# Patient Record
Sex: MALE | Race: WHITE | NOT HISPANIC OR LATINO | Employment: OTHER | URBAN - METROPOLITAN AREA
[De-identification: names, ages, dates, MRNs, and addresses within clinical notes are randomized per-mention and may not be internally consistent; named-entity substitution may affect disease eponyms.]

---

## 2018-06-08 ENCOUNTER — TRANSCRIBE ORDERS (OUTPATIENT)
Dept: ADMINISTRATIVE | Facility: HOSPITAL | Age: 68
End: 2018-06-08

## 2018-06-08 DIAGNOSIS — R60.9 EDEMA, UNSPECIFIED TYPE: Primary | ICD-10-CM

## 2018-06-12 ENCOUNTER — HOSPITAL ENCOUNTER (OUTPATIENT)
Dept: ULTRASOUND IMAGING | Facility: HOSPITAL | Age: 68
Discharge: HOME | End: 2018-06-12
Admitting: PHYSICIAN ASSISTANT

## 2018-06-12 DIAGNOSIS — R60.9 EDEMA, UNSPECIFIED TYPE: ICD-10-CM

## 2018-06-12 PROCEDURE — 76700 US EXAM ABDOM COMPLETE: CPT

## 2018-09-27 ENCOUNTER — TRANSCRIBE ORDERS (OUTPATIENT)
Dept: ADMINISTRATIVE | Facility: HOSPITAL | Age: 68
End: 2018-09-27

## 2018-09-27 DIAGNOSIS — J44.9 CHRONIC OBSTRUCTIVE PULMONARY DISEASE, UNSPECIFIED COPD TYPE (HCC): ICD-10-CM

## 2018-09-27 DIAGNOSIS — R06.09 DOE (DYSPNEA ON EXERTION): Primary | ICD-10-CM

## 2018-11-19 ENCOUNTER — APPOINTMENT (OUTPATIENT)
Dept: PULMONOLOGY | Facility: HOSPITAL | Age: 68
End: 2018-11-19
Attending: INTERNAL MEDICINE

## 2018-11-26 ENCOUNTER — HOSPITAL ENCOUNTER (OUTPATIENT)
Dept: PULMONOLOGY | Facility: HOSPITAL | Age: 68
Discharge: HOME | End: 2018-11-26
Attending: INTERNAL MEDICINE | Admitting: INTERNAL MEDICINE

## 2018-11-26 DIAGNOSIS — R06.09 DOE (DYSPNEA ON EXERTION): ICD-10-CM

## 2018-11-26 DIAGNOSIS — J44.9 CHRONIC OBSTRUCTIVE PULMONARY DISEASE, UNSPECIFIED COPD TYPE (HCC): ICD-10-CM

## 2018-11-26 PROCEDURE — 94060 EVALUATION OF WHEEZING: CPT

## 2018-11-26 PROCEDURE — 94726 PLETHYSMOGRAPHY LUNG VOLUMES: CPT

## 2018-11-26 PROCEDURE — 94729 DIFFUSING CAPACITY: CPT

## 2018-11-26 RX ORDER — ALBUTEROL SULFATE 2.5 MG/3ML
2.5 SOLUTION RESPIRATORY (INHALATION) ONCE
Status: COMPLETED | OUTPATIENT
Start: 2018-11-26 | End: 2018-11-26

## 2018-11-26 RX ADMIN — ALBUTEROL SULFATE 2.5 MG: 2.5 SOLUTION RESPIRATORY (INHALATION) at 09:26

## 2018-12-31 ENCOUNTER — TRANSCRIBE ORDERS (OUTPATIENT)
Dept: ADMINISTRATIVE | Facility: HOSPITAL | Age: 68
End: 2018-12-31

## 2018-12-31 DIAGNOSIS — F17.218 NICOTINE DEPENDENCE, CIGARETTES, WITH OTHER NICOTINE-INDUCED DISORDERS: Primary | ICD-10-CM

## 2018-12-31 DIAGNOSIS — Z12.2 ENCOUNTER FOR SCREENING FOR LUNG CANCER: ICD-10-CM

## 2019-01-04 ENCOUNTER — HOSPITAL ENCOUNTER (OUTPATIENT)
Dept: CT IMAGING | Facility: HOSPITAL | Age: 69
Discharge: HOME | End: 2019-01-04
Attending: INTERNAL MEDICINE | Admitting: INTERNAL MEDICINE

## 2019-01-04 DIAGNOSIS — Z12.2 ENCOUNTER FOR SCREENING FOR LUNG CANCER: ICD-10-CM

## 2019-01-04 DIAGNOSIS — F17.218 NICOTINE DEPENDENCE, CIGARETTES, WITH OTHER NICOTINE-INDUCED DISORDERS: ICD-10-CM

## 2019-01-04 PROCEDURE — G0297 LDCT FOR LUNG CA SCREEN: HCPCS

## 2019-03-28 ENCOUNTER — HOSPITAL ENCOUNTER (OUTPATIENT)
Facility: HOSPITAL | Age: 69
Discharge: HOME | End: 2019-03-30
Attending: EMERGENCY MEDICINE | Admitting: HOSPITALIST

## 2019-03-28 ENCOUNTER — APPOINTMENT (OUTPATIENT)
Dept: GENERAL RADIOLOGY | Facility: HOSPITAL | Age: 69
End: 2019-03-28

## 2019-03-28 DIAGNOSIS — J44.1 COPD WITH ACUTE EXACERBATION (HCC): Primary | ICD-10-CM

## 2019-03-28 DIAGNOSIS — J40 BRONCHITIS: ICD-10-CM

## 2019-03-28 LAB
ALBUMIN SERPL-MCNC: 4.5 G/DL (ref 3.5–5.2)
ALBUMIN/GLOB SERPL: 1.7 G/DL
ALP SERPL-CCNC: 59 U/L (ref 39–117)
ALT SERPL W P-5'-P-CCNC: 22 U/L (ref 1–41)
ANION GAP SERPL CALCULATED.3IONS-SCNC: 12.4 MMOL/L
AST SERPL-CCNC: 15 U/L (ref 1–40)
BASOPHILS # BLD AUTO: 0.09 10*3/MM3 (ref 0–0.2)
BASOPHILS NFR BLD AUTO: 1 % (ref 0–1.5)
BILIRUB SERPL-MCNC: 0.2 MG/DL (ref 0.2–1.2)
BUN BLD-MCNC: 8 MG/DL (ref 8–23)
BUN/CREAT SERPL: 12.5 (ref 7–25)
CALCIUM SPEC-SCNC: 9.2 MG/DL (ref 8.6–10.5)
CHLORIDE SERPL-SCNC: 97 MMOL/L (ref 98–107)
CO2 SERPL-SCNC: 27.6 MMOL/L (ref 22–29)
CREAT BLD-MCNC: 0.64 MG/DL (ref 0.76–1.27)
DEPRECATED RDW RBC AUTO: 44.9 FL (ref 37–54)
EOSINOPHIL # BLD AUTO: 0.15 10*3/MM3 (ref 0–0.4)
EOSINOPHIL NFR BLD AUTO: 1.6 % (ref 0.3–6.2)
ERYTHROCYTE [DISTWIDTH] IN BLOOD BY AUTOMATED COUNT: 12.5 % (ref 12.3–15.4)
FLUAV AG NPH QL: NEGATIVE
FLUBV AG NPH QL IA: NEGATIVE
GFR SERPL CREATININE-BSD FRML MDRD: 125 ML/MIN/1.73
GLOBULIN UR ELPH-MCNC: 2.6 GM/DL
GLUCOSE BLD-MCNC: 138 MG/DL (ref 65–99)
GLUCOSE BLDC GLUCOMTR-MCNC: 204 MG/DL (ref 70–130)
HCT VFR BLD AUTO: 45 % (ref 37.5–51)
HGB BLD-MCNC: 14.8 G/DL (ref 13–17.7)
IMM GRANULOCYTES # BLD AUTO: 0.06 10*3/MM3 (ref 0–0.05)
IMM GRANULOCYTES NFR BLD AUTO: 0.6 % (ref 0–0.5)
LYMPHOCYTES # BLD AUTO: 1.67 10*3/MM3 (ref 0.7–3.1)
LYMPHOCYTES NFR BLD AUTO: 17.8 % (ref 19.6–45.3)
MCH RBC QN AUTO: 32 PG (ref 26.6–33)
MCHC RBC AUTO-ENTMCNC: 32.9 G/DL (ref 31.5–35.7)
MCV RBC AUTO: 97.4 FL (ref 79–97)
MONOCYTES # BLD AUTO: 0.84 10*3/MM3 (ref 0.1–0.9)
MONOCYTES NFR BLD AUTO: 9 % (ref 5–12)
NEUTROPHILS # BLD AUTO: 6.56 10*3/MM3 (ref 1.4–7)
NEUTROPHILS NFR BLD AUTO: 70 % (ref 42.7–76)
NRBC BLD AUTO-RTO: 0 /100 WBC (ref 0–0)
NT-PROBNP SERPL-MCNC: 55.8 PG/ML (ref 5–900)
PLATELET # BLD AUTO: 207 10*3/MM3 (ref 140–450)
PMV BLD AUTO: 11.6 FL (ref 6–12)
POTASSIUM BLD-SCNC: 4.2 MMOL/L (ref 3.5–5.2)
PROT SERPL-MCNC: 7.1 G/DL (ref 6–8.5)
RBC # BLD AUTO: 4.62 10*6/MM3 (ref 4.14–5.8)
SODIUM BLD-SCNC: 137 MMOL/L (ref 136–145)
TROPONIN T SERPL-MCNC: <0.01 NG/ML (ref 0–0.03)
WBC NRBC COR # BLD: 9.37 10*3/MM3 (ref 3.4–10.8)

## 2019-03-28 PROCEDURE — 99284 EMERGENCY DEPT VISIT MOD MDM: CPT

## 2019-03-28 PROCEDURE — 83036 HEMOGLOBIN GLYCOSYLATED A1C: CPT | Performed by: HOSPITALIST

## 2019-03-28 PROCEDURE — 94640 AIRWAY INHALATION TREATMENT: CPT

## 2019-03-28 PROCEDURE — G0378 HOSPITAL OBSERVATION PER HR: HCPCS

## 2019-03-28 PROCEDURE — 25010000002 METHYLPREDNISOLONE PER 125 MG: Performed by: EMERGENCY MEDICINE

## 2019-03-28 PROCEDURE — 94799 UNLISTED PULMONARY SVC/PX: CPT

## 2019-03-28 PROCEDURE — 71046 X-RAY EXAM CHEST 2 VIEWS: CPT

## 2019-03-28 PROCEDURE — 87633 RESP VIRUS 12-25 TARGETS: CPT | Performed by: HOSPITALIST

## 2019-03-28 PROCEDURE — 99219 PR INITIAL OBSERVATION CARE/DAY 50 MINUTES: CPT | Performed by: HOSPITALIST

## 2019-03-28 PROCEDURE — 99285 EMERGENCY DEPT VISIT HI MDM: CPT

## 2019-03-28 PROCEDURE — 85025 COMPLETE CBC W/AUTO DIFF WBC: CPT | Performed by: EMERGENCY MEDICINE

## 2019-03-28 PROCEDURE — 87581 M.PNEUMON DNA AMP PROBE: CPT | Performed by: HOSPITALIST

## 2019-03-28 PROCEDURE — 93005 ELECTROCARDIOGRAM TRACING: CPT | Performed by: EMERGENCY MEDICINE

## 2019-03-28 PROCEDURE — 96374 THER/PROPH/DIAG INJ IV PUSH: CPT

## 2019-03-28 PROCEDURE — 96375 TX/PRO/DX INJ NEW DRUG ADDON: CPT

## 2019-03-28 PROCEDURE — 80053 COMPREHEN METABOLIC PANEL: CPT | Performed by: EMERGENCY MEDICINE

## 2019-03-28 PROCEDURE — 87798 DETECT AGENT NOS DNA AMP: CPT | Performed by: HOSPITALIST

## 2019-03-28 PROCEDURE — 87804 INFLUENZA ASSAY W/OPTIC: CPT | Performed by: EMERGENCY MEDICINE

## 2019-03-28 PROCEDURE — 82962 GLUCOSE BLOOD TEST: CPT

## 2019-03-28 PROCEDURE — 87486 CHLMYD PNEUM DNA AMP PROBE: CPT | Performed by: HOSPITALIST

## 2019-03-28 PROCEDURE — 84484 ASSAY OF TROPONIN QUANT: CPT | Performed by: EMERGENCY MEDICINE

## 2019-03-28 PROCEDURE — 83880 ASSAY OF NATRIURETIC PEPTIDE: CPT | Performed by: EMERGENCY MEDICINE

## 2019-03-28 PROCEDURE — 99284 EMERGENCY DEPT VISIT MOD MDM: CPT | Performed by: EMERGENCY MEDICINE

## 2019-03-28 PROCEDURE — 93010 ELECTROCARDIOGRAM REPORT: CPT | Performed by: INTERNAL MEDICINE

## 2019-03-28 RX ORDER — ALBUTEROL SULFATE 2.5 MG/3ML
2.5 SOLUTION RESPIRATORY (INHALATION) ONCE
Status: COMPLETED | OUTPATIENT
Start: 2019-03-28 | End: 2019-03-28

## 2019-03-28 RX ORDER — METHYLPREDNISOLONE SODIUM SUCCINATE 125 MG/2ML
125 INJECTION, POWDER, LYOPHILIZED, FOR SOLUTION INTRAMUSCULAR; INTRAVENOUS ONCE
Status: COMPLETED | OUTPATIENT
Start: 2019-03-28 | End: 2019-03-28

## 2019-03-28 RX ORDER — NICOTINE 21 MG/24HR
PATCH, TRANSDERMAL 24 HOURS TRANSDERMAL
Status: COMPLETED
Start: 2019-03-28 | End: 2019-03-30

## 2019-03-28 RX ORDER — IPRATROPIUM BROMIDE AND ALBUTEROL SULFATE 2.5; .5 MG/3ML; MG/3ML
3 SOLUTION RESPIRATORY (INHALATION) ONCE
Status: COMPLETED | OUTPATIENT
Start: 2019-03-28 | End: 2019-03-28

## 2019-03-28 RX ORDER — SODIUM CHLORIDE 9 MG/ML
40 INJECTION, SOLUTION INTRAVENOUS AS NEEDED
Status: DISCONTINUED | OUTPATIENT
Start: 2019-03-28 | End: 2019-03-30 | Stop reason: HOSPADM

## 2019-03-28 RX ORDER — NICOTINE 21 MG/24HR
1 PATCH, TRANSDERMAL 24 HOURS TRANSDERMAL ONCE
Status: COMPLETED | OUTPATIENT
Start: 2019-03-29 | End: 2019-03-30

## 2019-03-28 RX ORDER — DEXTROSE MONOHYDRATE 25 G/50ML
25 INJECTION, SOLUTION INTRAVENOUS
Status: DISCONTINUED | OUTPATIENT
Start: 2019-03-28 | End: 2019-03-30 | Stop reason: HOSPADM

## 2019-03-28 RX ORDER — BUDESONIDE 0.5 MG/2ML
0.5 INHALANT ORAL
Status: DISCONTINUED | OUTPATIENT
Start: 2019-03-28 | End: 2019-03-30 | Stop reason: HOSPADM

## 2019-03-28 RX ORDER — SODIUM CHLORIDE 0.9 % (FLUSH) 0.9 %
3-10 SYRINGE (ML) INJECTION AS NEEDED
Status: DISCONTINUED | OUTPATIENT
Start: 2019-03-28 | End: 2019-03-30 | Stop reason: HOSPADM

## 2019-03-28 RX ORDER — SODIUM CHLORIDE 0.9 % (FLUSH) 0.9 %
3 SYRINGE (ML) INJECTION EVERY 12 HOURS SCHEDULED
Status: DISCONTINUED | OUTPATIENT
Start: 2019-03-28 | End: 2019-03-30 | Stop reason: HOSPADM

## 2019-03-28 RX ORDER — METHYLPREDNISOLONE SODIUM SUCCINATE 125 MG/2ML
60 INJECTION, POWDER, LYOPHILIZED, FOR SOLUTION INTRAMUSCULAR; INTRAVENOUS EVERY 8 HOURS
Status: DISCONTINUED | OUTPATIENT
Start: 2019-03-28 | End: 2019-03-29

## 2019-03-28 RX ORDER — NICOTINE POLACRILEX 4 MG
15 LOZENGE BUCCAL
Status: DISCONTINUED | OUTPATIENT
Start: 2019-03-28 | End: 2019-03-30 | Stop reason: HOSPADM

## 2019-03-28 RX ORDER — SODIUM CHLORIDE 9 MG/ML
50 INJECTION, SOLUTION INTRAVENOUS CONTINUOUS
Status: DISCONTINUED | OUTPATIENT
Start: 2019-03-28 | End: 2019-03-29

## 2019-03-28 RX ORDER — ACETAMINOPHEN 325 MG/1
650 TABLET ORAL EVERY 4 HOURS PRN
Status: DISCONTINUED | OUTPATIENT
Start: 2019-03-28 | End: 2019-03-30 | Stop reason: HOSPADM

## 2019-03-28 RX ORDER — NICOTINE 21 MG/24HR
1 PATCH, TRANSDERMAL 24 HOURS TRANSDERMAL EVERY 24 HOURS
Status: DISCONTINUED | OUTPATIENT
Start: 2019-03-28 | End: 2019-03-30 | Stop reason: HOSPADM

## 2019-03-28 RX ORDER — SODIUM CHLORIDE 9 MG/ML
INJECTION, SOLUTION INTRAVENOUS
Status: DISPENSED
Start: 2019-03-28 | End: 2019-03-29

## 2019-03-28 RX ORDER — ALBUTEROL SULFATE 2.5 MG/3ML
2.5 SOLUTION RESPIRATORY (INHALATION)
Status: DISCONTINUED | OUTPATIENT
Start: 2019-03-28 | End: 2019-03-30 | Stop reason: HOSPADM

## 2019-03-28 RX ORDER — AZITHROMYCIN 500 MG/1
INJECTION, POWDER, LYOPHILIZED, FOR SOLUTION INTRAVENOUS
Status: DISPENSED
Start: 2019-03-28 | End: 2019-03-29

## 2019-03-28 RX ADMIN — ALBUTEROL SULFATE 2.5 MG: 2.5 SOLUTION RESPIRATORY (INHALATION) at 23:36

## 2019-03-28 RX ADMIN — IPRATROPIUM BROMIDE AND ALBUTEROL SULFATE 3 ML: .5; 3 SOLUTION RESPIRATORY (INHALATION) at 19:38

## 2019-03-28 RX ADMIN — ALBUTEROL SULFATE 2.5 MG: 2.5 SOLUTION RESPIRATORY (INHALATION) at 19:41

## 2019-03-28 RX ADMIN — ALBUTEROL SULFATE 2.5 MG: 2.5 SOLUTION RESPIRATORY (INHALATION) at 20:14

## 2019-03-28 RX ADMIN — METHYLPREDNISOLONE SODIUM SUCCINATE 125 MG: 125 INJECTION, POWDER, FOR SOLUTION INTRAMUSCULAR; INTRAVENOUS at 19:46

## 2019-03-28 RX ADMIN — BUDESONIDE 0.5 MG: 0.5 SUSPENSION RESPIRATORY (INHALATION) at 23:36

## 2019-03-29 PROBLEM — J96.21 ACUTE ON CHRONIC RESPIRATORY FAILURE WITH HYPOXIA: Status: ACTIVE | Noted: 2019-03-29

## 2019-03-29 LAB
B PARAPERT DNA SPEC QL NAA+PROBE: NOT DETECTED
B PERT DNA SPEC QL NAA+PROBE: NOT DETECTED
C PNEUM DNA NPH QL NAA+NON-PROBE: NOT DETECTED
FLUAV H1 2009 PAND RNA NPH QL NAA+PROBE: NOT DETECTED
FLUAV H1 HA GENE NPH QL NAA+PROBE: NOT DETECTED
FLUAV H3 RNA NPH QL NAA+PROBE: NOT DETECTED
FLUAV SUBTYP SPEC NAA+PROBE: NOT DETECTED
FLUBV RNA ISLT QL NAA+PROBE: NOT DETECTED
GLUCOSE BLDC GLUCOMTR-MCNC: 206 MG/DL (ref 70–130)
GLUCOSE BLDC GLUCOMTR-MCNC: 267 MG/DL (ref 70–130)
GLUCOSE BLDC GLUCOMTR-MCNC: 299 MG/DL (ref 70–130)
GLUCOSE BLDC GLUCOMTR-MCNC: 302 MG/DL (ref 70–130)
HADV DNA SPEC NAA+PROBE: NOT DETECTED
HBA1C MFR BLD: 9.6 % (ref 4.8–5.6)
HCOV 229E RNA SPEC QL NAA+PROBE: NOT DETECTED
HCOV HKU1 RNA SPEC QL NAA+PROBE: NOT DETECTED
HCOV NL63 RNA SPEC QL NAA+PROBE: NOT DETECTED
HCOV OC43 RNA SPEC QL NAA+PROBE: NOT DETECTED
HMPV RNA NPH QL NAA+NON-PROBE: NOT DETECTED
HPIV1 RNA SPEC QL NAA+PROBE: NOT DETECTED
HPIV2 RNA SPEC QL NAA+PROBE: NOT DETECTED
HPIV3 RNA NPH QL NAA+PROBE: NOT DETECTED
HPIV4 P GENE NPH QL NAA+PROBE: NOT DETECTED
M PNEUMO IGG SER IA-ACNC: NOT DETECTED
PROCALCITONIN SERPL-MCNC: 0.12 NG/ML (ref 0.1–0.25)
RHINOVIRUS RNA SPEC NAA+PROBE: NOT DETECTED
RSV RNA NPH QL NAA+NON-PROBE: NOT DETECTED

## 2019-03-29 PROCEDURE — 63710000001 NICOTINE 21 MG/24HR PATCH 24 HOUR: Performed by: HOSPITALIST

## 2019-03-29 PROCEDURE — 94799 UNLISTED PULMONARY SVC/PX: CPT

## 2019-03-29 PROCEDURE — 94640 AIRWAY INHALATION TREATMENT: CPT

## 2019-03-29 PROCEDURE — A9270 NON-COVERED ITEM OR SERVICE: HCPCS | Performed by: HOSPITALIST

## 2019-03-29 PROCEDURE — 96376 TX/PRO/DX INJ SAME DRUG ADON: CPT

## 2019-03-29 PROCEDURE — 87070 CULTURE OTHR SPECIMN AEROBIC: CPT | Performed by: HOSPITALIST

## 2019-03-29 PROCEDURE — A9270 NON-COVERED ITEM OR SERVICE: HCPCS

## 2019-03-29 PROCEDURE — 84145 PROCALCITONIN (PCT): CPT | Performed by: INTERNAL MEDICINE

## 2019-03-29 PROCEDURE — 63710000001 ACETAMINOPHEN 325 MG TABLET: Performed by: HOSPITALIST

## 2019-03-29 PROCEDURE — 63710000001 METFORMIN 500 MG TABLET: Performed by: HOSPITALIST

## 2019-03-29 PROCEDURE — 25010000002 AZITHROMYCIN: Performed by: HOSPITALIST

## 2019-03-29 PROCEDURE — 63710000001 GLIPIZIDE 5 MG TABLET: Performed by: HOSPITALIST

## 2019-03-29 PROCEDURE — 25010000002 METHYLPREDNISOLONE PER 125 MG: Performed by: INTERNAL MEDICINE

## 2019-03-29 PROCEDURE — 25010000002 METHYLPREDNISOLONE PER 40 MG: Performed by: INTERNAL MEDICINE

## 2019-03-29 PROCEDURE — 96372 THER/PROPH/DIAG INJ SC/IM: CPT

## 2019-03-29 PROCEDURE — 25010000002 METHYLPREDNISOLONE PER 40 MG

## 2019-03-29 PROCEDURE — 96365 THER/PROPH/DIAG IV INF INIT: CPT

## 2019-03-29 PROCEDURE — 25010000002 ENOXAPARIN PER 10 MG: Performed by: HOSPITALIST

## 2019-03-29 PROCEDURE — 82962 GLUCOSE BLOOD TEST: CPT

## 2019-03-29 PROCEDURE — 99232 SBSQ HOSP IP/OBS MODERATE 35: CPT | Performed by: HOSPITALIST

## 2019-03-29 PROCEDURE — 63710000001 NICOTINE 14 MG/24HR PATCH 24 HOUR

## 2019-03-29 PROCEDURE — 87205 SMEAR GRAM STAIN: CPT | Performed by: HOSPITALIST

## 2019-03-29 PROCEDURE — 63710000001 INSULIN ASPART PER 5 UNITS: Performed by: HOSPITALIST

## 2019-03-29 RX ORDER — BUDESONIDE AND FORMOTEROL FUMARATE DIHYDRATE 160; 4.5 UG/1; UG/1
2 AEROSOL RESPIRATORY (INHALATION)
COMMUNITY
End: 2019-12-18 | Stop reason: HOSPADM

## 2019-03-29 RX ORDER — DOXAZOSIN MESYLATE 1 MG/1
1 TABLET ORAL DAILY
Status: ON HOLD | COMMUNITY
End: 2022-07-07

## 2019-03-29 RX ORDER — METHYLPREDNISOLONE SODIUM SUCCINATE 40 MG/ML
INJECTION, POWDER, LYOPHILIZED, FOR SOLUTION INTRAMUSCULAR; INTRAVENOUS
Status: COMPLETED
Start: 2019-03-29 | End: 2019-03-29

## 2019-03-29 RX ORDER — METHYLPREDNISOLONE SODIUM SUCCINATE 40 MG/ML
40 INJECTION, POWDER, LYOPHILIZED, FOR SOLUTION INTRAMUSCULAR; INTRAVENOUS EVERY 8 HOURS
Status: DISCONTINUED | OUTPATIENT
Start: 2019-03-29 | End: 2019-03-30 | Stop reason: HOSPADM

## 2019-03-29 RX ORDER — GLIPIZIDE 5 MG/1
5 TABLET ORAL
Status: DISCONTINUED | OUTPATIENT
Start: 2019-03-29 | End: 2019-03-30 | Stop reason: HOSPADM

## 2019-03-29 RX ADMIN — INSULIN ASPART 5 UNITS: 100 INJECTION, SOLUTION INTRAVENOUS; SUBCUTANEOUS at 21:21

## 2019-03-29 RX ADMIN — ALBUTEROL SULFATE 2.5 MG: 2.5 SOLUTION RESPIRATORY (INHALATION) at 14:43

## 2019-03-29 RX ADMIN — METHYLPREDNISOLONE SODIUM SUCCINATE 60 MG: 125 INJECTION, POWDER, LYOPHILIZED, FOR SOLUTION INTRAMUSCULAR; INTRAVENOUS at 03:13

## 2019-03-29 RX ADMIN — AZITHROMYCIN MONOHYDRATE 500 MG: 500 INJECTION, POWDER, LYOPHILIZED, FOR SOLUTION INTRAVENOUS at 23:04

## 2019-03-29 RX ADMIN — INSULIN ASPART 3 UNITS: 100 INJECTION, SOLUTION INTRAVENOUS; SUBCUTANEOUS at 00:17

## 2019-03-29 RX ADMIN — SODIUM CHLORIDE, PRESERVATIVE FREE 3 ML: 5 INJECTION INTRAVENOUS at 00:17

## 2019-03-29 RX ADMIN — SODIUM CHLORIDE, PRESERVATIVE FREE 3 ML: 5 INJECTION INTRAVENOUS at 08:06

## 2019-03-29 RX ADMIN — NICOTINE 1 PATCH: 21 PATCH TRANSDERMAL at 09:20

## 2019-03-29 RX ADMIN — SODIUM CHLORIDE 50 ML/HR: 9 INJECTION, SOLUTION INTRAVENOUS at 00:07

## 2019-03-29 RX ADMIN — METHYLPREDNISOLONE SODIUM SUCCINATE 40 MG: 125 INJECTION, POWDER, FOR SOLUTION INTRAMUSCULAR; INTRAVENOUS at 12:56

## 2019-03-29 RX ADMIN — BUDESONIDE 0.5 MG: 0.5 SUSPENSION RESPIRATORY (INHALATION) at 07:16

## 2019-03-29 RX ADMIN — NICOTINE 1 PATCH: 14 PATCH TRANSDERMAL at 00:17

## 2019-03-29 RX ADMIN — ALBUTEROL SULFATE 2.5 MG: 2.5 SOLUTION RESPIRATORY (INHALATION) at 22:54

## 2019-03-29 RX ADMIN — GLIPIZIDE 5 MG: 5 TABLET ORAL at 17:24

## 2019-03-29 RX ADMIN — ACETAMINOPHEN 650 MG: 325 TABLET, FILM COATED ORAL at 23:04

## 2019-03-29 RX ADMIN — AZITHROMYCIN MONOHYDRATE 500 MG: 500 INJECTION, POWDER, LYOPHILIZED, FOR SOLUTION INTRAVENOUS at 00:16

## 2019-03-29 RX ADMIN — METHYLPREDNISOLONE SODIUM SUCCINATE 40 MG: 125 INJECTION, POWDER, FOR SOLUTION INTRAMUSCULAR; INTRAVENOUS at 21:17

## 2019-03-29 RX ADMIN — ACETAMINOPHEN 650 MG: 325 TABLET, FILM COATED ORAL at 03:13

## 2019-03-29 RX ADMIN — ALBUTEROL SULFATE 2.5 MG: 2.5 SOLUTION RESPIRATORY (INHALATION) at 07:16

## 2019-03-29 RX ADMIN — INSULIN ASPART 4 UNITS: 100 INJECTION, SOLUTION INTRAVENOUS; SUBCUTANEOUS at 08:00

## 2019-03-29 RX ADMIN — Medication 1 PATCH: at 00:17

## 2019-03-29 RX ADMIN — ALBUTEROL SULFATE 2.5 MG: 2.5 SOLUTION RESPIRATORY (INHALATION) at 03:30

## 2019-03-29 RX ADMIN — BUDESONIDE 0.5 MG: 0.5 SUSPENSION RESPIRATORY (INHALATION) at 19:14

## 2019-03-29 RX ADMIN — ENOXAPARIN SODIUM 40 MG: 40 INJECTION SUBCUTANEOUS at 08:05

## 2019-03-29 RX ADMIN — SODIUM CHLORIDE, PRESERVATIVE FREE 3 ML: 5 INJECTION INTRAVENOUS at 21:18

## 2019-03-29 RX ADMIN — INSULIN ASPART 4 UNITS: 100 INJECTION, SOLUTION INTRAVENOUS; SUBCUTANEOUS at 17:15

## 2019-03-29 RX ADMIN — METFORMIN HYDROCHLORIDE 500 MG: 500 TABLET ORAL at 17:16

## 2019-03-29 RX ADMIN — INSULIN ASPART 3 UNITS: 100 INJECTION, SOLUTION INTRAVENOUS; SUBCUTANEOUS at 12:55

## 2019-03-29 RX ADMIN — ALBUTEROL SULFATE 2.5 MG: 2.5 SOLUTION RESPIRATORY (INHALATION) at 19:14

## 2019-03-29 RX ADMIN — ALBUTEROL SULFATE 2.5 MG: 2.5 SOLUTION RESPIRATORY (INHALATION) at 11:04

## 2019-03-29 RX ADMIN — METHYLPREDNISOLONE SODIUM SUCCINATE 60 MG: 40 INJECTION, POWDER, FOR SOLUTION INTRAMUSCULAR; INTRAVENOUS at 03:13

## 2019-03-30 VITALS
HEIGHT: 74 IN | OXYGEN SATURATION: 90 % | HEART RATE: 103 BPM | BODY MASS INDEX: 34.65 KG/M2 | RESPIRATION RATE: 20 BRPM | TEMPERATURE: 97.7 F | DIASTOLIC BLOOD PRESSURE: 83 MMHG | SYSTOLIC BLOOD PRESSURE: 132 MMHG | WEIGHT: 270 LBS

## 2019-03-30 LAB
GLUCOSE BLDC GLUCOMTR-MCNC: 250 MG/DL (ref 70–130)
GLUCOSE BLDC GLUCOMTR-MCNC: 270 MG/DL (ref 70–130)

## 2019-03-30 PROCEDURE — 63710000001 NICOTINE 21 MG/24HR PATCH 24 HOUR: Performed by: HOSPITALIST

## 2019-03-30 PROCEDURE — A9270 NON-COVERED ITEM OR SERVICE: HCPCS | Performed by: HOSPITALIST

## 2019-03-30 PROCEDURE — 25010000002 METHYLPREDNISOLONE PER 40 MG: Performed by: INTERNAL MEDICINE

## 2019-03-30 PROCEDURE — 96372 THER/PROPH/DIAG INJ SC/IM: CPT

## 2019-03-30 PROCEDURE — 94799 UNLISTED PULMONARY SVC/PX: CPT

## 2019-03-30 PROCEDURE — 63710000001 GLIPIZIDE 5 MG TABLET: Performed by: HOSPITALIST

## 2019-03-30 PROCEDURE — 82962 GLUCOSE BLOOD TEST: CPT

## 2019-03-30 PROCEDURE — 96376 TX/PRO/DX INJ SAME DRUG ADON: CPT

## 2019-03-30 PROCEDURE — 99217 PR OBSERVATION CARE DISCHARGE MANAGEMENT: CPT | Performed by: HOSPITALIST

## 2019-03-30 PROCEDURE — 63710000001 INSULIN ASPART PER 5 UNITS: Performed by: HOSPITALIST

## 2019-03-30 PROCEDURE — 25010000002 ENOXAPARIN PER 10 MG: Performed by: HOSPITALIST

## 2019-03-30 PROCEDURE — 63710000001 METFORMIN 500 MG TABLET: Performed by: HOSPITALIST

## 2019-03-30 RX ORDER — NICOTINE 21 MG/24HR
1 PATCH, TRANSDERMAL 24 HOURS TRANSDERMAL EVERY 24 HOURS
Qty: 14 EACH | Refills: 0 | Status: SHIPPED | OUTPATIENT
Start: 2019-03-31 | End: 2019-12-07

## 2019-03-30 RX ORDER — AZITHROMYCIN 250 MG/1
TABLET, FILM COATED ORAL
Qty: 6 TABLET | Refills: 0 | Status: SHIPPED | OUTPATIENT
Start: 2019-03-30 | End: 2019-11-09

## 2019-03-30 RX ORDER — METHYLPREDNISOLONE 4 MG/1
TABLET ORAL
Qty: 21 TABLET | Refills: 0 | Status: SHIPPED | OUTPATIENT
Start: 2019-03-30 | End: 2019-11-09

## 2019-03-30 RX ADMIN — ALBUTEROL SULFATE 2.5 MG: 2.5 SOLUTION RESPIRATORY (INHALATION) at 07:16

## 2019-03-30 RX ADMIN — BUDESONIDE 0.5 MG: 0.5 SUSPENSION RESPIRATORY (INHALATION) at 07:16

## 2019-03-30 RX ADMIN — INSULIN ASPART 4 UNITS: 100 INJECTION, SOLUTION INTRAVENOUS; SUBCUTANEOUS at 12:13

## 2019-03-30 RX ADMIN — METFORMIN HYDROCHLORIDE 500 MG: 500 TABLET ORAL at 08:26

## 2019-03-30 RX ADMIN — METHYLPREDNISOLONE SODIUM SUCCINATE 40 MG: 125 INJECTION, POWDER, FOR SOLUTION INTRAMUSCULAR; INTRAVENOUS at 12:14

## 2019-03-30 RX ADMIN — INSULIN ASPART 4 UNITS: 100 INJECTION, SOLUTION INTRAVENOUS; SUBCUTANEOUS at 08:21

## 2019-03-30 RX ADMIN — GLIPIZIDE 5 MG: 5 TABLET ORAL at 08:26

## 2019-03-30 RX ADMIN — ALBUTEROL SULFATE 2.5 MG: 2.5 SOLUTION RESPIRATORY (INHALATION) at 02:53

## 2019-03-30 RX ADMIN — NICOTINE 1 PATCH: 21 PATCH TRANSDERMAL at 08:26

## 2019-03-30 RX ADMIN — SODIUM CHLORIDE, PRESERVATIVE FREE 3 ML: 5 INJECTION INTRAVENOUS at 08:26

## 2019-03-30 RX ADMIN — ENOXAPARIN SODIUM 40 MG: 40 INJECTION SUBCUTANEOUS at 08:26

## 2019-03-30 RX ADMIN — METHYLPREDNISOLONE SODIUM SUCCINATE 40 MG: 125 INJECTION, POWDER, FOR SOLUTION INTRAMUSCULAR; INTRAVENOUS at 03:07

## 2019-03-30 RX ADMIN — ALBUTEROL SULFATE 2.5 MG: 2.5 SOLUTION RESPIRATORY (INHALATION) at 11:20

## 2019-03-31 ENCOUNTER — READMISSION MANAGEMENT (OUTPATIENT)
Dept: CALL CENTER | Facility: HOSPITAL | Age: 69
End: 2019-03-31

## 2019-03-31 LAB
BACTERIA SPEC RESP CULT: NORMAL
GRAM STN SPEC: NORMAL

## 2019-03-31 NOTE — OUTREACH NOTE
Prep Survey      Responses   Facility patient discharged from?  [facility]   Is LACE score < 7 ?  No   Is patient eligible?  Yes   Discharge diagnosis  AE COPD, acute asthmatic bronchitis/probably viral, Hypoxia, osteoarthritis with pain, IDDM II, obesity, tobacco abuse   Does the patient have one of the following disease processes/diagnoses(primary or secondary)?  COPD/Pneumonia   Does the patient have Home health ordered?  No   Is there a DME ordered?  No   What DME was ordered?  Pt. has home O2 per [facility], nebulizer and glucometer as well.    Comments regarding appointments  Pt. to schedule follow up appointments.   Prep survey completed?  Yes          Ariana Stafford RN

## 2019-04-02 ENCOUNTER — READMISSION MANAGEMENT (OUTPATIENT)
Dept: CALL CENTER | Facility: HOSPITAL | Age: 69
End: 2019-04-02

## 2019-04-02 NOTE — OUTREACH NOTE
COPD/PN Week 1 Survey      Responses   Facility patient discharged from?  [facility]   Does the patient have one of the following disease processes/diagnoses(primary or secondary)?  COPD/Pneumonia   Is there a successful TCM telephone encounter documented?  No   Was the primary reason for admission:  COPD exacerbation   Week 1 attempt successful?  Yes   Call start time  1244   Call end time  1248   Discharge diagnosis  AE COPD, acute asthmatic bronchitis/probably viral, Hypoxia, osteoarthritis with pain, IDDM II, obesity, tobacco abuse   Meds reviewed with patient/caregiver?  Yes   Is the patient having any side effects they believe may be caused by any medication additions or changes?  No   Does the patient have all medications ordered at discharge?  Yes   Is the patient taking all medications as directed (includes completed medication regime)?  Yes   Does the patient have a primary care provider?   Yes   Does the patient have an appointment with their PCP or pulmonologist within 7 days of discharge?  Yes   Has the patient kept scheduled appointments due by today?  N/A   Has home health visited the patient within 72 hours of discharge?  N/A   Psychosocial issues?  No   Did the patient receive a copy of their discharge instructions?  Yes   Nursing interventions  Reviewed instructions with patient   What is the patient's perception of their health status since discharge?  Improving   Nursing Interventions  Nurse provided patient education   Are the patient's immunizations up to date?   Yes   Nursing interventions  Educated on importance of maintaining up to date immunizations as advised by provider, Advised patient to discuss with provider at next visit   Is the patient/caregiver able to teach back the hierarchy of who to call/visit for symptoms/problems? PCP, Specialist, Home health nurse, Urgent Care, ED, 911  Yes   Is the patient able to teach back COPD zones?  Yes   Nursing interventions  Education provided on  various zones   Patient reports what zone on this call?  Green Zone   Green Zone  Reports doing well, Appetite is good, Usual activity and exercise level, Usual amount of phlegm/mucus without difficulty coughing up, Sleeping well   Green Zone interventions:  Take daily medications   Is the patient/caregiver able to teach back signs and symptoms of worsening condition:  Fever/chills, Shortness of breath, Chest pain   Is the patient/caregiver able to teach back importance of completing antibiotic course of treatment?  Yes   Week 1 call completed?  Yes          Reji Garner RN

## 2019-04-10 ENCOUNTER — READMISSION MANAGEMENT (OUTPATIENT)
Dept: CALL CENTER | Facility: HOSPITAL | Age: 69
End: 2019-04-10

## 2019-04-10 NOTE — OUTREACH NOTE
COPD/PN Week 2 Survey      Responses   Facility patient discharged from?  [facility]   Does the patient have one of the following disease processes/diagnoses(primary or secondary)?  COPD/Pneumonia   Was the primary reason for admission:  COPD exacerbation   Week 2 attempt successful?  No   Unsuccessful attempts  Attempt 1          Amena Powers RN

## 2019-04-12 ENCOUNTER — READMISSION MANAGEMENT (OUTPATIENT)
Dept: CALL CENTER | Facility: HOSPITAL | Age: 69
End: 2019-04-12

## 2019-04-12 NOTE — OUTREACH NOTE
COPD/PN Week 2 Survey      Responses   Facility patient discharged from?  [facility]   Does the patient have one of the following disease processes/diagnoses(primary or secondary)?  COPD/Pneumonia   Was the primary reason for admission:  COPD exacerbation   Week 2 attempt successful?  Yes   Call start time  1422   Call end time  1437   Discharge diagnosis  AE COPD, acute asthmatic bronchitis/probably viral, Hypoxia, osteoarthritis with pain, IDDM II, obesity, tobacco abuse   Meds reviewed with patient/caregiver?  Yes   Is the patient taking all medications as directed (includes completed medication regime)?  Yes   Does the patient have a primary care provider?   Yes   Has the patient kept scheduled appointments due by today?  N/A   Comments  Next Wednesday PCP 04/17 Dr Kathleen   Has home health visited the patient within 72 hours of discharge?  N/A   What DME was ordered?   Using nebulizer 4-5 times a day.  Wears O2 at night.   What is the patient's perception of their health status since discharge?  Improving   Nursing Interventions  Advised patient to call provider [Blood sugar has been elevated since getting home.  Was 400 this am, insulin was d/c at discharge and he doesn't understand why.  He took his previous insulin dose this am of 38 units.  Advised to call PCP re: BS readings and insulin dosing.]   Is the patient able to teach back COPD zones?  Yes   Patient reports what zone on this call?  Green Zone   Green Zone  Breathing without shortness of breath, Usual activity and exercise level   Green Zone interventions:  Take daily medications, Use oxygen as prescribed   Week 2 call completed?  Yes          Liza Rock RN

## 2019-11-09 ENCOUNTER — HOSPITAL ENCOUNTER (EMERGENCY)
Facility: HOSPITAL | Age: 69
Discharge: HOME | End: 2019-11-09
Attending: EMERGENCY MEDICINE | Admitting: EMERGENCY MEDICINE

## 2019-11-09 ENCOUNTER — APPOINTMENT (OUTPATIENT)
Dept: GENERAL RADIOLOGY | Facility: HOSPITAL | Age: 69
End: 2019-11-09

## 2019-11-09 VITALS
RESPIRATION RATE: 20 BRPM | BODY MASS INDEX: 34.65 KG/M2 | WEIGHT: 270 LBS | HEART RATE: 90 BPM | SYSTOLIC BLOOD PRESSURE: 142 MMHG | OXYGEN SATURATION: 90 % | HEIGHT: 74 IN | TEMPERATURE: 97.9 F | DIASTOLIC BLOOD PRESSURE: 86 MMHG

## 2019-11-09 DIAGNOSIS — J44.1 ACUTE EXACERBATION OF CHRONIC OBSTRUCTIVE PULMONARY DISEASE (COPD) (HCC): Primary | ICD-10-CM

## 2019-11-09 LAB
ALBUMIN SERPL-MCNC: 4.4 G/DL (ref 3.5–5.2)
ALBUMIN/GLOB SERPL: 2 G/DL
ALP SERPL-CCNC: 45 U/L (ref 39–117)
ALT SERPL W P-5'-P-CCNC: 19 U/L (ref 1–41)
ANION GAP SERPL CALCULATED.3IONS-SCNC: 12.7 MMOL/L (ref 5–15)
APTT PPP: 27.2 SECONDS (ref 24.3–38.1)
AST SERPL-CCNC: 20 U/L (ref 1–40)
BASOPHILS # BLD AUTO: 0.07 10*3/MM3 (ref 0–0.2)
BASOPHILS NFR BLD AUTO: 1 % (ref 0–1.5)
BILIRUB SERPL-MCNC: 0.3 MG/DL (ref 0.2–1.2)
BILIRUB UR QL STRIP: NEGATIVE
BUN BLD-MCNC: 9 MG/DL (ref 8–23)
BUN/CREAT SERPL: 10.8 (ref 7–25)
CALCIUM SPEC-SCNC: 9.5 MG/DL (ref 8.6–10.5)
CHLORIDE SERPL-SCNC: 102 MMOL/L (ref 98–107)
CLARITY UR: CLEAR
CO2 SERPL-SCNC: 28.3 MMOL/L (ref 22–29)
COLOR UR: YELLOW
CREAT BLD-MCNC: 0.83 MG/DL (ref 0.76–1.27)
D-LACTATE SERPL-SCNC: 1.8 MMOL/L (ref 0.5–2)
DEPRECATED RDW RBC AUTO: 46.7 FL (ref 37–54)
EOSINOPHIL # BLD AUTO: 0.19 10*3/MM3 (ref 0–0.4)
EOSINOPHIL NFR BLD AUTO: 2.6 % (ref 0.3–6.2)
ERYTHROCYTE [DISTWIDTH] IN BLOOD BY AUTOMATED COUNT: 13 % (ref 12.3–15.4)
FLUAV AG NPH QL: NEGATIVE
FLUBV AG NPH QL IA: NEGATIVE
GFR SERPL CREATININE-BSD FRML MDRD: 92 ML/MIN/1.73
GLOBULIN UR ELPH-MCNC: 2.2 GM/DL
GLUCOSE BLD-MCNC: 117 MG/DL (ref 65–99)
GLUCOSE UR STRIP-MCNC: NEGATIVE MG/DL
HCT VFR BLD AUTO: 44.5 % (ref 37.5–51)
HGB BLD-MCNC: 14.3 G/DL (ref 13–17.7)
HGB UR QL STRIP.AUTO: NEGATIVE
IMM GRANULOCYTES # BLD AUTO: 0.04 10*3/MM3 (ref 0–0.05)
IMM GRANULOCYTES NFR BLD AUTO: 0.6 % (ref 0–0.5)
INR PPP: 0.97 (ref 0.9–1.1)
KETONES UR QL STRIP: NEGATIVE
LEUKOCYTE ESTERASE UR QL STRIP.AUTO: NEGATIVE
LIPASE SERPL-CCNC: 11 U/L (ref 13–60)
LYMPHOCYTES # BLD AUTO: 1.78 10*3/MM3 (ref 0.7–3.1)
LYMPHOCYTES NFR BLD AUTO: 24.8 % (ref 19.6–45.3)
MCH RBC QN AUTO: 31.6 PG (ref 26.6–33)
MCHC RBC AUTO-ENTMCNC: 32.1 G/DL (ref 31.5–35.7)
MCV RBC AUTO: 98.2 FL (ref 79–97)
MONOCYTES # BLD AUTO: 0.63 10*3/MM3 (ref 0.1–0.9)
MONOCYTES NFR BLD AUTO: 8.8 % (ref 5–12)
NEUTROPHILS # BLD AUTO: 4.46 10*3/MM3 (ref 1.7–7)
NEUTROPHILS NFR BLD AUTO: 62.2 % (ref 42.7–76)
NITRITE UR QL STRIP: NEGATIVE
NRBC BLD AUTO-RTO: 0 /100 WBC (ref 0–0.2)
NT-PROBNP SERPL-MCNC: 20.5 PG/ML (ref 5–900)
PH UR STRIP.AUTO: 8.5 [PH] (ref 4.5–8)
PLATELET # BLD AUTO: 208 10*3/MM3 (ref 140–450)
PMV BLD AUTO: 11.5 FL (ref 6–12)
POTASSIUM BLD-SCNC: 4 MMOL/L (ref 3.5–5.2)
PROT SERPL-MCNC: 6.6 G/DL (ref 6–8.5)
PROT UR QL STRIP: NEGATIVE
PROTHROMBIN TIME: 12.6 SECONDS (ref 12.1–15)
RBC # BLD AUTO: 4.53 10*6/MM3 (ref 4.14–5.8)
SODIUM BLD-SCNC: 143 MMOL/L (ref 136–145)
SP GR UR STRIP: 1.01 (ref 1–1.03)
TROPONIN T SERPL-MCNC: 0.01 NG/ML (ref 0–0.03)
UROBILINOGEN UR QL STRIP: ABNORMAL
WBC NRBC COR # BLD: 7.17 10*3/MM3 (ref 3.4–10.8)

## 2019-11-09 PROCEDURE — 81003 URINALYSIS AUTO W/O SCOPE: CPT | Performed by: EMERGENCY MEDICINE

## 2019-11-09 PROCEDURE — 94799 UNLISTED PULMONARY SVC/PX: CPT

## 2019-11-09 PROCEDURE — 85730 THROMBOPLASTIN TIME PARTIAL: CPT | Performed by: EMERGENCY MEDICINE

## 2019-11-09 PROCEDURE — 94640 AIRWAY INHALATION TREATMENT: CPT

## 2019-11-09 PROCEDURE — 87804 INFLUENZA ASSAY W/OPTIC: CPT | Performed by: EMERGENCY MEDICINE

## 2019-11-09 PROCEDURE — 85025 COMPLETE CBC W/AUTO DIFF WBC: CPT | Performed by: EMERGENCY MEDICINE

## 2019-11-09 PROCEDURE — 93005 ELECTROCARDIOGRAM TRACING: CPT | Performed by: EMERGENCY MEDICINE

## 2019-11-09 PROCEDURE — 80053 COMPREHEN METABOLIC PANEL: CPT | Performed by: EMERGENCY MEDICINE

## 2019-11-09 PROCEDURE — 96374 THER/PROPH/DIAG INJ IV PUSH: CPT

## 2019-11-09 PROCEDURE — 99284 EMERGENCY DEPT VISIT MOD MDM: CPT | Performed by: EMERGENCY MEDICINE

## 2019-11-09 PROCEDURE — 83605 ASSAY OF LACTIC ACID: CPT | Performed by: EMERGENCY MEDICINE

## 2019-11-09 PROCEDURE — 25010000002 METHYLPREDNISOLONE PER 125 MG: Performed by: EMERGENCY MEDICINE

## 2019-11-09 PROCEDURE — 36415 COLL VENOUS BLD VENIPUNCTURE: CPT

## 2019-11-09 PROCEDURE — 93010 ELECTROCARDIOGRAM REPORT: CPT | Performed by: INTERNAL MEDICINE

## 2019-11-09 PROCEDURE — 83690 ASSAY OF LIPASE: CPT | Performed by: EMERGENCY MEDICINE

## 2019-11-09 PROCEDURE — 71046 X-RAY EXAM CHEST 2 VIEWS: CPT

## 2019-11-09 PROCEDURE — 85610 PROTHROMBIN TIME: CPT | Performed by: EMERGENCY MEDICINE

## 2019-11-09 PROCEDURE — 84484 ASSAY OF TROPONIN QUANT: CPT | Performed by: EMERGENCY MEDICINE

## 2019-11-09 PROCEDURE — 99284 EMERGENCY DEPT VISIT MOD MDM: CPT

## 2019-11-09 PROCEDURE — 87040 BLOOD CULTURE FOR BACTERIA: CPT | Performed by: EMERGENCY MEDICINE

## 2019-11-09 PROCEDURE — 83880 ASSAY OF NATRIURETIC PEPTIDE: CPT | Performed by: EMERGENCY MEDICINE

## 2019-11-09 RX ORDER — IPRATROPIUM BROMIDE AND ALBUTEROL SULFATE 2.5; .5 MG/3ML; MG/3ML
3 SOLUTION RESPIRATORY (INHALATION) ONCE
Status: COMPLETED | OUTPATIENT
Start: 2019-11-09 | End: 2019-11-09

## 2019-11-09 RX ORDER — METHYLPREDNISOLONE 4 MG/1
TABLET ORAL
Qty: 21 TABLET | Refills: 0 | Status: ON HOLD | OUTPATIENT
Start: 2019-11-09 | End: 2019-12-07

## 2019-11-09 RX ORDER — SODIUM CHLORIDE 0.9 % (FLUSH) 0.9 %
10 SYRINGE (ML) INJECTION AS NEEDED
Status: DISCONTINUED | OUTPATIENT
Start: 2019-11-09 | End: 2019-11-09 | Stop reason: HOSPADM

## 2019-11-09 RX ORDER — METHYLPREDNISOLONE SODIUM SUCCINATE 125 MG/2ML
125 INJECTION, POWDER, LYOPHILIZED, FOR SOLUTION INTRAMUSCULAR; INTRAVENOUS ONCE
Status: COMPLETED | OUTPATIENT
Start: 2019-11-09 | End: 2019-11-09

## 2019-11-09 RX ADMIN — IPRATROPIUM BROMIDE AND ALBUTEROL SULFATE 3 ML: .5; 3 SOLUTION RESPIRATORY (INHALATION) at 10:03

## 2019-11-09 RX ADMIN — IPRATROPIUM BROMIDE AND ALBUTEROL SULFATE 3 ML: .5; 3 SOLUTION RESPIRATORY (INHALATION) at 08:44

## 2019-11-09 RX ADMIN — METHYLPREDNISOLONE SODIUM SUCCINATE 125 MG: 125 INJECTION, POWDER, FOR SOLUTION INTRAMUSCULAR; INTRAVENOUS at 09:45

## 2019-11-09 NOTE — ED PROVIDER NOTES
EMERGENCY DEPARTMENT ENCOUNTER      Room Number: 4/04      HPI:    Chief complaint: Shortness of breath     Location: Not applicable    Quality/Severity: Moderately severe    Timing/Duration: Increasing symptoms all week and much worse since last night    Modifying Factors: Laying down and exertion increases symptoms     Associated Symptoms: Nonproductive cough and chest tightness along with new pedal edema    Narrative: Pt is a 69 y.o. male who presents complaining of shortness of breath as noted above.  Patient does have a known history of COPD and continues to smoke.  Patient relates that he has had increasing respiratory difficulty all week and much worse since last evening.  Patient has been using his home nebulizer and oxygen, but feels that he is continuing to get worse.  No fever or purulent sputum reported.      PMD: Ney Kathleen MD    REVIEW OF SYSTEMS  Review of Systems   Constitutional: Positive for activity change and fatigue. Negative for fever.   HENT: Positive for congestion (Chest).    Respiratory: Positive for cough, chest tightness, shortness of breath and wheezing.    Cardiovascular: Positive for leg swelling (New this week). Negative for chest pain and palpitations.   Gastrointestinal: Negative for abdominal pain, diarrhea, nausea and vomiting.   Genitourinary: Negative for dysuria, flank pain, hematuria and urgency.   Musculoskeletal: Negative for back pain.   Skin: Negative for rash and wound.   Neurological: Negative for dizziness, weakness and headaches.   Psychiatric/Behavioral: Negative for confusion.   All other systems reviewed and are negative.      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   • Chronic pain 07/11/2016   • Pain in right shoulder 07/11/2016   • COPD with acute exacerbation (CMS/[facility]) 03/28/2019   • Acute on  chronic respiratory failure with hypoxia (CMS/[facility]) 03/29/2019     Resolved Ambulatory Problems     Diagnosis Date Noted   • No Resolved Ambulatory Problems     Past Medical History:   Diagnosis Date   • Arthritis of back    • Arthritis of neck    • Asthma    • COPD (chronic obstructive pulmonary disease) (CMS/[facility])    • DM (diabetes mellitus) (CMS/[facility])    • Hip arthrosis    • Hyperlipidemia    • Knee swelling    • Periarthritis of shoulder    • Rotator cuff syndrome        PAST SURGICAL HISTORY  Past Surgical History:   Procedure Laterality Date   • APPENDECTOMY     • CHOLECYSTECTOMY     • ROTATOR CUFF REPAIR Right        FAMILY HISTORY  Family History   Problem Relation Age of Onset   • Diabetes Mother    • COPD Father        SOCIAL HISTORY  Social History     Socioeconomic History   • Marital status:      Spouse name: Not on file   • Number of children: Not on file   • Years of education: Not on file   • Highest education level: Not on file   Tobacco Use   • Smoking status: Current Every Day Smoker     Packs/day: 1.00     Years: 58.00     Pack years: 58.00     Types: Cigarettes   • Smokeless tobacco: Never Used   Substance and Sexual Activity   • Alcohol use: No   • Drug use: No   • Sexual activity: Defer       ALLERGIES  Patient has no known allergies.    PHYSICAL EXAM  ED Triage Vitals [11/09/19 0815]   Temp Heart Rate Resp BP SpO2   97.9 °F (36.6 °C) 105 24 (!) 174/116 90 %      Temp src Heart Rate Source Patient Position BP Location FiO2 (%)   -- -- -- -- --       Physical Exam   Constitutional: He is oriented to person, place, and time.   The patient is a large, obese, 69-year-old, white male in mild respiratory distress.   HENT:   Head: Normocephalic and atraumatic.   Eyes: Conjunctivae and EOM are normal.   Neck: Normal range of motion. Neck supple.   Cardiovascular: Regular rhythm and normal heart sounds.   No murmur heard.  Borderline tachycardia   Pulmonary/Chest:   Mild respiratory distress  demonstrated by the patient only being able to speak in short sentences and having tachypnea.  Auscultation of the lungs reveals poor exchange of air with some mild scattered wheezes.   Abdominal: Soft. Bowel sounds are normal. There is no tenderness.   Musculoskeletal: Normal range of motion. He exhibits edema (1+ pitting edema of both ankles and feet.).   Neurological: He is alert and oriented to person, place, and time.   Skin: Skin is warm and dry.   Psychiatric: Affect and judgment normal.   Nursing note and vitals reviewed.      LAB RESULTS  Results for orders placed or performed during the hospital encounter of 11/09/19   Influenza Antigen, Rapid - Swab, Nasopharynx   Result Value Ref Range    Influenza A Ag, EIA Negative Negative    Influenza B Ag, EIA Negative Negative   Comprehensive Metabolic Panel   Result Value Ref Range    Glucose 117 (H) 65 - 99 mg/dL    BUN 9 8 - 23 mg/dL    Creatinine 0.83 0.76 - 1.27 mg/dL    Sodium 143 136 - 145 mmol/L    Potassium 4.0 3.5 - 5.2 mmol/L    Chloride 102 98 - 107 mmol/L    CO2 28.3 22.0 - 29.0 mmol/L    Calcium 9.5 8.6 - 10.5 mg/dL    Total Protein 6.6 6.0 - 8.5 g/dL    Albumin 4.40 3.50 - 5.20 g/dL    ALT (SGPT) 19 1 - 41 U/L    AST (SGOT) 20 1 - 40 U/L    Alkaline Phosphatase 45 39 - 117 U/L    Total Bilirubin 0.3 0.2 - 1.2 mg/dL    eGFR Non African Amer 92 >60 mL/min/1.73    Globulin 2.2 gm/dL    A/G Ratio 2.0 g/dL    BUN/Creatinine Ratio 10.8 7.0 - 25.0    Anion Gap 12.7 5.0 - 15.0 mmol/L   Protime-INR   Result Value Ref Range    Protime 12.6 12.1 - 15.0 Seconds    INR 0.97 0.90 - 1.10   aPTT   Result Value Ref Range    PTT 27.2 24.3 - 38.1 seconds   Lipase   Result Value Ref Range    Lipase 11 (L) 13 - 60 U/L   BNP   Result Value Ref Range    proBNP 20.5 5.0 - 900.0 pg/mL   Troponin   Result Value Ref Range    Troponin T 0.012 0.000-<0.030 ng/mL   Lactic Acid, Plasma   Result Value Ref Range    Lactate 1.8 0.5 - 2.0 mmol/L   CBC Auto Differential   Result Value  Ref Range    WBC 7.17 3.40 - 10.80 10*3/mm3    RBC 4.53 4.14 - 5.80 10*6/mm3    Hemoglobin 14.3 13.0 - 17.7 g/dL    Hematocrit 44.5 37.5 - 51.0 %    MCV 98.2 (H) 79.0 - 97.0 fL    MCH 31.6 26.6 - 33.0 pg    MCHC 32.1 31.5 - 35.7 g/dL    RDW 13.0 12.3 - 15.4 %    RDW-SD 46.7 37.0 - 54.0 fl    MPV 11.5 6.0 - 12.0 fL    Platelets 208 140 - 450 10*3/mm3    Neutrophil % 62.2 42.7 - 76.0 %    Lymphocyte % 24.8 19.6 - 45.3 %    Monocyte % 8.8 5.0 - 12.0 %    Eosinophil % 2.6 0.3 - 6.2 %    Basophil % 1.0 0.0 - 1.5 %    Immature Grans % 0.6 (H) 0.0 - 0.5 %    Neutrophils, Absolute 4.46 1.70 - 7.00 10*3/mm3    Lymphocytes, Absolute 1.78 0.70 - 3.10 10*3/mm3    Monocytes, Absolute 0.63 0.10 - 0.90 10*3/mm3    Eosinophils, Absolute 0.19 0.00 - 0.40 10*3/mm3    Basophils, Absolute 0.07 0.00 - 0.20 10*3/mm3    Immature Grans, Absolute 0.04 0.00 - 0.05 10*3/mm3    nRBC 0.0 0.0 - 0.2 /100 WBC         I ordered the above labs and reviewed the results    RADIOLOGY  Xr Chest 2 View    Result Date: 11/9/2019  Narrative: CR Chest 2 Vws INDICATION:  Shortness of air for couple weeks and some chest pain COMPARISON:  None. FINDINGS: PA and lateral views of the chest.  Heart and mediastinal contours are normal. The lungs are clear. No pneumothorax or pleural effusion.      Impression: No acute cardiopulmonary findings. Signer Name: Sebastián Corona MD  Signed: 11/9/2019 9:21 AM  Workstation Name: [facility]  [facility]      I ordered the above radiologic testing and reviewed the results    PROCEDURES  Procedures      PROGRESS AND CONSULTS  ED Course as of Nov 09 1016   Sat Nov 09, 2019   0835 EKG tracing was contemporaneously reviewed at 082 8 hours and showed a normal sinus rhythm with a rate of 90 bpm.  P waves, QRS complexes, ST segments and T waves all unremarkable.  No ectopy.  Normal ECG.  [ML]   1013 It was explained to the patient that his work-up was quite unremarkable.  Diagnosis of acute exacerbation of COPD  discussed with patient along with treatment plan, expectations and warnings.  No indication for antibiotics at this time.  [ML]      ED Course User Index  [ML] Hussain Varma MD           MEDICAL DECISION MAKING  Results were reviewed/discussed with the patient and they were also made aware of online access. Pt also made aware that some labs, such as cultures, will not be resulted during ER visit and follow up with PMD is necessary.     MDM  Number of Diagnoses or Management Options  Acute exacerbation of chronic obstructive pulmonary disease (COPD) (CMS/[facility]): new and requires workup     Amount and/or Complexity of Data Reviewed  Clinical lab tests: ordered and reviewed  Tests in the radiology section of CPT®: ordered and reviewed  Independent visualization of images, tracings, or specimens: yes    Risk of Complications, Morbidity, and/or Mortality  Presenting problems: high  Diagnostic procedures: high  Management options: moderate    My differential diagnosis for dyspnea includes but is not limited to:  Asthma, COPD, pneumonia, pulmonary embolus, acute respiratory distress syndrome, pneumothorax, pleural effusion, pulmonary fibrosis, congestive heart failure, myocardial infarction, DKA, uremia, acidosis, sepsis, anemia, drug related, hyperventilation, CNS disease         DIAGNOSIS  Final diagnoses:   Acute exacerbation of chronic obstructive pulmonary disease (COPD) (CMS/HCC)       Latest Documented Vital Signs:  As of 10:16 AM  BP- 133/86 HR- 87 Temp- 97.9 °F (36.6 °C) O2 sat- 94%    DISPOSITION  Discharged in stable condition       Medication List      New Prescriptions    methylPREDNISolone 4 MG tablet  Commonly known as:  MEDROL (RENETTA)  Take as directed on package instructions          Follow-up Information     Ney Kathleen MD In 2 days.    Specialty:  Family Medicine  Contact information:  [address] [place] [id number] [phone]                      Hussain Varma MD  11/09/19  1016

## 2019-11-14 LAB
BACTERIA SPEC AEROBE CULT: NORMAL
BACTERIA SPEC AEROBE CULT: NORMAL

## 2019-12-07 ENCOUNTER — HOSPITAL ENCOUNTER (INPATIENT)
Facility: HOSPITAL | Age: 69
LOS: 11 days | Discharge: HOME HEALTH | End: 2019-12-18
Attending: EMERGENCY MEDICINE | Admitting: HOSPITALIST

## 2019-12-07 ENCOUNTER — APPOINTMENT (OUTPATIENT)
Dept: GENERAL RADIOLOGY | Facility: HOSPITAL | Age: 69
End: 2019-12-07

## 2019-12-07 DIAGNOSIS — R09.02 HYPOXIA: ICD-10-CM

## 2019-12-07 DIAGNOSIS — J96.02 ACUTE RESPIRATORY FAILURE WITH HYPOXIA AND HYPERCAPNIA (HCC): ICD-10-CM

## 2019-12-07 DIAGNOSIS — J96.01 ACUTE RESPIRATORY FAILURE WITH HYPOXIA AND HYPERCAPNIA (HCC): ICD-10-CM

## 2019-12-07 DIAGNOSIS — E11.65 TYPE 2 DIABETES MELLITUS WITH HYPERGLYCEMIA, WITH LONG-TERM CURRENT USE OF INSULIN (HCC): ICD-10-CM

## 2019-12-07 DIAGNOSIS — G47.34 NOCTURNAL HYPOXEMIA: ICD-10-CM

## 2019-12-07 DIAGNOSIS — Z79.4 TYPE 2 DIABETES MELLITUS WITH HYPERGLYCEMIA, WITH LONG-TERM CURRENT USE OF INSULIN (HCC): ICD-10-CM

## 2019-12-07 DIAGNOSIS — R73.9 HYPERGLYCEMIA: ICD-10-CM

## 2019-12-07 DIAGNOSIS — J44.1 COPD EXACERBATION (HCC): Primary | ICD-10-CM

## 2019-12-07 LAB
ACETONE BLD QL: ABNORMAL
ALBUMIN SERPL-MCNC: 4.5 G/DL (ref 3.5–5.2)
ALBUMIN/GLOB SERPL: 1.3 G/DL
ALP SERPL-CCNC: 54 U/L (ref 39–117)
ALT SERPL W P-5'-P-CCNC: 29 U/L (ref 1–41)
ANION GAP SERPL CALCULATED.3IONS-SCNC: 20.5 MMOL/L (ref 5–15)
ARTERIAL PATENCY WRIST A: POSITIVE
AST SERPL-CCNC: 28 U/L (ref 1–40)
ATMOSPHERIC PRESS: 747 MMHG
BACTERIA UR QL AUTO: ABNORMAL /HPF
BASE EXCESS BLDA CALC-SCNC: 2.3 MMOL/L (ref 0–2)
BASOPHILS # BLD AUTO: 0.08 10*3/MM3 (ref 0–0.2)
BASOPHILS NFR BLD AUTO: 0.8 % (ref 0–1.5)
BDY SITE: ABNORMAL
BILIRUB SERPL-MCNC: 0.5 MG/DL (ref 0.2–1.2)
BILIRUB UR QL STRIP: ABNORMAL
BODY TEMPERATURE: 37 C
BUN BLD-MCNC: 10 MG/DL (ref 8–23)
BUN/CREAT SERPL: 11.6 (ref 7–25)
CALCIUM SPEC-SCNC: 9.7 MG/DL (ref 8.6–10.5)
CHLORIDE SERPL-SCNC: 93 MMOL/L (ref 98–107)
CLARITY UR: CLEAR
CO2 SERPL-SCNC: 21.5 MMOL/L (ref 22–29)
COLOR UR: YELLOW
CREAT BLD-MCNC: 0.86 MG/DL (ref 0.76–1.27)
D DIMER PPP FEU-MCNC: 0.28 MCGFEU/ML (ref 0–0.46)
D-LACTATE SERPL-SCNC: 1.6 MMOL/L (ref 0.5–2)
DEPRECATED RDW RBC AUTO: 46.8 FL (ref 37–54)
EOSINOPHIL # BLD AUTO: 0 10*3/MM3 (ref 0–0.4)
EOSINOPHIL NFR BLD AUTO: 0 % (ref 0.3–6.2)
ERYTHROCYTE [DISTWIDTH] IN BLOOD BY AUTOMATED COUNT: 13.1 % (ref 12.3–15.4)
FLUAV AG NPH QL: NEGATIVE
FLUBV AG NPH QL IA: NEGATIVE
GAS FLOW AIRWAY: 3 LPM
GFR SERPL CREATININE-BSD FRML MDRD: 89 ML/MIN/1.73
GLOBULIN UR ELPH-MCNC: 3.4 GM/DL
GLUCOSE BLD-MCNC: 338 MG/DL (ref 65–99)
GLUCOSE BLDC GLUCOMTR-MCNC: 299 MG/DL (ref 70–130)
GLUCOSE BLDC GLUCOMTR-MCNC: 335 MG/DL (ref 70–130)
GLUCOSE UR STRIP-MCNC: ABNORMAL MG/DL
HBA1C MFR BLD: 9.1 % (ref 4.8–5.6)
HCO3 BLDA-SCNC: 27 MMOL/L (ref 20–26)
HCT VFR BLD AUTO: 45 % (ref 37.5–51)
HGB BLD-MCNC: 15 G/DL (ref 13–17.7)
HGB BLDA-MCNC: 14.9 G/DL (ref 14–18)
HGB UR QL STRIP.AUTO: ABNORMAL
HYALINE CASTS UR QL AUTO: ABNORMAL /LPF
IMM GRANULOCYTES # BLD AUTO: 0.08 10*3/MM3 (ref 0–0.05)
IMM GRANULOCYTES NFR BLD AUTO: 0.8 % (ref 0–0.5)
KETONES UR QL STRIP: ABNORMAL
LEUKOCYTE ESTERASE UR QL STRIP.AUTO: NEGATIVE
LYMPHOCYTES # BLD AUTO: 0.68 10*3/MM3 (ref 0.7–3.1)
LYMPHOCYTES NFR BLD AUTO: 6.4 % (ref 19.6–45.3)
MCH RBC QN AUTO: 32.1 PG (ref 26.6–33)
MCHC RBC AUTO-ENTMCNC: 33.3 G/DL (ref 31.5–35.7)
MCV RBC AUTO: 96.4 FL (ref 79–97)
MODALITY: ABNORMAL
MONOCYTES # BLD AUTO: 0.9 10*3/MM3 (ref 0.1–0.9)
MONOCYTES NFR BLD AUTO: 8.5 % (ref 5–12)
NEUTROPHILS # BLD AUTO: 8.85 10*3/MM3 (ref 1.7–7)
NEUTROPHILS NFR BLD AUTO: 83.5 % (ref 42.7–76)
NITRITE UR QL STRIP: NEGATIVE
NRBC BLD AUTO-RTO: 0 /100 WBC (ref 0–0.2)
NT-PROBNP SERPL-MCNC: 207.8 PG/ML (ref 5–900)
PCO2 BLDA: 41.4 MM HG (ref 35–45)
PCO2 TEMP ADJ BLD: 41.4 MM HG (ref 35–45)
PH BLDA: 7.42 PH UNITS (ref 7.35–7.45)
PH UR STRIP.AUTO: 6 [PH] (ref 4.5–8)
PH, TEMP CORRECTED: 7.42 PH UNITS (ref 7.35–7.45)
PLATELET # BLD AUTO: 162 10*3/MM3 (ref 140–450)
PMV BLD AUTO: 13.5 FL (ref 6–12)
PO2 BLDA: 57.4 MM HG (ref 83–108)
PO2 TEMP ADJ BLD: 57.4 MM HG (ref 83–108)
POTASSIUM BLD-SCNC: 4.2 MMOL/L (ref 3.5–5.2)
PROCALCITONIN SERPL-MCNC: 0.2 NG/ML (ref 0.1–0.25)
PROT SERPL-MCNC: 7.9 G/DL (ref 6–8.5)
PROT UR QL STRIP: ABNORMAL
RBC # BLD AUTO: 4.67 10*6/MM3 (ref 4.14–5.8)
RBC # UR: ABNORMAL /HPF
REF LAB TEST METHOD: ABNORMAL
SAO2 % BLDCOA: 90.2 % (ref 94–99)
SODIUM BLD-SCNC: 135 MMOL/L (ref 136–145)
SP GR UR STRIP: 1.01 (ref 1–1.03)
SQUAMOUS #/AREA URNS HPF: ABNORMAL /HPF
TROPONIN T SERPL-MCNC: <0.01 NG/ML (ref 0–0.03)
TSH SERPL DL<=0.05 MIU/L-ACNC: 0.58 UIU/ML (ref 0.27–4.2)
UROBILINOGEN UR QL STRIP: ABNORMAL
VENTILATOR MODE: ABNORMAL
WBC NRBC COR # BLD: 10.59 10*3/MM3 (ref 3.4–10.8)
WBC UR QL AUTO: ABNORMAL /HPF

## 2019-12-07 PROCEDURE — 83036 HEMOGLOBIN GLYCOSYLATED A1C: CPT | Performed by: HOSPITALIST

## 2019-12-07 PROCEDURE — 0100U HC BIOFIRE FILMARRAY RESP PANEL 2: CPT | Performed by: HOSPITALIST

## 2019-12-07 PROCEDURE — 82009 KETONE BODYS QUAL: CPT | Performed by: EMERGENCY MEDICINE

## 2019-12-07 PROCEDURE — 84145 PROCALCITONIN (PCT): CPT | Performed by: EMERGENCY MEDICINE

## 2019-12-07 PROCEDURE — 99284 EMERGENCY DEPT VISIT MOD MDM: CPT | Performed by: EMERGENCY MEDICINE

## 2019-12-07 PROCEDURE — 71045 X-RAY EXAM CHEST 1 VIEW: CPT

## 2019-12-07 PROCEDURE — 71046 X-RAY EXAM CHEST 2 VIEWS: CPT

## 2019-12-07 PROCEDURE — 84484 ASSAY OF TROPONIN QUANT: CPT | Performed by: EMERGENCY MEDICINE

## 2019-12-07 PROCEDURE — 93005 ELECTROCARDIOGRAM TRACING: CPT | Performed by: EMERGENCY MEDICINE

## 2019-12-07 PROCEDURE — 84443 ASSAY THYROID STIM HORMONE: CPT | Performed by: HOSPITALIST

## 2019-12-07 PROCEDURE — 93010 ELECTROCARDIOGRAM REPORT: CPT | Performed by: INTERNAL MEDICINE

## 2019-12-07 PROCEDURE — 80053 COMPREHEN METABOLIC PANEL: CPT | Performed by: EMERGENCY MEDICINE

## 2019-12-07 PROCEDURE — 82803 BLOOD GASES ANY COMBINATION: CPT

## 2019-12-07 PROCEDURE — 85379 FIBRIN DEGRADATION QUANT: CPT | Performed by: EMERGENCY MEDICINE

## 2019-12-07 PROCEDURE — 25010000002 METHYLPREDNISOLONE PER 40 MG: Performed by: HOSPITALIST

## 2019-12-07 PROCEDURE — 94640 AIRWAY INHALATION TREATMENT: CPT

## 2019-12-07 PROCEDURE — 81001 URINALYSIS AUTO W/SCOPE: CPT | Performed by: EMERGENCY MEDICINE

## 2019-12-07 PROCEDURE — 99284 EMERGENCY DEPT VISIT MOD MDM: CPT

## 2019-12-07 PROCEDURE — G0378 HOSPITAL OBSERVATION PER HR: HCPCS

## 2019-12-07 PROCEDURE — 83605 ASSAY OF LACTIC ACID: CPT | Performed by: EMERGENCY MEDICINE

## 2019-12-07 PROCEDURE — 85025 COMPLETE CBC W/AUTO DIFF WBC: CPT | Performed by: EMERGENCY MEDICINE

## 2019-12-07 PROCEDURE — 99222 1ST HOSP IP/OBS MODERATE 55: CPT | Performed by: INTERNAL MEDICINE

## 2019-12-07 PROCEDURE — 94799 UNLISTED PULMONARY SVC/PX: CPT

## 2019-12-07 PROCEDURE — 25010000002 ENOXAPARIN PER 10 MG: Performed by: HOSPITALIST

## 2019-12-07 PROCEDURE — 25010000002 METHYLPREDNISOLONE PER 125 MG: Performed by: EMERGENCY MEDICINE

## 2019-12-07 PROCEDURE — 87804 INFLUENZA ASSAY W/OPTIC: CPT | Performed by: EMERGENCY MEDICINE

## 2019-12-07 PROCEDURE — 36600 WITHDRAWAL OF ARTERIAL BLOOD: CPT

## 2019-12-07 PROCEDURE — 83880 ASSAY OF NATRIURETIC PEPTIDE: CPT | Performed by: HOSPITALIST

## 2019-12-07 PROCEDURE — 63710000001 INSULIN ASPART PER 5 UNITS: Performed by: HOSPITALIST

## 2019-12-07 PROCEDURE — 63710000001 INSULIN ASPART PER 5 UNITS: Performed by: NURSE PRACTITIONER

## 2019-12-07 PROCEDURE — 82962 GLUCOSE BLOOD TEST: CPT

## 2019-12-07 PROCEDURE — 25010000002 ENOXAPARIN PER 10 MG: Performed by: NURSE PRACTITIONER

## 2019-12-07 PROCEDURE — 87040 BLOOD CULTURE FOR BACTERIA: CPT | Performed by: EMERGENCY MEDICINE

## 2019-12-07 RX ORDER — BUDESONIDE AND FORMOTEROL FUMARATE DIHYDRATE 160; 4.5 UG/1; UG/1
2 AEROSOL RESPIRATORY (INHALATION)
Status: DISCONTINUED | OUTPATIENT
Start: 2019-12-07 | End: 2019-12-08

## 2019-12-07 RX ORDER — ATORVASTATIN CALCIUM 10 MG/1
10 TABLET, FILM COATED ORAL NIGHTLY
Status: DISCONTINUED | OUTPATIENT
Start: 2019-12-07 | End: 2019-12-18 | Stop reason: HOSPADM

## 2019-12-07 RX ORDER — HYDROCODONE BITARTRATE AND ACETAMINOPHEN 10; 325 MG/1; MG/1
1 TABLET ORAL 4 TIMES DAILY
Status: DISCONTINUED | OUTPATIENT
Start: 2019-12-07 | End: 2019-12-18 | Stop reason: HOSPADM

## 2019-12-07 RX ORDER — HYDROCODONE BITARTRATE AND ACETAMINOPHEN 5; 325 MG/1; MG/1
1 TABLET ORAL ONCE
Status: COMPLETED | OUTPATIENT
Start: 2019-12-07 | End: 2019-12-07

## 2019-12-07 RX ORDER — METHYLPREDNISOLONE SODIUM SUCCINATE 40 MG/ML
40 INJECTION, POWDER, LYOPHILIZED, FOR SOLUTION INTRAMUSCULAR; INTRAVENOUS EVERY 6 HOURS
Status: DISCONTINUED | OUTPATIENT
Start: 2019-12-07 | End: 2019-12-08

## 2019-12-07 RX ORDER — DICYCLOMINE HCL 20 MG
20 TABLET ORAL 4 TIMES DAILY
Status: DISCONTINUED | OUTPATIENT
Start: 2019-12-07 | End: 2019-12-12

## 2019-12-07 RX ORDER — ACETAMINOPHEN 325 MG/1
650 TABLET ORAL EVERY 4 HOURS PRN
Status: DISCONTINUED | OUTPATIENT
Start: 2019-12-07 | End: 2019-12-18 | Stop reason: HOSPADM

## 2019-12-07 RX ORDER — SODIUM CHLORIDE 0.9 % (FLUSH) 0.9 %
10 SYRINGE (ML) INJECTION AS NEEDED
Status: DISCONTINUED | OUTPATIENT
Start: 2019-12-07 | End: 2019-12-18 | Stop reason: HOSPADM

## 2019-12-07 RX ORDER — TERAZOSIN 1 MG/1
1 CAPSULE ORAL NIGHTLY
Status: DISCONTINUED | OUTPATIENT
Start: 2019-12-07 | End: 2019-12-18 | Stop reason: HOSPADM

## 2019-12-07 RX ORDER — METHYLPREDNISOLONE SODIUM SUCCINATE 125 MG/2ML
125 INJECTION, POWDER, LYOPHILIZED, FOR SOLUTION INTRAMUSCULAR; INTRAVENOUS ONCE
Status: COMPLETED | OUTPATIENT
Start: 2019-12-07 | End: 2019-12-07

## 2019-12-07 RX ORDER — SODIUM CHLORIDE 0.9 % (FLUSH) 0.9 %
10 SYRINGE (ML) INJECTION EVERY 12 HOURS SCHEDULED
Status: DISCONTINUED | OUTPATIENT
Start: 2019-12-07 | End: 2019-12-18 | Stop reason: HOSPADM

## 2019-12-07 RX ORDER — SODIUM CHLORIDE 9 MG/ML
40 INJECTION, SOLUTION INTRAVENOUS AS NEEDED
Status: DISCONTINUED | OUTPATIENT
Start: 2019-12-07 | End: 2019-12-18 | Stop reason: HOSPADM

## 2019-12-07 RX ORDER — ACETAMINOPHEN 650 MG/1
650 SUPPOSITORY RECTAL EVERY 4 HOURS PRN
Status: DISCONTINUED | OUTPATIENT
Start: 2019-12-07 | End: 2019-12-18 | Stop reason: HOSPADM

## 2019-12-07 RX ORDER — PIOGLITAZONEHYDROCHLORIDE 45 MG/1
45 TABLET ORAL DAILY
Status: DISCONTINUED | OUTPATIENT
Start: 2019-12-08 | End: 2019-12-18 | Stop reason: HOSPADM

## 2019-12-07 RX ORDER — NITROGLYCERIN 0.4 MG/1
0.4 TABLET SUBLINGUAL
Status: DISCONTINUED | OUTPATIENT
Start: 2019-12-07 | End: 2019-12-18 | Stop reason: HOSPADM

## 2019-12-07 RX ORDER — IPRATROPIUM BROMIDE AND ALBUTEROL SULFATE 2.5; .5 MG/3ML; MG/3ML
3 SOLUTION RESPIRATORY (INHALATION)
Status: DISCONTINUED | OUTPATIENT
Start: 2019-12-07 | End: 2019-12-14

## 2019-12-07 RX ORDER — ACETAMINOPHEN 160 MG/5ML
650 SOLUTION ORAL EVERY 4 HOURS PRN
Status: DISCONTINUED | OUTPATIENT
Start: 2019-12-07 | End: 2019-12-18 | Stop reason: HOSPADM

## 2019-12-07 RX ORDER — NICOTINE POLACRILEX 4 MG
15 LOZENGE BUCCAL
Status: DISCONTINUED | OUTPATIENT
Start: 2019-12-07 | End: 2019-12-18 | Stop reason: HOSPADM

## 2019-12-07 RX ORDER — NICOTINE 21 MG/24HR
1 PATCH, TRANSDERMAL 24 HOURS TRANSDERMAL
Status: DISCONTINUED | OUTPATIENT
Start: 2019-12-07 | End: 2019-12-18 | Stop reason: HOSPADM

## 2019-12-07 RX ORDER — DEXTROSE MONOHYDRATE 25 G/50ML
25 INJECTION, SOLUTION INTRAVENOUS
Status: DISCONTINUED | OUTPATIENT
Start: 2019-12-07 | End: 2019-12-18 | Stop reason: HOSPADM

## 2019-12-07 RX ORDER — IPRATROPIUM BROMIDE AND ALBUTEROL SULFATE 2.5; .5 MG/3ML; MG/3ML
3 SOLUTION RESPIRATORY (INHALATION) ONCE
Status: COMPLETED | OUTPATIENT
Start: 2019-12-07 | End: 2019-12-07

## 2019-12-07 RX ORDER — SODIUM CHLORIDE 9 MG/ML
125 INJECTION, SOLUTION INTRAVENOUS CONTINUOUS
Status: DISCONTINUED | OUTPATIENT
Start: 2019-12-07 | End: 2019-12-07

## 2019-12-07 RX ADMIN — TERAZOSIN HYDROCHLORIDE ANHYDROUS 1 MG: 1 CAPSULE ORAL at 20:09

## 2019-12-07 RX ADMIN — ENOXAPARIN SODIUM 40 MG: 40 INJECTION SUBCUTANEOUS at 16:59

## 2019-12-07 RX ADMIN — IPRATROPIUM BROMIDE AND ALBUTEROL SULFATE 3 ML: .5; 3 SOLUTION RESPIRATORY (INHALATION) at 23:28

## 2019-12-07 RX ADMIN — HYDROCODONE BITARTRATE AND ACETAMINOPHEN 1 TABLET: 10; 325 TABLET ORAL at 20:19

## 2019-12-07 RX ADMIN — DICYCLOMINE HYDROCHLORIDE 20 MG: 20 TABLET ORAL at 18:09

## 2019-12-07 RX ADMIN — IPRATROPIUM BROMIDE AND ALBUTEROL SULFATE 3 ML: .5; 3 SOLUTION RESPIRATORY (INHALATION) at 18:49

## 2019-12-07 RX ADMIN — HYDROCODONE BITARTRATE AND ACETAMINOPHEN 1 TABLET: 5; 325 TABLET ORAL at 11:38

## 2019-12-07 RX ADMIN — BUDESONIDE AND FORMOTEROL FUMARATE DIHYDRATE 2 PUFF: 160; 4.5 AEROSOL RESPIRATORY (INHALATION) at 18:49

## 2019-12-07 RX ADMIN — INSULIN ASPART 16 UNITS: 100 INJECTION, SOLUTION INTRAVENOUS; SUBCUTANEOUS at 17:13

## 2019-12-07 RX ADMIN — METHYLPREDNISOLONE SODIUM SUCCINATE 40 MG: 40 INJECTION, POWDER, FOR SOLUTION INTRAMUSCULAR; INTRAVENOUS at 17:01

## 2019-12-07 RX ADMIN — DICYCLOMINE HYDROCHLORIDE 20 MG: 20 TABLET ORAL at 20:09

## 2019-12-07 RX ADMIN — METHYLPREDNISOLONE SODIUM SUCCINATE 40 MG: 40 INJECTION, POWDER, FOR SOLUTION INTRAMUSCULAR; INTRAVENOUS at 21:30

## 2019-12-07 RX ADMIN — GLIPIZIDE: 5 TABLET ORAL at 17:00

## 2019-12-07 RX ADMIN — METHYLPREDNISOLONE SODIUM SUCCINATE 125 MG: 125 INJECTION, POWDER, FOR SOLUTION INTRAMUSCULAR; INTRAVENOUS at 11:37

## 2019-12-07 RX ADMIN — IPRATROPIUM BROMIDE AND ALBUTEROL SULFATE 3 ML: .5; 3 SOLUTION RESPIRATORY (INHALATION) at 15:24

## 2019-12-07 RX ADMIN — INSULIN ASPART 12 UNITS: 100 INJECTION, SOLUTION INTRAVENOUS; SUBCUTANEOUS at 22:07

## 2019-12-07 RX ADMIN — HYDROCODONE BITARTRATE AND ACETAMINOPHEN 1 TABLET: 5; 325 TABLET ORAL at 13:34

## 2019-12-07 RX ADMIN — ATORVASTATIN CALCIUM 10 MG: 10 TABLET, FILM COATED ORAL at 20:09

## 2019-12-07 RX ADMIN — IPRATROPIUM BROMIDE AND ALBUTEROL SULFATE 3 ML: .5; 3 SOLUTION RESPIRATORY (INHALATION) at 11:44

## 2019-12-07 RX ADMIN — METFORMIN HYDROCHLORIDE 500 MG: 500 TABLET ORAL at 18:09

## 2019-12-07 RX ADMIN — HYDROCODONE BITARTRATE AND ACETAMINOPHEN 1 TABLET: 10; 325 TABLET ORAL at 18:08

## 2019-12-07 RX ADMIN — SODIUM CHLORIDE, PRESERVATIVE FREE 10 ML: 5 INJECTION INTRAVENOUS at 20:10

## 2019-12-07 RX ADMIN — NICOTINE 1 PATCH: 21 PATCH TRANSDERMAL at 17:00

## 2019-12-07 NOTE — ED PROVIDER NOTES
Subjective     Shortness of Breath   Severity:  Moderate  Onset quality:  Gradual  Progression:  Worsening  Chronicity:  Chronic  Context comment:  Spont onset  Relieved by:  Oxygen  Worsened by:  Activity and coughing  Ineffective treatments:  Oxygen and inhaler  Associated symptoms: cough, fever and wheezing    Associated symptoms: no abdominal pain, no chest pain and no vomiting        Review of Systems   Constitutional: Positive for fever.   Respiratory: Positive for cough, shortness of breath and wheezing.    Cardiovascular: Negative for chest pain.   Gastrointestinal: Negative for abdominal pain and vomiting.   All other systems reviewed and are negative.      Past Medical History:   Diagnosis Date   • Arthritis of back    • Arthritis of neck    • Asthma    • COPD (chronic obstructive pulmonary disease) (CMS/[facility])    • DM (diabetes mellitus) (CMS/[facility])    • Hip arthrosis    • Hyperlipidemia    • Knee swelling    • Periarthritis of shoulder    • Rotator cuff syndrome        No Known Allergies    Past Surgical History:   Procedure Laterality Date   • APPENDECTOMY     • CHOLECYSTECTOMY     • ROTATOR CUFF REPAIR Right        Family History   Problem Relation Age of Onset   • Diabetes Mother    • COPD Father        Social History     Socioeconomic History   • Marital status:      Spouse name: Not on file   • Number of children: Not on file   • Years of education: Not on file   • Highest education level: Not on file   Tobacco Use   • Smoking status: Current Every Day Smoker     Packs/day: 0.50     Years: 58.00     Pack years: 29.00     Types: Cigarettes   • Smokeless tobacco: Never Used   Substance and Sexual Activity   • Alcohol use: No   • Drug use: No   • Sexual activity: Defer           Objective   Physical Exam   Constitutional: He is oriented to person, place, and time. He appears well-developed and well-nourished. No distress.   HENT:   Head: Normocephalic and atraumatic.   Mouth/Throat: Oropharynx is  clear and moist.   Eyes: Pupils are equal, round, and reactive to light. Conjunctivae and EOM are normal.   Neck: Normal range of motion. Neck supple. No JVD present.   Cardiovascular: Exam reveals no friction rub.   No murmur heard.  tachy   Pulmonary/Chest: Effort normal. No respiratory distress. He has wheezes. He has no rales.   Abdominal: Soft. Bowel sounds are normal. He exhibits no distension. There is no tenderness. There is no guarding.   Musculoskeletal: Normal range of motion. He exhibits no edema or tenderness.   Neurological: He is alert and oriented to person, place, and time. No cranial nerve deficit or sensory deficit. Coordination normal.   Skin: Skin is warm. Capillary refill takes less than 2 seconds. He is not diaphoretic. No erythema. No pallor.   Psychiatric: He has a normal mood and affect.   Nursing note and vitals reviewed.      Procedures           ED Course  ED Course as of Dec 07 1314   Sat Dec 07, 2019   1132 Ekg by me sinus 131, rad, artifact, no st elev    [BC]      ED Course User Index  [BC] Thor Krishna MD      Reeval, improved WOB  inpt md paged                  No data recorded                        MDM  Number of Diagnoses or Management Options  COPD exacerbation (CMS/HCC):   Hyperglycemia:   Hypoxia:      Amount and/or Complexity of Data Reviewed  Clinical lab tests: reviewed and ordered  Tests in the radiology section of CPT®: ordered and reviewed  Discuss the patient with other providers: yes        Final diagnoses:   COPD exacerbation (CMS/HCC)   Hyperglycemia   Hypoxia              Thor Krishna MD  12/07/19 1039

## 2019-12-07 NOTE — ED NOTES
PT admitted to [facility] per elinor ,IV site patent.     Marcos Lock, RN  12/07/19 1825

## 2019-12-08 ENCOUNTER — APPOINTMENT (OUTPATIENT)
Dept: CT IMAGING | Facility: HOSPITAL | Age: 69
End: 2019-12-08

## 2019-12-08 PROBLEM — E78.5 HLD (HYPERLIPIDEMIA): Status: ACTIVE | Noted: 2019-12-08

## 2019-12-08 PROBLEM — E66.9 OBESITY (BMI 30-39.9): Status: ACTIVE | Noted: 2019-07-10

## 2019-12-08 PROBLEM — Z72.0 TOBACCO ABUSE: Status: ACTIVE | Noted: 2019-07-08

## 2019-12-08 PROBLEM — J96.21 ACUTE ON CHRONIC RESPIRATORY FAILURE WITH HYPOXIA (HCC): Status: RESOLVED | Noted: 2019-03-29 | Resolved: 2019-12-08

## 2019-12-08 PROBLEM — E11.9 DM2 (DIABETES MELLITUS, TYPE 2): Status: ACTIVE | Noted: 2019-12-08

## 2019-12-08 LAB
ALBUMIN SERPL-MCNC: 4.3 G/DL (ref 3.5–5.2)
ALBUMIN/GLOB SERPL: 1.3 G/DL
ALP SERPL-CCNC: 51 U/L (ref 39–117)
ALT SERPL W P-5'-P-CCNC: 35 U/L (ref 1–41)
ANION GAP SERPL CALCULATED.3IONS-SCNC: 14.6 MMOL/L (ref 5–15)
ANION GAP SERPL CALCULATED.3IONS-SCNC: 17.4 MMOL/L (ref 5–15)
APTT PPP: 25.3 SECONDS (ref 24.3–38.1)
AST SERPL-CCNC: 41 U/L (ref 1–40)
B PARAPERT DNA SPEC QL NAA+PROBE: NOT DETECTED
B PERT DNA SPEC QL NAA+PROBE: NOT DETECTED
BACTERIA SPEC RESP CULT: NORMAL
BASOPHILS # BLD AUTO: 0.01 10*3/MM3 (ref 0–0.2)
BASOPHILS # BLD AUTO: 0.03 10*3/MM3 (ref 0–0.2)
BASOPHILS NFR BLD AUTO: 0.1 % (ref 0–1.5)
BASOPHILS NFR BLD AUTO: 0.2 % (ref 0–1.5)
BILIRUB SERPL-MCNC: 0.3 MG/DL (ref 0.2–1.2)
BUN BLD-MCNC: 18 MG/DL (ref 8–23)
BUN BLD-MCNC: 19 MG/DL (ref 8–23)
BUN/CREAT SERPL: 17.4 (ref 7–25)
BUN/CREAT SERPL: 19.6 (ref 7–25)
C PNEUM DNA NPH QL NAA+NON-PROBE: NOT DETECTED
CALCIUM SPEC-SCNC: 9.7 MG/DL (ref 8.6–10.5)
CALCIUM SPEC-SCNC: 9.9 MG/DL (ref 8.6–10.5)
CHLORIDE SERPL-SCNC: 93 MMOL/L (ref 98–107)
CHLORIDE SERPL-SCNC: 97 MMOL/L (ref 98–107)
CO2 SERPL-SCNC: 22.6 MMOL/L (ref 22–29)
CO2 SERPL-SCNC: 23.4 MMOL/L (ref 22–29)
CREAT BLD-MCNC: 0.92 MG/DL (ref 0.76–1.27)
CREAT BLD-MCNC: 1.09 MG/DL (ref 0.76–1.27)
CRP SERPL-MCNC: 7.27 MG/DL (ref 0–0.5)
D-LACTATE SERPL-SCNC: 2.4 MMOL/L (ref 0.5–2)
D-LACTATE SERPL-SCNC: 3 MMOL/L (ref 0.5–2)
D-LACTATE SERPL-SCNC: 5.5 MMOL/L (ref 0.5–2)
DEPRECATED RDW RBC AUTO: 47.8 FL (ref 37–54)
DEPRECATED RDW RBC AUTO: 49.6 FL (ref 37–54)
EOSINOPHIL # BLD AUTO: 0 10*3/MM3 (ref 0–0.4)
EOSINOPHIL # BLD AUTO: 0 10*3/MM3 (ref 0–0.4)
EOSINOPHIL NFR BLD AUTO: 0 % (ref 0.3–6.2)
EOSINOPHIL NFR BLD AUTO: 0 % (ref 0.3–6.2)
ERYTHROCYTE [DISTWIDTH] IN BLOOD BY AUTOMATED COUNT: 13.2 % (ref 12.3–15.4)
ERYTHROCYTE [DISTWIDTH] IN BLOOD BY AUTOMATED COUNT: 13.4 % (ref 12.3–15.4)
FLUAV H1 2009 PAND RNA NPH QL NAA+PROBE: NOT DETECTED
FLUAV H1 HA GENE NPH QL NAA+PROBE: NOT DETECTED
FLUAV H3 RNA NPH QL NAA+PROBE: NOT DETECTED
FLUAV SUBTYP SPEC NAA+PROBE: NOT DETECTED
FLUBV RNA ISLT QL NAA+PROBE: NOT DETECTED
GFR SERPL CREATININE-BSD FRML MDRD: 67 ML/MIN/1.73
GFR SERPL CREATININE-BSD FRML MDRD: 82 ML/MIN/1.73
GLOBULIN UR ELPH-MCNC: 3.3 GM/DL
GLUCOSE BLD-MCNC: 333 MG/DL (ref 65–99)
GLUCOSE BLD-MCNC: 353 MG/DL (ref 65–99)
GLUCOSE BLDC GLUCOMTR-MCNC: 142 MG/DL (ref 70–130)
GLUCOSE BLDC GLUCOMTR-MCNC: 330 MG/DL (ref 70–130)
GLUCOSE BLDC GLUCOMTR-MCNC: 382 MG/DL (ref 70–130)
GLUCOSE BLDC GLUCOMTR-MCNC: 85 MG/DL (ref 70–130)
GRAM STN SPEC: NORMAL
GRAM STN SPEC: NORMAL
HADV DNA SPEC NAA+PROBE: NOT DETECTED
HCOV 229E RNA SPEC QL NAA+PROBE: NOT DETECTED
HCOV HKU1 RNA SPEC QL NAA+PROBE: NOT DETECTED
HCOV NL63 RNA SPEC QL NAA+PROBE: NOT DETECTED
HCOV OC43 RNA SPEC QL NAA+PROBE: NOT DETECTED
HCT VFR BLD AUTO: 44.3 % (ref 37.5–51)
HCT VFR BLD AUTO: 47 % (ref 37.5–51)
HGB BLD-MCNC: 14.4 G/DL (ref 13–17.7)
HGB BLD-MCNC: 14.9 G/DL (ref 13–17.7)
HMPV RNA NPH QL NAA+NON-PROBE: NOT DETECTED
HOLD SPECIMEN: NORMAL
HPIV1 RNA SPEC QL NAA+PROBE: NOT DETECTED
HPIV2 RNA SPEC QL NAA+PROBE: NOT DETECTED
HPIV3 RNA NPH QL NAA+PROBE: NOT DETECTED
HPIV4 P GENE NPH QL NAA+PROBE: NOT DETECTED
IMM GRANULOCYTES # BLD AUTO: 0.07 10*3/MM3 (ref 0–0.05)
IMM GRANULOCYTES # BLD AUTO: 0.08 10*3/MM3 (ref 0–0.05)
IMM GRANULOCYTES NFR BLD AUTO: 0.6 % (ref 0–0.5)
IMM GRANULOCYTES NFR BLD AUTO: 0.6 % (ref 0–0.5)
INR PPP: 0.95 (ref 0.9–1.1)
LYMPHOCYTES # BLD AUTO: 0.38 10*3/MM3 (ref 0.7–3.1)
LYMPHOCYTES # BLD AUTO: 0.62 10*3/MM3 (ref 0.7–3.1)
LYMPHOCYTES NFR BLD AUTO: 3.4 % (ref 19.6–45.3)
LYMPHOCYTES NFR BLD AUTO: 4.5 % (ref 19.6–45.3)
M PNEUMO IGG SER IA-ACNC: NOT DETECTED
MAGNESIUM SERPL-MCNC: 1.9 MG/DL (ref 1.6–2.4)
MCH RBC QN AUTO: 31.8 PG (ref 26.6–33)
MCH RBC QN AUTO: 31.8 PG (ref 26.6–33)
MCHC RBC AUTO-ENTMCNC: 31.7 G/DL (ref 31.5–35.7)
MCHC RBC AUTO-ENTMCNC: 32.5 G/DL (ref 31.5–35.7)
MCV RBC AUTO: 100.2 FL (ref 79–97)
MCV RBC AUTO: 97.8 FL (ref 79–97)
MONOCYTES # BLD AUTO: 0.71 10*3/MM3 (ref 0.1–0.9)
MONOCYTES # BLD AUTO: 1.18 10*3/MM3 (ref 0.1–0.9)
MONOCYTES NFR BLD AUTO: 6.3 % (ref 5–12)
MONOCYTES NFR BLD AUTO: 8.6 % (ref 5–12)
NEUTROPHILS # BLD AUTO: 10.04 10*3/MM3 (ref 1.7–7)
NEUTROPHILS # BLD AUTO: 11.84 10*3/MM3 (ref 1.7–7)
NEUTROPHILS NFR BLD AUTO: 86.1 % (ref 42.7–76)
NEUTROPHILS NFR BLD AUTO: 89.6 % (ref 42.7–76)
NRBC BLD AUTO-RTO: 0 /100 WBC (ref 0–0.2)
NRBC BLD AUTO-RTO: 0 /100 WBC (ref 0–0.2)
NT-PROBNP SERPL-MCNC: 129.2 PG/ML (ref 5–900)
PHOSPHATE SERPL-MCNC: 3.4 MG/DL (ref 2.5–4.5)
PLATELET # BLD AUTO: 186 10*3/MM3 (ref 140–450)
PLATELET # BLD AUTO: 187 10*3/MM3 (ref 140–450)
PMV BLD AUTO: 12.3 FL (ref 6–12)
PMV BLD AUTO: 12.4 FL (ref 6–12)
POTASSIUM BLD-SCNC: 4.5 MMOL/L (ref 3.5–5.2)
POTASSIUM BLD-SCNC: 4.5 MMOL/L (ref 3.5–5.2)
PROCALCITONIN SERPL-MCNC: 0.13 NG/ML (ref 0.1–0.25)
PROT SERPL-MCNC: 7.6 G/DL (ref 6–8.5)
PROTHROMBIN TIME: 12.4 SECONDS (ref 12.1–15)
RBC # BLD AUTO: 4.53 10*6/MM3 (ref 4.14–5.8)
RBC # BLD AUTO: 4.69 10*6/MM3 (ref 4.14–5.8)
RHINOVIRUS RNA SPEC NAA+PROBE: NOT DETECTED
RSV RNA NPH QL NAA+NON-PROBE: NOT DETECTED
SODIUM BLD-SCNC: 133 MMOL/L (ref 136–145)
SODIUM BLD-SCNC: 135 MMOL/L (ref 136–145)
TROPONIN T SERPL-MCNC: <0.01 NG/ML (ref 0–0.03)
WBC NRBC COR # BLD: 11.21 10*3/MM3 (ref 3.4–10.8)
WBC NRBC COR # BLD: 13.75 10*3/MM3 (ref 3.4–10.8)

## 2019-12-08 PROCEDURE — 93010 ELECTROCARDIOGRAM REPORT: CPT | Performed by: INTERNAL MEDICINE

## 2019-12-08 PROCEDURE — 93005 ELECTROCARDIOGRAM TRACING: CPT | Performed by: INTERNAL MEDICINE

## 2019-12-08 PROCEDURE — 86140 C-REACTIVE PROTEIN: CPT | Performed by: INTERNAL MEDICINE

## 2019-12-08 PROCEDURE — 83880 ASSAY OF NATRIURETIC PEPTIDE: CPT | Performed by: INTERNAL MEDICINE

## 2019-12-08 PROCEDURE — 85025 COMPLETE CBC W/AUTO DIFF WBC: CPT | Performed by: HOSPITALIST

## 2019-12-08 PROCEDURE — 94799 UNLISTED PULMONARY SVC/PX: CPT

## 2019-12-08 PROCEDURE — 80053 COMPREHEN METABOLIC PANEL: CPT | Performed by: HOSPITALIST

## 2019-12-08 PROCEDURE — 84100 ASSAY OF PHOSPHORUS: CPT | Performed by: INTERNAL MEDICINE

## 2019-12-08 PROCEDURE — 63710000001 INSULIN DETEMIR PER 5 UNITS: Performed by: HOSPITALIST

## 2019-12-08 PROCEDURE — 82010 KETONE BODYS QUAN: CPT | Performed by: INTERNAL MEDICINE

## 2019-12-08 PROCEDURE — 87040 BLOOD CULTURE FOR BACTERIA: CPT | Performed by: INTERNAL MEDICINE

## 2019-12-08 PROCEDURE — 63710000001 INSULIN ASPART PER 5 UNITS: Performed by: NURSE PRACTITIONER

## 2019-12-08 PROCEDURE — 82962 GLUCOSE BLOOD TEST: CPT

## 2019-12-08 PROCEDURE — 25010000002 CEFTRIAXONE SODIUM-DEXTROSE 2-2.22 GM-%(50ML) RECONSTITUTED SOLUTION: Performed by: NURSE PRACTITIONER

## 2019-12-08 PROCEDURE — 63710000001 INSULIN ASPART PER 5 UNITS: Performed by: HOSPITALIST

## 2019-12-08 PROCEDURE — 83605 ASSAY OF LACTIC ACID: CPT | Performed by: INTERNAL MEDICINE

## 2019-12-08 PROCEDURE — 25010000002 METHYLPREDNISOLONE PER 40 MG: Performed by: INTERNAL MEDICINE

## 2019-12-08 PROCEDURE — 83735 ASSAY OF MAGNESIUM: CPT | Performed by: INTERNAL MEDICINE

## 2019-12-08 PROCEDURE — 85610 PROTHROMBIN TIME: CPT | Performed by: INTERNAL MEDICINE

## 2019-12-08 PROCEDURE — G0378 HOSPITAL OBSERVATION PER HR: HCPCS

## 2019-12-08 PROCEDURE — 25010000002 ENOXAPARIN PER 10 MG: Performed by: HOSPITALIST

## 2019-12-08 PROCEDURE — 87205 SMEAR GRAM STAIN: CPT | Performed by: INTERNAL MEDICINE

## 2019-12-08 PROCEDURE — 71275 CT ANGIOGRAPHY CHEST: CPT

## 2019-12-08 PROCEDURE — 0 IOPAMIDOL PER 1 ML: Performed by: HOSPITALIST

## 2019-12-08 PROCEDURE — 25010000002 METHYLPREDNISOLONE PER 40 MG: Performed by: HOSPITALIST

## 2019-12-08 PROCEDURE — 84484 ASSAY OF TROPONIN QUANT: CPT | Performed by: INTERNAL MEDICINE

## 2019-12-08 PROCEDURE — 84145 PROCALCITONIN (PCT): CPT | Performed by: INTERNAL MEDICINE

## 2019-12-08 PROCEDURE — 63710000001 INSULIN ASPART PER 5 UNITS: Performed by: INTERNAL MEDICINE

## 2019-12-08 PROCEDURE — 99233 SBSQ HOSP IP/OBS HIGH 50: CPT | Performed by: INTERNAL MEDICINE

## 2019-12-08 PROCEDURE — 85730 THROMBOPLASTIN TIME PARTIAL: CPT | Performed by: INTERNAL MEDICINE

## 2019-12-08 PROCEDURE — 25010000002 CEFTRIAXONE SODIUM-DEXTROSE 2-2.22 GM-%(50ML) RECONSTITUTED SOLUTION: Performed by: INTERNAL MEDICINE

## 2019-12-08 PROCEDURE — 25010000002 ENOXAPARIN PER 10 MG: Performed by: NURSE PRACTITIONER

## 2019-12-08 PROCEDURE — 85025 COMPLETE CBC W/AUTO DIFF WBC: CPT | Performed by: INTERNAL MEDICINE

## 2019-12-08 RX ORDER — CEFTRIAXONE 2 G/50ML
2 INJECTION, SOLUTION INTRAVENOUS EVERY 24 HOURS
Status: DISCONTINUED | OUTPATIENT
Start: 2019-12-08 | End: 2019-12-09

## 2019-12-08 RX ORDER — METHYLPREDNISOLONE SODIUM SUCCINATE 40 MG/ML
40 INJECTION, POWDER, LYOPHILIZED, FOR SOLUTION INTRAMUSCULAR; INTRAVENOUS EVERY 12 HOURS
Status: DISCONTINUED | OUTPATIENT
Start: 2019-12-08 | End: 2019-12-09

## 2019-12-08 RX ORDER — SODIUM CHLORIDE 9 MG/ML
50 INJECTION, SOLUTION INTRAVENOUS CONTINUOUS
Status: DISCONTINUED | OUTPATIENT
Start: 2019-12-08 | End: 2019-12-09

## 2019-12-08 RX ADMIN — DICYCLOMINE HYDROCHLORIDE 20 MG: 20 TABLET ORAL at 11:48

## 2019-12-08 RX ADMIN — IPRATROPIUM BROMIDE AND ALBUTEROL SULFATE 3 ML: .5; 3 SOLUTION RESPIRATORY (INHALATION) at 15:09

## 2019-12-08 RX ADMIN — IPRATROPIUM BROMIDE AND ALBUTEROL SULFATE 3 ML: .5; 3 SOLUTION RESPIRATORY (INHALATION) at 11:01

## 2019-12-08 RX ADMIN — HYDROCODONE BITARTRATE AND ACETAMINOPHEN 1 TABLET: 10; 325 TABLET ORAL at 17:18

## 2019-12-08 RX ADMIN — SODIUM CHLORIDE, PRESERVATIVE FREE 10 ML: 5 INJECTION INTRAVENOUS at 20:39

## 2019-12-08 RX ADMIN — IPRATROPIUM BROMIDE AND ALBUTEROL SULFATE 3 ML: .5; 3 SOLUTION RESPIRATORY (INHALATION) at 22:46

## 2019-12-08 RX ADMIN — INSULIN ASPART 20 UNITS: 100 INJECTION, SOLUTION INTRAVENOUS; SUBCUTANEOUS at 09:22

## 2019-12-08 RX ADMIN — DICYCLOMINE HYDROCHLORIDE 20 MG: 20 TABLET ORAL at 17:18

## 2019-12-08 RX ADMIN — SODIUM CHLORIDE, PRESERVATIVE FREE 10 ML: 5 INJECTION INTRAVENOUS at 09:16

## 2019-12-08 RX ADMIN — SODIUM CHLORIDE 125 ML/HR: 9 INJECTION, SOLUTION INTRAVENOUS at 23:36

## 2019-12-08 RX ADMIN — DOXYCYCLINE 100 MG: 100 INJECTION, POWDER, LYOPHILIZED, FOR SOLUTION INTRAVENOUS at 23:37

## 2019-12-08 RX ADMIN — DOXYCYCLINE 100 MG: 100 INJECTION, POWDER, LYOPHILIZED, FOR SOLUTION INTRAVENOUS at 14:18

## 2019-12-08 RX ADMIN — ENOXAPARIN SODIUM 40 MG: 40 INJECTION SUBCUTANEOUS at 15:54

## 2019-12-08 RX ADMIN — SODIUM CHLORIDE 125 ML/HR: 9 INJECTION, SOLUTION INTRAVENOUS at 12:35

## 2019-12-08 RX ADMIN — ATORVASTATIN CALCIUM 10 MG: 10 TABLET, FILM COATED ORAL at 20:39

## 2019-12-08 RX ADMIN — HYDROCODONE BITARTRATE AND ACETAMINOPHEN 1 TABLET: 10; 325 TABLET ORAL at 20:39

## 2019-12-08 RX ADMIN — HYDROCODONE BITARTRATE AND ACETAMINOPHEN 1 TABLET: 10; 325 TABLET ORAL at 11:48

## 2019-12-08 RX ADMIN — IPRATROPIUM BROMIDE AND ALBUTEROL SULFATE 3 ML: .5; 3 SOLUTION RESPIRATORY (INHALATION) at 18:53

## 2019-12-08 RX ADMIN — HYDROCODONE BITARTRATE AND ACETAMINOPHEN 1 TABLET: 10; 325 TABLET ORAL at 09:14

## 2019-12-08 RX ADMIN — CEFTRIAXONE 2 G: 2 INJECTION, SOLUTION INTRAVENOUS at 12:35

## 2019-12-08 RX ADMIN — INSULIN ASPART 15 UNITS: 100 INJECTION, SOLUTION INTRAVENOUS; SUBCUTANEOUS at 17:18

## 2019-12-08 RX ADMIN — NICOTINE 1 PATCH: 21 PATCH TRANSDERMAL at 09:15

## 2019-12-08 RX ADMIN — IOPAMIDOL 24 ML: 755 INJECTION, SOLUTION INTRAVENOUS at 09:04

## 2019-12-08 RX ADMIN — IPRATROPIUM BROMIDE AND ALBUTEROL SULFATE 3 ML: .5; 3 SOLUTION RESPIRATORY (INHALATION) at 02:39

## 2019-12-08 RX ADMIN — ACETAMINOPHEN 650 MG: 325 TABLET ORAL at 16:21

## 2019-12-08 RX ADMIN — IOPAMIDOL 100 ML: 755 INJECTION, SOLUTION INTRAVENOUS at 09:04

## 2019-12-08 RX ADMIN — METHYLPREDNISOLONE SODIUM SUCCINATE 40 MG: 40 INJECTION, POWDER, FOR SOLUTION INTRAMUSCULAR; INTRAVENOUS at 15:54

## 2019-12-08 RX ADMIN — BUDESONIDE AND FORMOTEROL FUMARATE DIHYDRATE 2 PUFF: 160; 4.5 AEROSOL RESPIRATORY (INHALATION) at 07:24

## 2019-12-08 RX ADMIN — INSULIN ASPART 15 UNITS: 100 INJECTION, SOLUTION INTRAVENOUS; SUBCUTANEOUS at 12:35

## 2019-12-08 RX ADMIN — IPRATROPIUM BROMIDE AND ALBUTEROL SULFATE 3 ML: .5; 3 SOLUTION RESPIRATORY (INHALATION) at 07:14

## 2019-12-08 RX ADMIN — PIOGLITAZONE 45 MG: 45 TABLET ORAL at 09:14

## 2019-12-08 RX ADMIN — DICYCLOMINE HYDROCHLORIDE 20 MG: 20 TABLET ORAL at 20:39

## 2019-12-08 RX ADMIN — INSULIN ASPART 16 UNITS: 100 INJECTION, SOLUTION INTRAVENOUS; SUBCUTANEOUS at 12:36

## 2019-12-08 RX ADMIN — METHYLPREDNISOLONE SODIUM SUCCINATE 40 MG: 40 INJECTION, POWDER, FOR SOLUTION INTRAMUSCULAR; INTRAVENOUS at 04:29

## 2019-12-08 RX ADMIN — INSULIN DETEMIR 40 UNITS: 100 INJECTION, SOLUTION SUBCUTANEOUS at 09:22

## 2019-12-08 RX ADMIN — DICYCLOMINE HYDROCHLORIDE 20 MG: 20 TABLET ORAL at 09:14

## 2019-12-08 RX ADMIN — TERAZOSIN HYDROCHLORIDE ANHYDROUS 1 MG: 1 CAPSULE ORAL at 20:42

## 2019-12-08 RX ADMIN — ACETAMINOPHEN 650 MG: 325 TABLET ORAL at 06:14

## 2019-12-08 RX ADMIN — GLIPIZIDE: 5 TABLET ORAL at 09:13

## 2019-12-08 NOTE — PLAN OF CARE
Problem: Patient Care Overview  Goal: Plan of Care Review  Outcome: Ongoing (interventions implemented as appropriate)  Flowsheets  Taken 12/8/2019 1632 by Mariah Mccarty RN  Progress: declining  Outcome Summary: Pt requiring increased oxygen titrated to 4 liters this am, lactic acid 5.5, strated on iv antibiotics, remains on droplet isolation awaiting pending sputum culture results, blood cultures obtaned and pt started on iv fluids. will continue to monitor.

## 2019-12-08 NOTE — SIGNIFICANT NOTE
12/08/19 1132   Provider Notification   Reason for Communication Critical lab value  (lactic 3.0)

## 2019-12-08 NOTE — H&P
HOSPITALIST SERVICES     @ [facility], [place]        [facility] Hospitalist Team    ADMISSION HISTORY AND PHYSICAL    PATIENT:      Patient Care Team:  Ney Kathleen MD as PCP - General (Family Medicine)    PATIENT IS A RESIDENT OF:  Lives at home      PATIENT ACCOMPANIED BY:  Alone. No family present      CHIEF COMPLAINT:     Shortness of breath, cough, wheezing and fever    HISTORY OF PRESENT ILLNESS:    Mr. Kt Armstrong is a 69 year old  male with hx of HLD, Type II DM, COPD, Tobacco use, IBS and DJD who presented to [facility] ED with hx of shortness of breath, cough, wheezing and fever for last couple of days. Patinet has been bringing up yellowish/ whitish phlegm. Temp had gone upto 100 deg. Patient is a chronic smoker.    Patient denies any sx of headache, chest pain, abdominal pain, nausea/ vomiting, dysuria, urgency/ frequency or any recent hx of blood in stool. No other medical history available.        Past Medical History:   Diagnosis Date   • Arthritis of back    • Arthritis of neck    • Asthma    • COPD (chronic obstructive pulmonary disease) (CMS/[facility])    • DM (diabetes mellitus) (CMS/[facility])    • Hip arthrosis    • Hyperlipidemia    • Knee swelling    • Periarthritis of shoulder    • Rotator cuff syndrome      Past Surgical History:   Procedure Laterality Date   • APPENDECTOMY     • CHOLECYSTECTOMY     • ROTATOR CUFF REPAIR Right      Family History   Problem Relation Age of Onset   • Diabetes Mother    • COPD Father        Family History as documented in the Problem List.    Social History     Tobacco Use   • Smoking status: Current Every Day Smoker     Packs/day: 0.50     Years: 58.00     Pack years: 29.00     Types: Cigarettes   • Smokeless tobacco: Never Used   • Tobacco comment: pt asking for nicotine patch   Substance Use Topics   • Alcohol use: No   • Drug use: No     Medications Prior to Admission   Medication Sig Dispense Refill Last Dose   •  JEWELL CONTOUR TEST test strip   1 12/7/2019 at Unknown time   • budesonide-formoterol (SYMBICORT) 160-4.5 MCG/ACT inhaler Inhale 2 puffs 2 (Two) Times a Day.   12/7/2019 at 0630   • dicyclomine (BENTYL) 20 MG tablet Take 20 mg by mouth 4 (Four) Times a Day.   12/7/2019 at 0630   • doxazosin (CARDURA) 1 MG tablet Take 1 mg by mouth Daily.   12/7/2019 at 0630   • glipiZIDE 5 MG tablet 5 mg, metFORMIN 500 MG tablet 500 mg Take 2 doses by mouth 2 (Two) Times a Day Before Meals.   12/7/2019 at 0630   • HYDROcodone-acetaminophen (NORCO)  MG per tablet 1 tablet 4 (Four) Times a Day.  0 12/7/2019 at 0630   • ipratropium-albuterol (DUO-NEB) 0.5-2.5 mg/mL nebulizer 3 mL 4 (Four) Times a Day.  0 12/7/2019 at 1100   • metFORMIN (GLUCOPHAGE) 500 MG tablet Take 1 tablet by mouth 2 (Two) Times a Day With Meals.   12/7/2019 at 0630   • NON FORMULARY Inject 40 Units under the skin into the appropriate area as directed Daily. Lantus 40 units sq every morning   12/7/2019 at 0630   • pioglitazone (ACTOS) 45 MG tablet Take 45 mg by mouth Daily.   12/7/2019 at 0630   • SIMVASTATIN PO Take 40 mg by mouth Every Evening.   12/6/2019 at 1830     Allergies:  Patient has no known allergies.    REVIEW OF SYSTEMS (ROS):  Please see the above history of present illness for pertinent positives and negatives.  The remainder of the patient's systems have been reviewed and are negative.     ROS negative except current and past medical and surgical history as noted in the Problem List.    14 SYSTEM REVIEW OF SYSTEM (Per CMS):    Constitutional Symptoms:  No hx of weight loss, fatigue, fever, or headache      Allergic/Immunologic:  Drug allergies: As per Notes. No hx  HIV/AIDS     Integumentary:  Skin color changes: NONE, No hx of itching, rash, hives, moles     Eyes:  No hx of blurriness, photophobia, dryness, pain, or cataracts/ glaucoma     ENT:  No hx of sore throat, Sinusitis, tinnitus, hearing loss or epistaxis      Cardiovascular:   "Positive hx of HLD. No hx of HTN, chest pain, angina, MI, murmurs, palpitations, PVD or DVT     Respiratory:  POsitive hx of dyspnea, cough, sputum production, wheezing but no hx of hemoptysis      Musculoskeletal:  Positive hx of weakness, joint pain, swelling or arthritis     Gastrointestinal:  Hx of IBS. No hx of nausea, vomiting, heartburn, diarrhea, constipation, hematemesis or blood in stool     Neurological:  No hx of syncope, seizures, vertigo, memory loss, paralysis, tremors or numbness     Genitourinary:  Hx of BPH. No hx of dysuria, hematuria, renal stones, STD     Endocrine:  Positive hx of diabetes. No hx of thyroid disease or goiter     Hematologic/Lymphatic:  No hx of bleeding disorders, anemia, tenderness of lymph nodes     Psychiatric:  No hx of depression, anxiety, hallucinations, suicidal ideation, substance abuse                   Vital Signs  Temp:  [98.6 °F (37 °C)-99.2 °F (37.3 °C)] 99.2 °F (37.3 °C)  Heart Rate:  [107-134] 118  Resp:  [20-36] 20  BP: (109-167)/() 109/52    Flowsheet Rows      First Filed Value   Admission Height  187.9 cm (73.98\") Documented at 12/07/2019 1126   Admission Weight  130 kg (286 lb) Documented at 12/07/2019 1126           Physical Exam:    PHYSICAL EXAMINATION:    VITAL SIGNS: As per Nurse's notes    GENERAL APPEARANCE: Well developed, well nourished, , in no acute distress without complaints of shortness of breath or acute pain. Lips and nail beds are pink.    BMI:  36.89 (Obesity Class II)      HEENT: Normocephalic, atraumatic. PERRL. Sclerae anicteric and conjunctivae pink and moist. Extraocular muscle movements intact. External inspection of the ears and nose WNL. No rhinitis. Lips, teeth, and gums with normal mucosa. The oral mucosa, palate, tongue and posterior pharynx normal.     NECK: Supple and symmetric. No masses. No thyromegaly. No tenderness. Trachea central. No carotid bruits. No evidence of JVD.    LYMPH NODES: No palpable " "lymphadenopathy.    SKIN: Warm, dry and intact. No rash or lesions or wounds or patechiae.    CHEST: Normal AP diameter and normal contour.     LUNGS: Accessory muscles of respiration are not active. On auscultation there are bilateral expiratory wheezes with no rales. Clear to auscultation bilaterally. Respiratory expansion equal bilaterally.    CARDIOVASCULAR: RRR. S1, S2 normal without murmur/gallop/rub. No S3, S4. The carotid pulses normal and 2+ bilaterally without bruits. Peripheral pulses 2+ and symmetric. Capillary refill less than 3 seconds.    ABDOMEN: Soft, non-tender, non-distended. No masses. No rigidity/ rebound/guarding. No hepatosplenomegaly. Normal bowel sounds. No auscultatable bruits. There were no inguinal or umbilical hernias noted.     GENITO-URINARY: Examination not indicated.    RECTAL: Not performed.     EXTREMITIES:  No evidence of cyanosis, clubbing, or edema.  + pedal pulses. No rash or ulcers or varicosities identified. Pulses are 2+ throughout. No evidence of Pallor, Pulselessness, Poikilothermia (\"coldness\"), Paralysis or Paresthesia. Moves all extremities well. Negative Homans sign bilaterally.     MUSCULOSKELETAL: Examination of cervical and lumbosacral spines with pain and discomfort. ROM of extremities WNL. No evidence of redness, swelling, warmth or pain of the joints of the extremities. Muscle strength and tone normal.    PSYCHIATRY: Responds appropriately to questions. No evidence of acute anxiety, depression, panic attacks or hallucinations.    MENTAL STATUS EXAMINATION: Neatly dressed, conscious and alert. Speech normal in tone. Pleasant and cooperative. Orientation x3. Thought process, content and insight are normal. Memory without deficits.    NEUROLOGIC: Examination is grossly intact globally with no focal deficits. Cranial nerves II through XII are grossly intact. No motor weakness. No decrease in sensation. No facial asymmetry. Finger Nose test normal. Normal deep tendon " reflexes. Gait was not tested.         Emotional Behavior:    Judgement and Insight: JUDGEMENT: Assessment of real life problem-solving skills is not possible but it appears patient has good judgement.     INSIGHT: Patient has understanding of medical illness and is compliant with treatments.     Mental Status:  Alertness  Patient is fully alert   Memory:  Seems intact for both recent and remote events   Mood and Affect:         Depression  None seen               Anxiety  None seen    Debilities:   Physical Weakness  Present   Handicaps  None except chronic physical/ medical conditions   Disabilities  No evidence except chronic physical/ medical conditions     Agitation  None seen         Results Review:    I reviewed the patient's new clinical results.  Lab Results (most recent)     Procedure Component Value Units Date/Time    Respiratory Panel, PCR - Swab, Nasopharynx [[id number]] Collected:  12/07/19 1709    Specimen:  Swab from Nasopharynx Updated:  12/07/19 1709    POC Glucose Once [[id number]]  (Abnormal) Collected:  12/07/19 1658    Specimen:  Blood Updated:  12/07/19 1705     Glucose 335 mg/dL     Hemoglobin A1c [[id number]]  (Abnormal) Collected:  12/07/19 1130    Specimen:  Blood Updated:  12/07/19 1604     Hemoglobin A1C 9.10 %     Narrative:       Hemoglobin A1C Ranges:    Increased Risk for Diabetes  5.7% to 6.4%  Diabetes                     >= 6.5%  Diabetic Goal                < 7.0%    TSH [[id number]]  (Normal) Collected:  12/07/19 1130    Specimen:  Blood Updated:  12/07/19 1601     TSH 0.584 uIU/mL     BNP [[id number]]  (Normal) Collected:  12/07/19 1130    Specimen:  Blood Updated:  12/07/19 1556     proBNP 207.8 pg/mL     Narrative:       Among patients with dyspnea, NT-proBNP is highly sensitive for the detection of acute congestive heart failure. In addition NT-proBNP of <300 pg/ml effectively rules out acute congestive heart failure with 99% negative predictive value.      Ketone Bodies, Serum  (Not performed at [place]) [[id number]] Collected:  12/07/19 1130    Specimen:  Blood Updated:  12/07/19 1358    Narrative:       The following orders were created for panel order Ketone Bodies, Serum (Not performed at [place]).  Procedure                               Abnormality         Status                     ---------                               -----------         ------                     Acetone[[id number]]                      Abnormal            Final result                 Please view results for these tests on the individual orders.    Acetone [[id number]]  (Abnormal) Collected:  12/07/19 1130    Specimen:  Blood Updated:  12/07/19 1358     Acetone Small    Blood Gas, Arterial [[id number]]  (Abnormal) Collected:  12/07/19 1250    Specimen:  Arterial Blood Updated:  12/07/19 1252     Site Right Radial     Rich's Test Positive     pH, Arterial 7.423 pH units      pCO2, Arterial 41.4 mm Hg      pO2, Arterial 57.4 mm Hg      Comment: 84 Value below reference range        HCO3, Arterial 27.0 mmol/L      Comment: 83 Value above reference range        Base Excess, Arterial 2.3 mmol/L      Comment: 83 Value above reference range        O2 Saturation, Arterial 90.2 %      Comment: 84 Value below reference range        Hemoglobin, Blood Gas 14.9 g/dL      Temperature 37.0 C      Barometric Pressure for Blood Gas 747 mmHg      Modality Nasal Cannula     Flow Rate 3.0 lpm      Ventilator Mode NA     Comment: Meter: [id number]     :  [id number]        pCO2, Temperature Corrected 41.4 mm Hg      pH, Temp Corrected 7.423 pH Units      pO2, Temperature Corrected 57.4 mm Hg     Urinalysis, Microscopic Only - Urine, Clean Catch [[id number]]  (Abnormal) Collected:  12/07/19 1214    Specimen:  Urine, Clean Catch Updated:  12/07/19 1250     RBC, UA 0-2 /HPF      WBC, UA 0-2 /HPF      Bacteria, UA None Seen /HPF      Squamous Epithelial Cells, UA None Seen /HPF      Hyaline Casts, UA None Seen /LPF       Methodology Manual Light Microscopy    Urinalysis With Culture If Indicated - Urine, Clean Catch [[id number]]  (Abnormal) Collected:  12/07/19 1214    Specimen:  Urine, Clean Catch Updated:  12/07/19 1242     Color, UA Yellow     Appearance, UA Clear     pH, UA 6.0     Specific Gravity, UA 1.010     Glucose,  mg/dL (2+)     Ketones, UA 80 mg/dL (3+)     Bilirubin, UA Small (1+)     Blood, UA Small (1+)     Protein, UA 30 mg/dL (1+)     Leuk Esterase, UA Negative     Nitrite, UA Negative     Urobilinogen, UA 0.2 E.U./dL    Procalcitonin [[id number]]  (Normal) Collected:  12/07/19 1130    Specimen:  Blood Updated:  12/07/19 1229     Procalcitonin 0.20 ng/mL     Blood Culture - Blood, Arm, Right [[id number]] Collected:  12/07/19 1159    Specimen:  Blood from Arm, Right Updated:  12/07/19 1206    Troponin [[id number]]  (Normal) Collected:  12/07/19 1130    Specimen:  Blood Updated:  12/07/19 1205     Troponin T <0.010 ng/mL     Narrative:       Troponin T Reference Range:  <= 0.03 ng/mL-   Negative for AMI  >0.03 ng/mL-     Abnormal for myocardial necrosis.  Clinicians would have to utilize clinical acumen, EKG, Troponin and serial changes to determine if it is an Acute Myocardial Infarction or myocardial injury due to an underlying chronic condition.     Comprehensive Metabolic Panel [[id number]]  (Abnormal) Collected:  12/07/19 1130    Specimen:  Blood Updated:  12/07/19 1205     Glucose 338 mg/dL      BUN 10 mg/dL      Creatinine 0.86 mg/dL      Sodium 135 mmol/L      Potassium 4.2 mmol/L      Chloride 93 mmol/L      CO2 21.5 mmol/L      Calcium 9.7 mg/dL      Total Protein 7.9 g/dL      Albumin 4.50 g/dL      ALT (SGPT) 29 U/L      AST (SGOT) 28 U/L      Alkaline Phosphatase 54 U/L      Total Bilirubin 0.5 mg/dL      eGFR Non African Amer 89 mL/min/1.73      Globulin 3.4 gm/dL      A/G Ratio 1.3 g/dL      BUN/Creatinine Ratio 11.6     Anion Gap 20.5 mmol/L     Narrative:       GFR Normal >60  Chronic Kidney Disease  <60  Kidney Failure <15      Blood Culture - Blood, Arm, Left [[id number]] Collected:  12/07/19 1130    Specimen:  Blood from Arm, Left Updated:  12/07/19 1205    Influenza Antigen, Rapid - Swab, Nasopharynx [[id number]]  (Normal) Collected:  12/07/19 1131    Specimen:  Swab from Nasopharynx Updated:  12/07/19 1204     Influenza A Ag, EIA Negative     Influenza B Ag, EIA Negative    D-dimer, Quantitative [[id number]]  (Normal) Collected:  12/07/19 1132    Specimen:  Blood Updated:  12/07/19 1200     D-Dimer, Quantitative 0.28 MCGFEU/mL     Narrative:       Can be elevated in, but is not diagnostic for deep vein thrombosis (DVT) or pulmonary embolis (PE).  It is also elevated in other medical conditions.  Clinical correlation is required.  The negative cut-off value for the D-Dimer is 0.50 mcg FEU/mL for DVT and PE.      Lactic Acid, Plasma [[id number]]  (Normal) Collected:  12/07/19 1130    Specimen:  Blood Updated:  12/07/19 1159     Lactate 1.6 mmol/L     CBC & Differential [[id number]] Collected:  12/07/19 1130    Specimen:  Blood Updated:  12/07/19 1153    Narrative:       The following orders were created for panel order CBC & Differential.  Procedure                               Abnormality         Status                     ---------                               -----------         ------                     CBC Auto Differential[[id number]]        Abnormal            Final result                 Please view results for these tests on the individual orders.    CBC Auto Differential [[id number]]  (Abnormal) Collected:  12/07/19 1130    Specimen:  Blood Updated:  12/07/19 1153     WBC 10.59 10*3/mm3      RBC 4.67 10*6/mm3      Hemoglobin 15.0 g/dL      Hematocrit 45.0 %      MCV 96.4 fL      MCH 32.1 pg      MCHC 33.3 g/dL      RDW 13.1 %      RDW-SD 46.8 fl      MPV 13.5 fL      Platelets 162 10*3/mm3      Neutrophil % 83.5 %      Lymphocyte % 6.4 %      Monocyte % 8.5 %      Eosinophil % 0.0 %      Basophil % 0.8  %      Immature Grans % 0.8 %      Neutrophils, Absolute 8.85 10*3/mm3      Lymphocytes, Absolute 0.68 10*3/mm3      Monocytes, Absolute 0.90 10*3/mm3      Eosinophils, Absolute 0.00 10*3/mm3      Basophils, Absolute 0.08 10*3/mm3      Immature Grans, Absolute 0.08 10*3/mm3      nRBC 0.0 /100 WBC           Imaging Results (Most Recent)     Procedure Component Value Units Date/Time    XR Chest PA & Lateral [[id number]] Collected:  12/07/19 1615     Updated:  12/07/19 1617    Narrative:       CR Chest 2 Vws    INDICATION:    Hypoxia. Question CHF. COPD with acute exacerbation.    COMPARISON:    12/7/2019 1130 hours    FINDINGS:   PA and lateral views of the chest.  Improved lung volumes. Heart size within normal limits. Mild pulmonary vascular congestion. No interstitial or alveolar edema. No focal consolidation. No effusions or pneumothorax.        Impression:       Improved lung volumes with continued mild prominence of the pulmonary vascularity but no convincing evidence of CHF.    Signer Name: KEATON Martin MD   Signed: 12/7/2019 4:15 PM   Workstation Name: [email or web address]    [facility]    XR Chest 1 View [[id number]] Collected:  12/07/19 1206     Updated:  12/07/19 1208    Narrative:       CR Chest 1 Vw    INDICATION:   69-year-old male with worsening shortness of air for 3 days. History of COPD.     COMPARISON:    Chest 11/9/2019    FINDINGS:  2 portable AP view(s) of the chest.  Heart size is upper limits. Mild central vascular congestion and bilateral perihilar interstitial opacities. No pleural effusions or pneumothorax. Mediastinum is within normal limits.      Impression:       Borderline heart size with mild central vascular congestion and bilateral perihilar interstitial opacities. Correlate for clinical symptoms of congestive failure.    Signer Name: Reginaldo Montemayor MD   Signed: 12/7/2019 12:06 PM   Workstation Name: [id number]    [facility]         reviewed    ECG/EMG Results (most recent)     Procedure Component Value Units Date/Time    ECG 12 Lead [[id number]] Collected:  12/07/19 1126     Updated:  12/07/19 1845    Narrative:       HEART RATE= 131  bpm  RR Interval= 456  ms  [place] Interval= 127  ms  P Horizontal Axis= -5  deg  P Front Axis= 49  deg  QRSD Interval= 95  ms  QT Interval= 303  ms  QRS Axis= 135  deg  T Wave Axis= 21  deg  - OTHERWISE NORMAL ECG -  Poor quality tracing repeat  Electronically Signed By: Lyle Middleton ([facility]) 07-Dec-2019 18:45:33  Date and Time of Study: 2019-12-07 11:26:15        reviewed    Assessment/Plan       DIFFERENTIAL DIAGNOSES CONSIDERED FOR CHIEF COMPLAINT:        The following clinical entities were considered in differential diagnosis. The list includes commonly encountered practical probabilities and rare esoteric diagnoses worked out through systemic diagnostic methods used to identify the presence of a disease entity where multiple diagnoses are possible. The decision is reached through either of the following methods: 1) direct confirmatory testing, or 2) a process of elimination, or 3) at least a process of obtaining information that shrinks the “probabilities” of candidate conditions to negligible levels, by using clinical evidence and thus implementing aspects of the hypothetico-deductive method.           DIFFERENTIAL DIAGNOSES OF DYSPNEA:    Differential diagnosis for abdominal pain includes but is not limited to:    A) Cardiac: CHF, CAD, MI, Cardiomyopathy, Valvular dysfunction, LVH, Asymmetric Septal Hypertrophy, Pericarditis, Arrythmias; B) Pulmonary: COPD, Asthma, Restrictive Lung diseases, Pneumonia, Hereditary Lung disorders, Pneumothorax; C) Mixed cardiac or pulmonary: COPD with pulmonary hypertension, and cor pulmonale, Deconditioning, Pulmonary emboli, Trauma; D) Noncardiac or Nonpulmonary: Metabolic conditions e.g. Acidosis, Pain, Neuromuscular disorders, Otorhinolaryngeal disorders, Functional  (Anxiety, Panic disorders, Hyperventilation)              ASSESSMENT AND PLAN:       SUMMARY:    ?   PROPHYLAXIS:   -Oxygen Saturation: As per Nurses' notes.   -DVT Prophylaxis: On Inj. Lovenox and SCDs  -Gastritis Prophylaxis: Omeprazole   -Constipation Prophylaxis: Colace    -Immunizations: N/A  -Intake and Output Orders: As per order sheet   -Borrero Catheter: Not indicated at this time  -Smoking/ Nicotine issues: Nicotine patch 21 mg daily.     ACTIVITIES:  -Bathroom Privileges: May use bathroom   -Activity: Encouraged to sit up in side chair for 2-4 hours BID   -Swallowing evaluation by Nurse on bedside/ Speech Therapist: N/A  -PT/OT: N/A      NUTRITION AND FLUIDS:  -Diet/ Nutrition: Regular, cardiac, consistent carbohydrate diet   -Fluid Status/Electrolytes: Saline Lock      SOCIAL ISSUES:   -MRSA SCREEN: As per institutional protocol   -Behavioral/ Agitation Issues: NONE   -Social Issues: Lives at home with family.   -Occupational Issues: Patient is employed at this time.   -Code Status: FULL CODE on admission   -Disposition: Should be able to go back home once ready for discharge       THERAPEUTIC:   ALLERGIES: NKDA  ANTIBIOTICS: N/A  PAIN MANAGEMENT: Norco 10 mg  ANTIPYRETIC: Tylenol 650 mg for headache or   temp >100 degrees   INSULIN THERAPY: Low dose insulin coverage as ordered   CHEST PAIN: N/A    NEBULIZER TREATMENT: DuoNeb/ via nebulizer q4-6 hrs PRN for shortness of breath and/or wheezing   ANXIETY: N/A     DEPRESSION: N/A  INSOMNIA: N/A                PLAN:    Labs and diagnostic tests reviewed: YES    Diagnostic tests reviewed: YES    Patient is clinically and hemodynamically stable    Consultation: Pulmonary    Any new recommendations: As per consultant    New Labs ordered: CBC, BMP, Serial Troponin    New Diagnostic tests ordered: N/A    Any changes in Medications:   Drugs added: Inj Solumedrol and DuoNeb   Drugs deleted: N/A   Drug dose changed: N/A    To continue current management and supportive  care    Discharge planning issues: NONE. Patient should be able to go back home once ready for discharge.    Will follow patient closely    Nothing new to add for right now        DIAGNOSES:      PRIMARY DIAGNOSES:    Acute Exacerbation of COPD with hypoxia: Patient has hx of Asthma/ COPD. Breathing has improved. On Inj Solumedrol and DuoNeb and Budesonide-Formoterol. Pulmonary consult requested. CXR does not show acute infiltrates.     Pulmonary Congestion: CXR does show evidence of pulmonary congestion but proBNP 207.8. Troponin <0.010. May need diuresis, repeat CXR and Echo if dyspnea persists    HLD: On simvastatin.     Type II DM: Last blood sugar 338 to 299. HbA1c 9.10. Although there is small acetone, CO@ acceptable. On home medicines and SSI. Will monitor    DJD: On Norco 10 mg. No acute issues    BPH: On Hytrin. No acute issues    IBS: On dicylomine. No acute issues    Anthropometric Analysis: BMI:    BMI:  36.89 (Obesity Class II)    CODE Status: FULL CODE    Tobacco use: Current. On Nicotine patch    Alcohol intake: drinks a week    Illegal Drug use: N/A    DVT Prophylaxis: Inj Enoxaparin and SCDs  ?     SECONDARY DIAGNOSES:  ?     As above        SURGICAL DIAGNOSES:      As per Problem List      I discussed the patients findings and my recommendations with patient and patient is agreeable to current treatment and management plan.     Lisandro Barrera MD  12/07/19  7:36 PM          Lisandro Barrera M.D.; FACP; MPH; [facility]  Internal Medicine/ Hospitalist        Time:  60 minutes

## 2019-12-08 NOTE — PLAN OF CARE
Problem: Chronic Obstructive Pulmonary Disease (Adult)  Intervention: Reduce/Relieve Breathlessness  Flowsheets (Taken 12/8/2019 4114)  Sensory Stimulation Regulation: quiet environment promoted  Environmental Support: calm environment promoted

## 2019-12-08 NOTE — CONSULTS
[facility]  Phone: [phone]  Jorge Woods MD      Subjective   LOS: 0 days     Thank you for this consultation.  69-year-old male who follows with pulmonary at [facility].  He remains a current smoker of at least a few cigarettes a day.  Previously half pack a day smoker.  He has underlying COPD and his last admission here was in April this year with COPD exacerbation.  He uses inhalers and nebulizers at home.  For the past 5 days he has had increased cough.  He feels congested but not expectorating.  He states he is wheezing.  He uses oxygen at home and has been compliant.  He has increased his nebulizer treatments.  Despite this he is continued to get worse.  He states his daughter had the flu recently.  His rapid influenza test here was negative.  He denies leg swelling or heart problems.  He is unaware if he has been treated for diastolic heart failure in the past.  He denies any chest pain.  He denies fever.    Kt Armstrong  reports that he does not drink alcohol.,  reports that he has been smoking cigarettes. He has a 29.00 pack-year smoking history. He has never used smokeless tobacco.     Past Hx:  has a past medical history of Arthritis of back, Arthritis of neck, Asthma, COPD (chronic obstructive pulmonary disease) (CMS/[facility]), DM (diabetes mellitus) (CMS/[facility]), Hip arthrosis, Hyperlipidemia, Knee swelling, Periarthritis of shoulder, and Rotator cuff syndrome.  Surg Hx:  has a past surgical history that includes Cholecystectomy; Appendectomy; and Rotator cuff repair (Right).  FH: family history includes COPD in his father; Diabetes in his mother.  SH:  reports that he has been smoking cigarettes. He has a 29.00 pack-year smoking history. He has never used smokeless tobacco. He reports that he does not drink alcohol or use drugs.    Medications Prior to Admission   Medication Sig Dispense Refill Last Dose   • JEWELL CONTOUR TEST test strip   1 12/7/2019 at Unknown time   •  budesonide-formoterol (SYMBICORT) 160-4.5 MCG/ACT inhaler Inhale 2 puffs 2 (Two) Times a Day.   12/7/2019 at 0630   • dicyclomine (BENTYL) 20 MG tablet Take 20 mg by mouth 4 (Four) Times a Day.   12/7/2019 at 0630   • doxazosin (CARDURA) 1 MG tablet Take 1 mg by mouth Daily.   12/7/2019 at 0630   • glipiZIDE 5 MG tablet 5 mg, metFORMIN 500 MG tablet 500 mg Take 2 doses by mouth 2 (Two) Times a Day Before Meals.   12/7/2019 at 0630   • HYDROcodone-acetaminophen (NORCO)  MG per tablet 1 tablet 4 (Four) Times a Day.  0 12/7/2019 at 0630   • ipratropium-albuterol (DUO-NEB) 0.5-2.5 mg/mL nebulizer 3 mL 4 (Four) Times a Day.  0 12/7/2019 at 1100   • metFORMIN (GLUCOPHAGE) 500 MG tablet Take 1 tablet by mouth 2 (Two) Times a Day With Meals.   12/7/2019 at 0630   • NON FORMULARY Inject 40 Units under the skin into the appropriate area as directed Daily. Lantus 40 units sq every morning   12/7/2019 at 0630   • pioglitazone (ACTOS) 45 MG tablet Take 45 mg by mouth Daily.   12/7/2019 at 0630   • SIMVASTATIN PO Take 40 mg by mouth Every Evening.   12/6/2019 at 1830     No Known Allergies    Review of Systems   Constitutional: Negative for chills and fever.   HENT: Positive for congestion. Negative for postnasal drip, sore throat and trouble swallowing.    Eyes: Negative for redness and visual disturbance.   Respiratory: Positive for cough, shortness of breath and wheezing.    Cardiovascular: Negative for chest pain, palpitations and leg swelling.   Gastrointestinal: Negative for abdominal pain, diarrhea, nausea and vomiting.   Endocrine: Negative for cold intolerance and heat intolerance.   Genitourinary: Negative for frequency and urgency.   Musculoskeletal: Negative for arthralgias and back pain.   Skin: Negative for pallor and rash.   Neurological: Negative for seizures and headaches.   Psychiatric/Behavioral: Negative for behavioral problems. The patient is not nervous/anxious.      Vital Signs past 24hrs  BP range:  BP: (109-167)/() 151/80  Pulse range: Heart Rate:  [] 114  Resp rate range: Resp:  [18-36] 24  Temp range: Temp (24hrs), Av.8 °F (37.1 °C), Min:98.4 °F (36.9 °C), Max:99.2 °F (37.3 °C)    Oxygen range: SpO2:  [85 %-95 %] 91 %; Flow (L/min):  [3-4] 4;   Device (Oxygen Therapy): nasal cannula  123 kg (272 lb); Body mass index is 36.89 kg/m².  No intake/output data recorded.    Adult male sitting on the side of the bed.  He is on nasal cannula.  He does not look like a distress.  Pupils equal and reactive to light.  Oropharynx class III Mallampati airway.  No posterior pharyngeal discharge.  Nasopharynx without discharge septum midline.  JVP not elevated trachea midline thyroid not enlarged.  Lungs reveal bilateral diminished breath sounds but surprisingly no wheezing no crackles and no rhonchi.  Percussion note resonant chest expansion equal no chest wall deformity or tenderness.  Heart examination S1-S2 present rhythm regular no murmurs.  No edema in lower extremities.  Abdomen is morbidly obese, soft, nontender.  Bowel sounds present no liver spleen enlargement.  No peripheral cyanosis clubbing.  Moves all 4 extremities sensorimotor intact.  No cervical, axillary, inguinal adenopathy.     Results Review:    I have reviewed the laboratory and imaging data from current admission. My annotations are as noted in assessment and plan.    Medication Review:  I have reviewed the current MAR. My annotations are as noted in assessment and plan.    Plan   PCCM Problems  AECOPD  AHRF  Acute bronchitis  Current smoker  Chronic hypoxia  Relevant Medical Diagnoses  DM 2  DJD    Plan of Treatment  Worsening hypoxia and surprisingly clear lungs.  Has underlying severe COPD.  Chest x-ray clear.  So far at least influenza panel negative.  I cannot really explain his worsening hypoxia.  I will check a PE protocol CT.  He should also have an echocardiogram performed.  I will order a troponin and a BNP to be repeated.   His EKG was poor quality and I will repeat.  I will DC metformin since I am doing a contrasted CT.    Given the absence of wheezing I will cut his steroids down to every 12 hours.  Continue with nebs every 4 for now.    Management of diabetes per primary team with hemoglobin A1c over 9.    Part of this note may be an electronic transcription/translation of spoken language to printed text using the Dragon Dictation System.

## 2019-12-08 NOTE — NURSING NOTE
Discharge Planning Assessment  MURIEL Chavez     Patient Name: Kt Armstrong  MRN: [id number]  Today's Date: 12/8/2019    Admit Date: 12/7/2019    Discharge Needs Assessment     Row Name 12/08/19 1223       Living Environment    Lives With  child(becki), adult;grandchild(becki)    Current Living Arrangements  home/apartment/condo    Primary Care Provided by  self    Provides Primary Care For  no one    Family Caregiver if Needed  child(becki), adult    Quality of Family Relationships  involved;supportive    Able to Return to Prior Arrangements  yes       Resource/Environmental Concerns    Resource/Environmental Concerns  none    Transportation Concerns  car, none       Transition Planning    Patient/Family Anticipates Transition to  home with family    Patient/Family Anticipated Services at Transition  none       Discharge Needs Assessment    Readmission Within the Last 30 Days  no previous admission in last 30 days    Concerns to be Addressed  denies needs/concerns at this time    Equipment Currently Used at Home  nebulizer;oxygen    Equipment Needed After Discharge  none        Discharge Plan     Row Name 12/08/19 1224       Plan    Plan  plan home, assess needs    Patient/Family in Agreement with Plan  yes    Plan Comments  Spoke with patient at bedside, permission to speak with visitor present. Face sheet verified. Patient lives in an apartment with his adult daughter and 12yo grandson. He is independent of ADLs including driving. He has a nebulizer and oxygen provided by [employer]. He states his oxygen is 2L@HS and prn. He denies additional DME. He has used HH after a knee surgery years ago, but does not recall the agency. He has not used inpatient rehab previously. [facility] and denies issues obtaining medications. He does not have a living will and declines information about creating one. He does not anticipate any needs at [place]. ANNA # placed on white board, will continue to follow.         Destination      Coordination has not been started for this encounter.      Durable Medical Equipment      Coordination has not been started for this encounter.      Dialysis/Infusion      Coordination has not been started for this encounter.      Home Medical Care      Coordination has not been started for this encounter.      Therapy      Coordination has not been started for this encounter.      Community Resources      Coordination has not been started for this encounter.          Demographic Summary     Row Name 12/08/19 1222       General Information    Admission Type  observation    Arrived From  home    Referral Source  admission list    Reason for Consult  discharge planning    Preferred Language  English     Used During This Interaction  no       Contact Information    Permission Granted to Share Info With          Functional Status    No documentation.       Psychosocial    No documentation.       Abuse/Neglect    No documentation.       Legal    No documentation.       Substance Abuse    No documentation.       Patient Forms    No documentation.           Constantino May RN

## 2019-12-08 NOTE — PROGRESS NOTES
"Hospitalist Team      Patient Care Team:  Ney Kathleen MD as PCP - General (Family Medicine)        Chief Complaint: Shortness of breath    Subjective    Interval History and ROS:     Patient is a 69-year-old gentleman with history of COPD on oxygen and came in with increasing shortness of breath with cough for 5 days.  Patient does have wheezing.  Patient was using increasing nebulizer treatment as well as his oxygen demand was also up.  Patient continues to smoke cigarettes.  Patient was admitted to our service on 12/7/2019 and started on neb treatments well as steroids and he had labs done including chest x-ray which shows mild pulmonary vascular congestion as well as rapid flu test was negative.    Today on 12/8/2019, patient feels little better but still has shortness of breath and cough and congestion.  Denies any new complaints.  Patient also was seen by pulmonary specialist and I discussed the case with him.      Objective    Vital Signs  Temp:  [98.4 °F (36.9 °C)-99.2 °F (37.3 °C)] 98.4 °F (36.9 °C)  Heart Rate:  [] 114  Resp:  [18-36] 24  BP: (109-167)/() 151/80  Oxygen Therapy  SpO2: 91 %  Pulse Oximetry Type: Intermittent  Device (Oxygen Therapy): nasal cannula  Flow (L/min): 4}  Flowsheet Rows      First Filed Value   Admission Height  187.9 cm (73.98\") Documented at 12/07/2019 1126   Admission Weight  130 kg (286 lb) Documented at 12/07/2019 1126            Physical Exam:    Physical Exam   Constitutional: He is oriented to person, place, and time. He appears well-developed and well-nourished.   HENT:   Head: Normocephalic and atraumatic.   Eyes: Pupils are equal, round, and reactive to light.   Neck: Normal range of motion. Neck supple.   Cardiovascular: Normal rate and regular rhythm.   Pulmonary/Chest: Effort normal and breath sounds normal.   I did not hear any wheezing on his lung exams   Abdominal: Soft. Bowel sounds are normal.   Musculoskeletal: Normal range of motion. "   Neurological: He is alert and oriented to person, place, and time.   Skin: Skin is warm and dry.   Psychiatric: He has a normal mood and affect. His behavior is normal.   Nursing note and vitals reviewed.      Results Review:     I reviewed the patient's new clinical results.    Lab Results (last 24 hours)     Procedure Component Value Units Date/Time    POC Glucose Once [[id number]]  (Abnormal) Collected:  12/07/19 2053    Specimen:  Blood Updated:  12/07/19 2059     Glucose 299 mg/dL     Respiratory Panel, PCR - Swab, Nasopharynx [[id number]] Collected:  12/07/19 1709    Specimen:  Swab from Nasopharynx Updated:  12/07/19 1709    POC Glucose Once [[id number]]  (Abnormal) Collected:  12/07/19 1658    Specimen:  Blood Updated:  12/07/19 1705     Glucose 335 mg/dL     Hemoglobin A1c [[id number]]  (Abnormal) Collected:  12/07/19 1130    Specimen:  Blood Updated:  12/07/19 1604     Hemoglobin A1C 9.10 %     Narrative:       Hemoglobin A1C Ranges:    Increased Risk for Diabetes  5.7% to 6.4%  Diabetes                     >= 6.5%  Diabetic Goal                < 7.0%    TSH [[id number]]  (Normal) Collected:  12/07/19 1130    Specimen:  Blood Updated:  12/07/19 1601     TSH 0.584 uIU/mL     BNP [[id number]]  (Normal) Collected:  12/07/19 1130    Specimen:  Blood Updated:  12/07/19 1556     proBNP 207.8 pg/mL     Narrative:       Among patients with dyspnea, NT-proBNP is highly sensitive for the detection of acute congestive heart failure. In addition NT-proBNP of <300 pg/ml effectively rules out acute congestive heart failure with 99% negative predictive value.      Ketone Bodies, Serum (Not performed at [facility]) [[id number]] Collected:  12/07/19 1130    Specimen:  Blood Updated:  12/07/19 1358    Narrative:       The following orders were created for panel order Ketone Bodies, Serum (Not performed at [facility]).  Procedure                               Abnormality         Status                     ---------                                -----------         ------                     Acetone[[id number]]                      Abnormal            Final result                 Please view results for these tests on the individual orders.    Acetone [[id number]]  (Abnormal) Collected:  12/07/19 1130    Specimen:  Blood Updated:  12/07/19 1358     Acetone Small    Blood Gas, Arterial [[id number]]  (Abnormal) Collected:  12/07/19 1250    Specimen:  Arterial Blood Updated:  12/07/19 1252     Site Right Radial     Rich's Test Positive     pH, Arterial 7.423 pH units      pCO2, Arterial 41.4 mm Hg      pO2, Arterial 57.4 mm Hg      Comment: 84 Value below reference range        HCO3, Arterial 27.0 mmol/L      Comment: 83 Value above reference range        Base Excess, Arterial 2.3 mmol/L      Comment: 83 Value above reference range        O2 Saturation, Arterial 90.2 %      Comment: 84 Value below reference range        Hemoglobin, Blood Gas 14.9 g/dL      Temperature 37.0 C      Barometric Pressure for Blood Gas 747 mmHg      Modality Nasal Cannula     Flow Rate 3.0 lpm      Ventilator Mode NA     Comment: Meter: [id number]     :  [id number]        pCO2, Temperature Corrected 41.4 mm Hg      pH, Temp Corrected 7.423 pH Units      pO2, Temperature Corrected 57.4 mm Hg     Urinalysis, Microscopic Only - Urine, Clean Catch [[id number]]  (Abnormal) Collected:  12/07/19 1214    Specimen:  Urine, Clean Catch Updated:  12/07/19 1250     RBC, UA 0-2 /HPF      WBC, UA 0-2 /HPF      Bacteria, UA None Seen /HPF      Squamous Epithelial Cells, UA None Seen /HPF      Hyaline Casts, UA None Seen /LPF      Methodology Manual Light Microscopy    Urinalysis With Culture If Indicated - Urine, Clean Catch [[id number]]  (Abnormal) Collected:  12/07/19 1214    Specimen:  Urine, Clean Catch Updated:  12/07/19 1242     Color, UA Yellow     Appearance, UA Clear     pH, UA 6.0     Specific Gravity, UA 1.010     Glucose,  mg/dL (2+)     Ketones, UA 80 mg/dL  (3+)     Bilirubin, UA Small (1+)     Blood, UA Small (1+)     Protein, UA 30 mg/dL (1+)     Leuk Esterase, UA Negative     Nitrite, UA Negative     Urobilinogen, UA 0.2 E.U./dL    Procalcitonin [[id number]]  (Normal) Collected:  12/07/19 1130    Specimen:  Blood Updated:  12/07/19 1229     Procalcitonin 0.20 ng/mL     Blood Culture - Blood, Arm, Right [[id number]] Collected:  12/07/19 1159    Specimen:  Blood from Arm, Right Updated:  12/07/19 1206    Troponin [[id number]]  (Normal) Collected:  12/07/19 1130    Specimen:  Blood Updated:  12/07/19 1205     Troponin T <0.010 ng/mL     Narrative:       Troponin T Reference Range:  <= 0.03 ng/mL-   Negative for AMI  >0.03 ng/mL-     Abnormal for myocardial necrosis.  Clinicians would have to utilize clinical acumen, EKG, Troponin and serial changes to determine if it is an Acute Myocardial Infarction or myocardial injury due to an underlying chronic condition.     Comprehensive Metabolic Panel [[id number]]  (Abnormal) Collected:  12/07/19 1130    Specimen:  Blood Updated:  12/07/19 1205     Glucose 338 mg/dL      BUN 10 mg/dL      Creatinine 0.86 mg/dL      Sodium 135 mmol/L      Potassium 4.2 mmol/L      Chloride 93 mmol/L      CO2 21.5 mmol/L      Calcium 9.7 mg/dL      Total Protein 7.9 g/dL      Albumin 4.50 g/dL      ALT (SGPT) 29 U/L      AST (SGOT) 28 U/L      Alkaline Phosphatase 54 U/L      Total Bilirubin 0.5 mg/dL      eGFR Non African Amer 89 mL/min/1.73      Globulin 3.4 gm/dL      A/G Ratio 1.3 g/dL      BUN/Creatinine Ratio 11.6     Anion Gap 20.5 mmol/L     Narrative:       GFR Normal >60  Chronic Kidney Disease <60  Kidney Failure <15      Blood Culture - Blood, Arm, Left [[id number]] Collected:  12/07/19 1130    Specimen:  Blood from Arm, Left Updated:  12/07/19 1205    Influenza Antigen, Rapid - Swab, Nasopharynx [[id number]]  (Normal) Collected:  12/07/19 1131    Specimen:  Swab from Nasopharynx Updated:  12/07/19 1204     Influenza A Ag, EIA Negative      Influenza B Ag, EIA Negative    D-dimer, Quantitative [[id number]]  (Normal) Collected:  12/07/19 1132    Specimen:  Blood Updated:  12/07/19 1200     D-Dimer, Quantitative 0.28 MCGFEU/mL     Narrative:       Can be elevated in, but is not diagnostic for deep vein thrombosis (DVT) or pulmonary embolis (PE).  It is also elevated in other medical conditions.  Clinical correlation is required.  The negative cut-off value for the D-Dimer is 0.50 mcg FEU/mL for DVT and PE.      Lactic Acid, Plasma [[id number]]  (Normal) Collected:  12/07/19 1130    Specimen:  Blood Updated:  12/07/19 1159     Lactate 1.6 mmol/L     CBC & Differential [[id number]] Collected:  12/07/19 1130    Specimen:  Blood Updated:  12/07/19 1153    Narrative:       The following orders were created for panel order CBC & Differential.  Procedure                               Abnormality         Status                     ---------                               -----------         ------                     CBC Auto Differential[[id number]]        Abnormal            Final result                 Please view results for these tests on the individual orders.    CBC Auto Differential [[id number]]  (Abnormal) Collected:  12/07/19 1130    Specimen:  Blood Updated:  12/07/19 1153     WBC 10.59 10*3/mm3      RBC 4.67 10*6/mm3      Hemoglobin 15.0 g/dL      Hematocrit 45.0 %      MCV 96.4 fL      MCH 32.1 pg      MCHC 33.3 g/dL      RDW 13.1 %      RDW-SD 46.8 fl      MPV 13.5 fL      Platelets 162 10*3/mm3      Neutrophil % 83.5 %      Lymphocyte % 6.4 %      Monocyte % 8.5 %      Eosinophil % 0.0 %      Basophil % 0.8 %      Immature Grans % 0.8 %      Neutrophils, Absolute 8.85 10*3/mm3      Lymphocytes, Absolute 0.68 10*3/mm3      Monocytes, Absolute 0.90 10*3/mm3      Eosinophils, Absolute 0.00 10*3/mm3      Basophils, Absolute 0.08 10*3/mm3      Immature Grans, Absolute 0.08 10*3/mm3      nRBC 0.0 /100 WBC           Imaging Results (Last 24 Hours)      Procedure Component Value Units Date/Time    XR Chest PA & Lateral [[id number]] Collected:  12/07/19 1615     Updated:  12/07/19 1617    Narrative:       CR Chest 2 Vws    INDICATION:    Hypoxia. Question CHF. COPD with acute exacerbation.    COMPARISON:    12/7/2019 1130 hours    FINDINGS:   PA and lateral views of the chest.  Improved lung volumes. Heart size within normal limits. Mild pulmonary vascular congestion. No interstitial or alveolar edema. No focal consolidation. No effusions or pneumothorax.        Impression:       Improved lung volumes with continued mild prominence of the pulmonary vascularity but no convincing evidence of CHF.    Signer Name: KEATON Martin MD   Signed: 12/7/2019 4:15 PM   Workstation Name: [id number]    [facility]    XR Chest 1 View [[id number]] Collected:  12/07/19 1206     Updated:  12/07/19 1208    Narrative:       CR Chest 1 Vw    INDICATION:   69-year-old male with worsening shortness of air for 3 days. History of COPD.     COMPARISON:    Chest 11/9/2019    FINDINGS:  2 portable AP view(s) of the chest.  Heart size is upper limits. Mild central vascular congestion and bilateral perihilar interstitial opacities. No pleural effusions or pneumothorax. Mediastinum is within normal limits.      Impression:       Borderline heart size with mild central vascular congestion and bilateral perihilar interstitial opacities. Correlate for clinical symptoms of congestive failure.    Signer Name: Reginaldo Montemayor MD   Signed: 12/7/2019 12:06 PM   Workstation Name: [email or web address]    [facility]          Xray reviewed personally by physician.      ECG reviewed personally by physician  ECG/EMG Results (most recent)     Procedure Component Value Units Date/Time    ECG 12 Lead [[id number]] Collected:  12/07/19 1126     Updated:  12/07/19 1845    Narrative:       HEART RATE= 131  bpm  RR Interval= 456  ms  LA Interval= 127  ms  P Horizontal Axis= -5   deg  P Front Axis= 49  deg  QRSD Interval= 95  ms  QT Interval= 303  ms  QRS Axis= 135  deg  T Wave Axis= 21  deg  - OTHERWISE NORMAL ECG -  Poor quality tracing repeat  Electronically Signed By: Lyle Middleton ([facility]) 07-Dec-2019 18:45:33  Date and Time of Study: 2019-12-07 11:26:15          Medication Review:   I have reviewed the patient's current medication list    Current Facility-Administered Medications:   •  acetaminophen (TYLENOL) tablet 650 mg, 650 mg, Oral, Q4H PRN, 650 mg at 12/08/19 0614 **OR** acetaminophen (TYLENOL) 160 MG/5ML solution 650 mg, 650 mg, Oral, Q4H PRN **OR** acetaminophen (TYLENOL) suppository 650 mg, 650 mg, Rectal, Q4H PRN, Shahana Adair MD  •  atorvastatin (LIPITOR) tablet 10 mg, 10 mg, Oral, Nightly, Shahana Adair MD, 10 mg at 12/07/19 2009  •  budesonide-formoterol (SYMBICORT) 160-4.5 MCG/ACT inhaler 2 puff, 2 puff, Inhalation, BID - RT, Shahana Adair MD, 2 puff at 12/08/19 0724  •  dextrose (D50W) 25 g/ 50mL Intravenous Solution 25 g, 25 g, Intravenous, Q15 Min PRN, Shahana Adair MD  •  dextrose (GLUTOSE) oral gel 15 g, 15 g, Oral, Q15 Min PRN, Shahana Adair MD  •  dicyclomine (BENTYL) tablet 20 mg, 20 mg, Oral, 4x Daily, Shahana Adair MD, 20 mg at 12/07/19 2009  •  enoxaparin (LOVENOX) syringe 40 mg, 40 mg, Subcutaneous, Q24H, Shahana Adair MD, 40 mg at 12/07/19 1659  •  glipizide (GLUCOTROL), metFORMIN (GLUCOPHAGE) for METAGLIP 5-500, , Oral, BID AC, Shahana Adair MD  •  glucagon (GLUCAGEN) injection 1 mg, 1 mg, Subcutaneous, Q15 Min PRN, Shahana Adair MD  •  HYDROcodone-acetaminophen (NORCO)  MG per tablet 1 tablet, 1 tablet, Oral, 4x Daily, Shahana Adair MD, 1 tablet at 12/07/19 2019  •  insulin aspart (novoLOG) injection 0-24 Units, 0-24 Units, Subcutaneous, 4x Daily AC & at Bedtime, Shahana Adair  MD Ximena, 12 Units at 12/07/19 2207  •  insulin detemir (LEVEMIR) injection 40 Units, 40 Units, Subcutaneous, QAM, Shahana Adair MD  •  ipratropium-albuterol (DUO-NEB) nebulizer solution 3 mL, 3 mL, Nebulization, Q4H - RT, Shahana Adair MD, 3 mL at 12/08/19 0714  •  metFORMIN (GLUCOPHAGE) tablet 500 mg, 500 mg, Oral, BID With Meals, Shahana Adair MD, 500 mg at 12/07/19 1809  •  methylPREDNISolone sodium succinate (SOLU-Medrol) injection 40 mg, 40 mg, Intravenous, Q6H, Shahana Adair MD, 40 mg at 12/08/19 0429  •  nicotine (NICODERM CQ) 21 MG/24HR patch 1 patch, 1 patch, Transdermal, Q24H, Shahana Adair MD, 1 patch at 12/07/19 1700  •  nitroglycerin (NITROSTAT) SL tablet 0.4 mg, 0.4 mg, Sublingual, Q5 Min PRN, Shahana Adair MD  •  pioglitazone (ACTOS) tablet 45 mg, 45 mg, Oral, Daily, Shahana Adair MD  •  sodium chloride 0.9 % flush 10 mL, 10 mL, Intravenous, PRN, Shahana Adair MD  •  sodium chloride 0.9 % flush 10 mL, 10 mL, Intravenous, Q12H, Shahana Adair MD, 10 mL at 12/07/19 2010  •  sodium chloride 0.9 % infusion 40 mL, 40 mL, Intravenous, PRN, Shahana Adair MD  •  terazosin (HYTRIN) capsule 1 mg, 1 mg, Oral, Nightly, Shahana Adair MD, 1 mg at 12/07/19 2009      Assessment/Plan     #1 shortness of breath with cough and congestion/wheezing in a patient with history of severe COPD and current smoker  -Patient chest x-ray noted shows mild vascular congestion  -Rapid flu test negative  -proBNP within reasonable range  -Patient most likely has acute COPD exacerbation but other etiologies are not excluded including congestive heart failure as well as PE.  Patient will be getting echocardiogram as well as patient will be getting CT PE study.  -Pulmonary has seen the patient and decreased his steroids and also patient will be continued on by  nebs and oxygen treatment.  -I will also check viral panel since patient may have RSV as well as rhinovirus possibility  -Patient currently on no antibiotics      #2 diabetes mellitus type 2 with hyperglycemia and uncontrolled as A1c is more than 9  -Ketones/anion gap and acetone noted  -It is noted that patient is also on steroids which will cause worsening of hyperglycemia  -Metformin was discontinued because patient will be getting contrast study  -Patient will be continued on Actos as well as patient will be continued on glipizide  -Patient is also on Levemir 40 units subcu every morning and also will continue sliding scale insulin  -I think we need to adjust the doses of Levemir and use pre-meal insulin as patient has uncontrolled diabetes mellitus.  I am increasing his Lantus as well as adding pre-meal NovoLog.  -Repeat labs tomorrow morning    #3 BPH patient is on Rogel and and will continue it    #4 smoking with nicotine addiction: Patient will be on nicotine patch    #5 DVT prophylaxis with Lovenox    #6 osteoarthritis: Patient is on Norco and will continue it    #7 IBS: Patient is on dicyclomine will continue it    #8 obesity with BMI of 36.8    #9 full code    #10 hyperlipidemia: Patient is on statin therapy.    Plan for disposition: Home once ready    Brody Barnard MD  12/08/19  7:55 AM

## 2019-12-09 ENCOUNTER — APPOINTMENT (OUTPATIENT)
Dept: GENERAL RADIOLOGY | Facility: HOSPITAL | Age: 69
End: 2019-12-09

## 2019-12-09 ENCOUNTER — APPOINTMENT (OUTPATIENT)
Dept: CARDIOLOGY | Facility: HOSPITAL | Age: 69
End: 2019-12-09

## 2019-12-09 PROBLEM — J44.1 COPD EXACERBATION: Status: ACTIVE | Noted: 2019-12-09

## 2019-12-09 LAB
ALBUMIN SERPL-MCNC: 3.8 G/DL (ref 3.5–5.2)
ALBUMIN/GLOB SERPL: 1.1 G/DL
ALP SERPL-CCNC: 42 U/L (ref 39–117)
ALT SERPL W P-5'-P-CCNC: 40 U/L (ref 1–41)
ANION GAP SERPL CALCULATED.3IONS-SCNC: 14.4 MMOL/L (ref 5–15)
ARTERIAL PATENCY WRIST A: POSITIVE
ARTERIAL PATENCY WRIST A: POSITIVE
AST SERPL-CCNC: 62 U/L (ref 1–40)
ATMOSPHERIC PRESS: 729 MMHG
ATMOSPHERIC PRESS: 734 MMHG
BASE EXCESS BLDA CALC-SCNC: 1.4 MMOL/L (ref 0–2)
BASE EXCESS BLDA CALC-SCNC: 5 MMOL/L (ref 0–2)
BASOPHILS # BLD AUTO: 0.01 10*3/MM3 (ref 0–0.2)
BASOPHILS NFR BLD AUTO: 0.1 % (ref 0–1.5)
BDY SITE: ABNORMAL
BDY SITE: ABNORMAL
BH CV ECHO MEAS - AO MAX PG (FULL): 4 MMHG
BH CV ECHO MEAS - AO MAX PG: 10.4 MMHG
BH CV ECHO MEAS - AO MEAN PG (FULL): 2 MMHG
BH CV ECHO MEAS - AO MEAN PG: 6 MMHG
BH CV ECHO MEAS - AO ROOT AREA (BSA CORRECTED): 1.3
BH CV ECHO MEAS - AO ROOT AREA: 7.5 CM^2
BH CV ECHO MEAS - AO ROOT DIAM: 3.1 CM
BH CV ECHO MEAS - AO V2 MAX: 161 CM/SEC
BH CV ECHO MEAS - AO V2 MEAN: 117 CM/SEC
BH CV ECHO MEAS - AO V2 VTI: 27.8 CM
BH CV ECHO MEAS - AVA(I,A): 3.6 CM^2
BH CV ECHO MEAS - AVA(I,D): 3.6 CM^2
BH CV ECHO MEAS - AVA(V,A): 3.3 CM^2
BH CV ECHO MEAS - AVA(V,D): 3.3 CM^2
BH CV ECHO MEAS - BSA(HAYCOCK): 2.5 M^2
BH CV ECHO MEAS - BSA: 2.4 M^2
BH CV ECHO MEAS - BZI_BMI: 36.9 KILOGRAMS/M^2
BH CV ECHO MEAS - BZI_METRIC_HEIGHT: 182.9 CM
BH CV ECHO MEAS - BZI_METRIC_WEIGHT: 123.4 KG
BH CV ECHO MEAS - EDV(CUBED): 88.1 ML
BH CV ECHO MEAS - EDV(MOD-SP2): 84.5 ML
BH CV ECHO MEAS - EDV(MOD-SP4): 115 ML
BH CV ECHO MEAS - EDV(TEICH): 90.1 ML
BH CV ECHO MEAS - EF(CUBED): 79.1 %
BH CV ECHO MEAS - EF(MOD-SP2): 54.2 %
BH CV ECHO MEAS - EF(MOD-SP4): 65.5 %
BH CV ECHO MEAS - EF(TEICH): 71.6 %
BH CV ECHO MEAS - ESV(CUBED): 18.4 ML
BH CV ECHO MEAS - ESV(MOD-SP2): 38.7 ML
BH CV ECHO MEAS - ESV(MOD-SP4): 39.7 ML
BH CV ECHO MEAS - ESV(TEICH): 25.6 ML
BH CV ECHO MEAS - FS: 40.7 %
BH CV ECHO MEAS - IVS/LVPW: 1.2
BH CV ECHO MEAS - IVSD: 1.2 CM
BH CV ECHO MEAS - LAT PEAK E' VEL: 10 CM/SEC
BH CV ECHO MEAS - LV DIASTOLIC VOL/BSA (35-75): 47.4 ML/M^2
BH CV ECHO MEAS - LV MASS(C)D: 183.2 GRAMS
BH CV ECHO MEAS - LV MASS(C)DI: 75.4 GRAMS/M^2
BH CV ECHO MEAS - LV MAX PG: 6.4 MMHG
BH CV ECHO MEAS - LV MEAN PG: 4 MMHG
BH CV ECHO MEAS - LV SYSTOLIC VOL/BSA (12-30): 16.4 ML/M^2
BH CV ECHO MEAS - LV V1 MAX: 126 CM/SEC
BH CV ECHO MEAS - LV V1 MEAN: 97.1 CM/SEC
BH CV ECHO MEAS - LV V1 VTI: 24.3 CM
BH CV ECHO MEAS - LVIDD: 4.5 CM
BH CV ECHO MEAS - LVIDS: 2.6 CM
BH CV ECHO MEAS - LVLD AP2: 8 CM
BH CV ECHO MEAS - LVLD AP4: 9 CM
BH CV ECHO MEAS - LVLS AP2: 6.7 CM
BH CV ECHO MEAS - LVLS AP4: 6.7 CM
BH CV ECHO MEAS - LVOT AREA (M): 4.2 CM^2
BH CV ECHO MEAS - LVOT AREA: 4.2 CM^2
BH CV ECHO MEAS - LVOT DIAM: 2.3 CM
BH CV ECHO MEAS - LVPWD: 1.1 CM
BH CV ECHO MEAS - MED PEAK E' VEL: 12 CM/SEC
BH CV ECHO MEAS - MV A MAX VEL: 124 CM/SEC
BH CV ECHO MEAS - MV DEC SLOPE: 437 CM/SEC^2
BH CV ECHO MEAS - MV DEC TIME: 169 SEC
BH CV ECHO MEAS - MV E MAX VEL: 95.1 CM/SEC
BH CV ECHO MEAS - MV E/A: 0.77
BH CV ECHO MEAS - MV MAX PG: 7.4 MMHG
BH CV ECHO MEAS - MV MEAN PG: 3 MMHG
BH CV ECHO MEAS - MV P1/2T MAX VEL: 105 CM/SEC
BH CV ECHO MEAS - MV P1/2T: 70.4 MSEC
BH CV ECHO MEAS - MV V2 MAX: 136 CM/SEC
BH CV ECHO MEAS - MV V2 MEAN: 87 CM/SEC
BH CV ECHO MEAS - MV V2 VTI: 31.4 CM
BH CV ECHO MEAS - MVA P1/2T LCG: 2.1 CM^2
BH CV ECHO MEAS - MVA(P1/2T): 3.1 CM^2
BH CV ECHO MEAS - MVA(VTI): 3.2 CM^2
BH CV ECHO MEAS - PA ACC TIME: 0.11 SEC
BH CV ECHO MEAS - PA MAX PG (FULL): 2 MMHG
BH CV ECHO MEAS - PA MAX PG: 3.9 MMHG
BH CV ECHO MEAS - PA PR(ACCEL): 30 MMHG
BH CV ECHO MEAS - PA V2 MAX: 99.1 CM/SEC
BH CV ECHO MEAS - RAP SYSTOLE: 3 MMHG
BH CV ECHO MEAS - RV MAX PG: 1.9 MMHG
BH CV ECHO MEAS - RV MEAN PG: 1 MMHG
BH CV ECHO MEAS - RV V1 MAX: 68.7 CM/SEC
BH CV ECHO MEAS - RV V1 MEAN: 41 CM/SEC
BH CV ECHO MEAS - RV V1 VTI: 11.7 CM
BH CV ECHO MEAS - SI(AO): 86.4 ML/M^2
BH CV ECHO MEAS - SI(CUBED): 28.7 ML/M^2
BH CV ECHO MEAS - SI(LVOT): 41.6 ML/M^2
BH CV ECHO MEAS - SI(MOD-SP2): 18.9 ML/M^2
BH CV ECHO MEAS - SI(MOD-SP4): 31 ML/M^2
BH CV ECHO MEAS - SI(TEICH): 26.6 ML/M^2
BH CV ECHO MEAS - SV(AO): 209.8 ML
BH CV ECHO MEAS - SV(CUBED): 69.7 ML
BH CV ECHO MEAS - SV(LVOT): 101 ML
BH CV ECHO MEAS - SV(MOD-SP2): 45.8 ML
BH CV ECHO MEAS - SV(MOD-SP4): 75.3 ML
BH CV ECHO MEAS - SV(TEICH): 64.5 ML
BH CV ECHO MEAS - TAPSE (>1.6): 2.3 CM
BH CV ECHO MEASUREMENTS AVERAGE E/E' RATIO: 8.65
BH CV XLRA - RV BASE: 3.7 CM
BH CV XLRA - TDI S': 14 CM/SEC
BILIRUB SERPL-MCNC: 0.4 MG/DL (ref 0.2–1.2)
BODY TEMPERATURE: 37 C
BODY TEMPERATURE: 37 C
BUN BLD-MCNC: 15 MG/DL (ref 8–23)
BUN/CREAT SERPL: 22.4 (ref 7–25)
CALCIUM SPEC-SCNC: 8.8 MG/DL (ref 8.6–10.5)
CHLORIDE SERPL-SCNC: 96 MMOL/L (ref 98–107)
CO2 SERPL-SCNC: 21.6 MMOL/L (ref 22–29)
CREAT BLD-MCNC: 0.67 MG/DL (ref 0.76–1.27)
CRP SERPL-MCNC: 4.66 MG/DL (ref 0–0.5)
D-LACTATE SERPL-SCNC: 1 MMOL/L (ref 0.5–2)
D-LACTATE SERPL-SCNC: 2 MMOL/L (ref 0.5–2)
DEPRECATED RDW RBC AUTO: 48.3 FL (ref 37–54)
EOSINOPHIL # BLD AUTO: 0 10*3/MM3 (ref 0–0.4)
EOSINOPHIL NFR BLD AUTO: 0 % (ref 0.3–6.2)
ERYTHROCYTE [DISTWIDTH] IN BLOOD BY AUTOMATED COUNT: 13.2 % (ref 12.3–15.4)
GAS FLOW AIRWAY: 5 LPM
GAS FLOW AIRWAY: 8 LPM
GFR SERPL CREATININE-BSD FRML MDRD: 118 ML/MIN/1.73
GLOBULIN UR ELPH-MCNC: 3.4 GM/DL
GLUCOSE BLD-MCNC: 256 MG/DL (ref 65–99)
GLUCOSE BLDC GLUCOMTR-MCNC: 119 MG/DL (ref 70–130)
GLUCOSE BLDC GLUCOMTR-MCNC: 188 MG/DL (ref 70–130)
GLUCOSE BLDC GLUCOMTR-MCNC: 215 MG/DL (ref 70–130)
GLUCOSE BLDC GLUCOMTR-MCNC: 261 MG/DL (ref 70–130)
HCO3 BLDA-SCNC: 27 MMOL/L (ref 20–26)
HCO3 BLDA-SCNC: 29.9 MMOL/L (ref 20–26)
HCT VFR BLD AUTO: 42.7 % (ref 37.5–51)
HGB BLD-MCNC: 13.9 G/DL (ref 13–17.7)
HGB BLDA-MCNC: 14.4 G/DL (ref 14–18)
HGB BLDA-MCNC: 14.5 G/DL (ref 14–18)
IMM GRANULOCYTES # BLD AUTO: 0.04 10*3/MM3 (ref 0–0.05)
IMM GRANULOCYTES NFR BLD AUTO: 0.5 % (ref 0–0.5)
LYMPHOCYTES # BLD AUTO: 0.33 10*3/MM3 (ref 0.7–3.1)
LYMPHOCYTES NFR BLD AUTO: 4 % (ref 19.6–45.3)
MAGNESIUM SERPL-MCNC: 1.6 MG/DL (ref 1.6–2.4)
MAXIMAL PREDICTED HEART RATE: 153 BPM
MCH RBC QN AUTO: 31.8 PG (ref 26.6–33)
MCHC RBC AUTO-ENTMCNC: 32.6 G/DL (ref 31.5–35.7)
MCV RBC AUTO: 97.7 FL (ref 79–97)
MODALITY: ABNORMAL
MODALITY: ABNORMAL
MONOCYTES # BLD AUTO: 0.37 10*3/MM3 (ref 0.1–0.9)
MONOCYTES NFR BLD AUTO: 4.5 % (ref 5–12)
NEUTROPHILS # BLD AUTO: 7.45 10*3/MM3 (ref 1.7–7)
NEUTROPHILS NFR BLD AUTO: 90.9 % (ref 42.7–76)
NRBC BLD AUTO-RTO: 0 /100 WBC (ref 0–0.2)
PCO2 BLDA: 44.4 MM HG (ref 35–45)
PCO2 BLDA: 45.3 MM HG (ref 35–45)
PCO2 TEMP ADJ BLD: 44.4 MM HG (ref 35–45)
PCO2 TEMP ADJ BLD: 45.3 MM HG (ref 35–45)
PH BLDA: 7.38 PH UNITS (ref 7.35–7.45)
PH BLDA: 7.44 PH UNITS (ref 7.35–7.45)
PH, TEMP CORRECTED: 7.38 PH UNITS (ref 7.35–7.45)
PH, TEMP CORRECTED: 7.44 PH UNITS (ref 7.35–7.45)
PLATELET # BLD AUTO: 168 10*3/MM3 (ref 140–450)
PMV BLD AUTO: 12 FL (ref 6–12)
PO2 BLDA: 56.4 MM HG (ref 83–108)
PO2 BLDA: 63.7 MM HG (ref 83–108)
PO2 TEMP ADJ BLD: 56.4 MM HG (ref 83–108)
PO2 TEMP ADJ BLD: 63.7 MM HG (ref 83–108)
POTASSIUM BLD-SCNC: 4.6 MMOL/L (ref 3.5–5.2)
PROCALCITONIN SERPL-MCNC: 0.1 NG/ML (ref 0.1–0.25)
PROT SERPL-MCNC: 7.2 G/DL (ref 6–8.5)
RBC # BLD AUTO: 4.37 10*6/MM3 (ref 4.14–5.8)
SAO2 % BLDCOA: 88.5 % (ref 94–99)
SAO2 % BLDCOA: 92.8 % (ref 94–99)
SODIUM BLD-SCNC: 132 MMOL/L (ref 136–145)
STRESS TARGET HR: 130 BPM
URATE SERPL-MCNC: 4 MG/DL (ref 3.4–7)
VENTILATOR MODE: ABNORMAL
VENTILATOR MODE: ABNORMAL
WBC NRBC COR # BLD: 8.2 10*3/MM3 (ref 3.4–10.8)

## 2019-12-09 PROCEDURE — 84550 ASSAY OF BLOOD/URIC ACID: CPT | Performed by: NURSE PRACTITIONER

## 2019-12-09 PROCEDURE — 63710000001 INSULIN ASPART PER 5 UNITS: Performed by: NURSE PRACTITIONER

## 2019-12-09 PROCEDURE — 71045 X-RAY EXAM CHEST 1 VIEW: CPT

## 2019-12-09 PROCEDURE — 85025 COMPLETE CBC W/AUTO DIFF WBC: CPT | Performed by: INTERNAL MEDICINE

## 2019-12-09 PROCEDURE — 84145 PROCALCITONIN (PCT): CPT | Performed by: INTERNAL MEDICINE

## 2019-12-09 PROCEDURE — 25010000002 ENOXAPARIN PER 10 MG: Performed by: NURSE PRACTITIONER

## 2019-12-09 PROCEDURE — 63710000001 INSULIN ASPART PER 5 UNITS: Performed by: HOSPITALIST

## 2019-12-09 PROCEDURE — 25010000002 METHYLPREDNISOLONE PER 125 MG: Performed by: NURSE PRACTITIONER

## 2019-12-09 PROCEDURE — 83605 ASSAY OF LACTIC ACID: CPT | Performed by: INTERNAL MEDICINE

## 2019-12-09 PROCEDURE — 63710000001 INSULIN DETEMIR PER 5 UNITS: Performed by: INTERNAL MEDICINE

## 2019-12-09 PROCEDURE — 63710000001 INSULIN ASPART PER 5 UNITS: Performed by: INTERNAL MEDICINE

## 2019-12-09 PROCEDURE — 94799 UNLISTED PULMONARY SVC/PX: CPT

## 2019-12-09 PROCEDURE — 99233 SBSQ HOSP IP/OBS HIGH 50: CPT | Performed by: NURSE PRACTITIONER

## 2019-12-09 PROCEDURE — 87205 SMEAR GRAM STAIN: CPT | Performed by: NURSE PRACTITIONER

## 2019-12-09 PROCEDURE — 25010000002 METHYLPREDNISOLONE PER 125 MG: Performed by: INTERNAL MEDICINE

## 2019-12-09 PROCEDURE — 25010000002 CEFTRIAXONE SODIUM-DEXTROSE 2-2.22 GM-%(50ML) RECONSTITUTED SOLUTION: Performed by: INTERNAL MEDICINE

## 2019-12-09 PROCEDURE — 80053 COMPREHEN METABOLIC PANEL: CPT | Performed by: INTERNAL MEDICINE

## 2019-12-09 PROCEDURE — 87070 CULTURE OTHR SPECIMN AEROBIC: CPT | Performed by: NURSE PRACTITIONER

## 2019-12-09 PROCEDURE — 36600 WITHDRAWAL OF ARTERIAL BLOOD: CPT

## 2019-12-09 PROCEDURE — 93306 TTE W/DOPPLER COMPLETE: CPT | Performed by: INTERNAL MEDICINE

## 2019-12-09 PROCEDURE — 93306 TTE W/DOPPLER COMPLETE: CPT

## 2019-12-09 PROCEDURE — 86140 C-REACTIVE PROTEIN: CPT | Performed by: INTERNAL MEDICINE

## 2019-12-09 PROCEDURE — 25010000002 CEFTRIAXONE SODIUM-DEXTROSE 2-2.22 GM-%(50ML) RECONSTITUTED SOLUTION: Performed by: NURSE PRACTITIONER

## 2019-12-09 PROCEDURE — 93005 ELECTROCARDIOGRAM TRACING: CPT | Performed by: NURSE PRACTITIONER

## 2019-12-09 PROCEDURE — 83735 ASSAY OF MAGNESIUM: CPT | Performed by: INTERNAL MEDICINE

## 2019-12-09 PROCEDURE — 93010 ELECTROCARDIOGRAM REPORT: CPT | Performed by: INTERNAL MEDICINE

## 2019-12-09 PROCEDURE — 82962 GLUCOSE BLOOD TEST: CPT

## 2019-12-09 PROCEDURE — 63710000001 INSULIN DETEMIR PER 5 UNITS: Performed by: NURSE PRACTITIONER

## 2019-12-09 PROCEDURE — 82803 BLOOD GASES ANY COMBINATION: CPT

## 2019-12-09 RX ORDER — MAGNESIUM SULFATE HEPTAHYDRATE 40 MG/ML
2 INJECTION, SOLUTION INTRAVENOUS AS NEEDED
Status: DISCONTINUED | OUTPATIENT
Start: 2019-12-09 | End: 2019-12-18 | Stop reason: HOSPADM

## 2019-12-09 RX ORDER — IPRATROPIUM BROMIDE AND ALBUTEROL SULFATE 2.5; .5 MG/3ML; MG/3ML
3 SOLUTION RESPIRATORY (INHALATION) EVERY 4 HOURS PRN
Status: DISCONTINUED | OUTPATIENT
Start: 2019-12-09 | End: 2019-12-18 | Stop reason: HOSPADM

## 2019-12-09 RX ORDER — BUDESONIDE 0.5 MG/2ML
0.5 INHALANT ORAL
Status: DISCONTINUED | OUTPATIENT
Start: 2019-12-09 | End: 2019-12-18 | Stop reason: HOSPADM

## 2019-12-09 RX ORDER — ARFORMOTEROL TARTRATE 15 UG/2ML
15 SOLUTION RESPIRATORY (INHALATION)
Status: DISCONTINUED | OUTPATIENT
Start: 2019-12-09 | End: 2019-12-18 | Stop reason: HOSPADM

## 2019-12-09 RX ORDER — METHYLPREDNISOLONE SODIUM SUCCINATE 125 MG/2ML
60 INJECTION, POWDER, LYOPHILIZED, FOR SOLUTION INTRAMUSCULAR; INTRAVENOUS EVERY 6 HOURS
Status: DISCONTINUED | OUTPATIENT
Start: 2019-12-09 | End: 2019-12-12

## 2019-12-09 RX ORDER — HYDROCODONE BITARTRATE AND ACETAMINOPHEN 5; 325 MG/1; MG/1
1 TABLET ORAL ONCE AS NEEDED
Status: COMPLETED | OUTPATIENT
Start: 2019-12-09 | End: 2019-12-09

## 2019-12-09 RX ORDER — POTASSIUM CHLORIDE 7.45 MG/ML
10 INJECTION INTRAVENOUS
Status: DISCONTINUED | OUTPATIENT
Start: 2019-12-09 | End: 2019-12-18 | Stop reason: HOSPADM

## 2019-12-09 RX ORDER — MAGNESIUM SULFATE 1 G/100ML
1 INJECTION INTRAVENOUS AS NEEDED
Status: DISCONTINUED | OUTPATIENT
Start: 2019-12-09 | End: 2019-12-18 | Stop reason: HOSPADM

## 2019-12-09 RX ORDER — POTASSIUM CHLORIDE 1.5 G/1.77G
40 POWDER, FOR SOLUTION ORAL AS NEEDED
Status: DISCONTINUED | OUTPATIENT
Start: 2019-12-09 | End: 2019-12-18 | Stop reason: HOSPADM

## 2019-12-09 RX ORDER — POTASSIUM CHLORIDE 20 MEQ/1
40 TABLET, EXTENDED RELEASE ORAL AS NEEDED
Status: DISCONTINUED | OUTPATIENT
Start: 2019-12-09 | End: 2019-12-18 | Stop reason: HOSPADM

## 2019-12-09 RX ORDER — MAGNESIUM SULFATE HEPTAHYDRATE 40 MG/ML
4 INJECTION, SOLUTION INTRAVENOUS AS NEEDED
Status: DISCONTINUED | OUTPATIENT
Start: 2019-12-09 | End: 2019-12-18 | Stop reason: HOSPADM

## 2019-12-09 RX ADMIN — DICYCLOMINE HYDROCHLORIDE 20 MG: 20 TABLET ORAL at 20:41

## 2019-12-09 RX ADMIN — DICYCLOMINE HYDROCHLORIDE 20 MG: 20 TABLET ORAL at 18:42

## 2019-12-09 RX ADMIN — INSULIN ASPART 12 UNITS: 100 INJECTION, SOLUTION INTRAVENOUS; SUBCUTANEOUS at 09:11

## 2019-12-09 RX ADMIN — PIOGLITAZONE 45 MG: 45 TABLET ORAL at 09:10

## 2019-12-09 RX ADMIN — IPRATROPIUM BROMIDE AND ALBUTEROL SULFATE 3 ML: .5; 3 SOLUTION RESPIRATORY (INHALATION) at 11:27

## 2019-12-09 RX ADMIN — DOXYCYCLINE 100 MG: 100 INJECTION, POWDER, LYOPHILIZED, FOR SOLUTION INTRAVENOUS at 13:20

## 2019-12-09 RX ADMIN — SODIUM CHLORIDE, PRESERVATIVE FREE 10 ML: 5 INJECTION INTRAVENOUS at 09:14

## 2019-12-09 RX ADMIN — HYDROCODONE BITARTRATE AND ACETAMINOPHEN 1 TABLET: 10; 325 TABLET ORAL at 09:10

## 2019-12-09 RX ADMIN — HYDROCODONE BITARTRATE AND ACETAMINOPHEN 1 TABLET: 10; 325 TABLET ORAL at 12:23

## 2019-12-09 RX ADMIN — IPRATROPIUM BROMIDE AND ALBUTEROL SULFATE 3 ML: .5; 3 SOLUTION RESPIRATORY (INHALATION) at 16:06

## 2019-12-09 RX ADMIN — ARFORMOTEROL TARTRATE 15 MCG: 15 SOLUTION RESPIRATORY (INHALATION) at 23:13

## 2019-12-09 RX ADMIN — INSULIN DETEMIR 50 UNITS: 100 INJECTION, SOLUTION SUBCUTANEOUS at 09:10

## 2019-12-09 RX ADMIN — BUDESONIDE 0.5 MG: 0.5 SUSPENSION RESPIRATORY (INHALATION) at 23:13

## 2019-12-09 RX ADMIN — IPRATROPIUM BROMIDE AND ALBUTEROL SULFATE 3 ML: .5; 3 SOLUTION RESPIRATORY (INHALATION) at 19:21

## 2019-12-09 RX ADMIN — ATORVASTATIN CALCIUM 10 MG: 10 TABLET, FILM COATED ORAL at 20:41

## 2019-12-09 RX ADMIN — ENOXAPARIN SODIUM 40 MG: 40 INJECTION SUBCUTANEOUS at 16:55

## 2019-12-09 RX ADMIN — INSULIN ASPART 15 UNITS: 100 INJECTION, SOLUTION INTRAVENOUS; SUBCUTANEOUS at 12:24

## 2019-12-09 RX ADMIN — SODIUM CHLORIDE, PRESERVATIVE FREE 10 ML: 5 INJECTION INTRAVENOUS at 20:42

## 2019-12-09 RX ADMIN — IPRATROPIUM BROMIDE AND ALBUTEROL SULFATE 3 ML: .5; 3 SOLUTION RESPIRATORY (INHALATION) at 02:20

## 2019-12-09 RX ADMIN — HYDROCODONE BITARTRATE AND ACETAMINOPHEN 1 TABLET: 5; 325 TABLET ORAL at 02:59

## 2019-12-09 RX ADMIN — NICOTINE 1 PATCH: 21 PATCH TRANSDERMAL at 10:47

## 2019-12-09 RX ADMIN — DICYCLOMINE HYDROCHLORIDE 20 MG: 20 TABLET ORAL at 12:28

## 2019-12-09 RX ADMIN — METHYLPREDNISOLONE SODIUM SUCCINATE 60 MG: 125 INJECTION, POWDER, FOR SOLUTION INTRAMUSCULAR; INTRAVENOUS at 16:55

## 2019-12-09 RX ADMIN — HYDROCODONE BITARTRATE AND ACETAMINOPHEN 1 TABLET: 10; 325 TABLET ORAL at 21:36

## 2019-12-09 RX ADMIN — DICYCLOMINE HYDROCHLORIDE 20 MG: 20 TABLET ORAL at 09:10

## 2019-12-09 RX ADMIN — IPRATROPIUM BROMIDE AND ALBUTEROL SULFATE 3 ML: .5; 3 SOLUTION RESPIRATORY (INHALATION) at 06:57

## 2019-12-09 RX ADMIN — INSULIN ASPART 15 UNITS: 100 INJECTION, SOLUTION INTRAVENOUS; SUBCUTANEOUS at 09:12

## 2019-12-09 RX ADMIN — METHYLPREDNISOLONE SODIUM SUCCINATE 60 MG: 125 INJECTION, POWDER, FOR SOLUTION INTRAMUSCULAR; INTRAVENOUS at 02:59

## 2019-12-09 RX ADMIN — HYDROCODONE BITARTRATE AND ACETAMINOPHEN 1 TABLET: 10; 325 TABLET ORAL at 17:41

## 2019-12-09 RX ADMIN — METHYLPREDNISOLONE SODIUM SUCCINATE 60 MG: 125 INJECTION, POWDER, FOR SOLUTION INTRAMUSCULAR; INTRAVENOUS at 21:36

## 2019-12-09 RX ADMIN — TERAZOSIN HYDROCHLORIDE ANHYDROUS 1 MG: 1 CAPSULE ORAL at 20:41

## 2019-12-09 RX ADMIN — METHYLPREDNISOLONE SODIUM SUCCINATE 60 MG: 125 INJECTION, POWDER, FOR SOLUTION INTRAMUSCULAR; INTRAVENOUS at 09:12

## 2019-12-09 RX ADMIN — ACETAMINOPHEN 650 MG: 325 TABLET ORAL at 02:20

## 2019-12-09 RX ADMIN — CEFTRIAXONE 2 G: 2 INJECTION, SOLUTION INTRAVENOUS at 12:28

## 2019-12-09 RX ADMIN — IPRATROPIUM BROMIDE AND ALBUTEROL SULFATE 3 ML: .5; 3 SOLUTION RESPIRATORY (INHALATION) at 23:13

## 2019-12-09 RX ADMIN — INSULIN ASPART 4 UNITS: 100 INJECTION, SOLUTION INTRAVENOUS; SUBCUTANEOUS at 20:41

## 2019-12-09 RX ADMIN — INSULIN ASPART 8 UNITS: 100 INJECTION, SOLUTION INTRAVENOUS; SUBCUTANEOUS at 12:23

## 2019-12-09 NOTE — PROGRESS NOTES
"SERVICE: [facility] HOSPITALIST    CONSULTANTS: Pulmonary    CHIEF COMPLAINT: Follow-up respiratory failure, AE COPD    SUBJECTIVE: Staff notes patient became acutely more short of air overnight requiring 6 to 8 L oxygen at rest to maintain oxygen saturations in the high 80s.  On exam this morning patient is very short of breath, has poor air movement, despite normal echo, troponin and BMP.   Transferring to ICU in case of need for BiPAP with high oxygen requirement and decreased air movement.  The patient otherwise notes constant coughing, occasionally productive, requiring sitting up to breathe, did not sleep due to shortness of air.  He is in tripod position at time of exam.  He denies f/c/chest pain/n/v/d/abdominal pain or other new concerns.    OBJECTIVE:    /59 (BP Location: Left arm, Patient Position: Sitting)   Pulse 113   Temp 98.6 °F (37 °C) (Oral)   Resp 28   Ht 182.9 cm (72\")   Wt 123 kg (272 lb)   SpO2 (!) 89%   BMI 36.89 kg/m²     MEDS/LABS REVIEWED AND ORDERED    atorvastatin 10 mg Oral Nightly   cefTRIAXone 2 g Intravenous Q24H   And      doxycycline 100 mg Intravenous Q12H   dicyclomine 20 mg Oral 4x Daily   enoxaparin 40 mg Subcutaneous Q24H   HYDROcodone-acetaminophen 1 tablet Oral 4x Daily   insulin aspart 0-24 Units Subcutaneous 4x Daily AC & at Bedtime   insulin aspart 20 Units Subcutaneous TID With Meals   [START ON 12/10/2019] insulin detemir 55 Units Subcutaneous QAM   ipratropium-albuterol 3 mL Nebulization Q4H - RT   methylPREDNISolone sodium succinate 60 mg Intravenous Q6H   nicotine 1 patch Transdermal Q24H   pioglitazone 45 mg Oral Daily   sodium chloride 10 mL Intravenous Q12H   terazosin 1 mg Oral Nightly     Physical Exam   Constitutional: He is oriented to person, place, and time.   Obese, ill appearance, appears older than stated age   HENT:   Head: Normocephalic and atraumatic.   Eyes: Pupils are equal, round, and reactive to light. EOM are normal. "   Cardiovascular:   Tachycardic, regular   Pulmonary/Chest:   Air movement extremely poor throughout, right greater than left crackles/Rales   Abdominal: Bowel sounds are normal. He exhibits no distension. There is no tenderness. There is no guarding.   Obese   Musculoskeletal: He exhibits no edema.   Neurological: He is alert and oriented to person, place, and time.   Skin: Skin is warm and dry. No erythema.   Psychiatric:   Calm   Vitals reviewed.    LAB/DIAGNOSTICS:    Lab Results (last 24 hours)     Procedure Component Value Units Date/Time    Blood Culture - Blood, Arm, Right [[id number]] Collected:  12/08/19 1225    Specimen:  Blood from Arm, Right Updated:  12/09/19 1248     Blood Culture No growth at 24 hours    Blood Culture - Blood, Arm, Right [[id number]] Collected:  12/08/19 1235    Specimen:  Blood from Arm, Right Updated:  12/09/19 1248     Blood Culture No growth at 24 hours    Blood Culture - Blood, Arm, Right [[id number]] Collected:  12/07/19 1159    Specimen:  Blood from Arm, Right Updated:  12/09/19 1216     Blood Culture No growth at 2 days    Blood Culture - Blood, Arm, Left [[id number]] Collected:  12/07/19 1130    Specimen:  Blood from Arm, Left Updated:  12/09/19 1216     Blood Culture No growth at 2 days    POC Glucose Once [[id number]]  (Abnormal) Collected:  12/09/19 1137    Specimen:  Blood Updated:  12/09/19 1214     Glucose 215 mg/dL     C-reactive Protein [[id number]]  (Abnormal) Collected:  12/09/19 0630    Specimen:  Blood Updated:  12/09/19 1050     C-Reactive Protein 4.66 mg/dL     POC Glucose Once [[id number]]  (Abnormal) Collected:  12/09/19 0734    Specimen:  Blood Updated:  12/09/19 0742     Glucose 261 mg/dL     Procalcitonin [[id number]]  (Normal) Collected:  12/09/19 0630    Specimen:  Blood Updated:  12/09/19 0715     Procalcitonin 0.10 ng/mL     Comprehensive Metabolic Panel [[id number]]  (Abnormal) Collected:  12/09/19 0630    Specimen:  Blood Updated:  12/09/19 0713     Glucose  256 mg/dL      BUN 15 mg/dL      Creatinine 0.67 mg/dL      Sodium 132 mmol/L      Potassium 4.6 mmol/L      Chloride 96 mmol/L      CO2 21.6 mmol/L      Calcium 8.8 mg/dL      Total Protein 7.2 g/dL      Albumin 3.80 g/dL      ALT (SGPT) 40 U/L      AST (SGOT) 62 U/L      Alkaline Phosphatase 42 U/L      Total Bilirubin 0.4 mg/dL      eGFR Non African Amer 118 mL/min/1.73      Globulin 3.4 gm/dL      A/G Ratio 1.1 g/dL      BUN/Creatinine Ratio 22.4     Anion Gap 14.4 mmol/L     Narrative:       GFR Normal >60  Chronic Kidney Disease <60  Kidney Failure <15      Magnesium [[id number]]  (Normal) Collected:  12/09/19 0630    Specimen:  Blood Updated:  12/09/19 0713     Magnesium 1.6 mg/dL     Lactic Acid, Plasma [[id number]]  (Normal) Collected:  12/09/19 0630    Specimen:  Blood Updated:  12/09/19 0705     Lactate 1.0 mmol/L     CBC & Differential [[id number]] Collected:  12/09/19 0630    Specimen:  Blood Updated:  12/09/19 0651    Narrative:       The following orders were created for panel order CBC & Differential.  Procedure                               Abnormality         Status                     ---------                               -----------         ------                     CBC Auto Differential[[id number]]        Abnormal            Final result                 Please view results for these tests on the individual orders.    CBC Auto Differential [[id number]]  (Abnormal) Collected:  12/09/19 0630    Specimen:  Blood Updated:  12/09/19 0651     WBC 8.20 10*3/mm3      RBC 4.37 10*6/mm3      Hemoglobin 13.9 g/dL      Hematocrit 42.7 %      MCV 97.7 fL      MCH 31.8 pg      MCHC 32.6 g/dL      RDW 13.2 %      RDW-SD 48.3 fl      MPV 12.0 fL      Platelets 168 10*3/mm3      Neutrophil % 90.9 %      Lymphocyte % 4.0 %      Monocyte % 4.5 %      Eosinophil % 0.0 %      Basophil % 0.1 %      Immature Grans % 0.5 %      Neutrophils, Absolute 7.45 10*3/mm3      Lymphocytes, Absolute 0.33 10*3/mm3      Monocytes,  Absolute 0.37 10*3/mm3      Eosinophils, Absolute 0.00 10*3/mm3      Basophils, Absolute 0.01 10*3/mm3      Immature Grans, Absolute 0.04 10*3/mm3      nRBC 0.0 /100 WBC     Blood Gas, Arterial [[id number]]  (Abnormal) Collected:  12/09/19 0510    Specimen:  Arterial Blood Updated:  12/09/19 0517     Site Right Radial     Rich's Test Positive     pH, Arterial 7.384 pH units      pCO2, Arterial 45.3 mm Hg      Comment: 83 Value above reference range        pO2, Arterial 56.4 mm Hg      Comment: 84 Value below reference range        HCO3, Arterial 27.0 mmol/L      Comment: 83 Value above reference range        Base Excess, Arterial 1.4 mmol/L      O2 Saturation, Arterial 88.5 %      Comment: 84 Value below reference range        Hemoglobin, Blood Gas 14.4 g/dL      Temperature 37.0 C      Barometric Pressure for Blood Gas 734 mmHg      Modality Nasal Cannula     Flow Rate 5.0 lpm      Ventilator Mode NA     Comment: Meter: [id number]     :  [id number]        pCO2, Temperature Corrected 45.3 mm Hg      pH, Temp Corrected 7.384 pH Units      pO2, Temperature Corrected 56.4 mm Hg     Lactic Acid, Plasma [[id number]]  (Normal) Collected:  12/09/19 0007    Specimen:  Blood Updated:  12/09/19 0028     Lactate 2.0 mmol/L     POC Glucose Once [[id number]]  (Normal) Collected:  12/08/19 2100    Specimen:  Blood Updated:  12/08/19 2107     Glucose 85 mg/dL     Respiratory Culture - Sputum, Cough [[id number]] Collected:  12/08/19 1426    Specimen:  Sputum from Cough Updated:  12/08/19 1853     Respiratory Culture Rejected     Gram Stain Rare (1+) WBCs seen      Few (2+) Epithelial cells seen    Lactic Acid, Reflex [[id number]]  (Abnormal) Collected:  12/08/19 1746    Specimen:  Blood Updated:  12/08/19 1809     Lactate 2.4 mmol/L     C-reactive Protein [[id number]]  (Abnormal) Collected:  12/08/19 1151    Specimen:  Blood Updated:  12/08/19 1750     C-Reactive Protein 7.27 mg/dL     POC Glucose Once [[id number]]   (Abnormal) Collected:  12/08/19 1642    Specimen:  Blood Updated:  12/08/19 1705     Glucose 142 mg/dL     Lactic Acid, Reflex Timer (This will reflex a repeat order 3-3:15 hours after ordered.) [[id number]] Collected:  12/08/19 1103    Specimen:  Blood Updated:  12/08/19 1445     Extra Tube Hold for add-ons.     Comment: Auto resulted.           ECG 12 Lead   Final Result   HEART RATE= 111  bpm   RR Interval= 540  ms   VT Interval= 165  ms   P Horizontal Axis= 4  deg   P Front Axis= 57  deg   QRSD Interval= 103  ms   QT Interval= 334  ms   QRS Axis= 59  deg   T Wave Axis= 35  deg   - OTHERWISE NORMAL ECG -   Poor quality tracing repeat   Electronically Signed By: Lyle Middleton ([facility]) 08-Dec-2019 17:47:26   Date and Time of Study: 2019-12-08 08:23:27      ECG 12 Lead   Final Result   HEART RATE= 131  bpm   RR Interval= 456  ms   VT Interval= 127  ms   P Horizontal Axis= -5  deg   P Front Axis= 49  deg   QRSD Interval= 95  ms   QT Interval= 303  ms   QRS Axis= 135  deg   T Wave Axis= 21  deg   - OTHERWISE NORMAL ECG -   Poor quality tracing repeat   Electronically Signed By: Lyle Middleton ([facility]) 07-Dec-2019 18:45:33   Date and Time of Study: 2019-12-07 11:26:15        Results for orders placed during the hospital encounter of 12/07/19   Adult Transthoracic Echo Complete W/ Cont if Necessary Per Protocol    Narrative · Left ventricular systolic function is normal. Estimated EF appears to be   in the range of 56 - 60%. Normal left ventricular cavity size, wall   thickness and mass noted. Normal diastolic function.  · No significant valvular stenosis or regurgitation noted.        Ct Angiogram Chest With & Without Contrast    Result Date: 12/8/2019  1. Negative for pulmonary embolus. 2. Negative for thoracic aortic aneurysm/dissection. 3. Mild bibasilar atelectasis/infiltrate. Correlation with clinical symptoms to exclude superimposed pneumonia. 4. Emphysema. Signer Name: Reginaldo Montemayor MD  Signed: 12/8/2019  9:27 AM  Workstation Name: [email or web address]  [facility]    Xr Chest 1 View    Result Date: 12/9/2019   1. Lung volumes are lower. New bibasilar atelectasis or infiltrates right greater the left.  This report was finalized on 12/9/2019 1:03 PM by Dr. Jaden Danielle MD.      Xr Chest Pa & Lateral    Result Date: 12/7/2019  Improved lung volumes with continued mild prominence of the pulmonary vascularity but no convincing evidence of CHF. Signer Name: KEATON Martin MD  Signed: 12/7/2019 4:15 PM  Workstation Name: [email or web address]  [facility] [place]    ASSESSMENT/PLAN:  AHHRF 2/2 AE COPD:   Lactic acidosis 2/2 hypoxia:   Steroid-induced leukocytosis:   Possible bronchospasm: Pulmonary following  ABG this a.m. slightly worsened, repeat now stat, stat chest x-ray, if worse start BiPAP/give lasix  N.p.o., consult SLP to evaluate for aspiration and while on BiPAP  CTA chest negative for acute PE/aneurysm or dissection  Procalcitonin/RVP negative, awaiting repeat sputum culture  Can likely DC antibiotics, await further pulmonary input  Continue duo nebs every 4 hours  Stat uric acid then likely will need Lasix  Moved to ICU  Monitor    Uncontrolled DM2 with hyperglycemia in obese:   Steroid-induced hyperglycemia: A1c 9.1%  Glucose not controlled on high-dose sliding scale insulin with home Actos 45 mg daily  Body mass index is 36.89 kg/m².  TSH normal  Increase levemir to 55 units every morning, monitor for need to add nighttime dose  Increase NovoLog  To 20 units 3 times daily with meals  Change Accu-Cheks to every 6 hours while n.p.o.    Suspected volume overload:  Despite echo, troponin, proBNP resulting as normal, clinically feel patient slightly volume overloaded  Management as noted above  Check EKG    Hyperlipidemia: No acute issues on Lipitor 10 mg nightly    Electrolyte imbalance: Add electrolyte replacement protocol    BPH: No acute issues, continue home terazosin 1 mg  "nightly    IBS: No acute issues on Bentyl 20 mg 4 times daily    Tobacco abuse: 10 you nicotine patch    OA/chronic pain with chronic narcotic use:  On home regimen of hydrocodone 4 times daily    PLAN FOR DISPOSITION: Home when able    NARENDRA Blanco  Hospitalist, [facility]  12/09/19  7:30 AM    \"Dictated utilizing Dragon dictation\"    Addendum: ABG shows improvement, will do heated high flow rather than BiPAP at this time, change BiPAP to as needed, monitor closely in ICU  "

## 2019-12-09 NOTE — PLAN OF CARE
Problem: Patient Care Overview  Goal: Plan of Care Review  12/9/2019 1813 by Brenna Bacon, RN  Flowsheets (Taken 12/9/2019 1802)  Outcome Summary: Pt transferred to ICU this afternoon for increased c/o SOA and increased O@ needs. Pt is currently on Heated high flow N/C at 45 % and 35 L. Pt denies SOA at this time and breathing appears regular and unlabored. Pt is NPO but may ice chips and sips of water. Speech eval ordered.  Note:   VSS  12/9/2019 1802 by Brenna Bacon, RN  Outcome: Ongoing (interventions implemented as appropriate)  Flowsheets (Taken 12/9/2019 1802)  Progress: improving  Plan of Care Reviewed With: patient  Outcome Summary: Pt transferred to ICU this afternoon for increased c/o SOA and increased O2@ needs. Pt is currently on Heated high flow N/C at 45 % and 35 L. Pt denies SOA at this time and breathing appears regular and unlabored. Pt is NPO but may ice chips and sips of water. Speech eval ordered. VSS

## 2019-12-09 NOTE — PLAN OF CARE
See flowsheet charting, pt bumped up to 4L nc at 8am 12/8/19, continued overnight until around 0230 12/9/19, see RT notes,  involved, see his orders. Am labs pending. Pt c/o unable to lie down in bed r/t will not be able to get back up easily r/t his abdomen, therefore he has been sitting upright, or sleeping on his side at 45-90angles and now c/o lower back and hip pain increased. Pt reports baseline chronic back pain and general pain at a 7, managed by scheduled Norco 10, see emar. Pt requesting additional pain med overnight, see emar.

## 2019-12-10 LAB
B-OH-BUTYR SERPL-MCNC: 2.1 MG/DL
GLUCOSE BLDC GLUCOMTR-MCNC: 189 MG/DL (ref 70–130)
GLUCOSE BLDC GLUCOMTR-MCNC: 212 MG/DL (ref 70–130)
GLUCOSE BLDC GLUCOMTR-MCNC: 213 MG/DL (ref 70–130)
GLUCOSE BLDC GLUCOMTR-MCNC: 241 MG/DL (ref 70–130)
GLUCOSE BLDC GLUCOMTR-MCNC: 270 MG/DL (ref 70–130)
MAGNESIUM SERPL-MCNC: 2.1 MG/DL (ref 1.6–2.4)

## 2019-12-10 PROCEDURE — 99232 SBSQ HOSP IP/OBS MODERATE 35: CPT | Performed by: NURSE PRACTITIONER

## 2019-12-10 PROCEDURE — 94667 MNPJ CHEST WALL 1ST: CPT

## 2019-12-10 PROCEDURE — 63710000001 INSULIN ASPART PER 5 UNITS: Performed by: NURSE PRACTITIONER

## 2019-12-10 PROCEDURE — 25010000002 METHYLPREDNISOLONE PER 125 MG: Performed by: NURSE PRACTITIONER

## 2019-12-10 PROCEDURE — 92610 EVALUATE SWALLOWING FUNCTION: CPT

## 2019-12-10 PROCEDURE — 63710000001 INSULIN DETEMIR PER 5 UNITS: Performed by: NURSE PRACTITIONER

## 2019-12-10 PROCEDURE — 82962 GLUCOSE BLOOD TEST: CPT

## 2019-12-10 PROCEDURE — 83735 ASSAY OF MAGNESIUM: CPT | Performed by: NURSE PRACTITIONER

## 2019-12-10 PROCEDURE — 25010000002 ENOXAPARIN PER 10 MG: Performed by: NURSE PRACTITIONER

## 2019-12-10 PROCEDURE — 94799 UNLISTED PULMONARY SVC/PX: CPT

## 2019-12-10 RX ORDER — SODIUM CHLORIDE FOR INHALATION 7 %
4 VIAL, NEBULIZER (ML) INHALATION 4 TIMES DAILY
Status: DISCONTINUED | OUTPATIENT
Start: 2019-12-10 | End: 2019-12-11

## 2019-12-10 RX ADMIN — ARFORMOTEROL TARTRATE 15 MCG: 15 SOLUTION RESPIRATORY (INHALATION) at 19:28

## 2019-12-10 RX ADMIN — IPRATROPIUM BROMIDE AND ALBUTEROL SULFATE 3 ML: .5; 3 SOLUTION RESPIRATORY (INHALATION) at 18:45

## 2019-12-10 RX ADMIN — ACETAMINOPHEN 650 MG: 325 TABLET ORAL at 17:52

## 2019-12-10 RX ADMIN — INSULIN ASPART 8 UNITS: 100 INJECTION, SOLUTION INTRAVENOUS; SUBCUTANEOUS at 07:47

## 2019-12-10 RX ADMIN — HYDROCODONE BITARTRATE AND ACETAMINOPHEN 1 TABLET: 10; 325 TABLET ORAL at 12:48

## 2019-12-10 RX ADMIN — SODIUM CHLORIDE SOLN NEBU 7% 4 ML: 7 NEBU SOLN at 22:56

## 2019-12-10 RX ADMIN — DOXYCYCLINE 100 MG: 100 INJECTION, POWDER, LYOPHILIZED, FOR SOLUTION INTRAVENOUS at 12:18

## 2019-12-10 RX ADMIN — METHYLPREDNISOLONE SODIUM SUCCINATE 60 MG: 125 INJECTION, POWDER, FOR SOLUTION INTRAMUSCULAR; INTRAVENOUS at 20:45

## 2019-12-10 RX ADMIN — ACETAMINOPHEN 650 MG: 325 TABLET ORAL at 02:34

## 2019-12-10 RX ADMIN — NICOTINE 1 PATCH: 21 PATCH TRANSDERMAL at 09:10

## 2019-12-10 RX ADMIN — DICYCLOMINE HYDROCHLORIDE 20 MG: 20 TABLET ORAL at 09:09

## 2019-12-10 RX ADMIN — PIOGLITAZONE 45 MG: 45 TABLET ORAL at 09:09

## 2019-12-10 RX ADMIN — IPRATROPIUM BROMIDE AND ALBUTEROL SULFATE 3 ML: .5; 3 SOLUTION RESPIRATORY (INHALATION) at 07:15

## 2019-12-10 RX ADMIN — DICYCLOMINE HYDROCHLORIDE 20 MG: 20 TABLET ORAL at 12:18

## 2019-12-10 RX ADMIN — BUDESONIDE 0.5 MG: 0.5 SUSPENSION RESPIRATORY (INHALATION) at 18:45

## 2019-12-10 RX ADMIN — INSULIN ASPART 8 UNITS: 100 INJECTION, SOLUTION INTRAVENOUS; SUBCUTANEOUS at 12:48

## 2019-12-10 RX ADMIN — BUDESONIDE 0.5 MG: 0.5 SUSPENSION RESPIRATORY (INHALATION) at 07:15

## 2019-12-10 RX ADMIN — HYDROCODONE BITARTRATE AND ACETAMINOPHEN 1 TABLET: 10; 325 TABLET ORAL at 22:51

## 2019-12-10 RX ADMIN — HYDROCODONE BITARTRATE AND ACETAMINOPHEN 1 TABLET: 10; 325 TABLET ORAL at 07:47

## 2019-12-10 RX ADMIN — ARFORMOTEROL TARTRATE 15 MCG: 15 SOLUTION RESPIRATORY (INHALATION) at 07:24

## 2019-12-10 RX ADMIN — INSULIN ASPART 20 UNITS: 100 INJECTION, SOLUTION INTRAVENOUS; SUBCUTANEOUS at 17:51

## 2019-12-10 RX ADMIN — INSULIN DETEMIR 55 UNITS: 100 INJECTION, SOLUTION SUBCUTANEOUS at 06:11

## 2019-12-10 RX ADMIN — INSULIN DETEMIR 15 UNITS: 100 INJECTION, SOLUTION SUBCUTANEOUS at 20:40

## 2019-12-10 RX ADMIN — IPRATROPIUM BROMIDE AND ALBUTEROL SULFATE 3 ML: .5; 3 SOLUTION RESPIRATORY (INHALATION) at 15:20

## 2019-12-10 RX ADMIN — HYDROCODONE BITARTRATE AND ACETAMINOPHEN 1 TABLET: 10; 325 TABLET ORAL at 17:52

## 2019-12-10 RX ADMIN — INSULIN ASPART 4 UNITS: 100 INJECTION, SOLUTION INTRAVENOUS; SUBCUTANEOUS at 17:53

## 2019-12-10 RX ADMIN — SODIUM CHLORIDE, PRESERVATIVE FREE 10 ML: 5 INJECTION INTRAVENOUS at 09:11

## 2019-12-10 RX ADMIN — DOXYCYCLINE 100 MG: 100 INJECTION, POWDER, LYOPHILIZED, FOR SOLUTION INTRAVENOUS at 00:16

## 2019-12-10 RX ADMIN — IPRATROPIUM BROMIDE AND ALBUTEROL SULFATE 3 ML: .5; 3 SOLUTION RESPIRATORY (INHALATION) at 03:15

## 2019-12-10 RX ADMIN — IPRATROPIUM BROMIDE AND ALBUTEROL SULFATE 3 ML: .5; 3 SOLUTION RESPIRATORY (INHALATION) at 11:10

## 2019-12-10 RX ADMIN — DICYCLOMINE HYDROCHLORIDE 20 MG: 20 TABLET ORAL at 17:52

## 2019-12-10 RX ADMIN — INSULIN ASPART 20 UNITS: 100 INJECTION, SOLUTION INTRAVENOUS; SUBCUTANEOUS at 12:49

## 2019-12-10 RX ADMIN — IPRATROPIUM BROMIDE AND ALBUTEROL SULFATE 3 ML: .5; 3 SOLUTION RESPIRATORY (INHALATION) at 22:49

## 2019-12-10 RX ADMIN — METHYLPREDNISOLONE SODIUM SUCCINATE 60 MG: 125 INJECTION, POWDER, FOR SOLUTION INTRAMUSCULAR; INTRAVENOUS at 04:10

## 2019-12-10 RX ADMIN — SODIUM CHLORIDE, PRESERVATIVE FREE 10 ML: 5 INJECTION INTRAVENOUS at 20:27

## 2019-12-10 RX ADMIN — DICYCLOMINE HYDROCHLORIDE 20 MG: 20 TABLET ORAL at 20:27

## 2019-12-10 RX ADMIN — ATORVASTATIN CALCIUM 10 MG: 10 TABLET, FILM COATED ORAL at 20:27

## 2019-12-10 RX ADMIN — TERAZOSIN HYDROCHLORIDE ANHYDROUS 1 MG: 1 CAPSULE ORAL at 20:27

## 2019-12-10 RX ADMIN — METHYLPREDNISOLONE SODIUM SUCCINATE 60 MG: 125 INJECTION, POWDER, FOR SOLUTION INTRAMUSCULAR; INTRAVENOUS at 16:38

## 2019-12-10 RX ADMIN — METHYLPREDNISOLONE SODIUM SUCCINATE 60 MG: 125 INJECTION, POWDER, FOR SOLUTION INTRAMUSCULAR; INTRAVENOUS at 09:10

## 2019-12-10 RX ADMIN — ENOXAPARIN SODIUM 40 MG: 40 INJECTION SUBCUTANEOUS at 16:38

## 2019-12-10 RX ADMIN — INSULIN ASPART 8 UNITS: 100 INJECTION, SOLUTION INTRAVENOUS; SUBCUTANEOUS at 20:40

## 2019-12-10 NOTE — PLAN OF CARE
Problem: Patient Care Overview  Goal: Plan of Care Review  Outcome: Ongoing (interventions implemented as appropriate)  Flowsheets (Taken 12/10/2019 6944)  Progress: improving  Outcome Summary: pt remains on heated high-flow @ 50% &35L. Pt rested better overnight. VSS. continue to monitor. Pt remains NPO until seen by speech this am.

## 2019-12-10 NOTE — PLAN OF CARE
Problem: Patient Care Overview  Goal: Individualization and Mutuality  Outcome: Ongoing (interventions implemented as appropriate)     Problem: Patient Care Overview  Goal: Plan of Care Review  Flowsheets (Taken 12/10/2019 0317)  Progress: improving  Plan of Care Reviewed With: patient     Problem: Chronic Obstructive Pulmonary Disease (Adult)  Intervention: Optimize Oxygenation/Ventilation/Perfusion  Flowsheets (Taken 12/10/2019 0317)  Head of Bed (HOB): HOB elevated  Airway/Ventilation Management: airway patency maintained; humidification applied; pulmonary hygiene promoted  Breathing Techniques/Airway Clearance: deep/controlled cough encouraged

## 2019-12-10 NOTE — PROGRESS NOTES
LOS: 0 days   Patient Care Team:  Ney Kathleen MD as PCP - General (Family Medicine)    Chief Complaint:  F/up respiratory failure, COPD exacerbation and medical problems listed below    Subjective   Interval History  I reviewed the admission note, progress notes, PMH, PSH, Family hx, social history, imagings and prior records on this admission, summarized the finding in my note and formulated a transition of care plan.     Moved to the intensive care unit today due to worsening hypoxemia.  Currently on OptiFlow 45% FiO2 on 35 L/min flow rate.  He=e reported no cough but has difficulty breathing.  He asked for water repetitively.  Apparently he was placed n.p.o. due to concerns about choking.    REVIEW OF SYSTEMS:   CARDIOVASCULAR: No chest pain, chest pressure or chest discomfort. No palpitations or edema.   RESPIRATORY: See above  GASTROINTESTINAL: No anorexia, nausea, vomiting or diarrhea. No abdominal pain or blood.   Neurology: Anxiety but no depression    Ventilator/Non-Invasive Ventilation Settings (From admission, onward)     Start     Ordered    12/09/19 1355  NIPPV (CPAP or BIPAP)  As Needed-RT     Question Answer Comment   Type: AutoBIPAP    NIPPV Mask Interface: Per Patient Preference    Max IPAP 12    Min EPAP 5        12/09/19 1355    12/09/19 1209  NIPPV (CPAP or BIPAP)  Until Discontinued,   Status:  Canceled     Question Answer Comment   Type: AutoBIPAP    NIPPV Mask Interface: Per Patient Preference    Max IPAP 12    Min EPAP 5        12/09/19 1209                      Physical Exam:     Vital Signs  Temp:  [98 °F (36.7 °C)-99.1 °F (37.3 °C)] 98.7 °F (37.1 °C)  Heart Rate:  [] 97  Resp:  [20-30] 30  BP: (120-156)/(59-91) 139/85    Intake/Output Summary (Last 24 hours) at 12/9/2019 2142  Last data filed at 12/9/2019 1856  Gross per 24 hour   Intake 2306.68 ml   Output 2625 ml   Net -318.32 ml     Flowsheet Rows      First Filed Value  "  Admission Height  187.9 cm (73.98\") Documented at 12/07/2019 1126   Admission Weight  130 kg (286 lb) Documented at 12/07/2019 1126          General Appearance:    Alert, cooperative, in no acute distress   ENMT:  Mallampati score 3, dry mucous membrane   Eyes: Pupils equals and reactive to light. EOMI   Neck:   Large. Trachea midline. No thyromegaly.   Lungs:    Diffuse bilateral expiratory wheezing.  Minimal crackles at the bases bilaterally.  Decreased breath sounds overall.    Heart:    Regular rhythm and normal rate, normal S1 and S2, no            murmur   Skin:    No rash.   Abdomen:     Obese. Soft. No tenderness. No HSM.   Neuro:   Conscious, alert, oriented x3. Strength 5/5 in upper and lower ext   Extremities:   Moves all extremities well, no edema, no cyanosis, no             Redness          Results Review:        Results from last 7 days   Lab Units 12/09/19  0630 12/08/19  1235 12/08/19  1055   SODIUM mmol/L 132* 133* 135*   POTASSIUM mmol/L 4.6 4.5 4.5   CHLORIDE mmol/L 96* 93* 97*   CO2 mmol/L 21.6* 22.6 23.4   BUN mg/dL 15 19 18   CREATININE mg/dL 0.67* 1.09 0.92   GLUCOSE mg/dL 256* 333* 353*   CALCIUM mg/dL 8.8 9.7 9.9     Results from last 7 days   Lab Units 12/08/19  1103 12/07/19  1130   TROPONIN T ng/mL <0.010 <0.010     Results from last 7 days   Lab Units 12/09/19  0630 12/08/19  1235 12/08/19  1055   WBC 10*3/mm3 8.20 13.75* 11.21*   HEMOGLOBIN g/dL 13.9 14.9 14.4   HEMATOCRIT % 42.7 47.0 44.3   PLATELETS 10*3/mm3 168 186 187     Results from last 7 days   Lab Units 12/08/19  1151   INR  0.95   APTT seconds 25.3     Results from last 7 days   Lab Units 12/08/19  1103   PROBNP pg/mL 129.2       I reviewed the patient's new clinical results.  I personally viewed and interpreted the patient's CXR        Medication Review:     atorvastatin 10 mg Oral Nightly   cefTRIAXone 2 g Intravenous Q24H   And      doxycycline 100 mg Intravenous Q12H   dicyclomine 20 mg Oral 4x Daily   enoxaparin 40 mg " Subcutaneous Q24H   HYDROcodone-acetaminophen 1 tablet Oral 4x Daily   insulin aspart 0-24 Units Subcutaneous 4x Daily AC & at Bedtime   insulin aspart 20 Units Subcutaneous TID With Meals   [START ON 12/10/2019] insulin detemir 55 Units Subcutaneous QAM   ipratropium-albuterol 3 mL Nebulization Q4H - RT   methylPREDNISolone sodium succinate 60 mg Intravenous Q6H   nicotine 1 patch Transdermal Q24H   pioglitazone 45 mg Oral Daily   sodium chloride 10 mL Intravenous Q12H   terazosin 1 mg Oral Nightly            Diagnostic imaging:  I personally and independently reviewed the following images:  CXR 12/9/2019: Low lung volumes.  Increased pulmonary vascular markings.  CT chest 12/8/2019: Upper lobe predominant emphysema.  Peribronchial cuffing in the lower lobes.     Assessment   1. Acute exacerbation of COPD  2. Acute on chronic hypoxic respiratory failure, on oxygen 3.5 L/min at baseline, worse  3. Acute bronchitis  4. Tobacco abuse    Pertinent medical problems:  · Diabetes mellitus type 2 with worsening hyperglycemia due to steroid hyponatremia  ·     All problems new to me    Plan   · Continue DuoNeb every 4.  Start Brovana and Pulmicort  · Oxygen by OptiFlow.  Titrate for SPO2 greater than 90%.  Discussed with RT  · Solu-Medrol IV every 6 hours.  · Stop Rocephin.  Continue doxycycline for COPD exacerbation  · Flutter valve to advance mucus clearance  · Insulin per primary for hyperglycemia          Salinas James MD  12/09/19  9:42 PM          This note was dictated utilizing [employer] dictation

## 2019-12-10 NOTE — PROGRESS NOTES
"SERVICE: [facility] HOSPITALIST    CONSULTANTS: Pulmonary    CHIEF COMPLAINT: Follow-up AE COPD, respiratory failure    SUBJECTIVE: Staff notes patient has remained on heated high flow with improved oxygenation overnight.  Patient now reports that he typically wears 3.5 L at night but that prior to admission he was wearing 3.5 L occasionally as he needed to, which was quite often in the week prior to admission.  He notes that he does cough when eating and cannot specify exactly what happens and notes that he does not have teeth and feels that it may be related to this.  He otherwise denies f/c/chest pain/n/v/d/abdominal pain or other new concerns.    OBJECTIVE:    /85   Pulse 96   Temp 98.1 °F (36.7 °C) (Oral)   Resp 24   Ht 182.9 cm (72\")   Wt 123 kg (272 lb)   SpO2 (!) 88%   BMI 36.89 kg/m²     MEDS/LABS REVIEWED AND ORDERED    arformoterol 15 mcg Nebulization BID - RT   atorvastatin 10 mg Oral Nightly   budesonide 0.5 mg Nebulization BID - RT   dicyclomine 20 mg Oral 4x Daily   doxycycline 100 mg Intravenous Q12H   enoxaparin 40 mg Subcutaneous Q24H   HYDROcodone-acetaminophen 1 tablet Oral 4x Daily   insulin aspart 0-24 Units Subcutaneous 4x Daily AC & at Bedtime   insulin aspart 20 Units Subcutaneous TID With Meals   insulin detemir 15 Units Subcutaneous Nightly   [START ON 12/11/2019] insulin detemir 60 Units Subcutaneous QAM   ipratropium-albuterol 3 mL Nebulization Q4H - RT   methylPREDNISolone sodium succinate 60 mg Intravenous Q6H   nicotine 1 patch Transdermal Q24H   pioglitazone 45 mg Oral Daily   sodium chloride 10 mL Intravenous Q12H   terazosin 1 mg Oral Nightly     Physical Exam   Constitutional: He is oriented to person, place, and time.   Obese, appears older than stated age   HENT:   Head: Normocephalic and atraumatic.   Edentulous   Eyes: Pupils are equal, round, and reactive to light. EOM are normal.   Cardiovascular: Normal rate and regular rhythm. "   Pulmonary/Chest:   Mild dyspnea at rest and with speech  Anterior upper lobes are clear and improved, anterior lower lobes, posterior lobes are clear but remain diminished throughout     Abdominal: Soft. Bowel sounds are normal. He exhibits no distension. There is no tenderness. There is no guarding.   Obese   Musculoskeletal: He exhibits no edema.   Neurological: He is alert and oriented to person, place, and time.   Skin: Skin is warm and dry. No erythema.   Psychiatric: He has a normal mood and affect. His behavior is normal.   Vitals reviewed.    LAB/DIAGNOSTICS:    Lab Results (last 24 hours)     Procedure Component Value Units Date/Time    Blood Culture - Blood, Arm, Right [[id number]] Collected:  12/08/19 1225    Specimen:  Blood from Arm, Right Updated:  12/10/19 1246     Blood Culture No growth at 2 days    Blood Culture - Blood, Arm, Right [[id number]] Collected:  12/08/19 1235    Specimen:  Blood from Arm, Right Updated:  12/10/19 1246     Blood Culture No growth at 2 days    Blood Culture - Blood, Arm, Right [[id number]] Collected:  12/07/19 1159    Specimen:  Blood from Arm, Right Updated:  12/10/19 1215     Blood Culture No growth at 3 days    Blood Culture - Blood, Arm, Left [[id number]] Collected:  12/07/19 1130    Specimen:  Blood from Arm, Left Updated:  12/10/19 1215     Blood Culture No growth at 3 days    POC Glucose Once [[id number]]  (Abnormal) Collected:  12/10/19 1142    Specimen:  Blood Updated:  12/10/19 1202     Glucose 213 mg/dL     POC Glucose Once [[id number]]  (Abnormal) Collected:  12/10/19 0731    Specimen:  Blood Updated:  12/10/19 0743     Glucose 241 mg/dL     POC Glucose Once [[id number]]  (Abnormal) Collected:  12/10/19 0611    Specimen:  Blood Updated:  12/10/19 0623     Glucose 270 mg/dL     Magnesium [[id number]]  (Normal) Collected:  12/10/19 0415    Specimen:  Blood from Arm, Right Updated:  12/10/19 0554     Magnesium 2.1 mg/dL     POC Glucose Once [[id number]]  (Abnormal)  Collected:  12/09/19 2029    Specimen:  Blood Updated:  12/09/19 2039     Glucose 188 mg/dL     Respiratory Culture - Sputum, Cough [[id number]] Collected:  12/09/19 1742    Specimen:  Sputum from Cough Updated:  12/09/19 1931     Gram Stain Few (2+) WBCs seen      Rare (1+) Epithelial cells seen      Rare (1+) Gram positive cocci in clusters      Rare (1+) Gram positive bacilli      Few (2+) Yeast    POC Glucose Once [[id number]]  (Normal) Collected:  12/09/19 1546    Specimen:  Blood Updated:  12/09/19 1552     Glucose 119 mg/dL     Uric Acid [[id number]]  (Normal) Collected:  12/09/19 0630    Specimen:  Blood Updated:  12/09/19 1422     Uric Acid 4.0 mg/dL     Blood Gas, Arterial [[id number]]  (Abnormal) Collected:  12/09/19 1343    Specimen:  Arterial Blood Updated:  12/09/19 1347     Site Left Radial     Rich's Test Positive     pH, Arterial 7.437 pH units      pCO2, Arterial 44.4 mm Hg      pO2, Arterial 63.7 mm Hg      Comment: 84 Value below reference range        HCO3, Arterial 29.9 mmol/L      Comment: 83 Value above reference range        Base Excess, Arterial 5.0 mmol/L      Comment: 83 Value above reference range        O2 Saturation, Arterial 92.8 %      Comment: 84 Value below reference range        Hemoglobin, Blood Gas 14.5 g/dL      Temperature 37.0 C      Barometric Pressure for Blood Gas 729 mmHg      Modality HFNC     Flow Rate 8.0 lpm      Ventilator Mode NA     Comment: Meter: [id number]     :  [id number]        pCO2, Temperature Corrected 44.4 mm Hg      pH, Temp Corrected 7.437 pH Units      pO2, Temperature Corrected 63.7 mm Hg         ECG 12 Lead   Final Result   HEART RATE= 97  bpm   RR Interval= 616  ms   HI Interval= 155  ms   P Horizontal Axis= 10  deg   P Front Axis= 55  deg   QRSD Interval= 96  ms   QT Interval= 332  ms   QRS Axis= 36  deg   T Wave Axis= 48  deg   - NORMAL ECG -   Sinus rhythm   NO SIGNIFICANT CHANGE FROM PREVIOUS ECG   Electronically Signed By: Demetri Zhang  ([facility]) 10-Dec-2019 12:18:19   Date and Time of Study: 2019-12-09 14:11:27      ECG 12 Lead   Final Result   HEART RATE= 111  bpm   RR Interval= 540  ms   VT Interval= 165  ms   P Horizontal Axis= 4  deg   P Front Axis= 57  deg   QRSD Interval= 103  ms   QT Interval= 334  ms   QRS Axis= 59  deg   T Wave Axis= 35  deg   - OTHERWISE NORMAL ECG -   Poor quality tracing repeat   Electronically Signed By: Lyle Middleton ([facility]) 08-Dec-2019 17:47:26   Date and Time of Study: 2019-12-08 08:23:27      ECG 12 Lead   Final Result   HEART RATE= 131  bpm   RR Interval= 456  ms   VT Interval= 127  ms   P Horizontal Axis= -5  deg   P Front Axis= 49  deg   QRSD Interval= 95  ms   QT Interval= 303  ms   QRS Axis= 135  deg   T Wave Axis= 21  deg   - OTHERWISE NORMAL ECG -   Poor quality tracing repeat   Electronically Signed By: Lyle Middleton ([facility]) 07-Dec-2019 18:45:33   Date and Time of Study: 2019-12-07 11:26:15        Results for orders placed during the hospital encounter of 12/07/19   Adult Transthoracic Echo Complete W/ Cont if Necessary Per Protocol    Narrative · Left ventricular systolic function is normal. Estimated EF appears to be   in the range of 56 - 60%. Normal left ventricular cavity size, wall   thickness and mass noted. Normal diastolic function.  · No significant valvular stenosis or regurgitation noted.        Xr Chest 1 View    Result Date: 12/9/2019   1. Lung volumes are lower. New bibasilar atelectasis or infiltrates right greater the left.  This report was finalized on 12/9/2019 1:03 PM by Dr. Jaden Danielle MD.      ASSESSMENT/PLAN:  AHHRF 2/2 AE COPD:   Lactic acidosis 2/2 hypoxia:   Steroid-induced leukocytosis:   Acute bronchitis: Pulmonary following  Continue ICU level care with heated high flow oxygen, wean as able  Sputum culture still pending final result  Continue doxycycline, Brovana, Pulmicort, duo nebs, Solu-Medrol  SLP showed no swallow dysfunction, add soft CCC  "diet  Monitor    Uncontrolled DM2 with hyperglycemia in obese:   Steroid-induced hyperglycemia: A1c 9.1%  Glucose continues elevated on home Actos 45 mg daily, high-dose sliding scale insulin.    Body mass index is 36.89 kg/m².  TSH normal  Add Levemir 15 units p.m., increase a.m. dose to 60 units   Continue NovoLog 20 units 3 times daily with meals  Change Accu-Cheks back to AC/at bedtime  Monitor     Hyperlipidemia: No acute issues on Lipitor 10 mg nightly     Electrolyte imbalance: Continue electrolyte replacement protocol     BPH: No acute issues, continue home terazosin 1 mg nightly     IBS: No acute issues on Bentyl 20 mg 4 times daily     Tobacco abuse:  Continue nicotine patch     OA/chronic pain with chronic narcotic use:  On home regimen of hydrocodone 4 times daily, no acute issues    PLAN FOR DISPOSITION:    NARENDRA Blanco  Hospitalist, [facility]  12/10/19  7:39 AM    \"Dictated utilizing Dragon dictation\"    "

## 2019-12-10 NOTE — PLAN OF CARE
Problem: Patient Care Overview  Goal: Plan of Care Review  Outcome: Ongoing (interventions implemented as appropriate)  Flowsheets  Progress: improving  Taken 12/10/2019 1600 by Flower Badillo, RN  Plan of Care Reviewed With: patient  Taken 12/10/2019 1655 by Flower Badillo, RN  Outcome Summary: Pt continues on high flow nc to keep o2 saturation 88-92; tolerating well. pt passed swallow evaul. VSS. afebrile. no n/v/discomfort. continues on home chronic pain medication. new IV placed in right AC; tolerated well. will continue to monitor in the ICU setting.

## 2019-12-10 NOTE — PLAN OF CARE
Problem: Patient Care Overview  Goal: Plan of Care Review  Outcome: Ongoing (interventions implemented as appropriate)  Flowsheets (Taken 12/10/2019 1140)  Outcome Summary: Pt with functional oropharyngeal swallow. Difficulty with mastication reported due to lack of dentition and pt reports no dentures. No clinical s/s of aspiration on thins, puree or soft solids. Rec. mechanical soft diet w/ground meats and thin liquids.

## 2019-12-10 NOTE — NURSING NOTE
Continued Stay Note  [facility]     Patient Name: Kt Armstrong  MRN: [id number]  Today's Date: 12/10/2019    Admit Date: 12/7/2019    Discharge Plan     Row Name 12/10/19 1626       Plan    Plan  d/c home     Plan Comments  f/u visit with pt.  He states he is feeling better. No new concerns voiced at this time.         Discharge Codes    No documentation.             Dereck Burns RN

## 2019-12-10 NOTE — THERAPY EVALUATION
Acute Care - Speech Language Pathology   Swallow Initial [place] [facility] [place]     Patient Name: Kt Armstrong  : 1950  MRN: [id number]  Today's Date: 12/10/2019               Admit Date: 2019    Visit Dx:     ICD-10-CM ICD-9-CM   1. COPD exacerbation (CMS/HCC) J44.1 491.21   2. Hyperglycemia R73.9 790.29   3. Hypoxia R09.02 799.02     Patient Active Problem List   Diagnosis   • Complete tear of right rotator cuff   • Biceps tendonitis   • Suprascapular nerve injury   • Chronic pain   • Pain in right shoulder   • COPD with acute exacerbation (CMS/HCC)   • DM2 (diabetes mellitus, type 2) (CMS/HCC)   • HLD (hyperlipidemia)   • Obesity (BMI 30-39.9)   • Tobacco abuse   • COPD exacerbation (CMS/HCC)     Past Medical History:   Diagnosis Date   • Arthritis of back    • Arthritis of neck    • Asthma    • COPD (chronic obstructive pulmonary disease) (CMS/HCC)    • DM (diabetes mellitus) (CMS/HCC)    • Hip arthrosis    • Hyperlipidemia    • Knee swelling    • Periarthritis of shoulder    • Rotator cuff syndrome      Past Surgical History:   Procedure Laterality Date   • APPENDECTOMY     • CHOLECYSTECTOMY     • ROTATOR CUFF REPAIR Right         SWALLOW EVALUATION (last 72 hours)      SLP Adult Swallow Evaluation     Row Name 12/10/19 1800       Rehab Evaluation    Document Type  evaluation  -AD    Total Evaluation Minutes, SLP  30  -AD    Subjective Information  no complaints  -AD    Patient Observations  alert;cooperative;agree to therapy  -AD    Patient/Family Observations  Pt seen sitting on the edge of the bed for the evaluation.   -AD    Patient Effort  good  -AD    Symptoms Noted During/After Treatment  none  -AD       General Information    Patient Profile Reviewed  yes  -AD    Pertinent History Of Current Problem  Pt admitted with COPD exacerbation. Pt had SOA and transferred to ICU and placed on heated high flow nasal cannula. Pt made NPO at that time. Report of failing RN dysphagia screen and reports  of coughing/choking while eating.   -AD    Current Method of Nutrition  NPO;other (see comments) except ice chips  -AD    Precautions/Limitations, Vision  WFL;for purposes of eval  -AD    Precautions/Limitations, Hearing  WFL;for purposes of eval  -AD    Prior Level of Function-Communication  WFL  -AD    Prior Level of Function-Swallowing  no diet consistency restrictions;safe, efficient swallowing in all situations;mechanical soft textures;thin liquids;other (see comments) pt reports he eats soft, easy to chew foods due to no teeth  -AD    Plans/Goals Discussed with  patient;agreed upon  -AD    Barriers to Rehab  none identified  -AD    Patient's Goals for Discharge  return to PO diet;eat/drink without coughing/choking  -AD       Pain Assessment    Additional Documentation  -- no pain reported  -AD       Oral Motor and Function    Oral Lesions or Structural Abnormalities and/or variants  none  -AD    Dentition Assessment  edentulous, does not have dentures  -AD    Secretion Management  WNL/WFL  -AD    Mucosal Quality  moist, healthy  -AD    Volitional Swallow  WFL  -AD    Volitional Cough  WFL  -AD       Oral Musculature and Cranial Nerve Assessment    Oral Motor General Assessment  WFL  -AD       General Eating/Swallowing Observations    Respiratory Support Currently in Use  other (see comments) heated high flow nasal cannula  -AD    Eating/Swallowing Skills  self-fed;appropriate self-feeding skills observed  -AD    Positioning During Eating  upright 90 degree;upright in bed sitting on edge of bed  -AD    Utensils Used  spoon;cup  -AD    Consistencies Trialed  regular textures;pureed;thin liquids  -AD    Pre SpO2 (%)  89  -AD    Post SpO2 (%)  89  -AD       Respiratory    Respiratory Status  WFL;during swallowing/eating  -AD       Clinical Swallow Eval    Oral Prep Phase  WFL  -AD    Oral Transit  WFL  -AD    Oral Residue  WFL  -AD    Pharyngeal Phase  no overt signs/symptoms of pharyngeal impairment  -AD     Esophageal Phase  unremarkable  -AD    Clinical Swallow Evaluation Summary  Pt with a functional oropharyngeal swallow with the exception of reported difficulty eating hard solid foods due to lack of dentition. Pt able to adequately masticate a cracker with no difficulty or residue. No clinical s/s of aspiration on thins from cup, puree and solids. Complaints of chewing meats and raw solids such as salad with pecans.   -AD       Clinical Impression    SLP Swallowing Diagnosis  functional oral phase;functional pharyngeal phase  -AD    Functional Impact  other risk of choking on hard solid foods due to no teeth  -AD    Rehab Potential/Prognosis, Swallowing  good, to achieve stated therapy goals  -AD    Swallow Criteria for Skilled Therapeutic Interventions Met  demonstrates skilled criteria  -AD       Recommendations    Therapy Frequency (Swallow)  1 day per week  -AD    Predicted Duration Therapy Intervention (Days)  1 day  -AD    SLP Diet Recommendation  soft textures;ground;thin liquids  -AD    Recommended Diagnostics  reassess via clinical swallow evaluation;other (see comments) meal assessment  -AD    Recommended Precautions and Strategies  upright posture during/after eating;small bites of food and sips of liquid  -AD    SLP Rec. for Method of Medication Administration  meds whole;with thin liquids;as tolerated  -AD    Monitor for Signs of Aspiration  no;notify SLP if any concerns  -AD    Anticipated Dischage Disposition  home  -AD       Swallow Goals (SLP)    Oral Nutrition/Hydration Goal Selection (SLP)  oral nutrition/hydration, SLP goal 1  -AD       Oral Nutrition/Hydration Goal 1 (SLP)    Oral Nutrition/Hydration Goal 1, SLP  Pt will tolerate a soft, ground diet with thins w/o clinical s/s of aspiration or complications such as aspiration pneumonia via meal assessment.  -AD    Time Frame (Oral Nutrition/Hydration Goal 1, SLP)  short term goal (STG);1 day  -AD    Barriers (Oral Nutrition/Hydration Goal 1,  SLP)  edentulous status  -AD    Progress/Outcomes (Oral Nutrition/Hydration Goal 1, SLP)  other (see comments) new  -AD      User Key  (r) = Recorded By, (t) = Taken By, (c) = Cosigned By    Initials Name Effective Dates    Carlotta Echavarria, MS CCC-SLP 02/28/19 -           EDUCATION  The patient has been educated in the following areas:   Dysphagia (Swallowing Impairment) Modified Diet Instruction. Pt verbalizes understanding of results of clinical swallow eval and recommendation for soft, ground diet with thins.     SLP Recommendation and Plan  SLP Swallowing Diagnosis: functional oral phase, functional pharyngeal phase  SLP Diet Recommendation: soft textures, ground, thin liquids  Recommended Precautions and Strategies: upright posture during/after eating, small bites of food and sips of liquid  SLP Rec. for Method of Medication Administration: meds whole, with thin liquids, as tolerated  Monitor for Signs of Aspiration: no, notify SLP if any concerns  Recommended Diagnostics: reassess via clinical swallow evaluation, other (see comments)(meal assessment)  Swallow Criteria for Skilled Therapeutic Interventions Met: demonstrates skilled criteria  Anticipated Dischage Disposition: home  Rehab Potential/Prognosis, Swallowing: good, to achieve stated therapy goals  Therapy Frequency (Swallow): 1 day per week  Predicted Duration Therapy Intervention (Days): 1 day       Outcome Summary: Pt with functional oropharyngeal swallow. Difficulty with mastication reported due to lack of dentition and pt reports no dentures. No clinical s/s of aspiration on thins, puree or soft solids. Rec. mechanical soft diet w/ground meats and thin liquids.    SLP GOALS     Row Name 12/10/19 1800       Oral Nutrition/Hydration Goal 1 (SLP)    Oral Nutrition/Hydration Goal 1, SLP  Pt will tolerate a soft, ground diet with thins w/o clinical s/s of aspiration or complications such as aspiration pneumonia via meal assessment.  -AD    Time  Frame (Oral Nutrition/Hydration Goal 1, SLP)  short term goal (STG);1 day  -AD    Barriers (Oral Nutrition/Hydration Goal 1, SLP)  edentulous status  -AD    Progress/Outcomes (Oral Nutrition/Hydration Goal 1, SLP)  other (see comments) new  -AD      User Key  (r) = Recorded By, (t) = Taken By, (c) = Cosigned By    Initials Name Provider Type    Carlotta Echavarria MS CCC-SLP Speech and Language Pathologist             Time Calculation:   Time Calculation- SLP     Row Name 12/10/19 1848             Time Calculation- SLP    SLP Start Time  1110  -AD      SLP Stop Time  1140  -AD      SLP Time Calculation (min)  30 min  -AD      Total Timed Code Minutes- SLP  0 minute(s)  -AD      SLP Non-Billable Time (min)  0 min  -AD      TCU Minutes- SLP  0 min  -AD      SLP Received On  12/10/19  -AD      SLP - Next Appointment  12/11/19  -AD        User Key  (r) = Recorded By, (t) = Taken By, (c) = Cosigned By    Initials Name Provider Type    AD aCrlotta Marie MS CCC-SLP Speech and Language Pathologist          Therapy Charges for Today     Code Description Service Date Service Provider Modifiers Qty    [id number] HC ST EVAL ORAL PHARYNG SWALLOW 2 12/10/2019 Carlotta Marie MS CCC-SLP GN 1        Carlotta Marie MS CCC-SLP  12/10/2019

## 2019-12-11 ENCOUNTER — APPOINTMENT (OUTPATIENT)
Dept: GENERAL RADIOLOGY | Facility: HOSPITAL | Age: 69
End: 2019-12-11

## 2019-12-11 LAB
ANION GAP SERPL CALCULATED.3IONS-SCNC: 13.1 MMOL/L (ref 5–15)
ARTERIAL PATENCY WRIST A: POSITIVE
ATMOSPHERIC PRESS: 749 MMHG
BACTERIA SPEC RESP CULT: NORMAL
BASE EXCESS BLDA CALC-SCNC: 4.2 MMOL/L (ref 0–2)
BASOPHILS # BLD AUTO: 0.01 10*3/MM3 (ref 0–0.2)
BASOPHILS NFR BLD AUTO: 0.1 % (ref 0–1.5)
BDY SITE: ABNORMAL
BODY TEMPERATURE: 37 C
BUN BLD-MCNC: 17 MG/DL (ref 8–23)
BUN/CREAT SERPL: 26.2 (ref 7–25)
CALCIUM SPEC-SCNC: 9 MG/DL (ref 8.6–10.5)
CHLORIDE SERPL-SCNC: 94 MMOL/L (ref 98–107)
CO2 SERPL-SCNC: 27.9 MMOL/L (ref 22–29)
CREAT BLD-MCNC: 0.65 MG/DL (ref 0.76–1.27)
DEPRECATED RDW RBC AUTO: 46.6 FL (ref 37–54)
EOSINOPHIL # BLD AUTO: 0 10*3/MM3 (ref 0–0.4)
EOSINOPHIL NFR BLD AUTO: 0 % (ref 0.3–6.2)
ERYTHROCYTE [DISTWIDTH] IN BLOOD BY AUTOMATED COUNT: 12.9 % (ref 12.3–15.4)
GAS FLOW AIRWAY: 40 LPM
GFR SERPL CREATININE-BSD FRML MDRD: 123 ML/MIN/1.73
GLUCOSE BLD-MCNC: 136 MG/DL (ref 65–99)
GLUCOSE BLDC GLUCOMTR-MCNC: 118 MG/DL (ref 70–130)
GLUCOSE BLDC GLUCOMTR-MCNC: 153 MG/DL (ref 70–130)
GLUCOSE BLDC GLUCOMTR-MCNC: 187 MG/DL (ref 70–130)
GLUCOSE BLDC GLUCOMTR-MCNC: 68 MG/DL (ref 70–130)
GLUCOSE BLDC GLUCOMTR-MCNC: 75 MG/DL (ref 70–130)
GRAM STN SPEC: NORMAL
HCO3 BLDA-SCNC: 29.8 MMOL/L (ref 20–26)
HCT VFR BLD AUTO: 43.6 % (ref 37.5–51)
HGB BLD-MCNC: 14.3 G/DL (ref 13–17.7)
HGB BLDA-MCNC: 15.1 G/DL (ref 14–18)
HOROWITZ INDEX BLD+IHG-RTO: 60 %
IMM GRANULOCYTES # BLD AUTO: 0.07 10*3/MM3 (ref 0–0.05)
IMM GRANULOCYTES NFR BLD AUTO: 0.8 % (ref 0–0.5)
LYMPHOCYTES # BLD AUTO: 0.39 10*3/MM3 (ref 0.7–3.1)
LYMPHOCYTES NFR BLD AUTO: 4.6 % (ref 19.6–45.3)
MCH RBC QN AUTO: 31.8 PG (ref 26.6–33)
MCHC RBC AUTO-ENTMCNC: 32.8 G/DL (ref 31.5–35.7)
MCV RBC AUTO: 97.1 FL (ref 79–97)
MODALITY: ABNORMAL
MONOCYTES # BLD AUTO: 0.56 10*3/MM3 (ref 0.1–0.9)
MONOCYTES NFR BLD AUTO: 6.5 % (ref 5–12)
NEUTROPHILS # BLD AUTO: 7.53 10*3/MM3 (ref 1.7–7)
NEUTROPHILS NFR BLD AUTO: 88 % (ref 42.7–76)
NRBC BLD AUTO-RTO: 0 /100 WBC (ref 0–0.2)
PCO2 BLDA: 46.9 MM HG (ref 35–45)
PCO2 TEMP ADJ BLD: 46.9 MM HG (ref 35–45)
PH BLDA: 7.41 PH UNITS (ref 7.35–7.45)
PH, TEMP CORRECTED: 7.41 PH UNITS (ref 7.35–7.45)
PLATELET # BLD AUTO: 185 10*3/MM3 (ref 140–450)
PMV BLD AUTO: 12.3 FL (ref 6–12)
PO2 BLDA: 59.1 MM HG (ref 83–108)
PO2 TEMP ADJ BLD: 59.1 MM HG (ref 83–108)
POTASSIUM BLD-SCNC: 4.3 MMOL/L (ref 3.5–5.2)
RBC # BLD AUTO: 4.49 10*6/MM3 (ref 4.14–5.8)
SAO2 % BLDCOA: 89.9 % (ref 94–99)
SODIUM BLD-SCNC: 135 MMOL/L (ref 136–145)
VENTILATOR MODE: ABNORMAL
WBC NRBC COR # BLD: 8.56 10*3/MM3 (ref 3.4–10.8)

## 2019-12-11 PROCEDURE — 74019 RADEX ABDOMEN 2 VIEWS: CPT

## 2019-12-11 PROCEDURE — 85025 COMPLETE CBC W/AUTO DIFF WBC: CPT | Performed by: NURSE PRACTITIONER

## 2019-12-11 PROCEDURE — 80048 BASIC METABOLIC PNL TOTAL CA: CPT | Performed by: NURSE PRACTITIONER

## 2019-12-11 PROCEDURE — 25010000002 ONDANSETRON PER 1 MG

## 2019-12-11 PROCEDURE — 63710000001 INSULIN DETEMIR PER 5 UNITS: Performed by: NURSE PRACTITIONER

## 2019-12-11 PROCEDURE — 92526 ORAL FUNCTION THERAPY: CPT

## 2019-12-11 PROCEDURE — 94799 UNLISTED PULMONARY SVC/PX: CPT

## 2019-12-11 PROCEDURE — 36600 WITHDRAWAL OF ARTERIAL BLOOD: CPT

## 2019-12-11 PROCEDURE — 82803 BLOOD GASES ANY COMBINATION: CPT

## 2019-12-11 PROCEDURE — 25010000002 ENOXAPARIN PER 10 MG: Performed by: NURSE PRACTITIONER

## 2019-12-11 PROCEDURE — 82962 GLUCOSE BLOOD TEST: CPT

## 2019-12-11 PROCEDURE — 99233 SBSQ HOSP IP/OBS HIGH 50: CPT | Performed by: NURSE PRACTITIONER

## 2019-12-11 PROCEDURE — 63710000001 INSULIN ASPART PER 5 UNITS: Performed by: NURSE PRACTITIONER

## 2019-12-11 PROCEDURE — 25010000002 METHYLPREDNISOLONE PER 125 MG: Performed by: NURSE PRACTITIONER

## 2019-12-11 PROCEDURE — 94668 MNPJ CHEST WALL SBSQ: CPT

## 2019-12-11 PROCEDURE — 25010000002 MORPHINE PER 10 MG

## 2019-12-11 RX ORDER — ONDANSETRON 2 MG/ML
4 INJECTION INTRAMUSCULAR; INTRAVENOUS EVERY 6 HOURS PRN
Status: DISCONTINUED | OUTPATIENT
Start: 2019-12-11 | End: 2019-12-18 | Stop reason: HOSPADM

## 2019-12-11 RX ORDER — ONDANSETRON 2 MG/ML
INJECTION INTRAMUSCULAR; INTRAVENOUS
Status: COMPLETED
Start: 2019-12-11 | End: 2019-12-11

## 2019-12-11 RX ORDER — MORPHINE SULFATE 2 MG/ML
2 INJECTION, SOLUTION INTRAMUSCULAR; INTRAVENOUS ONCE
Status: COMPLETED | OUTPATIENT
Start: 2019-12-11 | End: 2019-12-11

## 2019-12-11 RX ORDER — DOCUSATE SODIUM 100 MG/1
100 CAPSULE, LIQUID FILLED ORAL 2 TIMES DAILY
Status: DISCONTINUED | OUTPATIENT
Start: 2019-12-11 | End: 2019-12-18 | Stop reason: HOSPADM

## 2019-12-11 RX ORDER — MORPHINE SULFATE 2 MG/ML
INJECTION, SOLUTION INTRAMUSCULAR; INTRAVENOUS
Status: COMPLETED
Start: 2019-12-11 | End: 2019-12-11

## 2019-12-11 RX ORDER — SUCRALFATE 1 G/1
1 TABLET ORAL ONCE
Status: COMPLETED | OUTPATIENT
Start: 2019-12-11 | End: 2019-12-11

## 2019-12-11 RX ORDER — SODIUM CHLORIDE FOR INHALATION 7 %
4 VIAL, NEBULIZER (ML) INHALATION
Status: DISCONTINUED | OUTPATIENT
Start: 2019-12-11 | End: 2019-12-13

## 2019-12-11 RX ORDER — THEOPHYLLINE 400 MG/1
200 TABLET, EXTENDED RELEASE ORAL
Status: DISCONTINUED | OUTPATIENT
Start: 2019-12-11 | End: 2019-12-14

## 2019-12-11 RX ADMIN — PIOGLITAZONE 45 MG: 45 TABLET ORAL at 10:13

## 2019-12-11 RX ADMIN — ATORVASTATIN CALCIUM 10 MG: 10 TABLET, FILM COATED ORAL at 20:17

## 2019-12-11 RX ADMIN — METHYLPREDNISOLONE SODIUM SUCCINATE 60 MG: 125 INJECTION, POWDER, FOR SOLUTION INTRAMUSCULAR; INTRAVENOUS at 21:48

## 2019-12-11 RX ADMIN — IPRATROPIUM BROMIDE AND ALBUTEROL SULFATE 3 ML: .5; 3 SOLUTION RESPIRATORY (INHALATION) at 22:50

## 2019-12-11 RX ADMIN — SODIUM CHLORIDE SOLN NEBU 7% 4 ML: 7 NEBU SOLN at 07:03

## 2019-12-11 RX ADMIN — ACETAMINOPHEN 650 MG: 325 TABLET ORAL at 00:11

## 2019-12-11 RX ADMIN — DOXYCYCLINE 100 MG: 100 INJECTION, POWDER, LYOPHILIZED, FOR SOLUTION INTRAVENOUS at 11:53

## 2019-12-11 RX ADMIN — ARFORMOTEROL TARTRATE 15 MCG: 15 SOLUTION RESPIRATORY (INHALATION) at 19:05

## 2019-12-11 RX ADMIN — MORPHINE SULFATE 2 MG: 2 INJECTION, SOLUTION INTRAMUSCULAR; INTRAVENOUS at 03:14

## 2019-12-11 RX ADMIN — BUDESONIDE 0.5 MG: 0.5 SUSPENSION RESPIRATORY (INHALATION) at 07:19

## 2019-12-11 RX ADMIN — SODIUM CHLORIDE SOLN NEBU 7% 4 ML: 7 NEBU SOLN at 19:10

## 2019-12-11 RX ADMIN — BUDESONIDE 0.5 MG: 0.5 SUSPENSION RESPIRATORY (INHALATION) at 18:57

## 2019-12-11 RX ADMIN — SODIUM CHLORIDE SOLN NEBU 7% 4 ML: 7 NEBU SOLN at 15:24

## 2019-12-11 RX ADMIN — IPRATROPIUM BROMIDE AND ALBUTEROL SULFATE 3 ML: .5; 3 SOLUTION RESPIRATORY (INHALATION) at 02:46

## 2019-12-11 RX ADMIN — DICYCLOMINE HYDROCHLORIDE 20 MG: 20 TABLET ORAL at 20:17

## 2019-12-11 RX ADMIN — HYDROCODONE BITARTRATE AND ACETAMINOPHEN 1 TABLET: 10; 325 TABLET ORAL at 08:11

## 2019-12-11 RX ADMIN — DOCUSATE SODIUM 100 MG: 100 CAPSULE, LIQUID FILLED ORAL at 08:03

## 2019-12-11 RX ADMIN — HYDROCODONE BITARTRATE AND ACETAMINOPHEN 1 TABLET: 10; 325 TABLET ORAL at 20:17

## 2019-12-11 RX ADMIN — METHYLPREDNISOLONE SODIUM SUCCINATE 60 MG: 125 INJECTION, POWDER, FOR SOLUTION INTRAMUSCULAR; INTRAVENOUS at 14:53

## 2019-12-11 RX ADMIN — DOCUSATE SODIUM 100 MG: 100 CAPSULE, LIQUID FILLED ORAL at 20:17

## 2019-12-11 RX ADMIN — ONDANSETRON 4 MG: 2 INJECTION INTRAMUSCULAR; INTRAVENOUS at 06:12

## 2019-12-11 RX ADMIN — HYDROCODONE BITARTRATE AND ACETAMINOPHEN 1 TABLET: 10; 325 TABLET ORAL at 17:05

## 2019-12-11 RX ADMIN — INSULIN ASPART 4 UNITS: 100 INJECTION, SOLUTION INTRAVENOUS; SUBCUTANEOUS at 08:12

## 2019-12-11 RX ADMIN — SODIUM CHLORIDE, PRESERVATIVE FREE 10 ML: 5 INJECTION INTRAVENOUS at 10:14

## 2019-12-11 RX ADMIN — TERAZOSIN HYDROCHLORIDE ANHYDROUS 1 MG: 1 CAPSULE ORAL at 20:17

## 2019-12-11 RX ADMIN — ONDANSETRON 4 MG: 2 INJECTION, SOLUTION INTRAMUSCULAR; INTRAVENOUS at 06:12

## 2019-12-11 RX ADMIN — INSULIN DETEMIR 60 UNITS: 100 INJECTION, SOLUTION SUBCUTANEOUS at 06:12

## 2019-12-11 RX ADMIN — SODIUM CHLORIDE, PRESERVATIVE FREE 10 ML: 5 INJECTION INTRAVENOUS at 20:22

## 2019-12-11 RX ADMIN — METHYLPREDNISOLONE SODIUM SUCCINATE 60 MG: 125 INJECTION, POWDER, FOR SOLUTION INTRAMUSCULAR; INTRAVENOUS at 03:14

## 2019-12-11 RX ADMIN — IPRATROPIUM BROMIDE AND ALBUTEROL SULFATE 3 ML: .5; 3 SOLUTION RESPIRATORY (INHALATION) at 15:24

## 2019-12-11 RX ADMIN — IPRATROPIUM BROMIDE AND ALBUTEROL SULFATE 3 ML: .5; 3 SOLUTION RESPIRATORY (INHALATION) at 18:57

## 2019-12-11 RX ADMIN — METHYLPREDNISOLONE SODIUM SUCCINATE 60 MG: 125 INJECTION, POWDER, FOR SOLUTION INTRAMUSCULAR; INTRAVENOUS at 10:14

## 2019-12-11 RX ADMIN — NICOTINE 1 PATCH: 21 PATCH TRANSDERMAL at 08:14

## 2019-12-11 RX ADMIN — ACETAMINOPHEN 650 MG: 325 TABLET ORAL at 14:51

## 2019-12-11 RX ADMIN — DICYCLOMINE HYDROCHLORIDE 20 MG: 20 TABLET ORAL at 08:03

## 2019-12-11 RX ADMIN — DOXYCYCLINE 100 MG: 100 INJECTION, POWDER, LYOPHILIZED, FOR SOLUTION INTRAVENOUS at 00:06

## 2019-12-11 RX ADMIN — DICYCLOMINE HYDROCHLORIDE 20 MG: 20 TABLET ORAL at 17:05

## 2019-12-11 RX ADMIN — POLYETHYLENE GLYCOL 3350 17 G: 17 POWDER, FOR SOLUTION ORAL at 08:03

## 2019-12-11 RX ADMIN — INSULIN ASPART 20 UNITS: 100 INJECTION, SOLUTION INTRAVENOUS; SUBCUTANEOUS at 08:12

## 2019-12-11 RX ADMIN — SODIUM CHLORIDE SOLN NEBU 7% 4 ML: 7 NEBU SOLN at 11:16

## 2019-12-11 RX ADMIN — INSULIN ASPART 20 UNITS: 100 INJECTION, SOLUTION INTRAVENOUS; SUBCUTANEOUS at 17:05

## 2019-12-11 RX ADMIN — DICYCLOMINE HYDROCHLORIDE 20 MG: 20 TABLET ORAL at 11:51

## 2019-12-11 RX ADMIN — HYDROCODONE BITARTRATE AND ACETAMINOPHEN 1 TABLET: 10; 325 TABLET ORAL at 11:51

## 2019-12-11 RX ADMIN — THEOPHYLLINE 200 MG: 400 TABLET, EXTENDED RELEASE ORAL at 11:01

## 2019-12-11 RX ADMIN — Medication 300 ML: at 11:01

## 2019-12-11 RX ADMIN — IPRATROPIUM BROMIDE AND ALBUTEROL SULFATE 3 ML: .5; 3 SOLUTION RESPIRATORY (INHALATION) at 07:03

## 2019-12-11 RX ADMIN — ENOXAPARIN SODIUM 40 MG: 40 INJECTION SUBCUTANEOUS at 16:40

## 2019-12-11 RX ADMIN — ARFORMOTEROL TARTRATE 15 MCG: 15 SOLUTION RESPIRATORY (INHALATION) at 07:19

## 2019-12-11 RX ADMIN — SUCRALFATE 1 G: 1 TABLET ORAL at 18:39

## 2019-12-11 RX ADMIN — IPRATROPIUM BROMIDE AND ALBUTEROL SULFATE 3 ML: .5; 3 SOLUTION RESPIRATORY (INHALATION) at 11:16

## 2019-12-11 NOTE — PLAN OF CARE
Problem: Patient Care Overview  Goal: Plan of Care Review  Outcome: Ongoing (interventions implemented as appropriate)  Flowsheets (Taken 12/11/2019 0620)  Progress: declining  Plan of Care Reviewed With: patient  Outcome Summary: pt c/o increased pain overnight. requiring a 1x dose of IV morphine. Pt became increasingly SOA this am c/o nausea and belching. Pt now requiring Heated high-flow @ flow of 45 & 60%. MD notfied. Flat and Upright ordered, zofran initiated. Continue to monitor.

## 2019-12-11 NOTE — PLAN OF CARE
Problem: Patient Care Overview  Goal: Individualization and Mutuality  Outcome: Ongoing (interventions implemented as appropriate)     Problem: Patient Care Overview  Goal: Plan of Care Review  Flowsheets (Taken 12/11/2019 0356)  Progress: improving  Plan of Care Reviewed With: patient  Outcome Summary: still on HFNC flow 35, 50%. Started CPT tonight.     Problem: Chronic Obstructive Pulmonary Disease (Adult)  Intervention: Optimize Oxygenation/Ventilation/Perfusion  Flowsheets (Taken 12/11/2019 0356)  Head of Bed (HOB): HOB elevated  Airway/Ventilation Management: airway patency maintained; humidification applied; pulmonary hygiene promoted  Breathing Techniques/Airway Clearance: deep/controlled cough encouraged

## 2019-12-11 NOTE — PROGRESS NOTES
"                                              LOS: 1 day   Patient Care Team:  Ney Kathleen MD as PCP - General (Family Medicine)    Chief Complaint:  F/up respiratory failure, COPD exacerbation and medical problems listed below    Subjective   Interval History  Remains on high flow oxygen, currently on FiO2 50% which is higher than yesterday and flow rate of 35 L/min.  SPO2 is around 90-92% with that.  Cough is mostly nonproductive and he continues to have shortness of breath.    REVIEW OF SYSTEMS:   CARDIOVASCULAR: No chest pain, chest pressure or chest discomfort. No palpitations or edema.   RESPIRATORY: See above  GASTROINTESTINAL: No anorexia, nausea, vomiting or diarrhea. No abdominal pain or blood.   Neurology: Anxiety but no depression    Ventilator/Non-Invasive Ventilation Settings (From admission, onward)     Start     Ordered    12/09/19 1355  NIPPV (CPAP or BIPAP)  As Needed-RT     Question Answer Comment   Type: AutoBIPAP    NIPPV Mask Interface: Per Patient Preference    Max IPAP 12    Min EPAP 5        12/09/19 1355    12/09/19 1209  NIPPV (CPAP or BIPAP)  Until Discontinued,   Status:  Canceled     Question Answer Comment   Type: AutoBIPAP    NIPPV Mask Interface: Per Patient Preference    Max IPAP 12    Min EPAP 5        12/09/19 1209                      Physical Exam:     Vital Signs  Temp:  [98 °F (36.7 °C)-98.1 °F (36.7 °C)] 98 °F (36.7 °C)  Heart Rate:  [] 90  Resp:  [20-26] 24  BP: (119-157)/(68-94) 122/74    Intake/Output Summary (Last 24 hours) at 12/10/2019 2021  Last data filed at 12/10/2019 1758  Gross per 24 hour   Intake 1050 ml   Output 1425 ml   Net -375 ml     Flowsheet Rows      First Filed Value   Admission Height  187.9 cm (73.98\") Documented at 12/07/2019 1126   Admission Weight  130 kg (286 lb) Documented at 12/07/2019 1126          General Appearance:    Alert, cooperative, in no acute distress   ENMT:  Mallampati score 3, dry mucous membrane   Eyes: Pupils equals and " reactive to light. EOMI   Neck:   Large. Trachea midline. No thyromegaly.   Lungs:    Diffuse bilateral expiratory wheezing more prominent when he takes a deep breath.  No crackles but coarse breath sounds.  Nonlabored breathing.    Heart:    Regular rhythm and normal rate, normal S1 and S2, no            murmur   Skin:    No rash.   Abdomen:     Obese. Soft. No tenderness. No HSM.   Neuro:   Conscious, alert, oriented x3. Strength 5/5 in upper and lower ext   Extremities:   Moves all extremities well, no edema, no cyanosis, no             Redness          Results Review:        Results from last 7 days   Lab Units 12/09/19  0630 12/08/19  1235 12/08/19  1055   SODIUM mmol/L 132* 133* 135*   POTASSIUM mmol/L 4.6 4.5 4.5   CHLORIDE mmol/L 96* 93* 97*   CO2 mmol/L 21.6* 22.6 23.4   BUN mg/dL 15 19 18   CREATININE mg/dL 0.67* 1.09 0.92   GLUCOSE mg/dL 256* 333* 353*   CALCIUM mg/dL 8.8 9.7 9.9     Results from last 7 days   Lab Units 12/08/19  1103 12/07/19  1130   TROPONIN T ng/mL <0.010 <0.010     Results from last 7 days   Lab Units 12/09/19  0630 12/08/19  1235 12/08/19  1055   WBC 10*3/mm3 8.20 13.75* 11.21*   HEMOGLOBIN g/dL 13.9 14.9 14.4   HEMATOCRIT % 42.7 47.0 44.3   PLATELETS 10*3/mm3 168 186 187     Results from last 7 days   Lab Units 12/08/19  1151   INR  0.95   APTT seconds 25.3     Results from last 7 days   Lab Units 12/08/19  1103   PROBNP pg/mL 129.2       I reviewed the patient's new clinical results.  I personally viewed and interpreted the patient's CXR        Medication Review:     arformoterol 15 mcg Nebulization BID - RT   atorvastatin 10 mg Oral Nightly   budesonide 0.5 mg Nebulization BID - RT   dicyclomine 20 mg Oral 4x Daily   doxycycline 100 mg Intravenous Q12H   enoxaparin 40 mg Subcutaneous Q24H   HYDROcodone-acetaminophen 1 tablet Oral 4x Daily   insulin aspart 0-24 Units Subcutaneous 4x Daily AC & at Bedtime   insulin aspart 20 Units Subcutaneous TID With Meals   insulin detemir 15  Units Subcutaneous Nightly   [START ON 12/11/2019] insulin detemir 60 Units Subcutaneous QAM   ipratropium-albuterol 3 mL Nebulization Q4H - RT   methylPREDNISolone sodium succinate 60 mg Intravenous Q6H   nicotine 1 patch Transdermal Q24H   pioglitazone 45 mg Oral Daily   sodium chloride 10 mL Intravenous Q12H   terazosin 1 mg Oral Nightly            Diagnostic imaging:  I personally and independently reviewed the following images:  CXR 12/9/2019: Low lung volumes.  Increased pulmonary vascular markings.  CT chest 12/8/2019: Upper lobe predominant emphysema.  Peribronchial cuffing in the lower lobes.     Assessment   1. Acute exacerbation of COPD  2. Acute on chronic hypoxic respiratory failure, on oxygen 3.5 L/min at baseline  3. Acute bronchitis  4. Tobacco abuse    Pertinent medical problems:  · Diabetes mellitus type 2 with worsening hyperglycemia due to steroid hyponatremia        Plan   · Start Hypertonic saline Q 6 hours and CPT in attempt to improve mucous clearance and probable atelectasis   · Continue DuoNeb every 4, Brovana and Pulmicort  · Oxygen by OptiFlow.    · Solu-Medrol IV every 6 hours.  · Continue doxycycline for COPD exacerbation  · Flutter valve to advance mucus clearance  · Insulin per primary for hyperglycemia          Salinas James MD  12/10/19  8:21 PM          This note was dictated utilizing Dragon dictation

## 2019-12-11 NOTE — PROGRESS NOTES
"                                              LOS: 2 days   Patient Care Team:  Ney Kathleen MD as PCP - General (Family Medicine)    Chief Complaint:  F/up respiratory failure, COPD exacerbation and medical problems listed below    Subjective   Interval History  Seems to be worse today.  Oxygen requirement increased.  Currently on FiO2 60% and 50 L/min.  However, he clinically seems to be the same.  He continues to have cough which is predominantly dry.  He has shortness of breath on minimal activities.    REVIEW OF SYSTEMS:   CARDIOVASCULAR: No chest pain, chest pressure or chest discomfort. No palpitations or edema.   RESPIRATORY: See above  GASTROINTESTINAL: No anorexia, nausea, vomiting or diarrhea. No abdominal pain but reported abdominal distention and lack of bowel movement for >3 days  Neurology: Anxiety but no depression                 Physical Exam:     Vital Signs  Temp:  [98 °F (36.7 °C)-99.2 °F (37.3 °C)] 98.8 °F (37.1 °C)  Heart Rate:  [] 102  Resp:  [20-36] 34  BP: (122-160)/() 160/92    Intake/Output Summary (Last 24 hours) at 12/11/2019 0913  Last data filed at 12/11/2019 0515  Gross per 24 hour   Intake 1840 ml   Output 1675 ml   Net 165 ml     Flowsheet Rows      First Filed Value   Admission Height  187.9 cm (73.98\") Documented at 12/07/2019 1126   Admission Weight  130 kg (286 lb) Documented at 12/07/2019 1126          General Appearance:    Alert, cooperative, in no acute distress   ENMT:  Mallampati score 3, dry mucous membrane   Eyes: Pupils equals and reactive to light. EOMI   Neck:   Large. Trachea midline. No thyromegaly.   Lungs:    Diffuse bilateral expiratory wheezing, improved.  Improved air entry compared to yesterday's exam.  No crackles.    Heart:    Regular rhythm and normal rate, normal S1 and S2, no            murmur   Skin:    No rash.   Abdomen:     Obese. Soft. No tenderness. No HSM.   Neuro:   Conscious, alert, oriented x3. Strength 5/5 in upper and lower " ext   Extremities:   Moves all extremities well, no edema, no cyanosis, no             Redness          Results Review:        Results from last 7 days   Lab Units 12/11/19  0435 12/09/19  0630 12/08/19  1235   SODIUM mmol/L 135* 132* 133*   POTASSIUM mmol/L 4.3 4.6 4.5   CHLORIDE mmol/L 94* 96* 93*   CO2 mmol/L 27.9 21.6* 22.6   BUN mg/dL 17 15 19   CREATININE mg/dL 0.65* 0.67* 1.09   GLUCOSE mg/dL 136* 256* 333*   CALCIUM mg/dL 9.0 8.8 9.7     Results from last 7 days   Lab Units 12/08/19  1103 12/07/19  1130   TROPONIN T ng/mL <0.010 <0.010     Results from last 7 days   Lab Units 12/11/19  0435 12/09/19  0630 12/08/19  1235   WBC 10*3/mm3 8.56 8.20 13.75*   HEMOGLOBIN g/dL 14.3 13.9 14.9   HEMATOCRIT % 43.6 42.7 47.0   PLATELETS 10*3/mm3 185 168 186     Results from last 7 days   Lab Units 12/08/19  1151   INR  0.95   APTT seconds 25.3     Results from last 7 days   Lab Units 12/08/19  1103   PROBNP pg/mL 129.2       I reviewed the patient's new clinical results.  I personally viewed and interpreted the patient's CXR        Medication Review:     0.9% sodium chloride for irrigation-mineral oil-glycerin 300 mL Rectal Once   arformoterol 15 mcg Nebulization BID - RT   atorvastatin 10 mg Oral Nightly   budesonide 0.5 mg Nebulization BID - RT   dicyclomine 20 mg Oral 4x Daily   docusate sodium 100 mg Oral BID   doxycycline 100 mg Intravenous Q12H   enoxaparin 40 mg Subcutaneous Q24H   HYDROcodone-acetaminophen 1 tablet Oral 4x Daily   insulin aspart 0-24 Units Subcutaneous 4x Daily AC & at Bedtime   insulin aspart 20 Units Subcutaneous TID With Meals   insulin detemir 15 Units Subcutaneous Nightly   insulin detemir 60 Units Subcutaneous QAM   ipratropium-albuterol 3 mL Nebulization Q4H - RT   methylPREDNISolone sodium succinate 60 mg Intravenous Q6H   nicotine 1 patch Transdermal Q24H   pioglitazone 45 mg Oral Daily   polyethylene glycol 17 g Oral Daily   sodium chloride 10 mL Intravenous Q12H   sodium chloride 4 mL  Nebulization 4x Daily   terazosin 1 mg Oral Nightly            Diagnostic imaging:  I personally and independently reviewed the following images:  CXR 12/9/2019: Low lung volumes.  Increased pulmonary vascular markings.  CT chest 12/8/2019: Upper lobe predominant emphysema.  Peribronchial cuffing in the lower lobes.     KUB 12/11/2019:  Infiltrates/fluid/consolidation in the left lower lobe.  Gastric distention.    Assessment   1. Acute exacerbation of COPD  2. Acute on chronic hypoxic respiratory failure, on oxygen 3.5 L/min at baseline  3. Acute bronchitis  4. Tobacco abuse  5. Ileus, probable, NEW  6. Left lower lobe consolidation on KUB    Pertinent medical problems:  · Diabetes mellitus type 2 with worsening hyperglycemia due to steroid hyponatremia        Plan   · Oxygen by HF NC, at 50 L/min.  I increase the FiO2 to 65%.  · Continue hypertonic saline Q 6 hours and CPT in attempt to improve mucous clearance and probable atelectasis   · Continue DuoNeb every 4, Brovana and Pulmicort  · Solu-Medrol IV every 6 hours.  · Start theophylline due to persistent wheezing and respiratory failure  · Continue doxycycline for COPD exacerbation  · Flutter valve to advance mucus clearance  · Insulin per primary for hyperglycemia  · Enema/laxative for ileus.  There is concerned that the high flow oxygen is causing ileus.  It can certainly cause distention of the stomach by aerophagia but doubt it is the reason for ileus.  In addition, he is requiring a lot of function and cannot really be transitioned to simple cannula.  · Check CXR in a.m.    Discussed with patient and NARENDRA Blanco MD  12/11/19  9:13 AM          This note was dictated utilizing Dragon dictation

## 2019-12-11 NOTE — THERAPY TREATMENT NOTE
Acute Care - Speech Language Pathology   Swallow [place] [facility] [place]     Patient Name: Kt Armstrong  : 1950  MRN: [id number]  Today's Date: 2019               Admit Date: 2019    Visit Dx:      ICD-10-CM ICD-9-CM   1. COPD exacerbation (CMS/HCC) J44.1 491.21   2. Hyperglycemia R73.9 790.29   3. Hypoxia R09.02 799.02     Patient Active Problem List   Diagnosis   • Complete tear of right rotator cuff   • Biceps tendonitis   • Suprascapular nerve injury   • Chronic pain   • Pain in right shoulder   • COPD with acute exacerbation (CMS/HCC)   • DM2 (diabetes mellitus, type 2) (CMS/HCC)   • HLD (hyperlipidemia)   • Obesity (BMI 30-39.9)   • Tobacco abuse   • COPD exacerbation (CMS/HCC)       Therapy Treatment  Rehabilitation Treatment Summary     Row Name 19 1245             Treatment Time/Intention    Discipline  speech language pathologist  -AD      Document Type  therapy note (daily note)  -AD      Subjective Information  complains of;fatigue  -AD      Mode of Treatment  individual therapy;speech-language pathology  -AD      Patient/Family Observations  Pt seen upright on side of bed during lunch.  -AD      Care Plan Review  evaluation/treatment results reviewed;care plan/treatment goals reviewed;risks/benefits reviewed;current/potential barriers reviewed;patient/other agree to care plan  -AD      Total Evaluation Minutes, SLP  -- 10 tx min  -AD      Therapy Frequency (Swallow)  1 day per week  -AD      Patient Effort  good  -AD      Existing Precautions/Restrictions  -- aspiration precautions  -AD      Recorded by [AD] Carlotta Marie MS CCC-SLP 19 2391      Row Name 19 1248             Clinical Impression    SLP Swallowing Diagnosis  functional oral phase;functional pharyngeal phase  -AD      Functional Impact  other risk of choking on hard solid foods due to no teeth  -AD      Rehab Potential/Prognosis, Swallowing  good, to achieve stated therapy goals  -AD       Swallow Criteria for Skilled Therapeutic Interventions Met  demonstrates skilled criteria  -AD      Recorded by [AD] Carlotta Marie MS CCC-SLP 12/11/19 1723      Row Name 12/11/19 1245             Recommendations    Predicted Duration Therapy Intervention (Days)  2 days  -AD      SLP Diet Recommendation  soft textures;ground;thin liquids  -AD      Recommended Diagnostics  reassess via clinical swallow evaluation;other (see comments)  -AD      Recommended Precautions and Strategies  upright posture during/after eating;small bites of food and sips of liquid  -AD      SLP Rec. for Method of Medication Administration  meds whole;with thin liquids;as tolerated  -AD      Recorded by [AD] Carlotta Marie MS CCC-SLP 12/11/19 1723      Row Name 12/11/19 1245             Positioning and Restraints    Pre-Treatment Position  in bed  -AD      Post Treatment Position  bed  -AD      In Bed  sitting EOB  -AD      Recorded by [AD] Carlotta Marie MS CCC-SLP 12/11/19 1723      Row Name 12/11/19 1245             Pain Assessment    Additional Documentation  -- no pain reported  -AD      Recorded by [AD] Carlotta Marie MS CCC-SLP 12/11/19 1723      Row Name 12/11/19 1245             Outcome Summary/Treatment Plan (SLP)    Daily Summary of Progress (SLP)  progress toward functional goals is good  -AD      Barriers to Overall Progress (SLP)  none  -AD      Plan for Continued Treatment (SLP)  Will continue to follow up for one more day to see if diet can be adjusted to aid with chewing and decrease fatigue with eating.   -AD      Anticipated Dischage Disposition  home  -AD      Patient/Family Concerns, Anticipated Discharge Disposition (SLP)  No concerns reported per patient.  -AD      Recorded by [AD] Carlotta Marie MS CCC-SLP 12/11/19 172        User Key  (r) = Recorded By, (t) = Taken By, (c) = Cosigned By    Initials Name Effective Dates Discipline    AD Carlotta Marie MS CCC-SLP 02/28/19 -  SLP           Outcome Summary  Outcome Summary/Treatment Plan (SLP)  Daily Summary of Progress (SLP): progress toward functional goals is good (12/11/19 1245 : Carlotta Marie, MS CCC-SLP)  Barriers to Overall Progress (SLP): none (12/11/19 1245 : Carlotta Marie, MS CCC-SLP)  Plan for Continued Treatment (SLP): Will continue to follow up for one more day to see if diet can be adjusted to aid with chewing and decrease fatigue with eating.  (12/11/19 1245 : Carlotta Marie, MS CHAUDHRY-SLP)  Anticipated Dischage Disposition: home (12/11/19 1245 : Carlotta Marie, MS CCC-SLP)  Patient/Family Concerns, Anticipated Discharge Disposition (SLP): No concerns reported per patient. (12/11/19 1245 : Carlotta Marie, MS CCC-SLP)      SLP GOALS     Row Name 12/11/19 1245 12/10/19 1800          Oral Nutrition/Hydration Goal 1 (SLP)    Oral Nutrition/Hydration Goal 1, SLP  Pt will tolerate a soft, ground diet with thins w/o clinical s/s of aspiration or complications such as aspiration pneumonia via meal assessment.  -AD  Pt will tolerate a soft, ground diet with thins w/o clinical s/s of aspiration or complications such as aspiration pneumonia via meal assessment.  -AD     Time Frame (Oral Nutrition/Hydration Goal 1, SLP)  short term goal (STG);1 day  -AD  short term goal (STG);1 day  -AD     Barriers (Oral Nutrition/Hydration Goal 1, SLP)  edentulous status  -AD  edentulous status  -AD     Progress/Outcomes (Oral Nutrition/Hydration Goal 1, SLP)  continuing progress toward goal;goal ongoing contw/some difficulty w/chewing solids. Will work w/diet  -AD  other (see comments) new  -AD       User Key  (r) = Recorded By, (t) = Taken By, (c) = Cosigned By    Initials Name Provider Type    AD Carlotta Marie, MS CCC-SLP Speech and Language Pathologist          EDUCATION  The patient has been educated in the following areas:   Dysphagia (Swallowing Impairment) Modified Diet Instruction.    SLP Recommendation and Plan  Daily  Summary of Progress (SLP): progress toward functional goals is good  Barriers to Overall Progress (SLP): none  Plan for Continued Treatment (SLP): Will continue to follow up for one more day to see if diet can be adjusted to aid with chewing and decrease fatigue with eating.   Anticipated Dischage Disposition: home  Patient/Family Concerns, Anticipated Discharge Disposition (SLP): No concerns reported per patient.           Time Calculation:   Time Calculation- SLP     Row Name 12/11/19 1723             Time Calculation- SLP    SLP Start Time  1235  -AD      SLP Stop Time  1245  -AD      SLP Time Calculation (min)  10 min  -AD      Total Timed Code Minutes- SLP  0 minute(s)  -AD      SLP Non-Billable Time (min)  0 min  -AD      TCU Minutes- SLP  0 min  -AD      SLP Received On  12/11/19  -AD        User Key  (r) = Recorded By, (t) = Taken By, (c) = Cosigned By    Initials Name Provider Type    AD Carlotta Marie, MS CCC-SLP Speech and Language Pathologist          Therapy Charges for Today     Code Description Service Date Service Provider Modifiers Qty    [id number] HC ST TREATMENT SWALLOW 1 12/11/2019 Carlotta Marie MS CCC-SLP GN 1          Carlotta Marie MS CCC-SLP  12/11/2019

## 2019-12-11 NOTE — PROGRESS NOTES
"SERVICE: [facility] HOSPITALIST    CONSULTANTS: Pulmonary    CHIEF COMPLAINT: Follow-up AE COPD, acute on chronic respiratory failure    SUBJECTIVE: Staff notes that patient became more short of air overnight and required higher oxygen flow.  On exam this morning patient reports he is feeling poorly, very short of breath and notes that he has not had a bowel movement in multiple days and feels distended, mildly nauseated.  X-ray done overnight.  He denies much sputum production.  He is short winded at rest.  He otherwise denies f/c/chest pain vomiting/diarrhea/abdominal pain or other new concerns.    OBJECTIVE:    /87 (BP Location: Left arm, Patient Position: Lying)   Pulse 110   Temp 98.8 °F (37.1 °C) (Oral)   Resp 22   Ht 182.9 cm (72\")   Wt 123 kg (272 lb)   SpO2 92%   BMI 36.89 kg/m²     MEDS/LABS REVIEWED AND ORDERED    arformoterol 15 mcg Nebulization BID - RT   atorvastatin 10 mg Oral Nightly   budesonide 0.5 mg Nebulization BID - RT   dicyclomine 20 mg Oral 4x Daily   docusate sodium 100 mg Oral BID   doxycycline 100 mg Intravenous Q12H   enoxaparin 40 mg Subcutaneous Q24H   HYDROcodone-acetaminophen 1 tablet Oral 4x Daily   insulin aspart 0-24 Units Subcutaneous 4x Daily AC & at Bedtime   insulin aspart 20 Units Subcutaneous TID With Meals   insulin detemir 15 Units Subcutaneous Nightly   insulin detemir 60 Units Subcutaneous QAM   ipratropium-albuterol 3 mL Nebulization Q4H - RT   methylPREDNISolone sodium succinate 60 mg Intravenous Q6H   nicotine 1 patch Transdermal Q24H   pioglitazone 45 mg Oral Daily   polyethylene glycol 17 g Oral Daily   sodium chloride 10 mL Intravenous Q12H   sodium chloride 4 mL Nebulization 4x Daily   terazosin 1 mg Oral Nightly   theophylline 200 mg Oral Q24H     Physical Exam   Constitutional: He is oriented to person, place, and time. He appears well-nourished.   Obese, appears older than stated age   HENT:   Head: Normocephalic and atraumatic. "   Edentulous   Eyes: Pupils are equal, round, and reactive to light. EOM are normal.   Cardiovascular:   Tachycardic, regular   Pulmonary/Chest:   Dyspneic at rest, improved air movement all fields, diminished throughout, diminished bilateral bases   Abdominal:   Obese, bowel sounds present, nontender, no guarding, difficult to determine distention with obesity   Musculoskeletal: He exhibits no edema.   Neurological: He is alert and oriented to person, place, and time.   Skin: Skin is warm and dry. No erythema.   Psychiatric: He has a normal mood and affect. His behavior is normal.   Vitals reviewed.    LAB/DIAGNOSTICS:    Lab Results (last 24 hours)     Procedure Component Value Units Date/Time    Respiratory Culture - Sputum, Cough [[id number]] Collected:  12/09/19 1742    Specimen:  Sputum from Cough Updated:  12/11/19 0813     Respiratory Culture Moderate growth (3+) Normal Respiratory Veronica     Gram Stain Few (2+) WBCs seen      Rare (1+) Epithelial cells seen      Rare (1+) Gram positive cocci in clusters      Rare (1+) Gram positive bacilli      Few (2+) Yeast    Blood Gas, Arterial [[id number]]  (Abnormal) Collected:  12/11/19 0715    Specimen:  Arterial Blood Updated:  12/11/19 0710     Site Left Radial     Rich's Test Positive     pH, Arterial 7.411 pH units      pCO2, Arterial 46.9 mm Hg      Comment: 83 Value above reference range        pO2, Arterial 59.1 mm Hg      Comment: 84 Value below reference range        HCO3, Arterial 29.8 mmol/L      Comment: 83 Value above reference range        Base Excess, Arterial 4.2 mmol/L      Comment: 83 Value above reference range        O2 Saturation, Arterial 89.9 %      Comment: 84 Value below reference range        Hemoglobin, Blood Gas 15.1 g/dL      Temperature 37.0 C      Barometric Pressure for Blood Gas 749 mmHg      Modality Heated HFNC     FIO2 60 %      Flow Rate 40.0 lpm      Ventilator Mode NA     Comment: Meter: [id number]     :  [id number]         pCO2, Temperature Corrected 46.9 mm Hg      pH, Temp Corrected 7.411 pH Units      pO2, Temperature Corrected 59.1 mm Hg     Basic Metabolic Panel [[id number]]  (Abnormal) Collected:  12/11/19 0435    Specimen:  Blood Updated:  12/11/19 0706     Glucose 136 mg/dL      BUN 17 mg/dL      Creatinine 0.65 mg/dL      Sodium 135 mmol/L      Potassium 4.3 mmol/L      Chloride 94 mmol/L      CO2 27.9 mmol/L      Calcium 9.0 mg/dL      eGFR Non African Amer 123 mL/min/1.73      BUN/Creatinine Ratio 26.2     Anion Gap 13.1 mmol/L     Narrative:       GFR Normal >60  Chronic Kidney Disease <60  Kidney Failure <15      CBC & Differential [[id number]] Collected:  12/11/19 0435    Specimen:  Blood Updated:  12/11/19 0653    Narrative:       The following orders were created for panel order CBC & Differential.  Procedure                               Abnormality         Status                     ---------                               -----------         ------                     CBC Auto Differential[[id number]]        Abnormal            Final result                 Please view results for these tests on the individual orders.    CBC Auto Differential [[id number]]  (Abnormal) Collected:  12/11/19 0435    Specimen:  Blood Updated:  12/11/19 0653     WBC 8.56 10*3/mm3      RBC 4.49 10*6/mm3      Hemoglobin 14.3 g/dL      Hematocrit 43.6 %      MCV 97.1 fL      MCH 31.8 pg      MCHC 32.8 g/dL      RDW 12.9 %      RDW-SD 46.6 fl      MPV 12.3 fL      Platelets 185 10*3/mm3      Neutrophil % 88.0 %      Lymphocyte % 4.6 %      Monocyte % 6.5 %      Eosinophil % 0.0 %      Basophil % 0.1 %      Immature Grans % 0.8 %      Neutrophils, Absolute 7.53 10*3/mm3      Lymphocytes, Absolute 0.39 10*3/mm3      Monocytes, Absolute 0.56 10*3/mm3      Eosinophils, Absolute 0.00 10*3/mm3      Basophils, Absolute 0.01 10*3/mm3      Immature Grans, Absolute 0.07 10*3/mm3      nRBC 0.0 /100 WBC     POC Glucose Once [[id number]]  (Abnormal)  Collected:  12/11/19 0544    Specimen:  Blood Updated:  12/11/19 0550     Glucose 153 mg/dL     POC Glucose Once [[id number]]  (Abnormal) Collected:  12/10/19 2024    Specimen:  Blood Updated:  12/10/19 2048     Glucose 212 mg/dL     POC Glucose Once [[id number]]  (Abnormal) Collected:  12/10/19 1746    Specimen:  Blood Updated:  12/10/19 1754     Glucose 189 mg/dL     Beta-Hydroxybutyrate [[id number]] Collected:  12/08/19 1055    Specimen:  Blood Updated:  12/10/19 1610     Beta-Hydroxybutyrate Acid 2.1 mg/dL      Comment: Reference Range:  All Ages (fasting): 0.2 - 2.8       Narrative:       Performed at:  [facility]  [address] [place] [address]  [id number]  : Bonilla Gutierrez MD, Phone:  [phone]    Blood Culture - Blood, Arm, Right [[id number]] Collected:  12/08/19 1225    Specimen:  Blood from Arm, Right Updated:  12/10/19 1246     Blood Culture No growth at 2 days    Blood Culture - Blood, Arm, Right [[id number]] Collected:  12/08/19 1235    Specimen:  Blood from Arm, Right Updated:  12/10/19 1246     Blood Culture No growth at 2 days    Blood Culture - Blood, Arm, Right [[id number]] Collected:  12/07/19 1159    Specimen:  Blood from Arm, Right Updated:  12/10/19 1215     Blood Culture No growth at 3 days    Blood Culture - Blood, Arm, Left [[id number]] Collected:  12/07/19 1130    Specimen:  Blood from Arm, Left Updated:  12/10/19 1215     Blood Culture No growth at 3 days    POC Glucose Once [[id number]]  (Abnormal) Collected:  12/10/19 1142    Specimen:  Blood Updated:  12/10/19 1202     Glucose 213 mg/dL         ECG 12 Lead   Final Result   HEART RATE= 97  bpm   RR Interval= 616  ms   CT Interval= 155  ms   P Horizontal Axis= 10  deg   P Front Axis= 55  deg   QRSD Interval= 96  ms   QT Interval= 332  ms   QRS Axis= 36  deg   T Wave Axis= 48  deg   - NORMAL ECG -   Sinus rhythm   NO SIGNIFICANT CHANGE FROM PREVIOUS ECG   Electronically Signed By: Demetri Zhang ([facility]) 10-Dec-2019  12:18:19   Date and Time of Study: 2019-12-09 14:11:27      ECG 12 Lead   Final Result   HEART RATE= 111  bpm   RR Interval= 540  ms   NM Interval= 165  ms   P Horizontal Axis= 4  deg   P Front Axis= 57  deg   QRSD Interval= 103  ms   QT Interval= 334  ms   QRS Axis= 59  deg   T Wave Axis= 35  deg   - OTHERWISE NORMAL ECG -   Poor quality tracing repeat   Electronically Signed By: Lyle Middleton ([facility]) 08-Dec-2019 17:47:26   Date and Time of Study: 2019-12-08 08:23:27      ECG 12 Lead   Final Result   HEART RATE= 131  bpm   RR Interval= 456  ms   NM Interval= 127  ms   P Horizontal Axis= -5  deg   P Front Axis= 49  deg   QRSD Interval= 95  ms   QT Interval= 303  ms   QRS Axis= 135  deg   T Wave Axis= 21  deg   - OTHERWISE NORMAL ECG -   Poor quality tracing repeat   Electronically Signed By: Lyle Middleton ([facility]) 07-Dec-2019 18:45:33   Date and Time of Study: 2019-12-07 11:26:15        Results for orders placed during the hospital encounter of 12/07/19   Adult Transthoracic Echo Complete W/ Cont if Necessary Per Protocol    Narrative · Left ventricular systolic function is normal. Estimated EF appears to be   in the range of 56 - 60%. Normal left ventricular cavity size, wall   thickness and mass noted. Normal diastolic function.  · No significant valvular stenosis or regurgitation noted.        Xr Abdomen Flat & Upright    Result Date: 12/11/2019  Moderate gastric distention possibly the sequela of an ileus. Moderate bibasilar atelectasis. Signer Name: KEATON Martin MD  Signed: 12/11/2019 6:48 AM  Workstation Name: [facility]  [facility]    Xr Chest 1 View    Result Date: 12/9/2019   1. Lung volumes are lower. New bibasilar atelectasis or infiltrates right greater the left.  This report was finalized on 12/9/2019 1:03 PM by Dr. Jaden Danielle MD.      ASSESSMENT/PLAN:  AHHRF 2/2 AE COPD:   Lactic acidosis 2/2 hypoxia:   Steroid-induced leukocytosis:   Acute bronchitis:  "Pulmonary following  ABG reviewed, current heated high flow settings reviewed and discussed with pulmonary  Continue present care to keep sats 88%  Continues Brovana, Pulmicort, duo nebs, Solu-Medrol, doxycycline with theophylline added today per pulmonary  Echo reviewed, was essentially normal  SLP determined no swallow dysfunction, continue soft CCC diet  Sputum culture showed normal karely final  Feel he has narcotic bowel, likely contributing to increased shortness of air, see below     Uncontrolled DM2 with hyperglycemia in obese:   Steroid-induced hyperglycemia: A1c 9.1%  Body mass index is 36.89 kg/m²., TSH normal  A.m. glucose at goal and daytime glucose much improved on levemir 15 units nightly, 60 units am  Range 136-212 today  Continue NovoLog 20 units 3 times daily with meals  Continue Accu-Cheks  AC/at bedtime  Adjust as needed    Narcotic and immobility induced constipation/ileus 2/2 chronic narcotic dependence 2/2 chronic pain and OA:   Give smog enema now  Add Colace twice daily, MiraLAX daily  Continues home regimen hydrocodone 4 times daily  Monitor closely     Hyperlipidemia: No acute issues on Lipitor 10 mg nightly     Electrolyte imbalance: Continue electrolyte replacement protocol     BPH: No acute issues, continue home terazosin 1 mg nightly     IBS: No acute issues on Bentyl 20 mg 4 times daily     Tobacco abuse:  Continue nicotine patch    PLAN FOR DISPOSITION: NARENDRA Harris  Hospitalist, [facility]  12/11/19  7:30 AM    \"Dictated utilizing Dragon dictation\"    "

## 2019-12-12 ENCOUNTER — APPOINTMENT (OUTPATIENT)
Dept: GENERAL RADIOLOGY | Facility: HOSPITAL | Age: 69
End: 2019-12-12

## 2019-12-12 LAB
ANION GAP SERPL CALCULATED.3IONS-SCNC: 9.4 MMOL/L (ref 5–15)
BACTERIA SPEC AEROBE CULT: NORMAL
BACTERIA SPEC AEROBE CULT: NORMAL
BASOPHILS # BLD AUTO: 0.02 10*3/MM3 (ref 0–0.2)
BASOPHILS NFR BLD AUTO: 0.2 % (ref 0–1.5)
BUN BLD-MCNC: 14 MG/DL (ref 8–23)
BUN/CREAT SERPL: 21.9 (ref 7–25)
CALCIUM SPEC-SCNC: 8.6 MG/DL (ref 8.6–10.5)
CHLORIDE SERPL-SCNC: 92 MMOL/L (ref 98–107)
CO2 SERPL-SCNC: 29.6 MMOL/L (ref 22–29)
CREAT BLD-MCNC: 0.64 MG/DL (ref 0.76–1.27)
DEPRECATED RDW RBC AUTO: 46.1 FL (ref 37–54)
EOSINOPHIL # BLD AUTO: 0 10*3/MM3 (ref 0–0.4)
EOSINOPHIL NFR BLD AUTO: 0 % (ref 0.3–6.2)
ERYTHROCYTE [DISTWIDTH] IN BLOOD BY AUTOMATED COUNT: 12.8 % (ref 12.3–15.4)
GFR SERPL CREATININE-BSD FRML MDRD: 125 ML/MIN/1.73
GLUCOSE BLD-MCNC: 197 MG/DL (ref 65–99)
GLUCOSE BLDC GLUCOMTR-MCNC: 115 MG/DL (ref 70–130)
GLUCOSE BLDC GLUCOMTR-MCNC: 149 MG/DL (ref 70–130)
GLUCOSE BLDC GLUCOMTR-MCNC: 202 MG/DL (ref 70–130)
GLUCOSE BLDC GLUCOMTR-MCNC: 253 MG/DL (ref 70–130)
HCT VFR BLD AUTO: 43.3 % (ref 37.5–51)
HGB BLD-MCNC: 14.1 G/DL (ref 13–17.7)
IMM GRANULOCYTES # BLD AUTO: 0.07 10*3/MM3 (ref 0–0.05)
IMM GRANULOCYTES NFR BLD AUTO: 0.9 % (ref 0–0.5)
LYMPHOCYTES # BLD AUTO: 0.38 10*3/MM3 (ref 0.7–3.1)
LYMPHOCYTES NFR BLD AUTO: 4.7 % (ref 19.6–45.3)
MCH RBC QN AUTO: 31.5 PG (ref 26.6–33)
MCHC RBC AUTO-ENTMCNC: 32.6 G/DL (ref 31.5–35.7)
MCV RBC AUTO: 96.9 FL (ref 79–97)
MONOCYTES # BLD AUTO: 0.41 10*3/MM3 (ref 0.1–0.9)
MONOCYTES NFR BLD AUTO: 5.1 % (ref 5–12)
NEUTROPHILS # BLD AUTO: 7.19 10*3/MM3 (ref 1.7–7)
NEUTROPHILS NFR BLD AUTO: 89.1 % (ref 42.7–76)
NRBC BLD AUTO-RTO: 0 /100 WBC (ref 0–0.2)
NT-PROBNP SERPL-MCNC: 50.3 PG/ML (ref 5–900)
PLATELET # BLD AUTO: 183 10*3/MM3 (ref 140–450)
PMV BLD AUTO: 11.8 FL (ref 6–12)
POTASSIUM BLD-SCNC: 4.7 MMOL/L (ref 3.5–5.2)
RBC # BLD AUTO: 4.47 10*6/MM3 (ref 4.14–5.8)
SODIUM BLD-SCNC: 131 MMOL/L (ref 136–145)
WBC NRBC COR # BLD: 8.07 10*3/MM3 (ref 3.4–10.8)

## 2019-12-12 PROCEDURE — 25010000002 METHYLPREDNISOLONE PER 125 MG: Performed by: NURSE PRACTITIONER

## 2019-12-12 PROCEDURE — 99232 SBSQ HOSP IP/OBS MODERATE 35: CPT | Performed by: NURSE PRACTITIONER

## 2019-12-12 PROCEDURE — 63710000001 INSULIN ASPART PER 5 UNITS: Performed by: NURSE PRACTITIONER

## 2019-12-12 PROCEDURE — 63710000001 INSULIN DETEMIR PER 5 UNITS: Performed by: NURSE PRACTITIONER

## 2019-12-12 PROCEDURE — 94799 UNLISTED PULMONARY SVC/PX: CPT

## 2019-12-12 PROCEDURE — 80048 BASIC METABOLIC PNL TOTAL CA: CPT | Performed by: NURSE PRACTITIONER

## 2019-12-12 PROCEDURE — 94668 MNPJ CHEST WALL SBSQ: CPT

## 2019-12-12 PROCEDURE — 25010000002 ENOXAPARIN PER 10 MG: Performed by: NURSE PRACTITIONER

## 2019-12-12 PROCEDURE — 25010000002 METHYLPREDNISOLONE PER 40 MG: Performed by: INTERNAL MEDICINE

## 2019-12-12 PROCEDURE — 83880 ASSAY OF NATRIURETIC PEPTIDE: CPT | Performed by: NURSE PRACTITIONER

## 2019-12-12 PROCEDURE — 25010000002 FUROSEMIDE PER 20 MG: Performed by: INTERNAL MEDICINE

## 2019-12-12 PROCEDURE — 82962 GLUCOSE BLOOD TEST: CPT

## 2019-12-12 PROCEDURE — 74022 RADEX COMPL AQT ABD SERIES: CPT

## 2019-12-12 PROCEDURE — 85025 COMPLETE CBC W/AUTO DIFF WBC: CPT | Performed by: NURSE PRACTITIONER

## 2019-12-12 RX ORDER — METHYLPREDNISOLONE SODIUM SUCCINATE 40 MG/ML
40 INJECTION, POWDER, LYOPHILIZED, FOR SOLUTION INTRAMUSCULAR; INTRAVENOUS EVERY 8 HOURS SCHEDULED
Status: DISCONTINUED | OUTPATIENT
Start: 2019-12-12 | End: 2019-12-15

## 2019-12-12 RX ORDER — BISACODYL 10 MG
10 SUPPOSITORY, RECTAL RECTAL DAILY
Status: DISCONTINUED | OUTPATIENT
Start: 2019-12-12 | End: 2019-12-18 | Stop reason: HOSPADM

## 2019-12-12 RX ORDER — FUROSEMIDE 10 MG/ML
20 INJECTION INTRAMUSCULAR; INTRAVENOUS DAILY
Status: DISCONTINUED | OUTPATIENT
Start: 2019-12-12 | End: 2019-12-17

## 2019-12-12 RX ORDER — ACETYLCYSTEINE 200 MG/ML
3 SOLUTION ORAL; RESPIRATORY (INHALATION)
Status: DISCONTINUED | OUTPATIENT
Start: 2019-12-12 | End: 2019-12-13

## 2019-12-12 RX ORDER — SUCRALFATE 1 G/1
1 TABLET ORAL 4 TIMES DAILY PRN
Status: DISCONTINUED | OUTPATIENT
Start: 2019-12-12 | End: 2019-12-18 | Stop reason: HOSPADM

## 2019-12-12 RX ORDER — BISACODYL 5 MG/1
10 TABLET, DELAYED RELEASE ORAL DAILY PRN
Status: DISCONTINUED | OUTPATIENT
Start: 2019-12-12 | End: 2019-12-18 | Stop reason: HOSPADM

## 2019-12-12 RX ORDER — DICYCLOMINE HCL 20 MG
10 TABLET ORAL 4 TIMES DAILY
Status: DISCONTINUED | OUTPATIENT
Start: 2019-12-12 | End: 2019-12-18 | Stop reason: HOSPADM

## 2019-12-12 RX ADMIN — SODIUM CHLORIDE SOLN NEBU 7% 4 ML: 7 NEBU SOLN at 19:26

## 2019-12-12 RX ADMIN — HYDROCODONE BITARTRATE AND ACETAMINOPHEN 1 TABLET: 10; 325 TABLET ORAL at 18:08

## 2019-12-12 RX ADMIN — ACETYLCYSTEINE 3 ML: 200 SOLUTION ORAL; RESPIRATORY (INHALATION) at 22:45

## 2019-12-12 RX ADMIN — BUDESONIDE 0.5 MG: 0.5 SUSPENSION RESPIRATORY (INHALATION) at 19:10

## 2019-12-12 RX ADMIN — BISACODYL 10 MG: 10 SUPPOSITORY RECTAL at 14:04

## 2019-12-12 RX ADMIN — NICOTINE 1 PATCH: 21 PATCH TRANSDERMAL at 08:36

## 2019-12-12 RX ADMIN — SODIUM CHLORIDE SOLN NEBU 7% 4 ML: 7 NEBU SOLN at 10:59

## 2019-12-12 RX ADMIN — INSULIN DETEMIR 60 UNITS: 100 INJECTION, SOLUTION SUBCUTANEOUS at 08:38

## 2019-12-12 RX ADMIN — DOCUSATE SODIUM 100 MG: 100 CAPSULE, LIQUID FILLED ORAL at 20:37

## 2019-12-12 RX ADMIN — Medication 300 ML: at 11:09

## 2019-12-12 RX ADMIN — IPRATROPIUM BROMIDE AND ALBUTEROL SULFATE 3 ML: .5; 3 SOLUTION RESPIRATORY (INHALATION) at 19:10

## 2019-12-12 RX ADMIN — IPRATROPIUM BROMIDE AND ALBUTEROL SULFATE 3 ML: .5; 3 SOLUTION RESPIRATORY (INHALATION) at 07:10

## 2019-12-12 RX ADMIN — SODIUM CHLORIDE SOLN NEBU 7% 4 ML: 7 NEBU SOLN at 07:10

## 2019-12-12 RX ADMIN — METHYLPREDNISOLONE SODIUM SUCCINATE 60 MG: 125 INJECTION, POWDER, FOR SOLUTION INTRAMUSCULAR; INTRAVENOUS at 03:50

## 2019-12-12 RX ADMIN — ACETAMINOPHEN 650 MG: 325 TABLET ORAL at 02:09

## 2019-12-12 RX ADMIN — INSULIN ASPART 20 UNITS: 100 INJECTION, SOLUTION INTRAVENOUS; SUBCUTANEOUS at 12:24

## 2019-12-12 RX ADMIN — METHYLPREDNISOLONE SODIUM SUCCINATE 40 MG: 40 INJECTION, POWDER, FOR SOLUTION INTRAMUSCULAR; INTRAVENOUS at 21:28

## 2019-12-12 RX ADMIN — INSULIN ASPART 12 UNITS: 100 INJECTION, SOLUTION INTRAVENOUS; SUBCUTANEOUS at 08:35

## 2019-12-12 RX ADMIN — SODIUM CHLORIDE, PRESERVATIVE FREE 10 ML: 5 INJECTION INTRAVENOUS at 08:37

## 2019-12-12 RX ADMIN — INSULIN ASPART 20 UNITS: 100 INJECTION, SOLUTION INTRAVENOUS; SUBCUTANEOUS at 18:08

## 2019-12-12 RX ADMIN — HYDROCODONE BITARTRATE AND ACETAMINOPHEN 1 TABLET: 10; 325 TABLET ORAL at 08:23

## 2019-12-12 RX ADMIN — ENOXAPARIN SODIUM 40 MG: 40 INJECTION SUBCUTANEOUS at 18:08

## 2019-12-12 RX ADMIN — ACETAMINOPHEN 650 MG: 325 TABLET ORAL at 18:08

## 2019-12-12 RX ADMIN — HYDROCODONE BITARTRATE AND ACETAMINOPHEN 1 TABLET: 10; 325 TABLET ORAL at 20:37

## 2019-12-12 RX ADMIN — DOCUSATE SODIUM 100 MG: 100 CAPSULE, LIQUID FILLED ORAL at 08:35

## 2019-12-12 RX ADMIN — HYDROCODONE BITARTRATE AND ACETAMINOPHEN 1 TABLET: 10; 325 TABLET ORAL at 12:23

## 2019-12-12 RX ADMIN — IPRATROPIUM BROMIDE AND ALBUTEROL SULFATE 3 ML: .5; 3 SOLUTION RESPIRATORY (INHALATION) at 10:59

## 2019-12-12 RX ADMIN — THEOPHYLLINE 200 MG: 400 TABLET, EXTENDED RELEASE ORAL at 10:16

## 2019-12-12 RX ADMIN — ACETAMINOPHEN 650 MG: 325 TABLET ORAL at 08:36

## 2019-12-12 RX ADMIN — METHYLPREDNISOLONE SODIUM SUCCINATE 60 MG: 125 INJECTION, POWDER, FOR SOLUTION INTRAMUSCULAR; INTRAVENOUS at 10:16

## 2019-12-12 RX ADMIN — ARFORMOTEROL TARTRATE 15 MCG: 15 SOLUTION RESPIRATORY (INHALATION) at 09:20

## 2019-12-12 RX ADMIN — TERAZOSIN HYDROCHLORIDE ANHYDROUS 1 MG: 1 CAPSULE ORAL at 20:37

## 2019-12-12 RX ADMIN — IPRATROPIUM BROMIDE AND ALBUTEROL SULFATE 3 ML: .5; 3 SOLUTION RESPIRATORY (INHALATION) at 15:26

## 2019-12-12 RX ADMIN — ARFORMOTEROL TARTRATE 15 MCG: 15 SOLUTION RESPIRATORY (INHALATION) at 19:22

## 2019-12-12 RX ADMIN — DICYCLOMINE HYDROCHLORIDE 20 MG: 20 TABLET ORAL at 08:35

## 2019-12-12 RX ADMIN — FUROSEMIDE 20 MG: 10 INJECTION, SOLUTION INTRAMUSCULAR; INTRAVENOUS at 18:21

## 2019-12-12 RX ADMIN — BUDESONIDE 0.5 MG: 0.5 SUSPENSION RESPIRATORY (INHALATION) at 09:20

## 2019-12-12 RX ADMIN — IPRATROPIUM BROMIDE AND ALBUTEROL SULFATE 3 ML: .5; 3 SOLUTION RESPIRATORY (INHALATION) at 03:09

## 2019-12-12 RX ADMIN — DICYCLOMINE HYDROCHLORIDE 10 MG: 20 TABLET ORAL at 12:24

## 2019-12-12 RX ADMIN — SUCRALFATE 1 G: 1 TABLET ORAL at 18:21

## 2019-12-12 RX ADMIN — DOXYCYCLINE 100 MG: 100 INJECTION, POWDER, LYOPHILIZED, FOR SOLUTION INTRAVENOUS at 00:47

## 2019-12-12 RX ADMIN — ATORVASTATIN CALCIUM 10 MG: 10 TABLET, FILM COATED ORAL at 20:37

## 2019-12-12 RX ADMIN — SODIUM CHLORIDE, PRESERVATIVE FREE 10 ML: 5 INJECTION INTRAVENOUS at 20:39

## 2019-12-12 RX ADMIN — IPRATROPIUM BROMIDE AND ALBUTEROL SULFATE 3 ML: .5; 3 SOLUTION RESPIRATORY (INHALATION) at 22:45

## 2019-12-12 RX ADMIN — INSULIN DETEMIR 15 UNITS: 100 INJECTION, SOLUTION SUBCUTANEOUS at 21:24

## 2019-12-12 RX ADMIN — INSULIN ASPART 20 UNITS: 100 INJECTION, SOLUTION INTRAVENOUS; SUBCUTANEOUS at 08:36

## 2019-12-12 RX ADMIN — DICYCLOMINE HYDROCHLORIDE 10 MG: 20 TABLET ORAL at 20:37

## 2019-12-12 RX ADMIN — INSULIN ASPART 8 UNITS: 100 INJECTION, SOLUTION INTRAVENOUS; SUBCUTANEOUS at 18:09

## 2019-12-12 RX ADMIN — DICYCLOMINE HYDROCHLORIDE 10 MG: 20 TABLET ORAL at 18:09

## 2019-12-12 RX ADMIN — DOXYCYCLINE 100 MG: 100 INJECTION, POWDER, LYOPHILIZED, FOR SOLUTION INTRAVENOUS at 12:30

## 2019-12-12 RX ADMIN — PIOGLITAZONE 45 MG: 45 TABLET ORAL at 10:16

## 2019-12-12 RX ADMIN — SODIUM CHLORIDE SOLN NEBU 7% 4 ML: 7 NEBU SOLN at 15:26

## 2019-12-12 NOTE — PLAN OF CARE
Problem: Patient Care Overview  Goal: Plan of Care Review  Outcome: Ongoing (interventions implemented as appropriate)  Flowsheets  Taken 12/12/2019 1840  Progress: no change  Outcome Summary: remains on HHFNC (50L 75% FiO2). denies n/v/. VSS. Afebrile. pain and discomfort treated; see MAR. large BM this shift. will continue to monitor.  Taken 12/12/2019 1600  Plan of Care Reviewed With: patient

## 2019-12-12 NOTE — PROGRESS NOTES
"                                              LOS: 3 days   Patient Care Team:  Ney Kathleen MD as PCP - General (Family Medicine)    Chief Complaint:  F/up respiratory failure, COPD exacerbation and medical problems listed below    Subjective   Interval History  He feels better today.  Cough is more productive but his oxygen requirement has increased.  He is currently on 50 L/min and 80% FiO2.  He still receiving nebulizer therapy and intravenous steroids.    REVIEW OF SYSTEMS:   CARDIOVASCULAR: No chest pain, chest pressure or chest discomfort. No palpitations or edema.   RESPIRATORY: See above  GASTROINTESTINAL: Had a smear bowel movement after the enema yesterday.  No abdominal pain but reported bloating.  No nausea or vomiting.  Neurology: No anxiety or depression.                 Physical Exam:     Vital Signs  Temp:  [97.7 °F (36.5 °C)-99.1 °F (37.3 °C)] 97.7 °F (36.5 °C)  Heart Rate:  [] 95  Resp:  [20-30] 28  BP: (111-150)/() 144/119    Intake/Output Summary (Last 24 hours) at 12/12/2019 1701  Last data filed at 12/12/2019 1230  Gross per 24 hour   Intake 790 ml   Output 2075 ml   Net -1285 ml     Flowsheet Rows      First Filed Value   Admission Height  187.9 cm (73.98\") Documented at 12/07/2019 1126   Admission Weight  130 kg (286 lb) Documented at 12/07/2019 1126          General Appearance:    Alert, cooperative, in no acute distress   ENMT:  Mallampati score 3, dry mucous membrane   Eyes: Pupils equals and reactive to light. EOMI   Neck:   Large. Trachea midline. No thyromegaly.   Lungs:    Diminished breath sounds overall.  No wheezing on today's examination but diffuse crackles on the left up to mid lung zone and right basal crackles.  Nonlabored breathing at rest    Heart:    Regular rhythm and normal rate, normal S1 and S2, no            murmur   Skin:    No rash.   Abdomen:     Obese. Soft. No tenderness. No HSM.   Neuro:   Conscious, alert, oriented x3. Strength 5/5 in upper and " lower ext   Extremities:   Moves all extremities well, +2 edema, no cyanosis, no             Redness          Results Review:        Results from last 7 days   Lab Units 12/12/19  0401 12/11/19  0435 12/09/19  0630   SODIUM mmol/L 131* 135* 132*   POTASSIUM mmol/L 4.7 4.3 4.6   CHLORIDE mmol/L 92* 94* 96*   CO2 mmol/L 29.6* 27.9 21.6*   BUN mg/dL 14 17 15   CREATININE mg/dL 0.64* 0.65* 0.67*   GLUCOSE mg/dL 197* 136* 256*   CALCIUM mg/dL 8.6 9.0 8.8     Results from last 7 days   Lab Units 12/08/19  1103 12/07/19  1130   TROPONIN T ng/mL <0.010 <0.010     Results from last 7 days   Lab Units 12/12/19  0401 12/11/19  0435 12/09/19  0630   WBC 10*3/mm3 8.07 8.56 8.20   HEMOGLOBIN g/dL 14.1 14.3 13.9   HEMATOCRIT % 43.3 43.6 42.7   PLATELETS 10*3/mm3 183 185 168     Results from last 7 days   Lab Units 12/08/19  1151   INR  0.95   APTT seconds 25.3     Results from last 7 days   Lab Units 12/12/19  0401   PROBNP pg/mL 50.3       I reviewed the patient's new clinical results.  I personally viewed and interpreted the patient's CXR        Medication Review:     arformoterol 15 mcg Nebulization BID - RT   atorvastatin 10 mg Oral Nightly   bisacodyl 10 mg Rectal Daily   budesonide 0.5 mg Nebulization BID - RT   dicyclomine 10 mg Oral 4x Daily   docusate sodium 100 mg Oral BID   doxycycline 100 mg Intravenous Q12H   enoxaparin 40 mg Subcutaneous Q24H   HYDROcodone-acetaminophen 1 tablet Oral 4x Daily   insulin aspart 0-24 Units Subcutaneous 4x Daily AC & at Bedtime   insulin aspart 20 Units Subcutaneous TID With Meals   insulin detemir 15 Units Subcutaneous Nightly   insulin detemir 60 Units Subcutaneous QAM   ipratropium-albuterol 3 mL Nebulization Q4H - RT   methylPREDNISolone sodium succinate 60 mg Intravenous Q6H   nicotine 1 patch Transdermal Q24H   pioglitazone 45 mg Oral Daily   polyethylene glycol 17 g Oral Daily   sodium chloride 10 mL Intravenous Q12H   sodium chloride 4 mL Nebulization 4x Daily - RT   terazosin 1  mg Oral Nightly   theophylline 200 mg Oral Q24H            Diagnostic imaging:  I personally and independently reviewed the following images:  CXR 12/9/2019: Low lung volumes.  Increased pulmonary vascular markings.  CT chest 12/8/2019: Upper lobe predominant emphysema.  Peribronchial cuffing in the lower lobes.     KUB 12/11/2019:  Infiltrates/fluid/consolidation in the left lower lobe.  Gastric distention.    CXR 12/12/2019: Increased pulmonary vascular markings.  Worsening atelectasis in the right lower lobe and possible left pleural effusion.    Assessment   1. Acute exacerbation of COPD  2. Acute on chronic hypoxic respiratory failure, on oxygen 3.5 L/min at baseline  3. Acute bronchitis  4. Tobacco abuse  5. Ileus, probable  6. Bilateral lower lobe atelectasis  7. Possible pleural effusion    Pertinent medical problems:  · Diabetes mellitus type 2 with worsening hyperglycemia due to steroid hyponatremia        Plan   · Oxygen by HF NC, at 50 L/min and FiO2 80%.  · Continue hypertonic saline Q 6 hours and CPT in attempt to improve mucous clearance and probable atelectasis.  Add Mucomyst due to lack of improvement  · Continue DuoNeb every 4, Brovana and Pulmicort  · Start low-dose diuretics to see if this would improve his breathing status..  Although his echocardiogram showed normal EF and diastolic dysfunction, he may still have some volume overload  · Reduce Solu-Medrol to 40 mg every 8.  Wheezing has improved.  · Continue theophylline day 2 and will check after 3 days  · Finished antibiotic with doxycycline.  Doubt pneumonia.  Never had fever and no leukocytosis and had several normal procalcitonin.  · Flutter valve to advance mucus clearance  · Insulin per primary for hyperglycemia      Discussed with patient and NARENDRA Blanco MD  12/12/19  5:01 PM          This note was dictated utilizing Dragon dictation

## 2019-12-12 NOTE — PLAN OF CARE
Chart reviewed due to LOS and  list. Pt limited po intake reported due to resp issue & GI issues. May consider oral supplement for time when pt not able to take much po. Will cont to follow.

## 2019-12-12 NOTE — PLAN OF CARE
Problem: Patient Care Overview  Goal: Plan of Care Review  Outcome: Ongoing (interventions implemented as appropriate)  Flowsheets (Taken 12/12/2019 0404)  Progress: improving  Plan of Care Reviewed With: patient  Outcome Summary: Still on HHFNC, 80%Fio2 and flow of 50.     Problem: Patient Care Overview  Goal: Individualization and Mutuality  Outcome: Ongoing (interventions implemented as appropriate)     Problem: Chronic Obstructive Pulmonary Disease (Adult)  Intervention: Optimize Oxygenation/Ventilation/Perfusion  Flowsheets (Taken 12/12/2019 0404)  Head of Bed (HOB): HOB elevated  Airway/Ventilation Management: airway patency maintained;humidification applied;pulmonary hygiene promoted  Breathing Techniques/Airway Clearance: deep/controlled cough encouraged

## 2019-12-12 NOTE — PROGRESS NOTES
Adult Nutrition  Assessment/PES    Patient Name:  Kt Armstrong  YOB: 1950  MRN: [id number]  Admit Date:  12/7/2019    Assessment Date:  12/12/2019    Recommend: Consider adding CIB light or Boost Glucose Control with meals for times when pt unable to eat much, could be easier to drink.      Reason for Assessment     Row Name 12/12/19 1340          Reason for Assessment    Reason For Assessment  per organizational policy LOS, noted  list     Diagnosis  pulmonary disease;diabetes diagnosis/complications;gastrointestinal disease Resp Failure, Acute COPD, Narcotic constipation vs ileus r/t high flow o2, DM          Nutrition/Diet History     Row Name 12/12/19 1341          Nutrition/Diet History    Typical Food/Fluid Intake  Spoke w pt, RT in room, pt with face mask & breathing treatment, permission given. NKFA. Denies issue chew/swallowing. Confirms some results with enema, eating definitely affected by breathing & belly.          Anthropometrics     Row Name 12/12/19 1344          Anthropometrics    Weight  -- 123 kg         Body Mass Index (BMI)    BMI Assessment  BMI 35-39.9: obesity grade II         Labs/Tests/Procedures/Meds     Row Name 12/12/19 1344          Labs/Procedures/Meds    Lab Results Reviewed  reviewed     Lab Results Comments  glu         Diagnostic Tests/Procedures    Diagnostic Test/Procedure Reviewed  reviewed        Medications    Pertinent Medications Reviewed  reviewed     Pertinent Medications Comments  actos, solumedrol, levemir, novolog         Physical Findings     Row Name 12/12/19 1344          Physical Findings    Overall Physical Appearance  obese high flow o2 noted         Estimated/Assessed Needs     Row Name 12/12/19 1345          Estimated/Assessed Needs    Additional Documentation  Calorie Requirements (Group);Protein Requirements (Group);Fluid Requirements (Group)        Calorie Requirements    Estimated Calorie Need Method  Manny العراقي      Estimated Calorie Requirement Comment  2049 kcal ( mifflin no factor obese)  231 gm CHO, 45% kcal         Protein Requirements    Est Protein Requirement Amount (gms/kg)  0.8 gm protein 98 gm pro         Fluid Requirements    Estimated Fluid Requirement Method  RDA Method 2049 ml          Nutrition Prescription Ordered     Row Name 12/12/19 1346          Nutrition Prescription PO    Current PO Diet  Soft Texture     Texture  Ground     Common Modifiers  Consistent Carbohydrate         Evaluation of Received Nutrient/Fluid Intake     Row Name 12/12/19 1346          Fluid Intake Evaluation    Oral Fluid (mL)  [address] ave x 5, 41%         PO Evaluation    Number of Meals  4     % PO Intake  75               Problem/Interventions:  Problem 1     Row Name 12/12/19 1346          Nutrition Diagnoses Problem 1    Problem 1  Overweight/Obesity     Etiology (related to)  Factors Affecting Nutrition     Signs/Symptoms (evidenced by)  BMI     BMI  35 - 39.9         Problem 2     Row Name 12/12/19 1346          Nutrition Diagnoses Problem 2    Problem 2  Inadequate Intake/Infusion     Inadequate Intake Type  Oral     Etiology (related to)  Factors Affecting Nutrition;Medical Diagnosis     Signs/Symptoms (evidenced by)  Report/Observation constipation/poss ileus/breathing              Intervention Goal     Row Name 12/12/19 1348          Intervention Goal    PO  Increase intake;PO intake (%)     PO Intake %  75 % or greater most meals     Weight  Appropriate weight loss         Nutrition Intervention     Row Name 12/12/19 1350          Nutrition Intervention    RD/Tech Action  Interview for preference;Encourage intake;Recommend/ordered;Follow Tx progress     Recommended/Ordered  Supplement         Nutrition Prescription     Row Name 12/12/19 1350          Other Orders    Other  may consider oral supplement         Education/Evaluation     Row Name 12/12/19 1351          Education    Education  Will Instruct as appropriate         Monitor/Evaluation    Monitor  Per protocol;I&O;PO intake;Pertinent labs;Supplement intake;Weight;Symptoms           Electronically signed by:  Nicole España RD  12/12/19 1:52 PM

## 2019-12-12 NOTE — PROGRESS NOTES
"SERVICE: [facility] HOSPITALIST    CONSULTANTS: Pulmonary    CHIEF COMPLAINT: Follow-up AE COPD, respiratory failure    SUBJECTIVE: The patient believes that he is feeling somewhat better today, did have bowel movement yesterday but notes he is not able to eat much due to shortness of air.  Left notes increasing FiO2 overnight.  He reports dyspepsia and abdominal fullness yesterday that improved after Carafate dosing COPD has assisted with mucus production.  Patient otherwise denies f/c/chest pain/n/v/d/abdominal pain or other new concerns.    OBJECTIVE:    /99 (BP Location: Right arm, Patient Position: Lying)   Pulse 84   Temp 98.4 °F (36.9 °C) (Oral)   Resp 28   Ht 182.9 cm (72\")   Wt 123 kg (272 lb)   SpO2 92%   BMI 36.89 kg/m²     MEDS/LABS REVIEWED AND ORDERED    0.9% sodium chloride for irrigation-mineral oil-glycerin 300 mL Rectal Once   arformoterol 15 mcg Nebulization BID - RT   atorvastatin 10 mg Oral Nightly   bisacodyl 10 mg Rectal Daily   budesonide 0.5 mg Nebulization BID - RT   dicyclomine 10 mg Oral 4x Daily   docusate sodium 100 mg Oral BID   doxycycline 100 mg Intravenous Q12H   enoxaparin 40 mg Subcutaneous Q24H   HYDROcodone-acetaminophen 1 tablet Oral 4x Daily   insulin aspart 0-24 Units Subcutaneous 4x Daily AC & at Bedtime   insulin aspart 20 Units Subcutaneous TID With Meals   insulin detemir 15 Units Subcutaneous Nightly   insulin detemir 60 Units Subcutaneous QAM   ipratropium-albuterol 3 mL Nebulization Q4H - RT   methylPREDNISolone sodium succinate 60 mg Intravenous Q6H   nicotine 1 patch Transdermal Q24H   pioglitazone 45 mg Oral Daily   polyethylene glycol 17 g Oral Daily   sodium chloride 10 mL Intravenous Q12H   sodium chloride 4 mL Nebulization 4x Daily - RT   terazosin 1 mg Oral Nightly   theophylline 200 mg Oral Q24H     Physical Exam   Constitutional: He is oriented to person, place, and time.   Obese   HENT:   Head: Normocephalic and atraumatic. "   Edentulous   Eyes: Pupils are equal, round, and reactive to light. EOM are normal.   Cardiovascular: Normal rate and regular rhythm.   Pulmonary/Chest:   Less labored, diminished all fields, bilateral crackles with faint expiratory wheezes, overall air movement is improved    Abdominal: Soft. Bowel sounds are normal. There is no tenderness. There is no guarding.   Obese   Musculoskeletal: He exhibits no edema.   Neurological: He is alert and oriented to person, place, and time.   Skin: Skin is warm and dry. No erythema.   Psychiatric: He has a normal mood and affect. His behavior is normal.   Vitals reviewed.    LAB/DIAGNOSTICS:    Lab Results (last 24 hours)     Procedure Component Value Units Date/Time    BNP [[id number]]  (Normal) Collected:  12/12/19 0401    Specimen:  Blood Updated:  12/12/19 0853     proBNP 50.3 pg/mL     Narrative:       Among patients with dyspnea, NT-proBNP is highly sensitive for the detection of acute congestive heart failure. In addition NT-proBNP of <300 pg/ml effectively rules out acute congestive heart failure with 99% negative predictive value.      POC Glucose Once [[id number]]  (Abnormal) Collected:  12/12/19 0827    Specimen:  Blood Updated:  12/12/19 0844     Glucose 253 mg/dL     Basic Metabolic Panel [[id number]]  (Abnormal) Collected:  12/12/19 0401    Specimen:  Blood Updated:  12/12/19 0444     Glucose 197 mg/dL      BUN 14 mg/dL      Creatinine 0.64 mg/dL      Sodium 131 mmol/L      Potassium 4.7 mmol/L      Chloride 92 mmol/L      CO2 29.6 mmol/L      Calcium 8.6 mg/dL      eGFR Non African Amer 125 mL/min/1.73      BUN/Creatinine Ratio 21.9     Anion Gap 9.4 mmol/L     Narrative:       GFR Normal >60  Chronic Kidney Disease <60  Kidney Failure <15      CBC & Differential [[id number]] Collected:  12/12/19 0401    Specimen:  Blood Updated:  12/12/19 0428    Narrative:       The following orders were created for panel order CBC & Differential.  Procedure                                Abnormality         Status                     ---------                               -----------         ------                     CBC Auto Differential[[id number]]        Abnormal            Final result                 Please view results for these tests on the individual orders.    CBC Auto Differential [[id number]]  (Abnormal) Collected:  12/12/19 0401    Specimen:  Blood Updated:  12/12/19 0428     WBC 8.07 10*3/mm3      RBC 4.47 10*6/mm3      Hemoglobin 14.1 g/dL      Hematocrit 43.3 %      MCV 96.9 fL      MCH 31.5 pg      MCHC 32.6 g/dL      RDW 12.8 %      RDW-SD 46.1 fl      MPV 11.8 fL      Platelets 183 10*3/mm3      Neutrophil % 89.1 %      Lymphocyte % 4.7 %      Monocyte % 5.1 %      Eosinophil % 0.0 %      Basophil % 0.2 %      Immature Grans % 0.9 %      Neutrophils, Absolute 7.19 10*3/mm3      Lymphocytes, Absolute 0.38 10*3/mm3      Monocytes, Absolute 0.41 10*3/mm3      Eosinophils, Absolute 0.00 10*3/mm3      Basophils, Absolute 0.02 10*3/mm3      Immature Grans, Absolute 0.07 10*3/mm3      nRBC 0.0 /100 WBC     POC Glucose Once [[id number]]  (Normal) Collected:  12/11/19 2109    Specimen:  Blood Updated:  12/11/19 2116     Glucose 75 mg/dL     POC Glucose Once [[id number]]  (Abnormal) Collected:  12/11/19 1639    Specimen:  Blood Updated:  12/11/19 1649     Glucose 187 mg/dL     POC Glucose Once [[id number]]  (Normal) Collected:  12/11/19 1456    Specimen:  Blood Updated:  12/11/19 1502     Glucose 118 mg/dL     Blood Culture - Blood, Arm, Right [[id number]] Collected:  12/08/19 1225    Specimen:  Blood from Arm, Right Updated:  12/11/19 1245     Blood Culture No growth at 3 days    Blood Culture - Blood, Arm, Right [[id number]] Collected:  12/08/19 1235    Specimen:  Blood from Arm, Right Updated:  12/11/19 1245     Blood Culture No growth at 3 days    Blood Culture - Blood, Arm, Right [[id number]] Collected:  12/07/19 1159    Specimen:  Blood from Arm, Right Updated:  12/11/19 121      Blood Culture No growth at 4 days    Blood Culture - Blood, Arm, Left [[id number]] Collected:  12/07/19 1130    Specimen:  Blood from Arm, Left Updated:  12/11/19 1215     Blood Culture No growth at 4 days    POC Glucose Once [[id number]]  (Abnormal) Collected:  12/11/19 1146    Specimen:  Blood Updated:  12/11/19 1206     Glucose 68 mg/dL         ECG 12 Lead   Final Result   HEART RATE= 97  bpm   RR Interval= 616  ms   NC Interval= 155  ms   P Horizontal Axis= 10  deg   P Front Axis= 55  deg   QRSD Interval= 96  ms   QT Interval= 332  ms   QRS Axis= 36  deg   T Wave Axis= 48  deg   - NORMAL ECG -   Sinus rhythm   NO SIGNIFICANT CHANGE FROM PREVIOUS ECG   Electronically Signed By: Demetri Zhang ([facility]) 10-Dec-2019 12:18:19   Date and Time of Study: 2019-12-09 14:11:27      ECG 12 Lead   Final Result   HEART RATE= 111  bpm   RR Interval= 540  ms   NC Interval= 165  ms   P Horizontal Axis= 4  deg   P Front Axis= 57  deg   QRSD Interval= 103  ms   QT Interval= 334  ms   QRS Axis= 59  deg   T Wave Axis= 35  deg   - OTHERWISE NORMAL ECG -   Poor quality tracing repeat   Electronically Signed By: Lyle Middleton ([facility]) 08-Dec-2019 17:47:26   Date and Time of Study: 2019-12-08 08:23:27      ECG 12 Lead   Final Result   HEART RATE= 131  bpm   RR Interval= 456  ms   NC Interval= 127  ms   P Horizontal Axis= -5  deg   P Front Axis= 49  deg   QRSD Interval= 95  ms   QT Interval= 303  ms   QRS Axis= 135  deg   T Wave Axis= 21  deg   - OTHERWISE NORMAL ECG -   Poor quality tracing repeat   Electronically Signed By: Lyle Middleton ([facility]) 07-Dec-2019 18:45:33   Date and Time of Study: 2019-12-07 11:26:15        Results for orders placed during the hospital encounter of 12/07/19   Adult Transthoracic Echo Complete W/ Cont if Necessary Per Protocol    Narrative · Left ventricular systolic function is normal. Estimated EF appears to be   in the range of 56 - 60%. Normal left ventricular cavity size, wall   thickness and mass noted.  Normal diastolic function.  · No significant valvular stenosis or regurgitation noted.        Xr Abdomen Flat & Upright    Result Date: 12/11/2019  Moderate gastric distention possibly the sequela of an ileus. Moderate bibasilar atelectasis. Signer Name: KEATON Martin MD  Signed: 12/11/2019 6:48 AM  Workstation Name: [id number]  [facility]    ASSESSMENT/PLAN:  AHHRF 2/2 AE COPD:   Lactic acidosis 2/2 hypoxia:   Steroid-induced leukocytosis:   Acute bronchitis: Pulmonary following  Continues on heated high flow with FiO2 at 80% now, goal saturations 88 to 90%  Continues saline nebs, duo nebs, Brovana, Pulmicort, Solu-Medrol, theophylline, doxycycline  BNP normal and echo overall normal function, CTA chest negative for PE/dissection/aneurysm  Continue CCC-A soft diet, no dysphagia per SLP  Sputum culture normal karely, procalcitonin low, RVP negative  Ileus/narcotic bowel contributing to respiratory difficulty  Check abdomen, chest x-ray now  Monitor     Uncontrolled DM2 with hyperglycemia in obese:   Steroid-induced hyperglycemia: A1c 9.1%  Body mass index is 36.89 kg/m²., TSH normal  AM glucose elevated, pm glucose nearer to goal  Increase levemir to 20 units nightly, continue 60 units am  Continue novolog 20 units 3 times daily with meals  Continue Accu-Cheks  AC/at bedtime  Continue to monitor closely and likely will need de-escalation once IV steroids are changed to oral     Narcotic and immobility induced constipation/ileus 2/2 chronic narcotic dependence 2/2 chronic pain and OA:   Feel this is complicating his respiratory situation, repeat smog enema now, improved after smog yesterday  Continue Colace, MiraLAX  Add as needed suppositories  Continues home regimen hydrocodone 4 times daily  Monitor closely    IBS: Change Bentyl dosing to 10 mg 4 times daily and consider need to hold with constipation     Stable diagnoses:     Hyperlipidemia: No acute issues on Lipitor 10 mg  "nightly     Electrolyte imbalance: Continue electrolyte replacement protocol     BPH: No acute issues, continue home terazosin 1 mg nightly     Tobacco abuse:  Continue nicotine patch    PLAN FOR DISPOSITION: home when able    NARENDRA Blanco  Hospitalist, [facility]  12/12/19  7:11 AM    \"Dictated utilizing Dragon dictation\"    "

## 2019-12-12 NOTE — PLAN OF CARE
Problem: Patient Care Overview  Goal: Plan of Care Review  Outcome: Ongoing (interventions implemented as appropriate)  Flowsheets (Taken 12/12/2019 0425)  Progress: improving  Plan of Care Reviewed With: patient  Outcome Summary: Remains on HHFNC (50L and 80% FiO2) with oxygen saturation at mid 90's. On scheduled pain med for generalized chronic pain. Denies nausea and vomiting. Voids per urinal. Tolerated current diet. SR on the monitor. VSS. Afebrile.

## 2019-12-13 LAB
ANION GAP SERPL CALCULATED.3IONS-SCNC: 9.7 MMOL/L (ref 5–15)
ARTERIAL PATENCY WRIST A: ABNORMAL
ATMOSPHERIC PRESS: 740 MMHG
BACTERIA SPEC AEROBE CULT: NORMAL
BACTERIA SPEC AEROBE CULT: NORMAL
BASE EXCESS BLDA CALC-SCNC: 9 MMOL/L (ref 0–2)
BASOPHILS # BLD AUTO: 0.03 10*3/MM3 (ref 0–0.2)
BASOPHILS NFR BLD AUTO: 0.4 % (ref 0–1.5)
BDY SITE: ABNORMAL
BODY TEMPERATURE: 37 C
BUN BLD-MCNC: 17 MG/DL (ref 8–23)
BUN/CREAT SERPL: 24.6 (ref 7–25)
CALCIUM SPEC-SCNC: 9 MG/DL (ref 8.6–10.5)
CHLORIDE SERPL-SCNC: 93 MMOL/L (ref 98–107)
CO2 SERPL-SCNC: 32.3 MMOL/L (ref 22–29)
CREAT BLD-MCNC: 0.69 MG/DL (ref 0.76–1.27)
DEPRECATED RDW RBC AUTO: 45.1 FL (ref 37–54)
EOSINOPHIL # BLD AUTO: 0 10*3/MM3 (ref 0–0.4)
EOSINOPHIL NFR BLD AUTO: 0 % (ref 0.3–6.2)
ERYTHROCYTE [DISTWIDTH] IN BLOOD BY AUTOMATED COUNT: 12.7 % (ref 12.3–15.4)
GAS FLOW AIRWAY: 60 LPM
GFR SERPL CREATININE-BSD FRML MDRD: 114 ML/MIN/1.73
GLUCOSE BLD-MCNC: 97 MG/DL (ref 65–99)
GLUCOSE BLDC GLUCOMTR-MCNC: 134 MG/DL (ref 70–130)
GLUCOSE BLDC GLUCOMTR-MCNC: 169 MG/DL (ref 70–130)
GLUCOSE BLDC GLUCOMTR-MCNC: 219 MG/DL (ref 70–130)
GLUCOSE BLDC GLUCOMTR-MCNC: 245 MG/DL (ref 70–130)
HCO3 BLDA-SCNC: 34.3 MMOL/L (ref 20–26)
HCT VFR BLD AUTO: 46.7 % (ref 37.5–51)
HGB BLD-MCNC: 15.3 G/DL (ref 13–17.7)
HGB BLDA-MCNC: 15.7 G/DL (ref 14–18)
HOROWITZ INDEX BLD+IHG-RTO: 90 %
IMM GRANULOCYTES # BLD AUTO: 0.18 10*3/MM3 (ref 0–0.05)
IMM GRANULOCYTES NFR BLD AUTO: 2.5 % (ref 0–0.5)
LYMPHOCYTES # BLD AUTO: 0.66 10*3/MM3 (ref 0.7–3.1)
LYMPHOCYTES NFR BLD AUTO: 9 % (ref 19.6–45.3)
MCH RBC QN AUTO: 31.5 PG (ref 26.6–33)
MCHC RBC AUTO-ENTMCNC: 32.8 G/DL (ref 31.5–35.7)
MCV RBC AUTO: 96.1 FL (ref 79–97)
MODALITY: ABNORMAL
MONOCYTES # BLD AUTO: 0.66 10*3/MM3 (ref 0.1–0.9)
MONOCYTES NFR BLD AUTO: 9 % (ref 5–12)
NEUTROPHILS # BLD AUTO: 5.77 10*3/MM3 (ref 1.7–7)
NEUTROPHILS NFR BLD AUTO: 79.1 % (ref 42.7–76)
NRBC BLD AUTO-RTO: 0 /100 WBC (ref 0–0.2)
PCO2 BLDA: 47.9 MM HG (ref 35–45)
PCO2 TEMP ADJ BLD: 47.9 MM HG (ref 35–45)
PH BLDA: 7.46 PH UNITS (ref 7.35–7.45)
PH, TEMP CORRECTED: 7.46 PH UNITS (ref 7.35–7.45)
PLATELET # BLD AUTO: 196 10*3/MM3 (ref 140–450)
PMV BLD AUTO: 12 FL (ref 6–12)
PO2 BLDA: 80.5 MM HG (ref 83–108)
PO2 TEMP ADJ BLD: 80.5 MM HG (ref 83–108)
POTASSIUM BLD-SCNC: 4.8 MMOL/L (ref 3.5–5.2)
RBC # BLD AUTO: 4.86 10*6/MM3 (ref 4.14–5.8)
SAO2 % BLDCOA: 96.1 % (ref 94–99)
SODIUM BLD-SCNC: 135 MMOL/L (ref 136–145)
THEOPHYLLINE SERPL-MCNC: 1.1 MCG/ML (ref 10–20)
VENTILATOR MODE: ABNORMAL
WBC NRBC COR # BLD: 7.3 10*3/MM3 (ref 3.4–10.8)

## 2019-12-13 PROCEDURE — 82962 GLUCOSE BLOOD TEST: CPT

## 2019-12-13 PROCEDURE — 63710000001 INSULIN DETEMIR PER 5 UNITS: Performed by: NURSE PRACTITIONER

## 2019-12-13 PROCEDURE — 80048 BASIC METABOLIC PNL TOTAL CA: CPT | Performed by: NURSE PRACTITIONER

## 2019-12-13 PROCEDURE — 25010000002 METHYLPREDNISOLONE PER 40 MG: Performed by: INTERNAL MEDICINE

## 2019-12-13 PROCEDURE — 94799 UNLISTED PULMONARY SVC/PX: CPT

## 2019-12-13 PROCEDURE — 25010000002 FUROSEMIDE PER 20 MG: Performed by: INTERNAL MEDICINE

## 2019-12-13 PROCEDURE — 63710000001 INSULIN ASPART PER 5 UNITS: Performed by: NURSE PRACTITIONER

## 2019-12-13 PROCEDURE — 85025 COMPLETE CBC W/AUTO DIFF WBC: CPT | Performed by: NURSE PRACTITIONER

## 2019-12-13 PROCEDURE — 25010000002 FUROSEMIDE PER 20 MG: Performed by: NURSE PRACTITIONER

## 2019-12-13 PROCEDURE — 25010000002 ENOXAPARIN PER 10 MG: Performed by: NURSE PRACTITIONER

## 2019-12-13 PROCEDURE — 99233 SBSQ HOSP IP/OBS HIGH 50: CPT | Performed by: NURSE PRACTITIONER

## 2019-12-13 PROCEDURE — 80198 ASSAY OF THEOPHYLLINE: CPT | Performed by: NURSE PRACTITIONER

## 2019-12-13 PROCEDURE — 94668 MNPJ CHEST WALL SBSQ: CPT

## 2019-12-13 PROCEDURE — 82803 BLOOD GASES ANY COMBINATION: CPT

## 2019-12-13 RX ORDER — FUROSEMIDE 10 MG/ML
40 INJECTION INTRAMUSCULAR; INTRAVENOUS ONCE
Status: COMPLETED | OUTPATIENT
Start: 2019-12-13 | End: 2019-12-13

## 2019-12-13 RX ORDER — SODIUM CHLORIDE FOR INHALATION 7 %
4 VIAL, NEBULIZER (ML) INHALATION
Status: DISCONTINUED | OUTPATIENT
Start: 2019-12-13 | End: 2019-12-17

## 2019-12-13 RX ORDER — FLUTICASONE PROPIONATE 50 MCG
2 SPRAY, SUSPENSION (ML) NASAL 2 TIMES DAILY
Status: DISCONTINUED | OUTPATIENT
Start: 2019-12-13 | End: 2019-12-18 | Stop reason: HOSPADM

## 2019-12-13 RX ADMIN — INSULIN DETEMIR 60 UNITS: 100 INJECTION, SOLUTION SUBCUTANEOUS at 08:41

## 2019-12-13 RX ADMIN — BUDESONIDE 0.5 MG: 0.5 SUSPENSION RESPIRATORY (INHALATION) at 19:35

## 2019-12-13 RX ADMIN — DICYCLOMINE HYDROCHLORIDE 10 MG: 20 TABLET ORAL at 17:45

## 2019-12-13 RX ADMIN — ATORVASTATIN CALCIUM 10 MG: 10 TABLET, FILM COATED ORAL at 21:36

## 2019-12-13 RX ADMIN — HYDROCODONE BITARTRATE AND ACETAMINOPHEN 1 TABLET: 10; 325 TABLET ORAL at 17:45

## 2019-12-13 RX ADMIN — FUROSEMIDE 20 MG: 10 INJECTION, SOLUTION INTRAMUSCULAR; INTRAVENOUS at 08:44

## 2019-12-13 RX ADMIN — IPRATROPIUM BROMIDE AND ALBUTEROL SULFATE 3 ML: .5; 3 SOLUTION RESPIRATORY (INHALATION) at 11:38

## 2019-12-13 RX ADMIN — ACETYLCYSTEINE 3 ML: 200 SOLUTION ORAL; RESPIRATORY (INHALATION) at 11:38

## 2019-12-13 RX ADMIN — HYDROCODONE BITARTRATE AND ACETAMINOPHEN 1 TABLET: 10; 325 TABLET ORAL at 12:11

## 2019-12-13 RX ADMIN — INSULIN DETEMIR 15 UNITS: 100 INJECTION, SOLUTION SUBCUTANEOUS at 21:26

## 2019-12-13 RX ADMIN — FLUTICASONE PROPIONATE 2 SPRAY: 50 SPRAY, METERED NASAL at 21:29

## 2019-12-13 RX ADMIN — THEOPHYLLINE 200 MG: 400 TABLET, EXTENDED RELEASE ORAL at 10:46

## 2019-12-13 RX ADMIN — METHYLPREDNISOLONE SODIUM SUCCINATE 40 MG: 40 INJECTION, POWDER, FOR SOLUTION INTRAMUSCULAR; INTRAVENOUS at 14:52

## 2019-12-13 RX ADMIN — ENOXAPARIN SODIUM 40 MG: 40 INJECTION SUBCUTANEOUS at 16:12

## 2019-12-13 RX ADMIN — IPRATROPIUM BROMIDE AND ALBUTEROL SULFATE 3 ML: .5; 3 SOLUTION RESPIRATORY (INHALATION) at 03:15

## 2019-12-13 RX ADMIN — INSULIN ASPART 8 UNITS: 100 INJECTION, SOLUTION INTRAVENOUS; SUBCUTANEOUS at 12:10

## 2019-12-13 RX ADMIN — FUROSEMIDE 40 MG: 10 INJECTION, SOLUTION INTRAMUSCULAR; INTRAVENOUS at 10:47

## 2019-12-13 RX ADMIN — SODIUM CHLORIDE 4 ML: 7 NEBU SOLN,3 % NEBU at 19:35

## 2019-12-13 RX ADMIN — METHYLPREDNISOLONE SODIUM SUCCINATE 40 MG: 40 INJECTION, POWDER, FOR SOLUTION INTRAMUSCULAR; INTRAVENOUS at 05:39

## 2019-12-13 RX ADMIN — ARFORMOTEROL TARTRATE 15 MCG: 15 SOLUTION RESPIRATORY (INHALATION) at 09:02

## 2019-12-13 RX ADMIN — INSULIN ASPART 8 UNITS: 100 INJECTION, SOLUTION INTRAVENOUS; SUBCUTANEOUS at 17:45

## 2019-12-13 RX ADMIN — DICYCLOMINE HYDROCHLORIDE 10 MG: 20 TABLET ORAL at 08:44

## 2019-12-13 RX ADMIN — DOXYCYCLINE 100 MG: 100 INJECTION, POWDER, LYOPHILIZED, FOR SOLUTION INTRAVENOUS at 00:33

## 2019-12-13 RX ADMIN — INSULIN ASPART 20 UNITS: 100 INJECTION, SOLUTION INTRAVENOUS; SUBCUTANEOUS at 12:11

## 2019-12-13 RX ADMIN — PIOGLITAZONE 45 MG: 45 TABLET ORAL at 10:46

## 2019-12-13 RX ADMIN — SODIUM CHLORIDE, PRESERVATIVE FREE 10 ML: 5 INJECTION INTRAVENOUS at 08:44

## 2019-12-13 RX ADMIN — DOCUSATE SODIUM 100 MG: 100 CAPSULE, LIQUID FILLED ORAL at 08:44

## 2019-12-13 RX ADMIN — FLUTICASONE PROPIONATE 2 SPRAY: 50 SPRAY, METERED NASAL at 12:22

## 2019-12-13 RX ADMIN — DICYCLOMINE HYDROCHLORIDE 10 MG: 20 TABLET ORAL at 12:11

## 2019-12-13 RX ADMIN — HYDROCODONE BITARTRATE AND ACETAMINOPHEN 1 TABLET: 10; 325 TABLET ORAL at 21:36

## 2019-12-13 RX ADMIN — IPRATROPIUM BROMIDE AND ALBUTEROL SULFATE 3 ML: .5; 3 SOLUTION RESPIRATORY (INHALATION) at 19:34

## 2019-12-13 RX ADMIN — ARFORMOTEROL TARTRATE 15 MCG: 15 SOLUTION RESPIRATORY (INHALATION) at 19:34

## 2019-12-13 RX ADMIN — ACETAMINOPHEN 650 MG: 325 TABLET ORAL at 05:52

## 2019-12-13 RX ADMIN — POLYETHYLENE GLYCOL 3350 17 G: 17 POWDER, FOR SOLUTION ORAL at 08:44

## 2019-12-13 RX ADMIN — DICYCLOMINE HYDROCHLORIDE 10 MG: 20 TABLET ORAL at 21:36

## 2019-12-13 RX ADMIN — IPRATROPIUM BROMIDE AND ALBUTEROL SULFATE 3 ML: .5; 3 SOLUTION RESPIRATORY (INHALATION) at 15:11

## 2019-12-13 RX ADMIN — DOCUSATE SODIUM 100 MG: 100 CAPSULE, LIQUID FILLED ORAL at 21:36

## 2019-12-13 RX ADMIN — METHYLPREDNISOLONE SODIUM SUCCINATE 40 MG: 40 INJECTION, POWDER, FOR SOLUTION INTRAMUSCULAR; INTRAVENOUS at 21:36

## 2019-12-13 RX ADMIN — HYDROCODONE BITARTRATE AND ACETAMINOPHEN 1 TABLET: 10; 325 TABLET ORAL at 08:43

## 2019-12-13 RX ADMIN — SODIUM CHLORIDE SOLN NEBU 7% 4 ML: 7 NEBU SOLN at 07:02

## 2019-12-13 RX ADMIN — TERAZOSIN HYDROCHLORIDE ANHYDROUS 1 MG: 1 CAPSULE ORAL at 21:36

## 2019-12-13 RX ADMIN — INSULIN ASPART 20 UNITS: 100 INJECTION, SOLUTION INTRAVENOUS; SUBCUTANEOUS at 08:43

## 2019-12-13 RX ADMIN — INSULIN ASPART 20 UNITS: 100 INJECTION, SOLUTION INTRAVENOUS; SUBCUTANEOUS at 17:46

## 2019-12-13 RX ADMIN — IPRATROPIUM BROMIDE AND ALBUTEROL SULFATE 3 ML: .5; 3 SOLUTION RESPIRATORY (INHALATION) at 06:49

## 2019-12-13 RX ADMIN — DOXYCYCLINE 100 MG: 100 INJECTION, POWDER, LYOPHILIZED, FOR SOLUTION INTRAVENOUS at 12:10

## 2019-12-13 RX ADMIN — NICOTINE 1 PATCH: 21 PATCH TRANSDERMAL at 08:53

## 2019-12-13 RX ADMIN — BUDESONIDE 0.5 MG: 0.5 SUSPENSION RESPIRATORY (INHALATION) at 06:50

## 2019-12-13 NOTE — PROGRESS NOTES
"SERVICE: [facility] HOSPITALIST    CONSULTANTS: Pulmonary    CHIEF COMPLAINT:f/u AECOPD, respiratory failure    SUBJECTIVE: The patient reports he continues to feel poorly, staff notes oxygen requirement went up to 90% overnight.  He reports very small bowel movement yesterday, not able to eat much due to shortness of air.  He complains of nasal congestion with nasal cannula.  He otherwise denies pain concerns.  He reports having to sit up to breathe. No c/o chest pain, n/v/d    OBJECTIVE:    /89   Pulse 109   Temp 97.9 °F (36.6 °C) (Oral)   Resp 28   Ht 182.9 cm (72\")   Wt 123 kg (272 lb)   SpO2 95%   BMI 36.89 kg/m²     MEDS/LABS REVIEWED AND ORDERED    0.9% sodium chloride for irrigation-mineral oil-glycerin 300 mL Rectal Once   acetylcysteine 3 mL Nebulization TID - RT   arformoterol 15 mcg Nebulization BID - RT   atorvastatin 10 mg Oral Nightly   bisacodyl 10 mg Rectal Daily   budesonide 0.5 mg Nebulization BID - RT   dicyclomine 10 mg Oral 4x Daily   docusate sodium 100 mg Oral BID   doxycycline 100 mg Intravenous Q12H   enoxaparin 40 mg Subcutaneous Q24H   fluticasone 2 spray Each Nare BID   furosemide 20 mg Intravenous Daily   HYDROcodone-acetaminophen 1 tablet Oral 4x Daily   insulin aspart 0-24 Units Subcutaneous 4x Daily AC & at Bedtime   insulin aspart 20 Units Subcutaneous TID With Meals   insulin detemir 15 Units Subcutaneous Nightly   [START ON 12/14/2019] insulin detemir 65 Units Subcutaneous QAM   ipratropium-albuterol 3 mL Nebulization Q4H - RT   methylPREDNISolone sodium succinate 40 mg Intravenous Q8H   nicotine 1 patch Transdermal Q24H   pioglitazone 45 mg Oral Daily   polyethylene glycol 17 g Oral Daily   sodium chloride 10 mL Intravenous Q12H   sodium chloride 4 mL Nebulization 4x Daily - RT   terazosin 1 mg Oral Nightly   theophylline 200 mg Oral Q24H     Physical Exam   Constitutional: He is oriented to person, place, and time.   Obese       HENT:   Head: " Normocephalic and atraumatic.   Eyes: Pupils are equal, round, and reactive to light. EOM are normal.   Cardiovascular: Normal rate and regular rhythm.   Pulmonary/Chest:   Mildly dyspneic at rest  Poor air movement all fields  Crackles/rales bilateral bases   Abdominal: Soft. Bowel sounds are normal. He exhibits no distension. There is no tenderness. There is no guarding.   obese   Musculoskeletal:   Trace bilateral LE edema   Neurological: He is alert and oriented to person, place, and time.   Skin: Skin is warm and dry. No erythema.   Psychiatric: He has a normal mood and affect. His behavior is normal.   Vitals reviewed.    LAB/DIAGNOSTICS:    Lab Results (last 24 hours)     Procedure Component Value Units Date/Time    Blood Culture - Blood, Arm, Right [[id number]] Collected:  12/08/19 1225    Specimen:  Blood from Arm, Right Updated:  12/13/19 1245     Blood Culture No growth at 5 days    Blood Culture - Blood, Arm, Right [[id number]] Collected:  12/08/19 1235    Specimen:  Blood from Arm, Right Updated:  12/13/19 1245     Blood Culture No growth at 5 days    POC Glucose Once [[id number]]  (Abnormal) Collected:  12/13/19 1154    Specimen:  Blood Updated:  12/13/19 1213     Glucose 219 mg/dL     Blood Gas, Arterial [[id number]]  (Abnormal) Collected:  12/13/19 0950    Specimen:  Arterial Blood Updated:  12/13/19 0954     Site Arterial Line     Rich's Test N/A     pH, Arterial 7.464 pH units      Comment: 83 Value above reference range        pCO2, Arterial 47.9 mm Hg      Comment: 83 Value above reference range        pO2, Arterial 80.5 mm Hg      Comment: 84 Value below reference range        HCO3, Arterial 34.3 mmol/L      Comment: 83 Value above reference range        Base Excess, Arterial 9.0 mmol/L      Comment: 83 Value above reference range        O2 Saturation, Arterial 96.1 %      Hemoglobin, Blood Gas 15.7 g/dL      Temperature 37.0 C      Barometric Pressure for Blood Gas 740 mmHg      Modality Heated  HFNC     FIO2 90 %      Flow Rate 60.0 lpm      Ventilator Mode NA     Comment: Meter: [id number]     :  [id number]        pCO2, Temperature Corrected 47.9 mm Hg      pH, Temp Corrected 7.464 pH Units      pO2, Temperature Corrected 80.5 mm Hg     POC Glucose Once [[id number]]  (Abnormal) Collected:  12/13/19 0727    Specimen:  Blood Updated:  12/13/19 0736     Glucose 134 mg/dL     Basic Metabolic Panel [[id number]]  (Abnormal) Collected:  12/13/19 0400    Specimen:  Blood Updated:  12/13/19 0442     Glucose 97 mg/dL      BUN 17 mg/dL      Creatinine 0.69 mg/dL      Sodium 135 mmol/L      Potassium 4.8 mmol/L      Chloride 93 mmol/L      CO2 32.3 mmol/L      Calcium 9.0 mg/dL      eGFR Non African Amer 114 mL/min/1.73      BUN/Creatinine Ratio 24.6     Anion Gap 9.7 mmol/L     Narrative:       GFR Normal >60  Chronic Kidney Disease <60  Kidney Failure <15      Theophylline Level [[id number]]  (Abnormal) Collected:  12/13/19 0400    Specimen:  Blood Updated:  12/13/19 0439     Theophylline Level 1.1 mcg/mL     CBC & Differential [[id number]] Collected:  12/13/19 0400    Specimen:  Blood Updated:  12/13/19 0420    Narrative:       The following orders were created for panel order CBC & Differential.  Procedure                               Abnormality         Status                     ---------                               -----------         ------                     CBC Auto Differential[[id number]]        Abnormal            Final result                 Please view results for these tests on the individual orders.    CBC Auto Differential [[id number]]  (Abnormal) Collected:  12/13/19 0400    Specimen:  Blood Updated:  12/13/19 0420     WBC 7.30 10*3/mm3      RBC 4.86 10*6/mm3      Hemoglobin 15.3 g/dL      Hematocrit 46.7 %      MCV 96.1 fL      MCH 31.5 pg      MCHC 32.8 g/dL      RDW 12.7 %      RDW-SD 45.1 fl      MPV 12.0 fL      Platelets 196 10*3/mm3      Neutrophil % 79.1 %      Lymphocyte % 9.0 %       Monocyte % 9.0 %      Eosinophil % 0.0 %      Basophil % 0.4 %      Immature Grans % 2.5 %      Neutrophils, Absolute 5.77 10*3/mm3      Lymphocytes, Absolute 0.66 10*3/mm3      Monocytes, Absolute 0.66 10*3/mm3      Eosinophils, Absolute 0.00 10*3/mm3      Basophils, Absolute 0.03 10*3/mm3      Immature Grans, Absolute 0.18 10*3/mm3      nRBC 0.0 /100 WBC     POC Glucose Once [[id number]]  (Abnormal) Collected:  12/12/19 2115    Specimen:  Blood Updated:  12/12/19 2122     Glucose 149 mg/dL     POC Glucose Once [[id number]]  (Abnormal) Collected:  12/12/19 1755    Specimen:  Blood Updated:  12/12/19 1805     Glucose 202 mg/dL         ECG 12 Lead   Final Result   HEART RATE= 97  bpm   RR Interval= 616  ms   MO Interval= 155  ms   P Horizontal Axis= 10  deg   P Front Axis= 55  deg   QRSD Interval= 96  ms   QT Interval= 332  ms   QRS Axis= 36  deg   T Wave Axis= 48  deg   - NORMAL ECG -   Sinus rhythm   NO SIGNIFICANT CHANGE FROM PREVIOUS ECG   Electronically Signed By: Demetri Zhang ([facility]) 10-Dec-2019 12:18:19   Date and Time of Study: 2019-12-09 14:11:27      ECG 12 Lead   Final Result   HEART RATE= 111  bpm   RR Interval= 540  ms   MO Interval= 165  ms   P Horizontal Axis= 4  deg   P Front Axis= 57  deg   QRSD Interval= 103  ms   QT Interval= 334  ms   QRS Axis= 59  deg   T Wave Axis= 35  deg   - OTHERWISE NORMAL ECG -   Poor quality tracing repeat   Electronically Signed By: Lyle Middleton ([facility]) 08-Dec-2019 17:47:26   Date and Time of Study: 2019-12-08 08:23:27      ECG 12 Lead   Final Result   HEART RATE= 131  bpm   RR Interval= 456  ms   MO Interval= 127  ms   P Horizontal Axis= -5  deg   P Front Axis= 49  deg   QRSD Interval= 95  ms   QT Interval= 303  ms   QRS Axis= 135  deg   T Wave Axis= 21  deg   - OTHERWISE NORMAL ECG -   Poor quality tracing repeat   Electronically Signed By: Lyle Middleton ([facility]) 07-Dec-2019 18:45:33   Date and Time of Study: 2019-12-07 11:26:15        Results for orders placed  during the hospital encounter of 12/07/19   Adult Transthoracic Echo Complete W/ Cont if Necessary Per Protocol    Narrative · Left ventricular systolic function is normal. Estimated EF appears to be   in the range of 56 - 60%. Normal left ventricular cavity size, wall   thickness and mass noted. Normal diastolic function.  · No significant valvular stenosis or regurgitation noted.        Xr Abdomen 2 View With Chest 1 View    Result Date: 12/12/2019   1. Cardiomegaly with persistent bibasilar atelectasis or infiltrates. Interval worsening of opacities in the midlung zone on the right suspicious for pneumonia. Persistent trace right effusion. 2. No free air. 3. Nonspecific but nonobstructive bowel gas pattern. Ileus could present similarly. Gaseous distention of the stomach, nonspecific.  This report was finalized on 12/12/2019 10:06 AM by Dr. Jaden Danielle MD.      ASSESSMENT/PLAN:  AHRF 2/2 AECOPD:  Bilateral pleural effusions:   Lactic acidosis 2/2 hypoxemia:  Acute bronchitis:  Steroid induced leukoctyosis:  Give dose of IV lasix now, seems volume overloaded  IV steroids weaned down yesterday, FiO2 with heated high flow increased to 90% overnight, now decreased, continue to wean as tolerated  Continues saline nebs, duo nebs, Brovana, Pulmicort, Solu-Medrol, theophylline, doxycycline  BNP normal and echo overall normal function, CTA chest negative for PE/dissection/aneurysm  Continue CCC soft diet, no dysphagia per SLP  Sputum culture normal karely, procalcitonin low, RVP negative  Ileus/narcotic bowel contributing to respiratory difficulty  Continue daily weight/strict I&O    Uncontrolled DM2 with hyperglycemia in obese:   Steroid-induced hyperglycemia: A1c 9.1%  Body mass index is 36.89 kg/m²., TSH normal  A.m. glucose at goal, daytime glucose remains above goal, increase a.m. dose to 65 units, continue p.m. dose 15 units  Continue novolog 20 units 3 times daily with meals  Continue Accu-Cheks  AC/at  "bedtime  Monitor as IV steroid is de-escalated     Narcotic and immobility induced constipation/ileus 2/2 chronic narcotic dependence 2/2 chronic pain and OA:   Repeat smog as needed, likely constipation complicating respiratory recovery  Continue Colace, MiraLAX  Continue needed suppositories  Continues home regimen hydrocodone 4 times daily  Monitor closely     Stable diagnoses:     IBS: continue bentyl dosing at 10 mg 4 times daily      Hyperlipidemia: No acute issues on Lipitor 10 mg nightly     Electrolyte imbalance: Continue electrolyte replacement protocol     BPH: No acute issues, continue home terazosin 1 mg nightly     Tobacco abuse:  Continue nicotine patch    PLAN FOR DISPOSITION: TBD, ??pulmonary rehab??    NARENDRA Blanco  Hospitalist, [facility]  12/13/19  9:25 AM    \"Dictated utilizing Dragon dictation\"    "

## 2019-12-13 NOTE — NURSING NOTE
Continued Stay Note  MURIEL Chavez     Patient Name: Kt Armstrong  MRN: [id number]  Today's Date: 12/13/2019    Admit Date: 12/7/2019    Discharge Plan     Row Name 12/13/19 1245       Plan    Plan  Uncertain    Plan Comments  Patient on higher oxygen and heated high flow.  Short of breath with any exertion.  Plan is uncertain.  Will continue to follow        Discharge Codes    No documentation.             Airana Taylor RN

## 2019-12-13 NOTE — PLAN OF CARE
Problem: Patient Care Overview  Goal: Plan of Care Review  Outcome: Ongoing (interventions implemented as appropriate)  Flowsheets  Taken 12/12/2019 1840  Progress: no change  Taken 12/13/2019 1616  Plan of Care Reviewed With: patient  Taken 12/13/2019 1722  Outcome Summary: pt had increased o2 demands at begging on shift. HHFNC at this time was 90% and 60L. o2 stablized and now is on HHFNC requiring 60L and 60% FiO2. pt still desats with minimal exertion. generalized chronic pain is well controlled with scheduled pain medication; (see MAR). VSS. afebrile. will continue to monitor.

## 2019-12-13 NOTE — PLAN OF CARE
Problem: Patient Care Overview  Goal: Plan of Care Review  12/13/2019 0546 by Osvaldo Francis, RN  Outcome: Ongoing (interventions implemented as appropriate)  Flowsheets  Taken 12/12/2019 1840 by Flower Badillo, RN  Progress: no change  Taken 12/13/2019 0546 by Osvaldo Francis, RN  Plan of Care Reviewed With: patient  Outcome Summary: Remains on HHFNC requiring 60L and 80% FiO2 for desaturation  even with minimal exertion since shift change last night. Generalized chronic pain is well controlled with scheduled pain medication. (see MAR). VSS. Afebrile.

## 2019-12-14 LAB
ANION GAP SERPL CALCULATED.3IONS-SCNC: 9.4 MMOL/L (ref 5–15)
BASOPHILS # BLD AUTO: 0.02 10*3/MM3 (ref 0–0.2)
BASOPHILS NFR BLD AUTO: 0.2 % (ref 0–1.5)
BUN BLD-MCNC: 21 MG/DL (ref 8–23)
BUN/CREAT SERPL: 28.4 (ref 7–25)
CALCIUM SPEC-SCNC: 8.9 MG/DL (ref 8.6–10.5)
CHLORIDE SERPL-SCNC: 92 MMOL/L (ref 98–107)
CO2 SERPL-SCNC: 34.6 MMOL/L (ref 22–29)
CREAT BLD-MCNC: 0.74 MG/DL (ref 0.76–1.27)
DEPRECATED RDW RBC AUTO: 45.1 FL (ref 37–54)
EOSINOPHIL # BLD AUTO: 0 10*3/MM3 (ref 0–0.4)
EOSINOPHIL NFR BLD AUTO: 0 % (ref 0.3–6.2)
ERYTHROCYTE [DISTWIDTH] IN BLOOD BY AUTOMATED COUNT: 12.6 % (ref 12.3–15.4)
GFR SERPL CREATININE-BSD FRML MDRD: 105 ML/MIN/1.73
GLUCOSE BLD-MCNC: 201 MG/DL (ref 65–99)
GLUCOSE BLDC GLUCOMTR-MCNC: 115 MG/DL (ref 70–130)
GLUCOSE BLDC GLUCOMTR-MCNC: 241 MG/DL (ref 70–130)
GLUCOSE BLDC GLUCOMTR-MCNC: 312 MG/DL (ref 70–130)
HCT VFR BLD AUTO: 44.8 % (ref 37.5–51)
HGB BLD-MCNC: 14.8 G/DL (ref 13–17.7)
IMM GRANULOCYTES # BLD AUTO: 0.17 10*3/MM3 (ref 0–0.05)
IMM GRANULOCYTES NFR BLD AUTO: 2.1 % (ref 0–0.5)
LYMPHOCYTES # BLD AUTO: 0.98 10*3/MM3 (ref 0.7–3.1)
LYMPHOCYTES NFR BLD AUTO: 11.9 % (ref 19.6–45.3)
MCH RBC QN AUTO: 31.8 PG (ref 26.6–33)
MCHC RBC AUTO-ENTMCNC: 33 G/DL (ref 31.5–35.7)
MCV RBC AUTO: 96.3 FL (ref 79–97)
MONOCYTES # BLD AUTO: 0.73 10*3/MM3 (ref 0.1–0.9)
MONOCYTES NFR BLD AUTO: 8.9 % (ref 5–12)
NEUTROPHILS # BLD AUTO: 6.33 10*3/MM3 (ref 1.7–7)
NEUTROPHILS NFR BLD AUTO: 76.9 % (ref 42.7–76)
NRBC BLD AUTO-RTO: 0 /100 WBC (ref 0–0.2)
PLATELET # BLD AUTO: 246 10*3/MM3 (ref 140–450)
PMV BLD AUTO: 11.1 FL (ref 6–12)
POTASSIUM BLD-SCNC: 4.2 MMOL/L (ref 3.5–5.2)
RBC # BLD AUTO: 4.65 10*6/MM3 (ref 4.14–5.8)
SODIUM BLD-SCNC: 136 MMOL/L (ref 136–145)
THEOPHYLLINE SERPL-MCNC: 1.4 MCG/ML (ref 10–20)
WBC NRBC COR # BLD: 8.23 10*3/MM3 (ref 3.4–10.8)

## 2019-12-14 PROCEDURE — 99231 SBSQ HOSP IP/OBS SF/LOW 25: CPT | Performed by: HOSPITALIST

## 2019-12-14 PROCEDURE — 85025 COMPLETE CBC W/AUTO DIFF WBC: CPT | Performed by: NURSE PRACTITIONER

## 2019-12-14 PROCEDURE — 94799 UNLISTED PULMONARY SVC/PX: CPT

## 2019-12-14 PROCEDURE — 63710000001 INSULIN DETEMIR PER 5 UNITS: Performed by: NURSE PRACTITIONER

## 2019-12-14 PROCEDURE — 25010000002 FUROSEMIDE PER 20 MG: Performed by: INTERNAL MEDICINE

## 2019-12-14 PROCEDURE — 63710000001 INSULIN ASPART PER 5 UNITS: Performed by: NURSE PRACTITIONER

## 2019-12-14 PROCEDURE — 82962 GLUCOSE BLOOD TEST: CPT

## 2019-12-14 PROCEDURE — 25010000002 ENOXAPARIN PER 10 MG: Performed by: NURSE PRACTITIONER

## 2019-12-14 PROCEDURE — 80198 ASSAY OF THEOPHYLLINE: CPT | Performed by: INTERNAL MEDICINE

## 2019-12-14 PROCEDURE — 80048 BASIC METABOLIC PNL TOTAL CA: CPT | Performed by: NURSE PRACTITIONER

## 2019-12-14 PROCEDURE — 25010000002 METHYLPREDNISOLONE PER 40 MG: Performed by: INTERNAL MEDICINE

## 2019-12-14 RX ORDER — THEOPHYLLINE 400 MG/1
400 TABLET, EXTENDED RELEASE ORAL
Status: DISCONTINUED | OUTPATIENT
Start: 2019-12-15 | End: 2019-12-18 | Stop reason: HOSPADM

## 2019-12-14 RX ORDER — IPRATROPIUM BROMIDE AND ALBUTEROL SULFATE 2.5; .5 MG/3ML; MG/3ML
3 SOLUTION RESPIRATORY (INHALATION)
Status: DISCONTINUED | OUTPATIENT
Start: 2019-12-14 | End: 2019-12-17

## 2019-12-14 RX ADMIN — DOXYCYCLINE 100 MG: 100 INJECTION, POWDER, LYOPHILIZED, FOR SOLUTION INTRAVENOUS at 23:51

## 2019-12-14 RX ADMIN — ARFORMOTEROL TARTRATE 15 MCG: 15 SOLUTION RESPIRATORY (INHALATION) at 10:22

## 2019-12-14 RX ADMIN — DICYCLOMINE HYDROCHLORIDE 10 MG: 20 TABLET ORAL at 11:52

## 2019-12-14 RX ADMIN — FUROSEMIDE 20 MG: 10 INJECTION, SOLUTION INTRAMUSCULAR; INTRAVENOUS at 09:09

## 2019-12-14 RX ADMIN — DOCUSATE SODIUM 100 MG: 100 CAPSULE, LIQUID FILLED ORAL at 09:06

## 2019-12-14 RX ADMIN — POLYETHYLENE GLYCOL 3350 17 G: 17 POWDER, FOR SOLUTION ORAL at 09:09

## 2019-12-14 RX ADMIN — THEOPHYLLINE 200 MG: 400 TABLET, EXTENDED RELEASE ORAL at 09:07

## 2019-12-14 RX ADMIN — TERAZOSIN HYDROCHLORIDE ANHYDROUS 1 MG: 1 CAPSULE ORAL at 21:57

## 2019-12-14 RX ADMIN — INSULIN DETEMIR 65 UNITS: 100 INJECTION, SOLUTION SUBCUTANEOUS at 07:48

## 2019-12-14 RX ADMIN — INSULIN ASPART 20 UNITS: 100 INJECTION, SOLUTION INTRAVENOUS; SUBCUTANEOUS at 11:51

## 2019-12-14 RX ADMIN — DICYCLOMINE HYDROCHLORIDE 10 MG: 20 TABLET ORAL at 08:03

## 2019-12-14 RX ADMIN — DICYCLOMINE HYDROCHLORIDE 10 MG: 20 TABLET ORAL at 18:37

## 2019-12-14 RX ADMIN — IPRATROPIUM BROMIDE AND ALBUTEROL SULFATE 3 ML: .5; 3 SOLUTION RESPIRATORY (INHALATION) at 03:10

## 2019-12-14 RX ADMIN — HYDROCODONE BITARTRATE AND ACETAMINOPHEN 1 TABLET: 10; 325 TABLET ORAL at 08:00

## 2019-12-14 RX ADMIN — SODIUM CHLORIDE 4 ML: 7 NEBU SOLN,3 % NEBU at 19:06

## 2019-12-14 RX ADMIN — BUDESONIDE 0.5 MG: 0.5 SUSPENSION RESPIRATORY (INHALATION) at 10:22

## 2019-12-14 RX ADMIN — HYDROCODONE BITARTRATE AND ACETAMINOPHEN 1 TABLET: 10; 325 TABLET ORAL at 18:37

## 2019-12-14 RX ADMIN — DICYCLOMINE HYDROCHLORIDE 10 MG: 20 TABLET ORAL at 21:52

## 2019-12-14 RX ADMIN — FLUTICASONE PROPIONATE 2 SPRAY: 50 SPRAY, METERED NASAL at 09:09

## 2019-12-14 RX ADMIN — SODIUM CHLORIDE, PRESERVATIVE FREE 10 ML: 5 INJECTION INTRAVENOUS at 21:58

## 2019-12-14 RX ADMIN — BUDESONIDE 0.5 MG: 0.5 SUSPENSION RESPIRATORY (INHALATION) at 18:55

## 2019-12-14 RX ADMIN — DOXYCYCLINE 100 MG: 100 INJECTION, POWDER, LYOPHILIZED, FOR SOLUTION INTRAVENOUS at 00:57

## 2019-12-14 RX ADMIN — INSULIN ASPART 20 UNITS: 100 INJECTION, SOLUTION INTRAVENOUS; SUBCUTANEOUS at 07:49

## 2019-12-14 RX ADMIN — ACETAMINOPHEN 650 MG: 325 TABLET ORAL at 04:58

## 2019-12-14 RX ADMIN — INSULIN ASPART 16 UNITS: 100 INJECTION, SOLUTION INTRAVENOUS; SUBCUTANEOUS at 21:56

## 2019-12-14 RX ADMIN — HYDROCODONE BITARTRATE AND ACETAMINOPHEN 1 TABLET: 10; 325 TABLET ORAL at 11:50

## 2019-12-14 RX ADMIN — IPRATROPIUM BROMIDE AND ALBUTEROL SULFATE 3 ML: .5; 3 SOLUTION RESPIRATORY (INHALATION) at 00:00

## 2019-12-14 RX ADMIN — INSULIN ASPART 8 UNITS: 100 INJECTION, SOLUTION INTRAVENOUS; SUBCUTANEOUS at 11:50

## 2019-12-14 RX ADMIN — INSULIN ASPART 8 UNITS: 100 INJECTION, SOLUTION INTRAVENOUS; SUBCUTANEOUS at 07:47

## 2019-12-14 RX ADMIN — ARFORMOTEROL TARTRATE 15 MCG: 15 SOLUTION RESPIRATORY (INHALATION) at 22:26

## 2019-12-14 RX ADMIN — NICOTINE 1 PATCH: 21 PATCH TRANSDERMAL at 08:05

## 2019-12-14 RX ADMIN — METHYLPREDNISOLONE SODIUM SUCCINATE 40 MG: 40 INJECTION, POWDER, FOR SOLUTION INTRAMUSCULAR; INTRAVENOUS at 15:55

## 2019-12-14 RX ADMIN — ENOXAPARIN SODIUM 40 MG: 40 INJECTION SUBCUTANEOUS at 15:55

## 2019-12-14 RX ADMIN — IPRATROPIUM BROMIDE AND ALBUTEROL SULFATE 3 ML: .5; 3 SOLUTION RESPIRATORY (INHALATION) at 08:20

## 2019-12-14 RX ADMIN — HYDROCODONE BITARTRATE AND ACETAMINOPHEN 1 TABLET: 10; 325 TABLET ORAL at 21:51

## 2019-12-14 RX ADMIN — INSULIN DETEMIR 15 UNITS: 100 INJECTION, SOLUTION SUBCUTANEOUS at 21:56

## 2019-12-14 RX ADMIN — IPRATROPIUM BROMIDE AND ALBUTEROL SULFATE 3 ML: .5; 3 SOLUTION RESPIRATORY (INHALATION) at 11:56

## 2019-12-14 RX ADMIN — IPRATROPIUM BROMIDE AND ALBUTEROL SULFATE 3 ML: .5; 3 SOLUTION RESPIRATORY (INHALATION) at 15:28

## 2019-12-14 RX ADMIN — IPRATROPIUM BROMIDE AND ALBUTEROL SULFATE 3 ML: .5; 3 SOLUTION RESPIRATORY (INHALATION) at 18:55

## 2019-12-14 RX ADMIN — THEOPHYLLINE 200 MG: 400 TABLET, EXTENDED RELEASE ORAL at 11:54

## 2019-12-14 RX ADMIN — DOXYCYCLINE 100 MG: 100 INJECTION, POWDER, LYOPHILIZED, FOR SOLUTION INTRAVENOUS at 12:04

## 2019-12-14 RX ADMIN — SODIUM CHLORIDE 4 ML: 7 NEBU SOLN,3 % NEBU at 08:20

## 2019-12-14 RX ADMIN — METHYLPREDNISOLONE SODIUM SUCCINATE 40 MG: 40 INJECTION, POWDER, FOR SOLUTION INTRAMUSCULAR; INTRAVENOUS at 21:59

## 2019-12-14 RX ADMIN — ATORVASTATIN CALCIUM 10 MG: 10 TABLET, FILM COATED ORAL at 21:56

## 2019-12-14 RX ADMIN — PIOGLITAZONE 45 MG: 45 TABLET ORAL at 09:09

## 2019-12-14 RX ADMIN — DOCUSATE SODIUM 100 MG: 100 CAPSULE, LIQUID FILLED ORAL at 21:56

## 2019-12-14 RX ADMIN — SODIUM CHLORIDE, PRESERVATIVE FREE 10 ML: 5 INJECTION INTRAVENOUS at 08:00

## 2019-12-14 NOTE — PLAN OF CARE
Problem: Patient Care Overview  Goal: Plan of Care Review  Outcome: Ongoing (interventions implemented as appropriate)  Flowsheets (Taken 12/14/2019 3019)  Plan of Care Reviewed With: patient  Note:   No increased need for 02 this shift. No issues. Rested off and on. VSS.

## 2019-12-14 NOTE — PROGRESS NOTES
"                                              LOS: 4 days   Patient Care Team:  Ney Kathleen MD as PCP - General (Family Medicine)    Chief Complaint:  F/up respiratory failure, COPD exacerbation and medical problems listed below    Subjective   Interval History  On high flow Oxygen. Cough is minimal, mostly clear. No shortness of breat at rest but on minimal exertion.     REVIEW OF SYSTEMS:   CARDIOVASCULAR: No chest pain, chest pressure or chest discomfort. No palpitations or edema.   GASTROINTESTINAL: Normal BM. No constipation or abdominal pain  Neurology: No anxiety or depression.                 Physical Exam:     Vital Signs  Temp:  [97.6 °F (36.4 °C)-98.2 °F (36.8 °C)] 97.6 °F (36.4 °C)  Heart Rate:  [] 86  Resp:  [20-32] 20  BP: (110-156)/(73-93) 129/85    Intake/Output Summary (Last 24 hours) at 12/13/2019 2111  Last data filed at 12/13/2019 1700  Gross per 24 hour   Intake 1140 ml   Output 3650 ml   Net -2510 ml     Flowsheet Rows      First Filed Value   Admission Height  187.9 cm (73.98\") Documented at 12/07/2019 1126   Admission Weight  130 kg (286 lb) Documented at 12/07/2019 1126          General Appearance:    Alert, cooperative, in no acute distress   ENMT:  Mallampati score 3, dry mucous membrane   Eyes: Pupils equals and reactive to light. EOMI   Neck:   Large. Trachea midline. No thyromegaly.   Lungs:    Diminished breath sounds overall.  Minimal wheezing. Mild rales at the bases, R>L.  Nonlabored breathing at rest    Heart:    Regular rhythm and normal rate, normal S1 and S2, no            murmur   Skin:    No rash.   Abdomen:     Obese. Soft. No tenderness. No HSM.   Neuro:   Conscious, alert, oriented x3. Strength 5/5 in upper and lower ext   Extremities:   Moves all extremities well, +2 edema, no cyanosis, no             Redness          Results Review:        Results from last 7 days   Lab Units 12/13/19  0400 12/12/19  0401 12/11/19  0435   SODIUM mmol/L 135* 131* 135* "   POTASSIUM mmol/L 4.8 4.7 4.3   CHLORIDE mmol/L 93* 92* 94*   CO2 mmol/L 32.3* 29.6* 27.9   BUN mg/dL 17 14 17   CREATININE mg/dL 0.69* 0.64* 0.65*   GLUCOSE mg/dL 97 197* 136*   CALCIUM mg/dL 9.0 8.6 9.0     Results from last 7 days   Lab Units 12/08/19  1103 12/07/19  1130   TROPONIN T ng/mL <0.010 <0.010     Results from last 7 days   Lab Units 12/13/19  0400 12/12/19  0401 12/11/19  0435   WBC 10*3/mm3 7.30 8.07 8.56   HEMOGLOBIN g/dL 15.3 14.1 14.3   HEMATOCRIT % 46.7 43.3 43.6   PLATELETS 10*3/mm3 196 183 185     Results from last 7 days   Lab Units 12/08/19  1151   INR  0.95   APTT seconds 25.3     Results from last 7 days   Lab Units 12/12/19  0401   PROBNP pg/mL 50.3       I reviewed the patient's new clinical results.  I personally viewed and interpreted the patient's CXR        Medication Review:     0.9% sodium chloride for irrigation-mineral oil-glycerin 300 mL Rectal Once   arformoterol 15 mcg Nebulization BID - RT   atorvastatin 10 mg Oral Nightly   bisacodyl 10 mg Rectal Daily   budesonide 0.5 mg Nebulization BID - RT   dicyclomine 10 mg Oral 4x Daily   docusate sodium 100 mg Oral BID   doxycycline 100 mg Intravenous Q12H   enoxaparin 40 mg Subcutaneous Q24H   fluticasone 2 spray Each Nare BID   furosemide 20 mg Intravenous Daily   HYDROcodone-acetaminophen 1 tablet Oral 4x Daily   insulin aspart 0-24 Units Subcutaneous 4x Daily AC & at Bedtime   insulin aspart 20 Units Subcutaneous TID With Meals   insulin detemir 15 Units Subcutaneous Nightly   [START ON 12/14/2019] insulin detemir 65 Units Subcutaneous QAM   ipratropium-albuterol 3 mL Nebulization Q4H - RT   methylPREDNISolone sodium succinate 40 mg Intravenous Q8H   nicotine 1 patch Transdermal Q24H   pioglitazone 45 mg Oral Daily   polyethylene glycol 17 g Oral Daily   sodium chloride 10 mL Intravenous Q12H   sodium chloride 4 mL Nebulization BID - RT   terazosin 1 mg Oral Nightly   theophylline 200 mg Oral Q24H            Diagnostic  imaging:  I personally and independently reviewed the following images:  CXR 12/9/2019: Low lung volumes.  Increased pulmonary vascular markings.  CT chest 12/8/2019: Upper lobe predominant emphysema.  Peribronchial cuffing in the lower lobes.     KUB 12/11/2019:  Infiltrates/fluid/consolidation in the left lower lobe.  Gastric distention.    CXR 12/12/2019: Increased pulmonary vascular markings.  Worsening atelectasis in the right lower lobe and possible left pleural effusion.    Assessment   1. Acute exacerbation of COPD  2. Acute on chronic hypoxic respiratory failure, on oxygen 3.5 L/min at baseline  3. Acute bronchitis  4. Tobacco abuse  5. Ileus, probable  6. Bilateral lower lobe atelectasis  7. Possible pleural effusion    Pertinent medical problems:  · Diabetes mellitus type 2 with worsening hyperglycemia due to steroid        Plan   · Oxygen by [facility], at 60 L/min and FiO2 70%. Titrate as tolerated  · Reduce hypertonic saline to BID. Stop Mucomyst. Concerns that the frequent mucolytics making him have more bronchospasm  · Change DuoNeb to every 6 and continue Brovana and Pulmicort  · Continue Lasix 40 mg daily..  Although his echocardiogram showed normal EF and diastolic dysfunction, he may still have some volume overload  · Solu-Medrol to 40 mg every 8.  Wheezing has improved.  · Continue theophylline day 3. Check trough tomorrow AM.   · Finished antibiotic with doxycycline.  Doubt pneumonia.  Never had fever and no leukocytosis and had several normal procalcitonin.  · Flutter valve to advance mucus clearance  · Insulin per primary for hyperglycemia      Discussed with patient and RT          Salinas James MD  12/13/19  9:11 PM          This note was dictated utilizing Dragon dictation

## 2019-12-14 NOTE — PROGRESS NOTES
"Hospitalist Team      Patient Care Team:  Ney Kathleen MD as PCP - General (Family Medicine)        Chief Complaint:  Follow up end stage AECOPD/ acute on chronic respiratory failure    Subjective    Interval History and ROS:     Today patient does not feel much different.  He tells me, \"Maybe even worse.\"  Wheezing a lot which means he is moving air today.    Objective    Vital Signs  Temp:  [97.6 °F (36.4 °C)-97.8 °F (36.6 °C)] 97.6 °F (36.4 °C)  Heart Rate:  [] 85  Resp:  [20-28] 20  BP: (115-138)/(69-93) 136/78  Oxygen Therapy  SpO2: 94 %  Pulse Oximetry Type: Continuous  Device (Oxygen Therapy): heated, high-flow nasal cannula, humidified  $ High Flow Nasal Cannula Set-Up: yes  Flow (L/min): 60  Oxygen Concentration (%): 50  ETCO2 (mmHg): 32 mmHg  Oximetry Probe Site Changed: Yes(change to head probe)}  Flowsheet Rows      First Filed Value   Admission Height  187.9 cm (73.98\") Documented at 12/07/2019 1126   Admission Weight  130 kg (286 lb) Documented at 12/07/2019 1126        Body mass index is 35.67 kg/m².      Physical Exam:    Physical Exam   Constitutional: He is oriented to person, place, and time.   Cardiovascular: Normal rate and regular rhythm.   Pulmonary/Chest: Effort normal and breath sounds normal.   Abdominal: Soft. Bowel sounds are normal.   Musculoskeletal:   Moves all 4 extremities in bed   Neurological: He is alert and oriented to person, place, and time.   Skin: Skin is warm and dry.   Nursing note and vitals reviewed.      Results Review:     I reviewed the patient's new clinical results.    Lab Results (last 24 hours)     Procedure Component Value Units Date/Time    Theophylline Level [[id number]]  (Abnormal) Collected:  12/14/19 0558    Specimen:  Blood Updated:  12/14/19 0628     Theophylline Level 1.4 mcg/mL     Basic Metabolic Panel [[id number]]  (Abnormal) Collected:  12/14/19 0558    Specimen:  Blood Updated:  12/14/19 0628     Glucose 201 mg/dL      BUN 21 mg/dL      " Creatinine 0.74 mg/dL      Sodium 136 mmol/L      Potassium 4.2 mmol/L      Chloride 92 mmol/L      CO2 34.6 mmol/L      Calcium 8.9 mg/dL      eGFR Non African Amer 105 mL/min/1.73      BUN/Creatinine Ratio 28.4     Anion Gap 9.4 mmol/L     Narrative:       GFR Normal >60  Chronic Kidney Disease <60  Kidney Failure <15      CBC & Differential [[id number]] Collected:  12/14/19 0558    Specimen:  Blood Updated:  12/14/19 0602    Narrative:       The following orders were created for panel order CBC & Differential.  Procedure                               Abnormality         Status                     ---------                               -----------         ------                     CBC Auto Differential[[id number]]        Abnormal            Final result                 Please view results for these tests on the individual orders.    CBC Auto Differential [[id number]]  (Abnormal) Collected:  12/14/19 0558    Specimen:  Blood Updated:  12/14/19 0602     WBC 8.23 10*3/mm3      RBC 4.65 10*6/mm3      Hemoglobin 14.8 g/dL      Hematocrit 44.8 %      MCV 96.3 fL      MCH 31.8 pg      MCHC 33.0 g/dL      RDW 12.6 %      RDW-SD 45.1 fl      MPV 11.1 fL      Platelets 246 10*3/mm3      Neutrophil % 76.9 %      Lymphocyte % 11.9 %      Monocyte % 8.9 %      Eosinophil % 0.0 %      Basophil % 0.2 %      Immature Grans % 2.1 %      Neutrophils, Absolute 6.33 10*3/mm3      Lymphocytes, Absolute 0.98 10*3/mm3      Monocytes, Absolute 0.73 10*3/mm3      Eosinophils, Absolute 0.00 10*3/mm3      Basophils, Absolute 0.02 10*3/mm3      Immature Grans, Absolute 0.17 10*3/mm3      nRBC 0.0 /100 WBC     POC Glucose Once [[id number]]  (Abnormal) Collected:  12/13/19 2121    Specimen:  Blood Updated:  12/13/19 2129     Glucose 169 mg/dL     POC Glucose Once [[id number]]  (Abnormal) Collected:  12/13/19 1704    Specimen:  Blood Updated:  12/13/19 1712     Glucose 245 mg/dL     Blood Culture - Blood, Arm, Right [[id number]] Collected:   12/08/19 1225    Specimen:  Blood from Arm, Right Updated:  12/13/19 1245     Blood Culture No growth at 5 days    Blood Culture - Blood, Arm, Right [[id number]] Collected:  12/08/19 1235    Specimen:  Blood from Arm, Right Updated:  12/13/19 1245     Blood Culture No growth at 5 days    POC Glucose Once [[id number]]  (Abnormal) Collected:  12/13/19 1154    Specimen:  Blood Updated:  12/13/19 1213     Glucose 219 mg/dL           Imaging Results (Last 24 Hours)     ** No results found for the last 24 hours. **          Xray not reviewed personally by physician.      ECG not reviewed personally by physician  ECG/EMG Results (most recent)     Procedure Component Value Units Date/Time    ECG 12 Lead [[id number]] Collected:  12/07/19 1126     Updated:  12/07/19 1845    Narrative:       HEART RATE= 131  bpm  RR Interval= 456  ms  [place] Interval= 127  ms  P Horizontal Axis= -5  deg  P Front Axis= 49  deg  QRSD Interval= 95  ms  QT Interval= 303  ms  QRS Axis= 135  deg  T Wave Axis= 21  deg  - OTHERWISE NORMAL ECG -  Poor quality tracing repeat  Electronically Signed By: Lyle Middleton ([facility]) 07-Dec-2019 18:45:33  Date and Time of Study: 2019-12-07 11:26:15    ECG 12 Lead [[id number]] Collected:  12/08/19 0823     Updated:  12/08/19 1747    Narrative:       HEART RATE= 111  bpm  RR Interval= 540  ms  NY Interval= 165  ms  P Horizontal Axis= 4  deg  P Front Axis= 57  deg  QRSD Interval= 103  ms  QT Interval= 334  ms  QRS Axis= 59  deg  T Wave Axis= 35  deg  - OTHERWISE NORMAL ECG -  Poor quality tracing repeat  Electronically Signed By: Lyle Middleton ([facility]) 08-Dec-2019 17:47:26  Date and Time of Study: 2019-12-08 08:23:27    Adult Transthoracic Echo Complete W/ Cont if Necessary Per Protocol [[id number]] Collected:  12/09/19 0728     Updated:  12/09/19 0851     BSA 2.4 m^2      IVSd 1.2 cm      LVIDd 4.5 cm      LVIDs 2.6 cm      LVPWd 1.1 cm      IVS/LVPW 1.2     FS 40.7 %      EDV(Teich) 90.1 ml      ESV(Teich) 25.6 ml       EF(Teich) 71.6 %      EDV(cubed) 88.1 ml      ESV(cubed) 18.4 ml      EF(cubed) 79.1 %      LV mass(C)d 183.2 grams      LV mass(C)dI 75.4 grams/m^2      SV(Teich) 64.5 ml      SI(Teich) 26.6 ml/m^2      SV(cubed) 69.7 ml      SI(cubed) 28.7 ml/m^2      Ao root diam 3.1 cm      Ao root area 7.5 cm^2      LVOT diam 2.3 cm      LVOT area 4.2 cm^2      LVOT area(traced) 4.2 cm^2      LVLd ap4 9.0 cm      EDV(MOD-sp4) 115.0 ml      LVLs ap4 6.7 cm      ESV(MOD-sp4) 39.7 ml      EF(MOD-sp4) 65.5 %      LVLd ap2 8.0 cm      EDV(MOD-sp2) 84.5 ml      LVLs ap2 6.7 cm      ESV(MOD-sp2) 38.7 ml      EF(MOD-sp2) 54.2 %      SV(MOD-sp4) 75.3 ml      SI(MOD-sp4) 31.0 ml/m^2      SV(MOD-sp2) 45.8 ml      SI(MOD-sp2) 18.9 ml/m^2      Ao root area (BSA corrected) 1.3     LV Cifuentes Vol (BSA corrected) 47.4 ml/m^2      LV Sys Vol (BSA corrected) 16.4 ml/m^2      TAPSE (>1.6) 2.3 cm      MV E max jerome 95.1 cm/sec      MV A max jerome 124.0 cm/sec      MV E/A 0.77     MV V2 max 136.0 cm/sec      MV max PG 7.4 mmHg      MV V2 mean 87.0 cm/sec      MV mean PG 3.0 mmHg      MV V2 VTI 31.4 cm      MVA(VTI) 3.2 cm^2      MV P1/2t max jerome 105.0 cm/sec      MV P1/2t 70.4 msec      MVA(P1/2t) 3.1 cm^2      MV dec slope 437.0 cm/sec^2      MV dec time 169 sec      Ao pk jerome 161.0 cm/sec      Ao max PG 10.4 mmHg      Ao max PG (full) 4.0 mmHg      Ao V2 mean 117.0 cm/sec      Ao mean PG 6.0 mmHg      Ao mean PG (full) 2.0 mmHg      Ao V2 VTI 27.8 cm      CHRISSIE(I,A) 3.6 cm^2      CHRISSIE(I,D) 3.6 cm^2      CHRISSIE(V,A) 3.3 cm^2      CHRISSIE(V,D) 3.3 cm^2      LV V1 max PG 6.4 mmHg      LV V1 mean PG 4.0 mmHg      LV V1 max 126.0 cm/sec      LV V1 mean 97.1 cm/sec      LV V1 VTI 24.3 cm      SV(Ao) 209.8 ml      SI(Ao) 86.4 ml/m^2      SV(LVOT) 101.0 ml      SI(LVOT) 41.6 ml/m^2      PA V2 max 99.1 cm/sec      PA max PG 3.9 mmHg      PA max PG (full) 2.0 mmHg      PA acc time 0.11 sec      RV V1 max PG 1.9 mmHg      RV V1 mean PG 1.0 mmHg      RV V1 max 68.7  cm/sec      RV V1 mean 41.0 cm/sec      RV V1 VTI 11.7 cm      PA pr(Accel) 30.0 mmHg      MVA P1/2T LCG 2.1 cm^2       CV ECHO BRETT - BZI_BMI 36.9 kilograms/m^2       CV ECHO BRETT - BSA(HAYCOCK) 2.5 m^2       CV ECHO BRETT - BZI_METRIC_WEIGHT 123.4 kg       CV ECHO BRETT - BZI_METRIC_HEIGHT 182.9 cm      Target HR (85%) 130 bpm      Max. Pred. HR (100%) 153 bpm      TDI S' 14.00 cm/sec      RV Base 3.70 cm      Avg E/e' ratio 8.65     Lat Peak E' Carlos 10.0 cm/sec      Med Peak E' Carlos 12.00 cm/sec      RAP systole 3 mmHg     Narrative:       · Left ventricular systolic function is normal. Estimated EF appears to be   in the range of 56 - 60%. Normal left ventricular cavity size, wall   thickness and mass noted. Normal diastolic function.  · No significant valvular stenosis or regurgitation noted.       ECG 12 Lead [[id number]] Collected:  12/09/19 1411     Updated:  12/10/19 1218    Narrative:       HEART RATE= 97  bpm  RR Interval= 616  ms  ME Interval= 155  ms  P Horizontal Axis= 10  deg  P Front Axis= 55  deg  QRSD Interval= 96  ms  QT Interval= 332  ms  QRS Axis= 36  deg  T Wave Axis= 48  deg  - NORMAL ECG -  Sinus rhythm  NO SIGNIFICANT CHANGE FROM PREVIOUS ECG  Electronically Signed By: Demetri Zhang ([facility]) 10-Dec-2019 12:18:19  Date and Time of Study: 2019-12-09 14:11:27          Medication Review:   I have reviewed the patient's current medication list    Current Facility-Administered Medications:   •  0.9% sodium chloride for irrigation-mineral oil-glycerin (SMOG) enema 300 mL, 300 mL, Rectal, Once, Archana Yang, APRN, Stopped at 12/13/19 1042  •  acetaminophen (TYLENOL) tablet 650 mg, 650 mg, Oral, Q4H PRN, 650 mg at 12/14/19 0458 **OR** acetaminophen (TYLENOL) 160 MG/5ML solution 650 mg, 650 mg, Oral, Q4H PRN **OR** acetaminophen (TYLENOL) suppository 650 mg, 650 mg, Rectal, Q4H PRN, Archana Yang, NARENDRA  •  arformoterol (BROVANA) nebulizer solution 15 mcg, 15 mcg, Nebulization, BID - RT,  Salinas James MD, 15 mcg at 12/13/19 1934  •  atorvastatin (LIPITOR) tablet 10 mg, 10 mg, Oral, Nightly, Archana Yang APRN, 10 mg at 12/13/19 2136  •  bisacodyl (DULCOLAX) EC tablet 10 mg, 10 mg, Oral, Daily PRN, Archana Yang APRN  •  bisacodyl (DULCOLAX) suppository 10 mg, 10 mg, Rectal, Daily, Archana Yang APRN, Stopped at 12/13/19 0844  •  budesonide (PULMICORT) nebulizer solution 0.5 mg, 0.5 mg, Nebulization, BID - RT, Salinas James MD, 0.5 mg at 12/13/19 1935  •  dextrose (D50W) 25 g/ 50mL Intravenous Solution 25 g, 25 g, Intravenous, Q15 Min PRN, Archana Yang APRN  •  dextrose (GLUTOSE) oral gel 15 g, 15 g, Oral, Q15 Min PRN, Archana Yang APRJADEN  •  dicyclomine (BENTYL) tablet 10 mg, 10 mg, Oral, 4x Daily, Archana Yang APRN, 10 mg at 12/14/19 0803  •  docusate sodium (COLACE) capsule 100 mg, 100 mg, Oral, BID, Archana Yang APRN, 100 mg at 12/14/19 0906  •  [DISCONTINUED] cefTRIAXone (ROCEPHIN) IVPB 2 g/50ml dextrose (premix), 2 g, Intravenous, Q24H, Last Rate: 100 mL/hr at 12/09/19 1228, 2 g at 12/09/19 1228 **AND** doxycycline (VIBRAMYCIN) 100 mg in sodium chloride 0.9 % 250 mL IVPB, 100 mg, Intravenous, Q12H, Archana Yang APRN, Last Rate: 250 mL/hr at 12/13/19 0033, 100 mg at 12/14/19 0057  •  enoxaparin (LOVENOX) syringe 40 mg, 40 mg, Subcutaneous, Q24H, Archana Yang APRN, 40 mg at 12/13/19 1612  •  fluticasone (FLONASE) 50 MCG/ACT nasal spray 2 spray, 2 spray, Each Nare, BID, Archana Yang APRN, 2 spray at 12/14/19 0909  •  furosemide (LASIX) injection 20 mg, 20 mg, Intravenous, Daily, Salinas James MD, 20 mg at 12/14/19 0909  •  glucagon (GLUCAGEN) injection 1 mg, 1 mg, Subcutaneous, Q15 Min PRN, Archana Yang APRN  •  HYDROcodone-acetaminophen (NORCO)  MG per tablet 1 tablet, 1 tablet, Oral, 4x Daily, Archana Yang APRN, 1 tablet at 12/14/19 0800  •  insulin aspart (novoLOG) injection 0-24 Units, 0-24 Units,  Subcutaneous, 4x Daily AC & at Bedtime, Trey NARENDRA Loza, 8 Units at 12/14/19 0747  •  insulin aspart (novoLOG) injection 20 Units, 20 Units, Subcutaneous, TID With Meals, Trey NARENDRA Loza, 20 Units at 12/14/19 0749  •  insulin detemir (LEVEMIR) injection 15 Units, 15 Units, Subcutaneous, Nightly, Archana Yang APRN, 15 Units at 12/13/19 2126  •  insulin detemir (LEVEMIR) injection 65 Units, 65 Units, Subcutaneous, QAM, Trey NARENDRA Loza, 65 Units at 12/14/19 0748  •  ipratropium-albuterol (DUO-NEB) nebulizer solution 3 mL, 3 mL, Nebulization, Q4H - RT, Archana Yang APRN, 3 mL at 12/14/19 0820  •  ipratropium-albuterol (DUO-NEB) nebulizer solution 3 mL, 3 mL, Nebulization, Q4H PRN, Archana Yang APRN  •  magnesium hydroxide (MILK OF MAGNESIA) 400 MG/5ML suspension 30 mL, 30 mL, Oral, Daily PRN, Archana Yang APRN  •  magnesium sulfate 4 gram infusion - Mg less than or equal to 1mg/dL, 4 g, Intravenous, PRN **OR** magnesium sulfate 3 gram infusion (1gm x 3) - Mg 1.1 - 1.5 mg/dL, 1 g, Intravenous, PRN **OR** Magnesium Sulfate 2 gram infusion- Mg 1.6 - 1.9 mg/dL, 2 g, Intravenous, PRN, Archana Yang APRN  •  methylPREDNISolone sodium succinate (SOLU-Medrol) injection 40 mg, 40 mg, Intravenous, Q8H, Salinas James MD, 40 mg at 12/13/19 2136  •  nicotine (NICODERM CQ) 21 MG/24HR patch 1 patch, 1 patch, Transdermal, Q24H, Archana Yang APRN, 1 patch at 12/14/19 0805  •  nitroglycerin (NITROSTAT) SL tablet 0.4 mg, 0.4 mg, Sublingual, Q5 Min PRN, Archana Yang APRN  •  ondansetron (ZOFRAN) injection 4 mg, 4 mg, Intravenous, Q6H PRN, Shahana Adair MD, 4 mg at 12/11/19 0612  •  pioglitazone (ACTOS) tablet 45 mg, 45 mg, Oral, Daily, Archana Yang APRN, 45 mg at 12/14/19 0909  •  polyethylene glycol 3350 powder (packet), 17 g, Oral, Daily, Archana Ynag APRN, 17 g at 12/14/19 0909  •  potassium chloride (K-DUR,KLOR-CON) CR tablet 40  mEq, 40 mEq, Oral, PRN **OR** potassium chloride (KLOR-CON) packet 40 mEq, 40 mEq, Oral, PRN **OR** potassium chloride 10 mEq in 100 mL IVPB, 10 mEq, Intravenous, Q1H PRN, Trey, Archana R, APRN  •  sodium chloride 0.9 % flush 10 mL, 10 mL, Intravenous, PRN, Trey, Archana R, APRN  •  sodium chloride 0.9 % flush 10 mL, 10 mL, Intravenous, Q12H, Alcolu, Archana R, APRN, 10 mL at 12/14/19 0800  •  sodium chloride 0.9 % infusion 40 mL, 40 mL, Intravenous, PRN, Alcolu, Archana R, APRN  •  sodium chloride 0.9% - IBW for BMI > 30 bolus 2,328 mL, 30 mL/kg (Ideal), Intravenous, PRN, Trey, Archana R, APRN  •  sodium chloride 7 % nebulizer solution nebulizer solution 4 mL, 4 mL, Nebulization, BID - RT, Salinas James MD, 4 mL at 12/14/19 0820  •  sucralfate (CARAFATE) tablet 1 g, 1 g, Oral, 4x Daily PRN, Belem Jimenez DO, 1 g at 12/12/19 1821  •  terazosin (HYTRIN) capsule 1 mg, 1 mg, Oral, Nightly, Trey, Archana R, APRN, 1 mg at 12/13/19 2136  •  [START ON 12/15/2019] theophylline (UNIPHYL) 24 hr tablet 400 mg, 400 mg, Oral, Q24H, Salinas James MD      Assessment/Plan     Acute hypoxic respiratory failure  Lactic acidosis  Bilateral pleural effusions  Acute bronchitis  Steroid induced leukocytosis  Changes made by Pulmonary/ This has been a difficult case.   Patient on 50%FIO2/ heated high flow oxygen    Volume overload/ Had IV lasix yesterday    Narcotic bowel/ treating    Obesity  Body mass index is 35.67 kg/m².    Uncontrolled DM2 in obese patient    IBS    HLD    Electrolyte imbalance/ replacement protocol in place    BPH    Tobacco abuse        Plan for disposition:  Continued care and oxygen    Belem Jimenez DO  12/14/19  10:13 AM      Time: 15 min

## 2019-12-14 NOTE — PROGRESS NOTES
"                                              LOS: 5 days   Patient Care Team:  Ney Kathleen MD as PCP - General (Family Medicine)    Chief Complaint:  F/up respiratory failure, COPD exacerbation and medical problems listed below    Subjective   Interval History  Improved today.  FiO2 is down to 50%.  Remains on inspiratory flow rate 60 L/min which is maximum.    REVIEW OF SYSTEMS:   CARDIOVASCULAR: No chest pain, chest pressure or chest discomfort. No palpitations or edema.   GASTROINTESTINAL: Has not had a BM for 2 days.  Denies abdominal pain, nausea or vomiting  Neurology: No anxiety or depression.                 Physical Exam:     Vital Signs  Temp:  [97.6 °F (36.4 °C)-97.8 °F (36.6 °C)] 97.6 °F (36.4 °C)  Heart Rate:  [] 81  Resp:  [20-28] 22  BP: (115-138)/(69-93) 121/71    Intake/Output Summary (Last 24 hours) at 12/14/2019 0955  Last data filed at 12/13/2019 1700  Gross per 24 hour   Intake 800 ml   Output 2425 ml   Net -1625 ml     Flowsheet Rows      First Filed Value   Admission Height  187.9 cm (73.98\") Documented at 12/07/2019 1126   Admission Weight  130 kg (286 lb) Documented at 12/07/2019 1126          General Appearance:    Alert, cooperative, in no acute distress   ENMT:  Mallampati score 3, dry mucous membrane   Eyes: Pupils equals and reactive to light. EOMI   Neck:   Large. Trachea midline. No thyromegaly.   Lungs:    No wheezing today.  Crackles up to third of the chest bilaterally.  Diminished breath sounds at the bases.    Heart:    Regular rhythm and normal rate, normal S1 and S2, no            murmur   Skin:    No rash.   Abdomen:     Obese. Soft. No tenderness. No HSM.   Neuro:   Conscious, alert, oriented x3. Strength 5/5 in upper and lower ext   Extremities:   Moves all extremities well, +2 edema, no cyanosis, no             Redness          Results Review:        Results from last 7 days   Lab Units 12/14/19  0558 12/13/19  0400 12/12/19  0401   SODIUM mmol/L 136 135* 131* "   POTASSIUM mmol/L 4.2 4.8 4.7   CHLORIDE mmol/L 92* 93* 92*   CO2 mmol/L 34.6* 32.3* 29.6*   BUN mg/dL 21 17 14   CREATININE mg/dL 0.74* 0.69* 0.64*   GLUCOSE mg/dL 201* 97 197*   CALCIUM mg/dL 8.9 9.0 8.6     Results from last 7 days   Lab Units 12/08/19  1103 12/07/19  1130   TROPONIN T ng/mL <0.010 <0.010     Results from last 7 days   Lab Units 12/14/19  0558 12/13/19  0400 12/12/19  0401   WBC 10*3/mm3 8.23 7.30 8.07   HEMOGLOBIN g/dL 14.8 15.3 14.1   HEMATOCRIT % 44.8 46.7 43.3   PLATELETS 10*3/mm3 246 196 183     Results from last 7 days   Lab Units 12/08/19  1151   INR  0.95   APTT seconds 25.3     Results from last 7 days   Lab Units 12/12/19  0401   PROBNP pg/mL 50.3       I reviewed the patient's new clinical results.  I personally viewed and interpreted the patient's CXR        Medication Review:     0.9% sodium chloride for irrigation-mineral oil-glycerin 300 mL Rectal Once   arformoterol 15 mcg Nebulization BID - RT   atorvastatin 10 mg Oral Nightly   bisacodyl 10 mg Rectal Daily   budesonide 0.5 mg Nebulization BID - RT   dicyclomine 10 mg Oral 4x Daily   docusate sodium 100 mg Oral BID   doxycycline 100 mg Intravenous Q12H   enoxaparin 40 mg Subcutaneous Q24H   fluticasone 2 spray Each Nare BID   furosemide 20 mg Intravenous Daily   HYDROcodone-acetaminophen 1 tablet Oral 4x Daily   insulin aspart 0-24 Units Subcutaneous 4x Daily AC & at Bedtime   insulin aspart 20 Units Subcutaneous TID With Meals   insulin detemir 15 Units Subcutaneous Nightly   insulin detemir 65 Units Subcutaneous QAM   ipratropium-albuterol 3 mL Nebulization Q4H - RT   methylPREDNISolone sodium succinate 40 mg Intravenous Q8H   nicotine 1 patch Transdermal Q24H   pioglitazone 45 mg Oral Daily   polyethylene glycol 17 g Oral Daily   sodium chloride 10 mL Intravenous Q12H   sodium chloride 4 mL Nebulization BID - RT   terazosin 1 mg Oral Nightly   theophylline 200 mg Oral Q24H            Diagnostic imaging:  I personally and  independently reviewed the following images:  CXR 12/9/2019: Low lung volumes.  Increased pulmonary vascular markings.  CT chest 12/8/2019: Upper lobe predominant emphysema.  Peribronchial cuffing in the lower lobes.     KUB 12/11/2019:  Infiltrates/fluid/consolidation in the left lower lobe.  Gastric distention.    CXR 12/12/2019: Increased pulmonary vascular markings.  Worsening atelectasis in the right lower lobe and possible left pleural effusion.    Assessment   1. Acute exacerbation of COPD  2. Acute on chronic hypoxic respiratory failure, on oxygen 3.5 L/min at baseline  3. Acute bronchitis  4. Tobacco abuse  5. Ileus, probable  6. Bilateral lower lobe atelectasis  7. Possible pleural effusion    Pertinent medical problems:  · Diabetes mellitus type 2 with worsening hyperglycemia due to steroid        Plan   · Oxygen by [facility], at 60 L/min and FiO2 50%. Titrate flow rate first then FiO2  · Hypertonic saline to BID  · DuoNeb every 6, Brovana and Pulmicort  · Continue Lasix 40 mg daily..  Although his echocardiogram showed normal EF and diastolic dysfunction, he may still have some volume overload  · Solu-Medrol to 40 mg every 8.  Wheezing has improved.  · Increase theophylline to 400 mg  · Finished antibiotic with doxycycline.  Doubt pneumonia.  Never had fever and no leukocytosis and had several normal procalcitonin.  · Flutter valve to advance mucus clearance  · Insulin per primary for hyperglycemia      Discussed with patient and RT          Salinas James MD  12/14/19  9:55 AM          This note was dictated utilizing [employer] dictation

## 2019-12-15 LAB
ANION GAP SERPL CALCULATED.3IONS-SCNC: 12.5 MMOL/L (ref 5–15)
BASOPHILS # BLD AUTO: 0.02 10*3/MM3 (ref 0–0.2)
BASOPHILS NFR BLD AUTO: 0.2 % (ref 0–1.5)
BUN BLD-MCNC: 20 MG/DL (ref 8–23)
BUN/CREAT SERPL: 29.4 (ref 7–25)
CALCIUM SPEC-SCNC: 9 MG/DL (ref 8.6–10.5)
CHLORIDE SERPL-SCNC: 94 MMOL/L (ref 98–107)
CO2 SERPL-SCNC: 29.5 MMOL/L (ref 22–29)
CREAT BLD-MCNC: 0.68 MG/DL (ref 0.76–1.27)
DEPRECATED RDW RBC AUTO: 44.3 FL (ref 37–54)
EOSINOPHIL # BLD AUTO: 0 10*3/MM3 (ref 0–0.4)
EOSINOPHIL NFR BLD AUTO: 0 % (ref 0.3–6.2)
ERYTHROCYTE [DISTWIDTH] IN BLOOD BY AUTOMATED COUNT: 12.6 % (ref 12.3–15.4)
GFR SERPL CREATININE-BSD FRML MDRD: 116 ML/MIN/1.73
GLUCOSE BLD-MCNC: 224 MG/DL (ref 65–99)
GLUCOSE BLDC GLUCOMTR-MCNC: 183 MG/DL (ref 70–130)
GLUCOSE BLDC GLUCOMTR-MCNC: 195 MG/DL (ref 70–130)
GLUCOSE BLDC GLUCOMTR-MCNC: 222 MG/DL (ref 70–130)
GLUCOSE BLDC GLUCOMTR-MCNC: 251 MG/DL (ref 70–130)
HCT VFR BLD AUTO: 43.8 % (ref 37.5–51)
HGB BLD-MCNC: 14.7 G/DL (ref 13–17.7)
IMM GRANULOCYTES # BLD AUTO: 0.22 10*3/MM3 (ref 0–0.05)
IMM GRANULOCYTES NFR BLD AUTO: 2.4 % (ref 0–0.5)
LYMPHOCYTES # BLD AUTO: 0.8 10*3/MM3 (ref 0.7–3.1)
LYMPHOCYTES NFR BLD AUTO: 8.6 % (ref 19.6–45.3)
MCH RBC QN AUTO: 32.2 PG (ref 26.6–33)
MCHC RBC AUTO-ENTMCNC: 33.6 G/DL (ref 31.5–35.7)
MCV RBC AUTO: 95.8 FL (ref 79–97)
MONOCYTES # BLD AUTO: 0.6 10*3/MM3 (ref 0.1–0.9)
MONOCYTES NFR BLD AUTO: 6.4 % (ref 5–12)
NEUTROPHILS # BLD AUTO: 7.71 10*3/MM3 (ref 1.7–7)
NEUTROPHILS NFR BLD AUTO: 82.4 % (ref 42.7–76)
NRBC BLD AUTO-RTO: 0 /100 WBC (ref 0–0.2)
PLATELET # BLD AUTO: 271 10*3/MM3 (ref 140–450)
PMV BLD AUTO: 10.9 FL (ref 6–12)
POTASSIUM BLD-SCNC: 4.6 MMOL/L (ref 3.5–5.2)
RBC # BLD AUTO: 4.57 10*6/MM3 (ref 4.14–5.8)
SODIUM BLD-SCNC: 136 MMOL/L (ref 136–145)
WBC NRBC COR # BLD: 9.35 10*3/MM3 (ref 3.4–10.8)

## 2019-12-15 PROCEDURE — 82962 GLUCOSE BLOOD TEST: CPT

## 2019-12-15 PROCEDURE — 63710000001 INSULIN ASPART PER 5 UNITS: Performed by: NURSE PRACTITIONER

## 2019-12-15 PROCEDURE — 99231 SBSQ HOSP IP/OBS SF/LOW 25: CPT | Performed by: HOSPITALIST

## 2019-12-15 PROCEDURE — 94668 MNPJ CHEST WALL SBSQ: CPT

## 2019-12-15 PROCEDURE — 25010000002 FUROSEMIDE PER 20 MG: Performed by: INTERNAL MEDICINE

## 2019-12-15 PROCEDURE — 80048 BASIC METABOLIC PNL TOTAL CA: CPT | Performed by: NURSE PRACTITIONER

## 2019-12-15 PROCEDURE — 94799 UNLISTED PULMONARY SVC/PX: CPT

## 2019-12-15 PROCEDURE — 25010000002 METHYLPREDNISOLONE PER 40 MG: Performed by: INTERNAL MEDICINE

## 2019-12-15 PROCEDURE — 85025 COMPLETE CBC W/AUTO DIFF WBC: CPT | Performed by: NURSE PRACTITIONER

## 2019-12-15 PROCEDURE — 63710000001 INSULIN DETEMIR PER 5 UNITS: Performed by: NURSE PRACTITIONER

## 2019-12-15 PROCEDURE — 25010000002 ENOXAPARIN PER 10 MG: Performed by: NURSE PRACTITIONER

## 2019-12-15 RX ORDER — METHYLPREDNISOLONE SODIUM SUCCINATE 40 MG/ML
40 INJECTION, POWDER, LYOPHILIZED, FOR SOLUTION INTRAMUSCULAR; INTRAVENOUS EVERY 12 HOURS
Status: DISCONTINUED | OUTPATIENT
Start: 2019-12-15 | End: 2019-12-17

## 2019-12-15 RX ORDER — DICYCLOMINE HYDROCHLORIDE 10 MG/1
CAPSULE ORAL
Status: DISPENSED
Start: 2019-12-15 | End: 2019-12-16

## 2019-12-15 RX ADMIN — HYDROCODONE BITARTRATE AND ACETAMINOPHEN 1 TABLET: 10; 325 TABLET ORAL at 08:35

## 2019-12-15 RX ADMIN — METHYLPREDNISOLONE SODIUM SUCCINATE 40 MG: 40 INJECTION, POWDER, FOR SOLUTION INTRAMUSCULAR; INTRAVENOUS at 18:09

## 2019-12-15 RX ADMIN — DICYCLOMINE HYDROCHLORIDE 10 MG: 20 TABLET ORAL at 11:46

## 2019-12-15 RX ADMIN — DICYCLOMINE HYDROCHLORIDE 10 MG: 20 TABLET ORAL at 21:14

## 2019-12-15 RX ADMIN — IPRATROPIUM BROMIDE AND ALBUTEROL SULFATE 3 ML: .5; 3 SOLUTION RESPIRATORY (INHALATION) at 07:10

## 2019-12-15 RX ADMIN — BUDESONIDE 0.5 MG: 0.5 SUSPENSION RESPIRATORY (INHALATION) at 07:19

## 2019-12-15 RX ADMIN — INSULIN ASPART 20 UNITS: 100 INJECTION, SOLUTION INTRAVENOUS; SUBCUTANEOUS at 08:34

## 2019-12-15 RX ADMIN — TERAZOSIN HYDROCHLORIDE ANHYDROUS 1 MG: 1 CAPSULE ORAL at 21:03

## 2019-12-15 RX ADMIN — IPRATROPIUM BROMIDE AND ALBUTEROL SULFATE 3 ML: .5; 3 SOLUTION RESPIRATORY (INHALATION) at 18:51

## 2019-12-15 RX ADMIN — ARFORMOTEROL TARTRATE 15 MCG: 15 SOLUTION RESPIRATORY (INHALATION) at 19:06

## 2019-12-15 RX ADMIN — INSULIN DETEMIR 15 UNITS: 100 INJECTION, SOLUTION SUBCUTANEOUS at 21:15

## 2019-12-15 RX ADMIN — INSULIN DETEMIR 65 UNITS: 100 INJECTION, SOLUTION SUBCUTANEOUS at 08:34

## 2019-12-15 RX ADMIN — SODIUM CHLORIDE, PRESERVATIVE FREE 10 ML: 5 INJECTION INTRAVENOUS at 08:37

## 2019-12-15 RX ADMIN — HYDROCODONE BITARTRATE AND ACETAMINOPHEN 1 TABLET: 10; 325 TABLET ORAL at 18:07

## 2019-12-15 RX ADMIN — FLUTICASONE PROPIONATE 2 SPRAY: 50 SPRAY, METERED NASAL at 08:45

## 2019-12-15 RX ADMIN — ENOXAPARIN SODIUM 40 MG: 40 INJECTION SUBCUTANEOUS at 18:08

## 2019-12-15 RX ADMIN — IPRATROPIUM BROMIDE AND ALBUTEROL SULFATE 3 ML: .5; 3 SOLUTION RESPIRATORY (INHALATION) at 16:16

## 2019-12-15 RX ADMIN — SODIUM CHLORIDE 4 ML: 7 NEBU SOLN,3 % NEBU at 07:10

## 2019-12-15 RX ADMIN — DICYCLOMINE HYDROCHLORIDE 10 MG: 20 TABLET ORAL at 18:09

## 2019-12-15 RX ADMIN — HYDROCODONE BITARTRATE AND ACETAMINOPHEN 1 TABLET: 10; 325 TABLET ORAL at 21:15

## 2019-12-15 RX ADMIN — ATORVASTATIN CALCIUM 10 MG: 10 TABLET, FILM COATED ORAL at 21:04

## 2019-12-15 RX ADMIN — INSULIN ASPART 4 UNITS: 100 INJECTION, SOLUTION INTRAVENOUS; SUBCUTANEOUS at 11:52

## 2019-12-15 RX ADMIN — ACETAMINOPHEN 650 MG: 325 TABLET ORAL at 00:04

## 2019-12-15 RX ADMIN — INSULIN ASPART 4 UNITS: 100 INJECTION, SOLUTION INTRAVENOUS; SUBCUTANEOUS at 08:35

## 2019-12-15 RX ADMIN — INSULIN ASPART 20 UNITS: 100 INJECTION, SOLUTION INTRAVENOUS; SUBCUTANEOUS at 18:08

## 2019-12-15 RX ADMIN — SODIUM CHLORIDE, PRESERVATIVE FREE 10 ML: 5 INJECTION INTRAVENOUS at 21:04

## 2019-12-15 RX ADMIN — METHYLPREDNISOLONE SODIUM SUCCINATE 40 MG: 40 INJECTION, POWDER, FOR SOLUTION INTRAMUSCULAR; INTRAVENOUS at 06:34

## 2019-12-15 RX ADMIN — FUROSEMIDE 20 MG: 10 INJECTION, SOLUTION INTRAMUSCULAR; INTRAVENOUS at 08:44

## 2019-12-15 RX ADMIN — FLUTICASONE PROPIONATE 2 SPRAY: 50 SPRAY, METERED NASAL at 21:04

## 2019-12-15 RX ADMIN — NICOTINE 1 PATCH: 21 PATCH TRANSDERMAL at 08:45

## 2019-12-15 RX ADMIN — DOCUSATE SODIUM 100 MG: 100 CAPSULE, LIQUID FILLED ORAL at 08:45

## 2019-12-15 RX ADMIN — DOCUSATE SODIUM 100 MG: 100 CAPSULE, LIQUID FILLED ORAL at 21:04

## 2019-12-15 RX ADMIN — ARFORMOTEROL TARTRATE 15 MCG: 15 SOLUTION RESPIRATORY (INHALATION) at 07:24

## 2019-12-15 RX ADMIN — POLYETHYLENE GLYCOL 3350 17 G: 17 POWDER, FOR SOLUTION ORAL at 08:46

## 2019-12-15 RX ADMIN — BUDESONIDE 0.5 MG: 0.5 SUSPENSION RESPIRATORY (INHALATION) at 16:22

## 2019-12-15 RX ADMIN — PIOGLITAZONE 45 MG: 45 TABLET ORAL at 08:43

## 2019-12-15 RX ADMIN — SODIUM CHLORIDE 4 ML: 7 NEBU SOLN,3 % NEBU at 18:51

## 2019-12-15 RX ADMIN — INSULIN ASPART 20 UNITS: 100 INJECTION, SOLUTION INTRAVENOUS; SUBCUTANEOUS at 11:52

## 2019-12-15 RX ADMIN — DICYCLOMINE HYDROCHLORIDE 10 MG: 20 TABLET ORAL at 08:42

## 2019-12-15 RX ADMIN — INSULIN ASPART 8 UNITS: 100 INJECTION, SOLUTION INTRAVENOUS; SUBCUTANEOUS at 18:07

## 2019-12-15 RX ADMIN — HYDROCODONE BITARTRATE AND ACETAMINOPHEN 1 TABLET: 10; 325 TABLET ORAL at 11:40

## 2019-12-15 RX ADMIN — INSULIN ASPART 12 UNITS: 100 INJECTION, SOLUTION INTRAVENOUS; SUBCUTANEOUS at 21:15

## 2019-12-15 RX ADMIN — THEOPHYLLINE 400 MG: 400 TABLET, EXTENDED RELEASE ORAL at 08:43

## 2019-12-15 NOTE — PROGRESS NOTES
"Hospitalist Team      Patient Care Team:  Ney Kathleen MD as PCP - General (Family Medicine)        Chief Complaint: Follow-up end-stage COPD    Subjective    Interval History and ROS:     Patient States feeling better today  Patient Complaints: Short of air on exertion  Patient Denies: No nausea and vomiting.  Has his appetite back  History taken from: patient chart RN      Objective    Vital Signs  Temp:  [97.6 °F (36.4 °C)-98.7 °F (37.1 °C)] 98.7 °F (37.1 °C)  Heart Rate:  [] 115  Resp:  [20-28] 24  BP: (111-162)/(75-99) 128/85  Oxygen Therapy  SpO2: 94 %  Pulse Oximetry Type: Continuous  Device (Oxygen Therapy): high-flow nasal cannula  $ High Flow Nasal Cannula Set-Up: yes  Flow (L/min): 8  Oxygen Concentration (%): 50  ETCO2 (mmHg): 32 mmHg  Oximetry Probe Site Changed: Yes(change to head probe)}  Flowsheet Rows      First Filed Value   Admission Height  187.9 cm (73.98\") Documented at 12/07/2019 1126   Admission Weight  130 kg (286 lb) Documented at 12/07/2019 1126        Body mass index is 35.67 kg/m².      Physical Exam:    Physical Exam   Constitutional: He is oriented to person, place, and time.   Patient is alert and talking tonight.  Breathing better than he has.  Has appetite back   Cardiovascular: Normal rate and regular rhythm.   Pulmonary/Chest:   Still wheeze   Abdominal: Soft. Bowel sounds are normal.   Neurological: He is alert and oriented to person, place, and time.   Skin: Skin is warm and dry.   Nursing note and vitals reviewed.      Results Review:     I reviewed the patient's new clinical results.    Lab Results (last 24 hours)     Procedure Component Value Units Date/Time    POC Glucose Once [[id number]]  (Abnormal) Collected:  12/15/19 1145    Specimen:  Blood Updated:  12/15/19 1156     Glucose 183 mg/dL     POC Glucose Once [[id number]]  (Abnormal) Collected:  12/15/19 0730    Specimen:  Blood Updated:  12/15/19 0737     Glucose 195 mg/dL     Basic Metabolic Panel [[id number]]  " (Abnormal) Collected:  12/15/19 0531    Specimen:  Blood Updated:  12/15/19 0554     Glucose 224 mg/dL      BUN 20 mg/dL      Creatinine 0.68 mg/dL      Sodium 136 mmol/L      Potassium 4.6 mmol/L      Chloride 94 mmol/L      CO2 29.5 mmol/L      Calcium 9.0 mg/dL      eGFR Non African Amer 116 mL/min/1.73      BUN/Creatinine Ratio 29.4     Anion Gap 12.5 mmol/L     Narrative:       GFR Normal >60  Chronic Kidney Disease <60  Kidney Failure <15      CBC & Differential [[id number]] Collected:  12/15/19 0531    Specimen:  Blood Updated:  12/15/19 0537    Narrative:       The following orders were created for panel order CBC & Differential.  Procedure                               Abnormality         Status                     ---------                               -----------         ------                     CBC Auto Differential[[id number]]        Abnormal            Final result                 Please view results for these tests on the individual orders.    CBC Auto Differential [[id number]]  (Abnormal) Collected:  12/15/19 0531    Specimen:  Blood Updated:  12/15/19 0537     WBC 9.35 10*3/mm3      RBC 4.57 10*6/mm3      Hemoglobin 14.7 g/dL      Hematocrit 43.8 %      MCV 95.8 fL      MCH 32.2 pg      MCHC 33.6 g/dL      RDW 12.6 %      RDW-SD 44.3 fl      MPV 10.9 fL      Platelets 271 10*3/mm3      Neutrophil % 82.4 %      Lymphocyte % 8.6 %      Monocyte % 6.4 %      Eosinophil % 0.0 %      Basophil % 0.2 %      Immature Grans % 2.4 %      Neutrophils, Absolute 7.71 10*3/mm3      Lymphocytes, Absolute 0.80 10*3/mm3      Monocytes, Absolute 0.60 10*3/mm3      Eosinophils, Absolute 0.00 10*3/mm3      Basophils, Absolute 0.02 10*3/mm3      Immature Grans, Absolute 0.22 10*3/mm3      nRBC 0.0 /100 WBC     POC Glucose Once [[id number]]  (Abnormal) Collected:  12/14/19 2058    Specimen:  Blood Updated:  12/14/19 2106     Glucose 312 mg/dL           Imaging Results (Last 24 Hours)     ** No results found for the last  24 hours. **          Xray not reviewed personally by physician.      ECG not reviewed personally by physician  ECG/EMG Results (most recent)     Procedure Component Value Units Date/Time    ECG 12 Lead [[id number]] Collected:  12/07/19 1126     Updated:  12/07/19 1845    Narrative:       HEART RATE= 131  bpm  RR Interval= 456  ms  MS Interval= 127  ms  P Horizontal Axis= -5  deg  P Front Axis= 49  deg  QRSD Interval= 95  ms  QT Interval= 303  ms  QRS Axis= 135  deg  T Wave Axis= 21  deg  - OTHERWISE NORMAL ECG -  Poor quality tracing repeat  Electronically Signed By: Llye Middleton ([facility]) 07-Dec-2019 18:45:33  Date and Time of Study: 2019-12-07 11:26:15    ECG 12 Lead [[id number]] Collected:  12/08/19 0823     Updated:  12/08/19 1747    Narrative:       HEART RATE= 111  bpm  RR Interval= 540  ms  MS Interval= 165  ms  P Horizontal Axis= 4  deg  P Front Axis= 57  deg  QRSD Interval= 103  ms  QT Interval= 334  ms  QRS Axis= 59  deg  T Wave Axis= 35  deg  - OTHERWISE NORMAL ECG -  Poor quality tracing repeat  Electronically Signed By: Lyle Middleton ([facility]) 08-Dec-2019 17:47:26  Date and Time of Study: 2019-12-08 08:23:27    Adult Transthoracic Echo Complete W/ Cont if Necessary Per Protocol [[id number]] Collected:  12/09/19 0728     Updated:  12/09/19 0851     BSA 2.4 m^2      IVSd 1.2 cm      LVIDd 4.5 cm      LVIDs 2.6 cm      LVPWd 1.1 cm      IVS/LVPW 1.2     FS 40.7 %      EDV(Teich) 90.1 ml      ESV(Teich) 25.6 ml      EF(Teich) 71.6 %      EDV(cubed) 88.1 ml      ESV(cubed) 18.4 ml      EF(cubed) 79.1 %      LV mass(C)d 183.2 grams      LV mass(C)dI 75.4 grams/m^2      SV(Teich) 64.5 ml      SI(Teich) 26.6 ml/m^2      SV(cubed) 69.7 ml      SI(cubed) 28.7 ml/m^2      Ao root diam 3.1 cm      Ao root area 7.5 cm^2      LVOT diam 2.3 cm      LVOT area 4.2 cm^2      LVOT area(traced) 4.2 cm^2      LVLd ap4 9.0 cm      EDV(MOD-sp4) 115.0 ml      LVLs ap4 6.7 cm      ESV(MOD-sp4) 39.7 ml      EF(MOD-sp4) 65.5 %       LVLd ap2 8.0 cm      EDV(MOD-sp2) 84.5 ml      LVLs ap2 6.7 cm      ESV(MOD-sp2) 38.7 ml      EF(MOD-sp2) 54.2 %      SV(MOD-sp4) 75.3 ml      SI(MOD-sp4) 31.0 ml/m^2      SV(MOD-sp2) 45.8 ml      SI(MOD-sp2) 18.9 ml/m^2      Ao root area (BSA corrected) 1.3     LV Cifuentes Vol (BSA corrected) 47.4 ml/m^2      LV Sys Vol (BSA corrected) 16.4 ml/m^2      TAPSE (>1.6) 2.3 cm      MV E max carlos 95.1 cm/sec      MV A max carlos 124.0 cm/sec      MV E/A 0.77     MV V2 max 136.0 cm/sec      MV max PG 7.4 mmHg      MV V2 mean 87.0 cm/sec      MV mean PG 3.0 mmHg      MV V2 VTI 31.4 cm      MVA(VTI) 3.2 cm^2      MV P1/2t max carlos 105.0 cm/sec      MV P1/2t 70.4 msec      MVA(P1/2t) 3.1 cm^2      MV dec slope 437.0 cm/sec^2      MV dec time 169 sec      Ao pk carlos 161.0 cm/sec      Ao max PG 10.4 mmHg      Ao max PG (full) 4.0 mmHg      Ao V2 mean 117.0 cm/sec      Ao mean PG 6.0 mmHg      Ao mean PG (full) 2.0 mmHg      Ao V2 VTI 27.8 cm      CHRISSIE(I,A) 3.6 cm^2      CHRISSIE(I,D) 3.6 cm^2      CHRISSIE(V,A) 3.3 cm^2      CHRISSIE(V,D) 3.3 cm^2      LV V1 max PG 6.4 mmHg      LV V1 mean PG 4.0 mmHg      LV V1 max 126.0 cm/sec      LV V1 mean 97.1 cm/sec      LV V1 VTI 24.3 cm      SV(Ao) 209.8 ml      SI(Ao) 86.4 ml/m^2      SV(LVOT) 101.0 ml      SI(LVOT) 41.6 ml/m^2      PA V2 max 99.1 cm/sec      PA max PG 3.9 mmHg      PA max PG (full) 2.0 mmHg      PA acc time 0.11 sec      RV V1 max PG 1.9 mmHg      RV V1 mean PG 1.0 mmHg      RV V1 max 68.7 cm/sec      RV V1 mean 41.0 cm/sec      RV V1 VTI 11.7 cm      PA pr(Accel) 30.0 mmHg      MVA P1/2T LCG 2.1 cm^2       CV ECHO BRETT - BZI_BMI 36.9 kilograms/m^2       CV ECHO BRETT - BSA(HAYCOCK) 2.5 m^2       CV ECHO BRETT - BZI_METRIC_WEIGHT 123.4 kg       CV ECHO BRETT - BZI_METRIC_HEIGHT 182.9 cm      Target HR (85%) 130 bpm      Max. Pred. HR (100%) 153 bpm      TDI S' 14.00 cm/sec      RV Base 3.70 cm      Avg E/e' ratio 8.65     Lat Peak E' Carlos 10.0 cm/sec      Med Peak E' Carlos 12.00  cm/sec      RAP systole 3 mmHg     Narrative:       · Left ventricular systolic function is normal. Estimated EF appears to be   in the range of 56 - 60%. Normal left ventricular cavity size, wall   thickness and mass noted. Normal diastolic function.  · No significant valvular stenosis or regurgitation noted.       ECG 12 Lead [[id number]] Collected:  12/09/19 1411     Updated:  12/10/19 1218    Narrative:       HEART RATE= 97  bpm  RR Interval= 616  ms  KS Interval= 155  ms  P Horizontal Axis= 10  deg  P Front Axis= 55  deg  QRSD Interval= 96  ms  QT Interval= 332  ms  QRS Axis= 36  deg  T Wave Axis= 48  deg  - NORMAL ECG -  Sinus rhythm  NO SIGNIFICANT CHANGE FROM PREVIOUS ECG  Electronically Signed By: Demetri Zhang ([facility]) 10-Dec-2019 12:18:19  Date and Time of Study: 2019-12-09 14:11:27          Medication Review:   I have reviewed the patient's current medication list    Current Facility-Administered Medications:   •  0.9% sodium chloride for irrigation-mineral oil-glycerin (SMOG) enema 300 mL, 300 mL, Rectal, Once, Archana Yang APRN, Stopped at 12/13/19 1042  •  acetaminophen (TYLENOL) tablet 650 mg, 650 mg, Oral, Q4H PRN, 650 mg at 12/15/19 0004 **OR** acetaminophen (TYLENOL) 160 MG/5ML solution 650 mg, 650 mg, Oral, Q4H PRN **OR** acetaminophen (TYLENOL) suppository 650 mg, 650 mg, Rectal, Q4H PRN, Archana Yang APRN  •  arformoterol (BROVANA) nebulizer solution 15 mcg, 15 mcg, Nebulization, BID - RT, Salinas James MD, 15 mcg at 12/15/19 0724  •  atorvastatin (LIPITOR) tablet 10 mg, 10 mg, Oral, Nightly, Archana Yang APRN, 10 mg at 12/14/19 2156  •  bisacodyl (DULCOLAX) EC tablet 10 mg, 10 mg, Oral, Daily PRN, Archana Yang APRN  •  bisacodyl (DULCOLAX) suppository 10 mg, 10 mg, Rectal, Daily, Archana Yang APRN, Stopped at 12/13/19 1012  •  budesonide (PULMICORT) nebulizer solution 0.5 mg, 0.5 mg, Nebulization, BID - RT, Salinas James MD, 0.5 mg at 12/15/19 1622  •  dextrose  (D50W) 25 g/ 50mL Intravenous Solution 25 g, 25 g, Intravenous, Q15 Min PRN, Archana Yang APRN  •  dextrose (GLUTOSE) oral gel 15 g, 15 g, Oral, Q15 Min PRN, Archana Yang APRN  •  dicyclomine (BENTYL) tablet 10 mg, 10 mg, Oral, 4x Daily, Archana Yang APRN, 10 mg at 12/15/19 1146  •  docusate sodium (COLACE) capsule 100 mg, 100 mg, Oral, BID, Archana Yang APRN, 100 mg at 12/15/19 0845  •  enoxaparin (LOVENOX) syringe 40 mg, 40 mg, Subcutaneous, Q24H, Archana Yang APRN, 40 mg at 12/14/19 1555  •  fluticasone (FLONASE) 50 MCG/ACT nasal spray 2 spray, 2 spray, Each Nare, BID, Archana Yang APRN, 2 spray at 12/15/19 0845  •  furosemide (LASIX) injection 20 mg, 20 mg, Intravenous, Daily, Salinas James MD, 20 mg at 12/15/19 0844  •  glucagon (GLUCAGEN) injection 1 mg, 1 mg, Subcutaneous, Q15 Min PRN, Archana Yang APRN  •  HYDROcodone-acetaminophen (NORCO)  MG per tablet 1 tablet, 1 tablet, Oral, 4x Daily, Archana Yang APRN, 1 tablet at 12/15/19 1140  •  insulin aspart (novoLOG) injection 0-24 Units, 0-24 Units, Subcutaneous, 4x Daily AC & at Bedtime, Archana Yang APRN, 4 Units at 12/15/19 1152  •  insulin aspart (novoLOG) injection 20 Units, 20 Units, Subcutaneous, TID With Meals, Archana Yang APRN, 20 Units at 12/15/19 1152  •  insulin detemir (LEVEMIR) injection 15 Units, 15 Units, Subcutaneous, Nightly, Archana Yang APRN, 15 Units at 12/14/19 2156  •  insulin detemir (LEVEMIR) injection 65 Units, 65 Units, Subcutaneous, QAM, Archana Yang, APRN, 65 Units at 12/15/19 0834  •  ipratropium-albuterol (DUO-NEB) nebulizer solution 3 mL, 3 mL, Nebulization, Q4H PRN, Archana Yang APRN  •  ipratropium-albuterol (DUO-NEB) nebulizer solution 3 mL, 3 mL, Nebulization, 4x Daily - RT, Salinas James MD, 3 mL at 12/15/19 1616  •  magnesium hydroxide (MILK OF MAGNESIA) 400 MG/5ML suspension 30 mL, 30 mL, Oral, Daily PRN, Archana Yang,  APRN  •  magnesium sulfate 4 gram infusion - Mg less than or equal to 1mg/dL, 4 g, Intravenous, PRN **OR** magnesium sulfate 3 gram infusion (1gm x 3) - Mg 1.1 - 1.5 mg/dL, 1 g, Intravenous, PRN **OR** Magnesium Sulfate 2 gram infusion- Mg 1.6 - 1.9 mg/dL, 2 g, Intravenous, PRN, TreyArchana reeves R, APRN  •  methylPREDNISolone sodium succinate (SOLU-Medrol) injection 40 mg, 40 mg, Intravenous, Q12H, Salinas James MD  •  nicotine (NICODERM CQ) 21 MG/24HR patch 1 patch, 1 patch, Transdermal, Q24H, Archana Yang, APRN, 1 patch at 12/15/19 0845  •  nitroglycerin (NITROSTAT) SL tablet 0.4 mg, 0.4 mg, Sublingual, Q5 Min PRN, Archana Yang R, APRN  •  ondansetron (ZOFRAN) injection 4 mg, 4 mg, Intravenous, Q6H PRN, Shahana Adair MD, 4 mg at 12/11/19 0612  •  pioglitazone (ACTOS) tablet 45 mg, 45 mg, Oral, Daily, Archana Yang, APRN, 45 mg at 12/15/19 0843  •  polyethylene glycol 3350 powder (packet), 17 g, Oral, Daily, Archana Yang R, APRN, 17 g at 12/15/19 0846  •  potassium chloride (K-DUR,KLOR-CON) CR tablet 40 mEq, 40 mEq, Oral, PRN **OR** potassium chloride (KLOR-CON) packet 40 mEq, 40 mEq, Oral, PRN **OR** potassium chloride 10 mEq in 100 mL IVPB, 10 mEq, Intravenous, Q1H PRN, Kenny Yangana R, APRN  •  sodium chloride 0.9 % flush 10 mL, 10 mL, Intravenous, PRN, Cecil, Archana R, APRN  •  sodium chloride 0.9 % flush 10 mL, 10 mL, Intravenous, Q12H, Archana Yang R, APRN, 10 mL at 12/15/19 0837  •  sodium chloride 0.9 % infusion 40 mL, 40 mL, Intravenous, PRN, Archana Yang, APRN  •  sodium chloride 0.9% - IBW for BMI > 30 bolus 2,328 mL, 30 mL/kg (Ideal), Intravenous, PRN, Archana Yang, APRN  •  sodium chloride 7 % nebulizer solution nebulizer solution 4 mL, 4 mL, Nebulization, BID - RT, Salinas James MD, 4 mL at 12/15/19 0710  •  sucralfate (CARAFATE) tablet 1 g, 1 g, Oral, 4x Daily PRN, Belem Jimenez DO, 1 g at 12/12/19 1821  •  terazosin (HYTRIN) capsule 1  mg, 1 mg, Oral, Nightly, Arhcana Yang, APRN, 1 mg at 12/14/19 6269  •  theophylline (UNIPHYL) 24 hr tablet 400 mg, 400 mg, Oral, Q24H, Salinas James MD, 400 mg at 12/15/19 0807      Assessment/Plan        Acute hypoxic respiratory failure  Lactic acidosis  Bilateral pleural effusions  Acute bronchitis  Steroid induced leukocytosis  Changes made by Pulmonary/ This has been a difficult case.   Patient on 50%FIO2/ heated high flow oxygen     Volume overload/ Had IV lasix yesterday     Narcotic bowel/ treating     Obesity  Body mass index is 35.67 kg/m².     Uncontrolled DM2 in obese patient     IBS     HLD     Electrolyte imbalance/ replacement protocol in place     BPH     Tobacco abuse       Plan for disposition:  Continued care    Belem Jimenez DO  12/15/19  5:21 PM      Time: 15 min

## 2019-12-15 NOTE — PROGRESS NOTES
"                                              LOS: 6 days   Patient Care Team:  Ney Kathleen MD as PCP - General (Family Medicine)    Chief Complaint:  F/up respiratory failure, COPD exacerbation and medical problems listed below    Subjective   Interval History  Taken off OptiFlow yesterday.  Currently on high flow nasal cannula.  Reported cough with greenish phlegm and he showed me a cup, quarter full of greenish sputum.    REVIEW OF SYSTEMS:   CARDIOVASCULAR: No chest pain, chest pressure or chest discomfort. No palpitations or edema.   Constitutional: No fever or chills                 Physical Exam:     Vital Signs  Temp:  [97.6 °F (36.4 °C)-97.7 °F (36.5 °C)] 97.6 °F (36.4 °C)  Heart Rate:  [] 81  Resp:  [20-28] 24  BP: (111-135)/(72-89) 111/77    Intake/Output Summary (Last 24 hours) at 12/15/2019 1018  Last data filed at 12/15/2019 0500  Gross per 24 hour   Intake 1540 ml   Output 1950 ml   Net -410 ml     Flowsheet Rows      First Filed Value   Admission Height  187.9 cm (73.98\") Documented at 12/07/2019 1126   Admission Weight  130 kg (286 lb) Documented at 12/07/2019 1126          General Appearance:    Alert, cooperative, in no acute distress   ENMT:  Mallampati score 3, dry mucous membrane   Eyes: Pupils equals and reactive to light. EOMI   Neck:   Large. Trachea midline. No thyromegaly.   Lungs:    Minimal bilateral wheezing.  Crackles bilaterally up to third of the chest.    Heart:    Regular rhythm and normal rate, normal S1 and S2, no            murmur   Skin:    No rash.   Abdomen:     Obese. Soft. No tenderness. No HSM.   Neuro:   Conscious, alert, oriented x3. Strength 5/5 in upper and lower ext   Extremities:   Moves all extremities well, trace edema, no cyanosis, no             Redness          Results Review:        Results from last 7 days   Lab Units 12/15/19  0531 12/14/19  0558 12/13/19  0400   SODIUM mmol/L 136 136 135*   POTASSIUM mmol/L 4.6 4.2 4.8   CHLORIDE mmol/L 94* 92* " 93*   CO2 mmol/L 29.5* 34.6* 32.3*   BUN mg/dL 20 21 17   CREATININE mg/dL 0.68* 0.74* 0.69*   GLUCOSE mg/dL 224* 201* 97   CALCIUM mg/dL 9.0 8.9 9.0     Results from last 7 days   Lab Units 12/08/19  1103   TROPONIN T ng/mL <0.010     Results from last 7 days   Lab Units 12/15/19  0531 12/14/19  0558 12/13/19  0400   WBC 10*3/mm3 9.35 8.23 7.30   HEMOGLOBIN g/dL 14.7 14.8 15.3   HEMATOCRIT % 43.8 44.8 46.7   PLATELETS 10*3/mm3 271 246 196     Results from last 7 days   Lab Units 12/08/19  1151   INR  0.95   APTT seconds 25.3     Results from last 7 days   Lab Units 12/12/19  0401   PROBNP pg/mL 50.3       I reviewed the patient's new clinical results.  I personally viewed and interpreted the patient's CXR        Medication Review:     0.9% sodium chloride for irrigation-mineral oil-glycerin 300 mL Rectal Once   arformoterol 15 mcg Nebulization BID - RT   atorvastatin 10 mg Oral Nightly   bisacodyl 10 mg Rectal Daily   budesonide 0.5 mg Nebulization BID - RT   dicyclomine 10 mg Oral 4x Daily   docusate sodium 100 mg Oral BID   enoxaparin 40 mg Subcutaneous Q24H   fluticasone 2 spray Each Nare BID   furosemide 20 mg Intravenous Daily   HYDROcodone-acetaminophen 1 tablet Oral 4x Daily   insulin aspart 0-24 Units Subcutaneous 4x Daily AC & at Bedtime   insulin aspart 20 Units Subcutaneous TID With Meals   insulin detemir 15 Units Subcutaneous Nightly   insulin detemir 65 Units Subcutaneous QAM   ipratropium-albuterol 3 mL Nebulization 4x Daily - RT   methylPREDNISolone sodium succinate 40 mg Intravenous Q8H   nicotine 1 patch Transdermal Q24H   pioglitazone 45 mg Oral Daily   polyethylene glycol 17 g Oral Daily   sodium chloride 10 mL Intravenous Q12H   sodium chloride 4 mL Nebulization BID - RT   terazosin 1 mg Oral Nightly   theophylline 400 mg Oral Q24H            Diagnostic imaging:  I personally and independently reviewed the following images:  CXR 12/9/2019: Low lung volumes.  Increased pulmonary vascular  markings.  CT chest 12/8/2019: Upper lobe predominant emphysema.  Peribronchial cuffing in the lower lobes.     KUB 12/11/2019:  Infiltrates/fluid/consolidation in the left lower lobe.  Gastric distention.    CXR 12/12/2019: Increased pulmonary vascular markings.  Worsening atelectasis in the right lower lobe and possible left pleural effusion.    Assessment   1. Acute exacerbation of COPD  2. Acute on chronic hypoxic respiratory failure, on oxygen 3.5 L/min at baseline  3. Acute bronchitis  4. Tobacco abuse  5. Ileus, probable  6. Bilateral lower lobe atelectasis  7. Possible pleural effusion    Pertinent medical problems:  · Diabetes mellitus type 2 with worsening hyperglycemia due to steroid        Plan   · Switched to oxygen high flow nasal cannula, 7 L/min  · Finish doxycycline.  Check sputum culture today.  He has greenish phlegm.  · Hypertonic saline nebulizer twice a day  · DuoNeb every 6, Brovana and Pulmicort  · Continue Lasix 20 mg daily.  · Reduce Solu-Medrol to 40 mg twice daily and can transition to prednisone in the next few days  · Continue theophylline 400 mg day 2.  Check level on day 3  · Flutter valve to advance mucus clearance  · Insulin per primary for hyperglycemia              Salinas James MD  12/15/19  10:18 AM          This note was dictated utilizing Dragon dictation

## 2019-12-15 NOTE — PLAN OF CARE
Problem: Patient Care Overview  Goal: Plan of Care Review  Outcome: Ongoing (interventions implemented as appropriate)  Flowsheets (Taken 12/14/2019 1933)  Progress: improving  Plan of Care Reviewed With: patient; son  Note:   Pt had a better day today, was up in the chair. Family at bedside. Heated high flow o2 now at 50liters, 50%. Sats maintaining 92-93%. Theophylline increased to 400mg today and doxycycline 100mg cont as ordered. Appetite good today and accu check 120-220. Insulin given as ordered.  Nebulizer now qid. Tele SR 90's

## 2019-12-15 NOTE — PLAN OF CARE
Problem: Patient Care Overview  Goal: Plan of Care Review  Outcome: Ongoing (interventions implemented as appropriate)  Note:   Slept better this night. 02 decreased to 45%, 50L. VSS.  No new issues.

## 2019-12-16 ENCOUNTER — APPOINTMENT (OUTPATIENT)
Dept: GENERAL RADIOLOGY | Facility: HOSPITAL | Age: 69
End: 2019-12-16

## 2019-12-16 LAB
ANION GAP SERPL CALCULATED.3IONS-SCNC: 12.1 MMOL/L (ref 5–15)
BASOPHILS # BLD AUTO: 0.01 10*3/MM3 (ref 0–0.2)
BASOPHILS NFR BLD AUTO: 0.1 % (ref 0–1.5)
BUN BLD-MCNC: 19 MG/DL (ref 8–23)
BUN/CREAT SERPL: 30.2 (ref 7–25)
CALCIUM SPEC-SCNC: 8.9 MG/DL (ref 8.6–10.5)
CHLORIDE SERPL-SCNC: 91 MMOL/L (ref 98–107)
CO2 SERPL-SCNC: 26.9 MMOL/L (ref 22–29)
CREAT BLD-MCNC: 0.63 MG/DL (ref 0.76–1.27)
CRP SERPL-MCNC: 0.43 MG/DL (ref 0–0.5)
DEPRECATED RDW RBC AUTO: 44.7 FL (ref 37–54)
EOSINOPHIL # BLD AUTO: 0.06 10*3/MM3 (ref 0–0.4)
EOSINOPHIL NFR BLD AUTO: 0.6 % (ref 0.3–6.2)
ERYTHROCYTE [DISTWIDTH] IN BLOOD BY AUTOMATED COUNT: 12.4 % (ref 12.3–15.4)
GFR SERPL CREATININE-BSD FRML MDRD: 127 ML/MIN/1.73
GLUCOSE BLD-MCNC: 316 MG/DL (ref 65–99)
GLUCOSE BLDC GLUCOMTR-MCNC: 101 MG/DL (ref 70–130)
GLUCOSE BLDC GLUCOMTR-MCNC: 256 MG/DL (ref 70–130)
GLUCOSE BLDC GLUCOMTR-MCNC: 344 MG/DL (ref 70–130)
HCT VFR BLD AUTO: 46.6 % (ref 37.5–51)
HGB BLD-MCNC: 14.7 G/DL (ref 13–17.7)
IMM GRANULOCYTES # BLD AUTO: 0.41 10*3/MM3 (ref 0–0.05)
IMM GRANULOCYTES NFR BLD AUTO: 4.1 % (ref 0–0.5)
LYMPHOCYTES # BLD AUTO: 1.8 10*3/MM3 (ref 0.7–3.1)
LYMPHOCYTES NFR BLD AUTO: 18.1 % (ref 19.6–45.3)
MAGNESIUM SERPL-MCNC: 1.9 MG/DL (ref 1.6–2.4)
MCH RBC QN AUTO: 30.7 PG (ref 26.6–33)
MCHC RBC AUTO-ENTMCNC: 31.5 G/DL (ref 31.5–35.7)
MCV RBC AUTO: 97.3 FL (ref 79–97)
MONOCYTES # BLD AUTO: 0.92 10*3/MM3 (ref 0.1–0.9)
MONOCYTES NFR BLD AUTO: 9.3 % (ref 5–12)
NEUTROPHILS # BLD AUTO: 6.73 10*3/MM3 (ref 1.7–7)
NEUTROPHILS NFR BLD AUTO: 67.8 % (ref 42.7–76)
PHOSPHATE SERPL-MCNC: 3.1 MG/DL (ref 2.5–4.5)
PLATELET # BLD AUTO: 287 10*3/MM3 (ref 140–450)
PMV BLD AUTO: 10.9 FL (ref 6–12)
POTASSIUM BLD-SCNC: 4.2 MMOL/L (ref 3.5–5.2)
PROCALCITONIN SERPL-MCNC: 0.11 NG/ML (ref 0.1–0.25)
RBC # BLD AUTO: 4.79 10*6/MM3 (ref 4.14–5.8)
SODIUM BLD-SCNC: 130 MMOL/L (ref 136–145)
WBC NRBC COR # BLD: 9.93 10*3/MM3 (ref 3.4–10.8)

## 2019-12-16 PROCEDURE — 82962 GLUCOSE BLOOD TEST: CPT

## 2019-12-16 PROCEDURE — 94799 UNLISTED PULMONARY SVC/PX: CPT

## 2019-12-16 PROCEDURE — 25010000002 METHYLPREDNISOLONE PER 40 MG: Performed by: INTERNAL MEDICINE

## 2019-12-16 PROCEDURE — 85025 COMPLETE CBC W/AUTO DIFF WBC: CPT | Performed by: NURSE PRACTITIONER

## 2019-12-16 PROCEDURE — 99233 SBSQ HOSP IP/OBS HIGH 50: CPT | Performed by: INTERNAL MEDICINE

## 2019-12-16 PROCEDURE — 84100 ASSAY OF PHOSPHORUS: CPT | Performed by: INTERNAL MEDICINE

## 2019-12-16 PROCEDURE — 25010000002 FUROSEMIDE PER 20 MG: Performed by: INTERNAL MEDICINE

## 2019-12-16 PROCEDURE — 83735 ASSAY OF MAGNESIUM: CPT | Performed by: INTERNAL MEDICINE

## 2019-12-16 PROCEDURE — 80048 BASIC METABOLIC PNL TOTAL CA: CPT | Performed by: NURSE PRACTITIONER

## 2019-12-16 PROCEDURE — 86140 C-REACTIVE PROTEIN: CPT | Performed by: INTERNAL MEDICINE

## 2019-12-16 PROCEDURE — 63710000001 INSULIN ASPART PER 5 UNITS: Performed by: NURSE PRACTITIONER

## 2019-12-16 PROCEDURE — 25010000002 ENOXAPARIN PER 10 MG: Performed by: NURSE PRACTITIONER

## 2019-12-16 PROCEDURE — 71045 X-RAY EXAM CHEST 1 VIEW: CPT

## 2019-12-16 PROCEDURE — 84145 PROCALCITONIN (PCT): CPT | Performed by: INTERNAL MEDICINE

## 2019-12-16 PROCEDURE — 94668 MNPJ CHEST WALL SBSQ: CPT

## 2019-12-16 PROCEDURE — C1751 CATH, INF, PER/CENT/MIDLINE: HCPCS

## 2019-12-16 PROCEDURE — 63710000001 INSULIN DETEMIR PER 5 UNITS: Performed by: NURSE PRACTITIONER

## 2019-12-16 PROCEDURE — 02HV33Z INSERTION OF INFUSION DEVICE INTO SUPERIOR VENA CAVA, PERCUTANEOUS APPROACH: ICD-10-PCS | Performed by: HOSPITALIST

## 2019-12-16 RX ORDER — SODIUM CHLORIDE 0.9 % (FLUSH) 0.9 %
10 SYRINGE (ML) INJECTION AS NEEDED
Status: DISCONTINUED | OUTPATIENT
Start: 2019-12-16 | End: 2019-12-18 | Stop reason: HOSPADM

## 2019-12-16 RX ORDER — SODIUM CHLORIDE 0.9 % (FLUSH) 0.9 %
20 SYRINGE (ML) INJECTION AS NEEDED
Status: DISCONTINUED | OUTPATIENT
Start: 2019-12-16 | End: 2019-12-18 | Stop reason: HOSPADM

## 2019-12-16 RX ORDER — SODIUM CHLORIDE 0.9 % (FLUSH) 0.9 %
10 SYRINGE (ML) INJECTION EVERY 12 HOURS SCHEDULED
Status: DISCONTINUED | OUTPATIENT
Start: 2019-12-16 | End: 2019-12-18 | Stop reason: HOSPADM

## 2019-12-16 RX ADMIN — BUDESONIDE 0.5 MG: 0.5 SUSPENSION RESPIRATORY (INHALATION) at 18:50

## 2019-12-16 RX ADMIN — DICYCLOMINE HYDROCHLORIDE 10 MG: 20 TABLET ORAL at 20:30

## 2019-12-16 RX ADMIN — INSULIN ASPART 16 UNITS: 100 INJECTION, SOLUTION INTRAVENOUS; SUBCUTANEOUS at 08:14

## 2019-12-16 RX ADMIN — SODIUM CHLORIDE, PRESERVATIVE FREE 10 ML: 5 INJECTION INTRAVENOUS at 20:35

## 2019-12-16 RX ADMIN — ARFORMOTEROL TARTRATE 15 MCG: 15 SOLUTION RESPIRATORY (INHALATION) at 07:25

## 2019-12-16 RX ADMIN — INSULIN ASPART 20 UNITS: 100 INJECTION, SOLUTION INTRAVENOUS; SUBCUTANEOUS at 08:15

## 2019-12-16 RX ADMIN — PIOGLITAZONE 45 MG: 45 TABLET ORAL at 08:12

## 2019-12-16 RX ADMIN — IPRATROPIUM BROMIDE AND ALBUTEROL SULFATE 3 ML: .5; 3 SOLUTION RESPIRATORY (INHALATION) at 11:31

## 2019-12-16 RX ADMIN — SODIUM CHLORIDE 4 ML: 7 NEBU SOLN,3 % NEBU at 07:14

## 2019-12-16 RX ADMIN — THEOPHYLLINE 400 MG: 400 TABLET, EXTENDED RELEASE ORAL at 08:20

## 2019-12-16 RX ADMIN — SODIUM CHLORIDE, PRESERVATIVE FREE 10 ML: 5 INJECTION INTRAVENOUS at 20:30

## 2019-12-16 RX ADMIN — FUROSEMIDE 20 MG: 10 INJECTION, SOLUTION INTRAMUSCULAR; INTRAVENOUS at 08:13

## 2019-12-16 RX ADMIN — SODIUM CHLORIDE, PRESERVATIVE FREE 10 ML: 5 INJECTION INTRAVENOUS at 18:05

## 2019-12-16 RX ADMIN — BUDESONIDE 0.5 MG: 0.5 SUSPENSION RESPIRATORY (INHALATION) at 07:25

## 2019-12-16 RX ADMIN — HYDROCODONE BITARTRATE AND ACETAMINOPHEN 1 TABLET: 10; 325 TABLET ORAL at 21:51

## 2019-12-16 RX ADMIN — IPRATROPIUM BROMIDE AND ALBUTEROL SULFATE 3 ML: .5; 3 SOLUTION RESPIRATORY (INHALATION) at 18:50

## 2019-12-16 RX ADMIN — HYDROCODONE BITARTRATE AND ACETAMINOPHEN 1 TABLET: 10; 325 TABLET ORAL at 12:08

## 2019-12-16 RX ADMIN — DOCUSATE SODIUM 100 MG: 100 CAPSULE, LIQUID FILLED ORAL at 20:29

## 2019-12-16 RX ADMIN — DOCUSATE SODIUM 100 MG: 100 CAPSULE, LIQUID FILLED ORAL at 08:11

## 2019-12-16 RX ADMIN — INSULIN DETEMIR 15 UNITS: 100 INJECTION, SOLUTION SUBCUTANEOUS at 20:33

## 2019-12-16 RX ADMIN — FLUTICASONE PROPIONATE 2 SPRAY: 50 SPRAY, METERED NASAL at 08:13

## 2019-12-16 RX ADMIN — INSULIN ASPART 12 UNITS: 100 INJECTION, SOLUTION INTRAVENOUS; SUBCUTANEOUS at 16:43

## 2019-12-16 RX ADMIN — IPRATROPIUM BROMIDE AND ALBUTEROL SULFATE 3 ML: .5; 3 SOLUTION RESPIRATORY (INHALATION) at 07:15

## 2019-12-16 RX ADMIN — BISACODYL 10 MG: 10 SUPPOSITORY RECTAL at 08:21

## 2019-12-16 RX ADMIN — METHYLPREDNISOLONE SODIUM SUCCINATE 40 MG: 40 INJECTION, POWDER, FOR SOLUTION INTRAMUSCULAR; INTRAVENOUS at 18:05

## 2019-12-16 RX ADMIN — INSULIN ASPART 16 UNITS: 100 INJECTION, SOLUTION INTRAVENOUS; SUBCUTANEOUS at 20:31

## 2019-12-16 RX ADMIN — ACETAMINOPHEN 650 MG: 325 TABLET ORAL at 02:14

## 2019-12-16 RX ADMIN — DICYCLOMINE HYDROCHLORIDE 10 MG: 20 TABLET ORAL at 18:06

## 2019-12-16 RX ADMIN — HYDROCODONE BITARTRATE AND ACETAMINOPHEN 1 TABLET: 10; 325 TABLET ORAL at 08:02

## 2019-12-16 RX ADMIN — POLYETHYLENE GLYCOL 3350 17 G: 17 POWDER, FOR SOLUTION ORAL at 08:14

## 2019-12-16 RX ADMIN — NICOTINE 1 PATCH: 21 PATCH TRANSDERMAL at 08:16

## 2019-12-16 RX ADMIN — IPRATROPIUM BROMIDE AND ALBUTEROL SULFATE 3 ML: .5; 3 SOLUTION RESPIRATORY (INHALATION) at 16:43

## 2019-12-16 RX ADMIN — SODIUM CHLORIDE, PRESERVATIVE FREE 10 ML: 5 INJECTION INTRAVENOUS at 08:16

## 2019-12-16 RX ADMIN — SUCRALFATE 1 G: 1 TABLET ORAL at 08:12

## 2019-12-16 RX ADMIN — ENOXAPARIN SODIUM 40 MG: 40 INJECTION SUBCUTANEOUS at 16:23

## 2019-12-16 RX ADMIN — DICYCLOMINE HYDROCHLORIDE 10 MG: 20 TABLET ORAL at 12:08

## 2019-12-16 RX ADMIN — FLUTICASONE PROPIONATE 2 SPRAY: 50 SPRAY, METERED NASAL at 20:33

## 2019-12-16 RX ADMIN — TERAZOSIN HYDROCHLORIDE ANHYDROUS 1 MG: 1 CAPSULE ORAL at 20:29

## 2019-12-16 RX ADMIN — SODIUM CHLORIDE 4 ML: 7 NEBU SOLN,3 % NEBU at 18:58

## 2019-12-16 RX ADMIN — INSULIN DETEMIR 65 UNITS: 100 INJECTION, SOLUTION SUBCUTANEOUS at 08:11

## 2019-12-16 RX ADMIN — ATORVASTATIN CALCIUM 10 MG: 10 TABLET, FILM COATED ORAL at 20:29

## 2019-12-16 RX ADMIN — HYDROCODONE BITARTRATE AND ACETAMINOPHEN 1 TABLET: 10; 325 TABLET ORAL at 18:05

## 2019-12-16 RX ADMIN — METHYLPREDNISOLONE SODIUM SUCCINATE 40 MG: 40 INJECTION, POWDER, FOR SOLUTION INTRAMUSCULAR; INTRAVENOUS at 08:15

## 2019-12-16 RX ADMIN — ARFORMOTEROL TARTRATE 15 MCG: 15 SOLUTION RESPIRATORY (INHALATION) at 20:54

## 2019-12-16 NOTE — PLAN OF CARE
Problem: Patient Care Overview  Goal: Plan of Care Review  Outcome: Ongoing (interventions implemented as appropriate)  Flowsheets (Taken 12/15/2019 2025)  Progress: improving  Plan of Care Reviewed With: patient; son  Note:   Pt had a better day today. He is now on high jorje not heated at 8l and sats are maintaining 90-93%. Generalized aches cont and hydro given prn. Lasix IV cont and output good today. Some SOA cont with exertion. Sit on side of bed and ate today 100% all meals. Does have prod cough now thick tan creamy sputum noted. Resp culture to be done, pt aware and cup at bedside and instructed . VSS

## 2019-12-16 NOTE — PROGRESS NOTES
"SERVICE: [facility] HOSPITALIST    CHIEF COMPLAINT: Cough with sputum    SUBJECTIVE:    Pt feels he is not getting better. Is better than when he came in, but not better for the last few days. Biggest concern is the productive cough. Nursing reports copious, copious amounts of sputum production.     No other complaints    ROS negative for fever, chills, n/v/d, abdominal pain.     OBJECTIVE:    /74   Pulse 104   Temp 96.6 °F (35.9 °C) (Oral)   Resp 22   Ht 182.9 cm (72\")   Wt 120 kg (264 lb 11.2 oz)   SpO2 94%   BMI 35.90 kg/m²     MEDS/LABS REVIEWED AND ORDERED      0.9% sodium chloride for irrigation-mineral oil-glycerin 300 mL Rectal Once   arformoterol 15 mcg Nebulization BID - RT   atorvastatin 10 mg Oral Nightly   bisacodyl 10 mg Rectal Daily   budesonide 0.5 mg Nebulization BID - RT   dicyclomine 10 mg Oral 4x Daily   docusate sodium 100 mg Oral BID   enoxaparin 40 mg Subcutaneous Q24H   fluticasone 2 spray Each Nare BID   furosemide 20 mg Intravenous Daily   HYDROcodone-acetaminophen 1 tablet Oral 4x Daily   insulin aspart 0-24 Units Subcutaneous 4x Daily AC & at Bedtime   insulin aspart 20 Units Subcutaneous TID With Meals   insulin detemir 15 Units Subcutaneous Nightly   insulin detemir 65 Units Subcutaneous QAM   ipratropium-albuterol 3 mL Nebulization 4x Daily - RT   methylPREDNISolone sodium succinate 40 mg Intravenous Q12H   nicotine 1 patch Transdermal Q24H   pioglitazone 45 mg Oral Daily   polyethylene glycol 17 g Oral Daily   sodium chloride 10 mL Intravenous Q12H   sodium chloride 4 mL Nebulization BID - RT   terazosin 1 mg Oral Nightly   theophylline 400 mg Oral Q24H       Physical Exam   Constitutional: No distress.   Wet ratting cough, and rattle aubible w.o auscaltation on deep inspriation  Awake, not in resp distress, but appears acutely/chronically ill   Eyes: Pupils are equal, round, and reactive to light.   Neck: No JVD present.   Cardiovascular: Normal rate, " regular rhythm and normal heart sounds.   No murmur heard.  Pulmonary/Chest: Effort normal. No stridor. No respiratory distress. He has no wheezes. He has rales.   Abdominal: Soft. Bowel sounds are normal. He exhibits no distension. There is no tenderness.   Musculoskeletal:   Trace to 1+ b/l pitting edema up to mid shin   Neurological: He is alert.   Skin: Skin is warm. He is not diaphoretic.   Psychiatric: He has a normal mood and affect. His behavior is normal.   Nursing note and vitals reviewed.      LAB/DIAGNOSTICS:    Lab Results (last 24 hours)     Procedure Component Value Units Date/Time    Basic Metabolic Panel [[id number]]  (Abnormal) Collected:  12/16/19 0646    Specimen:  Blood Updated:  12/16/19 0716     Glucose 316 mg/dL      BUN 19 mg/dL      Creatinine 0.63 mg/dL      Sodium 130 mmol/L      Potassium 4.2 mmol/L      Chloride 91 mmol/L      CO2 26.9 mmol/L      Calcium 8.9 mg/dL      eGFR Non African Amer 127 mL/min/1.73      BUN/Creatinine Ratio 30.2     Anion Gap 12.1 mmol/L     Narrative:       GFR Normal >60  Chronic Kidney Disease <60  Kidney Failure <15      Phosphorus [[id number]]  (Normal) Collected:  12/16/19 0646    Specimen:  Blood Updated:  12/16/19 0716     Phosphorus 3.1 mg/dL     Magnesium [[id number]]  (Normal) Collected:  12/16/19 0646    Specimen:  Blood Updated:  12/16/19 0716     Magnesium 1.9 mg/dL     Procalcitonin [[id number]]  (Normal) Collected:  12/16/19 0646    Specimen:  Blood Updated:  12/16/19 0714     Procalcitonin 0.11 ng/mL     CBC & Differential [[id number]] Collected:  12/16/19 0645    Specimen:  Blood Updated:  12/16/19 0703    Narrative:       The following orders were created for panel order CBC & Differential.  Procedure                               Abnormality         Status                     ---------                               -----------         ------                     CBC Auto Differential[[id number]]        Abnormal            Final result                  Please view results for these tests on the individual orders.    CBC Auto Differential [[id number]]  (Abnormal) Collected:  12/16/19 0645    Specimen:  Blood Updated:  12/16/19 0703     WBC 9.93 10*3/mm3      RBC 4.79 10*6/mm3      Hemoglobin 14.7 g/dL      Hematocrit 46.6 %      MCV 97.3 fL      MCH 30.7 pg      MCHC 31.5 g/dL      RDW 12.4 %      RDW-SD 44.7 fl      MPV 10.9 fL      Platelets 287 10*3/mm3      Neutrophil % 67.8 %      Lymphocyte % 18.1 %      Monocyte % 9.3 %      Eosinophil % 0.6 %      Basophil % 0.1 %      Immature Grans % 4.1 %      Neutrophils, Absolute 6.73 10*3/mm3      Lymphocytes, Absolute 1.80 10*3/mm3      Monocytes, Absolute 0.92 10*3/mm3      Eosinophils, Absolute 0.06 10*3/mm3      Basophils, Absolute 0.01 10*3/mm3      Immature Grans, Absolute 0.41 10*3/mm3     C-reactive Protein [[id number]] Collected:  12/16/19 0646    Specimen:  Blood Updated:  12/16/19 0646    POC Glucose Once [[id number]]  (Abnormal) Collected:  12/15/19 2105    Specimen:  Blood Updated:  12/15/19 2112     Glucose 251 mg/dL     POC Glucose Once [[id number]]  (Abnormal) Collected:  12/15/19 1737    Specimen:  Blood Updated:  12/15/19 1743     Glucose 222 mg/dL     POC Glucose Once [[id number]]  (Abnormal) Collected:  12/15/19 1145    Specimen:  Blood Updated:  12/15/19 1156     Glucose 183 mg/dL         Results for orders placed during the hospital encounter of 12/07/19   Adult Transthoracic Echo Complete W/ Cont if Necessary Per Protocol    Narrative · Left ventricular systolic function is normal. Estimated EF appears to be   in the range of 56 - 60%. Normal left ventricular cavity size, wall   thickness and mass noted. Normal diastolic function.  · No significant valvular stenosis or regurgitation noted.        No radiology results for the last day      ASSESSMENT/PLAN:    70 yo M w/ PMH of COPD(on home 3.5L Nocturnal), DM2, Chronic pain on chronic opioids, and tobacco abuse who on 12/7 p/w SOA, reported temp  at home of 100, Admitted for work up of worsening hypoxia, Was stable on the floor until 12/9 when pt’s oxygenation worsened overnight, and he was trx to the ICU for hiflow, initially was needing 35L/min @55%, and increased to 60L/min @80% by 12/13. Has been improving since 12/13, ?if related to lasix despite echo describing LV diastolic function as normal.    Acute on Chronic Hypoxic Respiratory Failure due to AECOPD from Acute Bronchitis  - Home o2 3.5L at night  - CTA neg for PE or other acute findings  - Downtitrated to Non-heated Hiflow at 40% at 8L last night  - Initially on Rocephin,  switched to just Doxycycline on 12/9 for anti-inflammatory effect for AECOPD, yesterday's Pulm doubts PNA, today's states tailor abx per sputum culture, sputum culture from 12/9 negative.  - finished 7 day course of doxy yesterday  - Continue shantanu, brovan and pulmicort  - Theophylline 200mg daily started 12/11, increased to 400mg daily on 12/15, check level on 12/17  - Tapered Solumedrol  from 40mg Q8H to 40mg BID yesterday, transition to oral pred in AM  - Added opep yesterday  - Pulm did not address if bronch may be helpful with pt, or if pt may be candidate  - Currently on 7L non heated hiflow at 40%    Diabetes w/ Hyperglycemia  - A1c 9.1, improved since 7/11/19 10.8, At home: Metformin 1000mg (or 1500mg?) bid, Glip 10mg BID, & Actos 45mg, 40 units lantus  - Worsened by steriods  - BG’s 200-250’s yesterday  - On Actos, & high dose ssi, w/ 20 units short acting scheduled with meals  - 15 units levemir night, and 65 units QAM, had been increasing AM Levemir dose daily, last change 12/14  - Steroids tapered down yesterday  - WIll stop scheduled 20 units of short acting, and decrease am Levemir to 40, will keep high dose ssi, and 15 units at night  - Monitor BG, ok for pt to be hyperglycemic tonight, concern for hypoglycemia tomorrow, when switched to PO    Narcotic induced constipation  - Needs aggressive bowel regimine, has  been tolerating diet, no sign of ileus  - Had large BM today    Chronic Narcotics for chronic pain  - On Scheduled Norco 10mg 4 times daily (home dose), not on prn beyond tyelnol    Volume overload from ?DCHF  - ?b/l pleural effusions vs LL actelactasis  - Echo had EF of 60%, w/ normal diastolic function, the Pulmonic and tricuspid were not well visualized, so unclear if Pulm HTN could be contributing  - On 20mg IV Lasix daily since 12/12, got additional 40mg on 12/13  - Appears to be nearing euvolemia, continue lasix, monitor renal panel    IBS  - ON bentyl    Tobacco Abuse  - ON patch    BPH  - ON Home Terazosin    Dysphagia ruled out - SLP evalutated found no swallow dsyfunction    Dispo: Pulm rehab?, STR?    Continues to be ICU level care at this facility, No step down, and Med/surg unable to take hi flow

## 2019-12-16 NOTE — PROGRESS NOTES
"                                              LOS: 7 days   Patient Care Team:  Ney Kathleen MD as PCP - General (Family Medicine)    Chief Complaint:  F/up respiratory failure, COPD exacerbation and medical problems listed below    Subjective   Interval History  Taken off OptiFlow .  Currently on high flow nasal cannula.  Reported cough with greenish phlegm and he showed me a cup noted to be coughing up a lot of greenish-brownish sputum    REVIEW OF SYSTEMS:   CARDIOVASCULAR: No chest pain, chest pressure or chest discomfort. No palpitations or edema.   Constitutional: No fever or chills                 Physical Exam:     Vital Signs  Temp:  [96.6 °F (35.9 °C)-98.5 °F (36.9 °C)] 97 °F (36.1 °C)  Heart Rate:  [] 102  Resp:  [16-24] 16  BP: (100-139)/() 139/92    Intake/Output Summary (Last 24 hours) at 12/16/2019 1856  Last data filed at 12/16/2019 1752  Gross per 24 hour   Intake 1180 ml   Output 1400 ml   Net -220 ml     Flowsheet Rows      First Filed Value   Admission Height  187.9 cm (73.98\") Documented at 12/07/2019 1126   Admission Weight  130 kg (286 lb) Documented at 12/07/2019 1126          General Appearance:    Alert, cooperative, in no acute distress   ENMT:  Mallampati score 3, dry mucous membrane   Eyes: Pupils equals and reactive to light. EOMI   Neck:   Large. Trachea midline. No thyromegaly.   Lungs:    Minimal bilateral wheezing.  Crackles bilaterally up to third of the chest.    Heart:    Regular rhythm and normal rate, normal S1 and S2, no            murmur   Skin:    No rash.   Abdomen:     Obese. Soft. No tenderness. No HSM.   Neuro:   Conscious, alert, oriented x3. Strength 5/5 in upper and lower ext   Extremities:   Moves all extremities well, trace edema, no cyanosis, no             Redness          Results Review:        Results from last 7 days   Lab Units 12/16/19  0646 12/15/19  0531 12/14/19  0558   SODIUM mmol/L 130* 136 136   POTASSIUM mmol/L 4.2 4.6 4.2   CHLORIDE " mmol/L 91* 94* 92*   CO2 mmol/L 26.9 29.5* 34.6*   BUN mg/dL 19 20 21   CREATININE mg/dL 0.63* 0.68* 0.74*   GLUCOSE mg/dL 316* 224* 201*   CALCIUM mg/dL 8.9 9.0 8.9         Results from last 7 days   Lab Units 12/16/19  0645 12/15/19  0531 12/14/19  0558   WBC 10*3/mm3 9.93 9.35 8.23   HEMOGLOBIN g/dL 14.7 14.7 14.8   HEMATOCRIT % 46.6 43.8 44.8   PLATELETS 10*3/mm3 287 271 246         Results from last 7 days   Lab Units 12/12/19  0401   PROBNP pg/mL 50.3       I reviewed the patient's new clinical results.  I personally viewed and interpreted the patient's CXR        Medication Review:     0.9% sodium chloride for irrigation-mineral oil-glycerin 300 mL Rectal Once   arformoterol 15 mcg Nebulization BID - RT   atorvastatin 10 mg Oral Nightly   bisacodyl 10 mg Rectal Daily   budesonide 0.5 mg Nebulization BID - RT   dicyclomine 10 mg Oral 4x Daily   docusate sodium 100 mg Oral BID   enoxaparin 40 mg Subcutaneous Q24H   fluticasone 2 spray Each Nare BID   furosemide 20 mg Intravenous Daily   HYDROcodone-acetaminophen 1 tablet Oral 4x Daily   insulin aspart 0-24 Units Subcutaneous 4x Daily AC & at Bedtime   insulin detemir 15 Units Subcutaneous Nightly   [START ON 12/17/2019] insulin detemir 40 Units Subcutaneous QAM   ipratropium-albuterol 3 mL Nebulization 4x Daily - RT   methylPREDNISolone sodium succinate 40 mg Intravenous Q12H   nicotine 1 patch Transdermal Q24H   pioglitazone 45 mg Oral Daily   polyethylene glycol 17 g Oral Daily   sodium chloride 10 mL Intravenous Q12H   sodium chloride 10 mL Intravenous Q12H   sodium chloride 10 mL Intravenous Q12H   sodium chloride 4 mL Nebulization BID - RT   terazosin 1 mg Oral Nightly   theophylline 400 mg Oral Q24H            Diagnostic imaging:  I personally and independently reviewed the following images:  CXR 12/9/2019: Low lung volumes.  Increased pulmonary vascular markings.  CT chest 12/8/2019: Upper lobe predominant emphysema.  Peribronchial cuffing in the lower  lobes.     KUB 12/11/2019:  Infiltrates/fluid/consolidation in the left lower lobe.  Gastric distention.    CXR 12/12/2019: Increased pulmonary vascular markings.  Worsening atelectasis in the right lower lobe and possible left pleural effusion.    Assessment   1. Acute exacerbation of COPD  2. Acute on chronic hypoxic respiratory failure, on oxygen 3.5 L/min at baseline  3. Acute bronchitis  4. Tobacco abuse  5. Ileus, probable  6. Bilateral lower lobe atelectasis  7. Possible pleural effusion    Pertinent medical problems:  · Diabetes mellitus type 2 with worsening hyperglycemia due to steroid        Plan   · Switched to oxygen high flow nasal cannula, 7 L/min  · Finish doxycycline.  Antibiotics as guided by sputum cultures  · Hypertonic saline nebulizer twice a day  · DuoNeb every 6, Brovana and Pulmicort  · Continue Lasix 20 mg daily.  · Reduce Solu-Medrol to 40 mg twice daily and will taper down steroids to oral in the morning  · Continue theophylline 400 mg day 2.  Check level on day 3  · Flutter valve to advance mucus clearance  · Insulin per primary for hyperglycemia              Harvey Shahid MD  12/16/19  6:56 PM          This note was dictated utilizing Dragon dictation

## 2019-12-16 NOTE — NURSING NOTE
Continued Stay Note  MURIEL Chavez     Patient Name: Kt Armstrong  MRN: [id number]  Today's Date: 12/16/2019    Admit Date: 12/7/2019    Discharge Plan     Row Name 12/16/19 1317       Plan    Plan  Home with daughter    Patient/Family in Agreement with Plan  yes    Plan Comments  Followed up with patient today regarding discharge needs. Patient is sitting up in bed watching TV. Patient states he plans on returning back to his daughter's where he lives at discharge. Patient states he is current with [facility] for his home oxygen and has all the equipment he needs at home. He is uncertain at this time if he will need home health. Patient had no other questions or concerns regarding discharge plans at this time. Name and number placed on white board in room. CM will continue to follow for needs.        Discharge Codes    No documentation.             Glenys Marley RN

## 2019-12-16 NOTE — PROGRESS NOTES
Adult Nutrition  Assessment/PES    Patient Name:  Kt Armstrong  YOB: 1950  MRN: [id number]  Admit Date:  12/7/2019    Assessment Date:  12/16/2019    Comments:  Po intake improved. Will cont to follow.    Reason for Assessment     Row Name 12/16/19 1418          Reason for Assessment    Reason For Assessment  follow-up protocol     Diagnosis  pulmonary disease resp failure, COPD, DM, chronic constipation         Nutrition/Diet History     Row Name 12/16/19 1418          Nutrition/Diet History    Typical Food/Fluid Intake  pt with improved o2 status per rounds, up at side of bed, reporting eating well and no concerns         Anthropometrics     Row Name 12/16/19 1419          Anthropometrics    Weight  -- 264.7 #         Usual Body Weight (UBW)    Weight Loss  intentional     Weight Loss Time Frame  duresis noted down significant amount        Body Mass Index (BMI)    BMI Assessment  BMI 35-39.9: obesity grade II         Labs/Tests/Procedures/Meds     Row Name 12/16/19 1419          Labs/Procedures/Meds    Lab Results Reviewed  reviewed     Lab Results Comments  Na 130 L, glu 115-316 , Mg/Phos wnl        Diagnostic Tests/Procedures    Diagnostic Test/Procedure Reviewed  reviewed        Medications    Pertinent Medications Reviewed  reviewed     Pertinent Medications Comments  lasix, SMOG, novolog, levemir, solumedrol             Nutrition Prescription Ordered     Row Name 12/16/19 1420          Nutrition Prescription PO    Current PO Diet  Soft Texture     Texture  Ground     Supplement  [place] Breakfast Essentials Light     Supplement Frequency  3 times a day     Common Modifiers  Cardiac;Consistent Carbohydrate         Evaluation of Received Nutrient/Fluid Intake     Row Name 12/16/19 1420          Fluid Intake Evaluation    Oral Fluid (mL)  1286 ave x 3, 63%        PO Evaluation    Number of Meals  6     % PO Intake  79               Problem/Interventions:  Problem 1     Row Name 12/16/19 1425           Nutrition Diagnoses Problem 1    Problem 1  Overweight/Obesity     Etiology (related to)  Factors Affecting Nutrition     Signs/Symptoms (evidenced by)  BMI     BMI  35 - 39.9         Problem 2     Row Name 12/16/19 1421          Nutrition Diagnoses Problem 2    Problem 2  Inadequate Intake/Infusion     Inadequate Intake Type  Oral     Etiology (related to)  Factors Affecting Nutrition;Medical Diagnosis     Signs/Symptoms (evidenced by)  Report/Observation     Resolved?  Yes             Intervention Goal     Row Name 12/16/19 1421          Intervention Goal    PO  Maintain intake;PO intake (%)     PO Intake %  75 % or greater     Weight  Appropriate weight loss         Nutrition Intervention     Row Name 12/16/19 1421          Nutrition Intervention    RD/Tech Action  Interview for preference;Follow Tx progress           Education/Evaluation     Row Name 12/16/19 1421          Education    Education  Will Instruct as appropriate        Monitor/Evaluation    Monitor  Per protocol;I&O;PO intake;Pertinent labs;Weight;Symptoms           Electronically signed by:  Nicole España RD  12/16/19 2:22 PM

## 2019-12-16 NOTE — THERAPY DISCHARGE NOTE
Acute Care - Speech Language Pathology Discharge Summary   Isatu Severino       Patient Name: Kt Armstrong  : 1950  MRN: [id number]    Today's Date: 2019              Admit Date: 2019        Visit Dx:    ICD-10-CM ICD-9-CM   1. COPD exacerbation (CMS/[facility]) J44.1 491.21   2. Hyperglycemia R73.9 790.29   3. Hypoxia R09.02 799.02           SLP GOALS     Row Name 19 1100       Oral Nutrition/Hydration Goal 1 (SLP)    Oral Nutrition/Hydration Goal 1, SLP  Pt will tolerate a soft, ground diet with thins w/o clinical s/s of aspiration or complications such as aspiration pneumonia via meal assessment.  -AD    Time Frame (Oral Nutrition/Hydration Goal 1, SLP)  short term goal (STG);1 day  -AD    Barriers (Oral Nutrition/Hydration Goal 1, SLP)  edentulous status  -AD    Progress/Outcomes (Oral Nutrition/Hydration Goal 1, SLP)  good progress toward goal;goal met;other (see comments) Pt reports tolerance of diet w/no clinical s/s of aspiration  -AD      User Key  (r) = Recorded By, (t) = Taken By, (c) = Cosigned By    Initials Name Provider Type    AD Carlotta Marie MS CCC-SLP Speech and Language Pathologist          SLP following up with patient today. No clinical s/s reported. Staff with no reports of dysphagia or aspiration. Tolerating soft diet without issue. No further needs at this time.       SLP Discharge Summary  Anticipated Dischage Disposition: home with assist  Reason for Discharge: all goals and outcomes met, no further needs identified  Progress Toward Achieving Short/long Term Goals: all goals met within established timelines      Carlotta Marie MS CCC-SLP  2019

## 2019-12-16 NOTE — PLAN OF CARE
Problem: Patient Care Overview  Goal: Plan of Care Review  Outcome: Ongoing (interventions implemented as appropriate)  Flowsheets (Taken 12/16/2019 0318)  Progress: improving  Plan of Care Reviewed With: patient  Outcome Summary: pt did well overnight  on 8L high flow oxygen. VSS. no acute issues noted

## 2019-12-17 LAB
ANION GAP SERPL CALCULATED.3IONS-SCNC: 10 MMOL/L (ref 5–15)
BASOPHILS # BLD AUTO: 0.07 10*3/MM3 (ref 0–0.2)
BASOPHILS NFR BLD AUTO: 0.6 % (ref 0–1.5)
BUN BLD-MCNC: 20 MG/DL (ref 8–23)
BUN/CREAT SERPL: 32.3 (ref 7–25)
CALCIUM SPEC-SCNC: 8.9 MG/DL (ref 8.6–10.5)
CHLORIDE SERPL-SCNC: 94 MMOL/L (ref 98–107)
CO2 SERPL-SCNC: 29 MMOL/L (ref 22–29)
CREAT BLD-MCNC: 0.62 MG/DL (ref 0.76–1.27)
DEPRECATED RDW RBC AUTO: 44 FL (ref 37–54)
EOSINOPHIL # BLD AUTO: 0.02 10*3/MM3 (ref 0–0.4)
EOSINOPHIL NFR BLD AUTO: 0.2 % (ref 0.3–6.2)
ERYTHROCYTE [DISTWIDTH] IN BLOOD BY AUTOMATED COUNT: 12.5 % (ref 12.3–15.4)
GFR SERPL CREATININE-BSD FRML MDRD: 129 ML/MIN/1.73
GLUCOSE BLD-MCNC: 299 MG/DL (ref 65–99)
GLUCOSE BLDC GLUCOMTR-MCNC: 241 MG/DL (ref 70–130)
GLUCOSE BLDC GLUCOMTR-MCNC: 244 MG/DL (ref 70–130)
GLUCOSE BLDC GLUCOMTR-MCNC: 300 MG/DL (ref 70–130)
GLUCOSE BLDC GLUCOMTR-MCNC: 394 MG/DL (ref 70–130)
GLUCOSE BLDC GLUCOMTR-MCNC: 418 MG/DL (ref 70–130)
HCT VFR BLD AUTO: 43.2 % (ref 37.5–51)
HGB BLD-MCNC: 14.2 G/DL (ref 13–17.7)
IMM GRANULOCYTES # BLD AUTO: 0.61 10*3/MM3 (ref 0–0.05)
IMM GRANULOCYTES NFR BLD AUTO: 4.9 % (ref 0–0.5)
LYMPHOCYTES # BLD AUTO: 1.5 10*3/MM3 (ref 0.7–3.1)
LYMPHOCYTES NFR BLD AUTO: 12 % (ref 19.6–45.3)
MCH RBC QN AUTO: 31.4 PG (ref 26.6–33)
MCHC RBC AUTO-ENTMCNC: 32.9 G/DL (ref 31.5–35.7)
MCV RBC AUTO: 95.6 FL (ref 79–97)
MONOCYTES # BLD AUTO: 0.82 10*3/MM3 (ref 0.1–0.9)
MONOCYTES NFR BLD AUTO: 6.5 % (ref 5–12)
NEUTROPHILS # BLD AUTO: 9.51 10*3/MM3 (ref 1.7–7)
NEUTROPHILS NFR BLD AUTO: 75.8 % (ref 42.7–76)
NRBC BLD AUTO-RTO: 0 /100 WBC (ref 0–0.2)
PLATELET # BLD AUTO: 268 10*3/MM3 (ref 140–450)
PMV BLD AUTO: 11.7 FL (ref 6–12)
POTASSIUM BLD-SCNC: 4.2 MMOL/L (ref 3.5–5.2)
RBC # BLD AUTO: 4.52 10*6/MM3 (ref 4.14–5.8)
SODIUM BLD-SCNC: 133 MMOL/L (ref 136–145)
WBC NRBC COR # BLD: 12.53 10*3/MM3 (ref 3.4–10.8)

## 2019-12-17 PROCEDURE — 94668 MNPJ CHEST WALL SBSQ: CPT

## 2019-12-17 PROCEDURE — 94799 UNLISTED PULMONARY SVC/PX: CPT

## 2019-12-17 PROCEDURE — 25010000002 ENOXAPARIN PER 10 MG: Performed by: NURSE PRACTITIONER

## 2019-12-17 PROCEDURE — 25010000002 FUROSEMIDE PER 20 MG: Performed by: INTERNAL MEDICINE

## 2019-12-17 PROCEDURE — 63710000001 PREDNISONE PER 1 MG: Performed by: NURSE PRACTITIONER

## 2019-12-17 PROCEDURE — 25010000002 METHYLPREDNISOLONE PER 40 MG: Performed by: INTERNAL MEDICINE

## 2019-12-17 PROCEDURE — 85025 COMPLETE CBC W/AUTO DIFF WBC: CPT | Performed by: NURSE PRACTITIONER

## 2019-12-17 PROCEDURE — 80048 BASIC METABOLIC PNL TOTAL CA: CPT | Performed by: NURSE PRACTITIONER

## 2019-12-17 PROCEDURE — 63710000001 INSULIN DETEMIR PER 5 UNITS: Performed by: NURSE PRACTITIONER

## 2019-12-17 PROCEDURE — 63710000001 INSULIN DETEMIR PER 5 UNITS: Performed by: INTERNAL MEDICINE

## 2019-12-17 PROCEDURE — 82962 GLUCOSE BLOOD TEST: CPT

## 2019-12-17 PROCEDURE — 99233 SBSQ HOSP IP/OBS HIGH 50: CPT | Performed by: NURSE PRACTITIONER

## 2019-12-17 PROCEDURE — 63710000001 PREDNISONE PER 1 MG: Performed by: INTERNAL MEDICINE

## 2019-12-17 PROCEDURE — 63710000001 INSULIN ASPART PER 5 UNITS: Performed by: NURSE PRACTITIONER

## 2019-12-17 RX ORDER — PREDNISONE 20 MG/1
20 TABLET ORAL 2 TIMES DAILY WITH MEALS
Status: DISCONTINUED | OUTPATIENT
Start: 2019-12-17 | End: 2019-12-18 | Stop reason: HOSPADM

## 2019-12-17 RX ORDER — SODIUM CHLORIDE FOR INHALATION 7 %
4 VIAL, NEBULIZER (ML) INHALATION EVERY 4 HOURS PRN
Status: DISCONTINUED | OUTPATIENT
Start: 2019-12-17 | End: 2019-12-18 | Stop reason: HOSPADM

## 2019-12-17 RX ORDER — FUROSEMIDE 40 MG/1
40 TABLET ORAL DAILY
Status: DISCONTINUED | OUTPATIENT
Start: 2019-12-17 | End: 2019-12-18 | Stop reason: HOSPADM

## 2019-12-17 RX ORDER — IPRATROPIUM BROMIDE AND ALBUTEROL SULFATE 2.5; .5 MG/3ML; MG/3ML
3 SOLUTION RESPIRATORY (INHALATION)
Status: DISCONTINUED | OUTPATIENT
Start: 2019-12-17 | End: 2019-12-18 | Stop reason: HOSPADM

## 2019-12-17 RX ADMIN — HYDROCODONE BITARTRATE AND ACETAMINOPHEN 1 TABLET: 10; 325 TABLET ORAL at 08:26

## 2019-12-17 RX ADMIN — ARFORMOTEROL TARTRATE 15 MCG: 15 SOLUTION RESPIRATORY (INHALATION) at 08:09

## 2019-12-17 RX ADMIN — INSULIN DETEMIR 20 UNITS: 100 INJECTION, SOLUTION SUBCUTANEOUS at 20:56

## 2019-12-17 RX ADMIN — ATORVASTATIN CALCIUM 10 MG: 10 TABLET, FILM COATED ORAL at 20:55

## 2019-12-17 RX ADMIN — INSULIN ASPART 20 UNITS: 100 INJECTION, SOLUTION INTRAVENOUS; SUBCUTANEOUS at 20:57

## 2019-12-17 RX ADMIN — BUDESONIDE 0.5 MG: 0.5 SUSPENSION RESPIRATORY (INHALATION) at 19:00

## 2019-12-17 RX ADMIN — FLUTICASONE PROPIONATE 2 SPRAY: 50 SPRAY, METERED NASAL at 20:57

## 2019-12-17 RX ADMIN — DICYCLOMINE HYDROCHLORIDE 10 MG: 20 TABLET ORAL at 20:55

## 2019-12-17 RX ADMIN — POLYETHYLENE GLYCOL 3350 17 G: 17 POWDER, FOR SOLUTION ORAL at 08:29

## 2019-12-17 RX ADMIN — INSULIN ASPART 8 UNITS: 100 INJECTION, SOLUTION INTRAVENOUS; SUBCUTANEOUS at 17:21

## 2019-12-17 RX ADMIN — BUDESONIDE 0.5 MG: 0.5 SUSPENSION RESPIRATORY (INHALATION) at 07:53

## 2019-12-17 RX ADMIN — INSULIN DETEMIR 40 UNITS: 100 INJECTION, SOLUTION SUBCUTANEOUS at 08:52

## 2019-12-17 RX ADMIN — SODIUM CHLORIDE, PRESERVATIVE FREE 10 ML: 5 INJECTION INTRAVENOUS at 08:31

## 2019-12-17 RX ADMIN — IPRATROPIUM BROMIDE AND ALBUTEROL SULFATE 3 ML: .5; 3 SOLUTION RESPIRATORY (INHALATION) at 19:00

## 2019-12-17 RX ADMIN — FLUTICASONE PROPIONATE 2 SPRAY: 50 SPRAY, METERED NASAL at 08:52

## 2019-12-17 RX ADMIN — SODIUM CHLORIDE, PRESERVATIVE FREE 10 ML: 5 INJECTION INTRAVENOUS at 20:38

## 2019-12-17 RX ADMIN — FUROSEMIDE 20 MG: 10 INJECTION, SOLUTION INTRAMUSCULAR; INTRAVENOUS at 08:32

## 2019-12-17 RX ADMIN — METHYLPREDNISOLONE SODIUM SUCCINATE 40 MG: 40 INJECTION, POWDER, FOR SOLUTION INTRAMUSCULAR; INTRAVENOUS at 06:37

## 2019-12-17 RX ADMIN — NICOTINE 1 PATCH: 21 PATCH TRANSDERMAL at 08:32

## 2019-12-17 RX ADMIN — SODIUM CHLORIDE 4 ML: 7 NEBU SOLN,3 % NEBU at 07:54

## 2019-12-17 RX ADMIN — HYDROCODONE BITARTRATE AND ACETAMINOPHEN 1 TABLET: 10; 325 TABLET ORAL at 17:21

## 2019-12-17 RX ADMIN — PREDNISONE 20 MG: 20 TABLET ORAL at 08:27

## 2019-12-17 RX ADMIN — ENOXAPARIN SODIUM 40 MG: 40 INJECTION SUBCUTANEOUS at 17:20

## 2019-12-17 RX ADMIN — INSULIN ASPART 16 UNITS: 100 INJECTION, SOLUTION INTRAVENOUS; SUBCUTANEOUS at 12:34

## 2019-12-17 RX ADMIN — DOCUSATE SODIUM 100 MG: 100 CAPSULE, LIQUID FILLED ORAL at 08:34

## 2019-12-17 RX ADMIN — DICYCLOMINE HYDROCHLORIDE 10 MG: 20 TABLET ORAL at 08:31

## 2019-12-17 RX ADMIN — INSULIN ASPART 12 UNITS: 100 INJECTION, SOLUTION INTRAVENOUS; SUBCUTANEOUS at 06:37

## 2019-12-17 RX ADMIN — PREDNISONE 20 MG: 20 TABLET ORAL at 17:32

## 2019-12-17 RX ADMIN — ACETAMINOPHEN 650 MG: 325 TABLET ORAL at 02:08

## 2019-12-17 RX ADMIN — SODIUM CHLORIDE, PRESERVATIVE FREE 10 ML: 5 INJECTION INTRAVENOUS at 08:35

## 2019-12-17 RX ADMIN — IPRATROPIUM BROMIDE AND ALBUTEROL SULFATE 3 ML: .5; 3 SOLUTION RESPIRATORY (INHALATION) at 07:53

## 2019-12-17 RX ADMIN — ARFORMOTEROL TARTRATE 15 MCG: 15 SOLUTION RESPIRATORY (INHALATION) at 21:00

## 2019-12-17 RX ADMIN — IPRATROPIUM BROMIDE AND ALBUTEROL SULFATE 3 ML: .5; 3 SOLUTION RESPIRATORY (INHALATION) at 11:06

## 2019-12-17 RX ADMIN — DICYCLOMINE HYDROCHLORIDE 10 MG: 20 TABLET ORAL at 17:31

## 2019-12-17 RX ADMIN — DOCUSATE SODIUM 100 MG: 100 CAPSULE, LIQUID FILLED ORAL at 20:55

## 2019-12-17 RX ADMIN — DICYCLOMINE HYDROCHLORIDE 10 MG: 20 TABLET ORAL at 12:33

## 2019-12-17 RX ADMIN — THEOPHYLLINE 400 MG: 400 TABLET, EXTENDED RELEASE ORAL at 08:30

## 2019-12-17 RX ADMIN — HYDROCODONE BITARTRATE AND ACETAMINOPHEN 1 TABLET: 10; 325 TABLET ORAL at 12:33

## 2019-12-17 RX ADMIN — TERAZOSIN HYDROCHLORIDE ANHYDROUS 1 MG: 1 CAPSULE ORAL at 20:55

## 2019-12-17 RX ADMIN — PIOGLITAZONE 45 MG: 45 TABLET ORAL at 08:28

## 2019-12-17 RX ADMIN — SODIUM CHLORIDE, PRESERVATIVE FREE 10 ML: 5 INJECTION INTRAVENOUS at 08:29

## 2019-12-17 RX ADMIN — HYDROCODONE BITARTRATE AND ACETAMINOPHEN 1 TABLET: 10; 325 TABLET ORAL at 21:57

## 2019-12-17 NOTE — PROGRESS NOTES
"SERVICE: [facility] HOSPITALIST    CONSULTANTS: Pulmonary    CHIEF COMPLAINT: Follow-up acute on chronic respiratory failure, bronchitis    SUBJECTIVE: Patient reports he is feeling much better today, staff notes some production of mucus occasionally.  Discussed with respiratory therapist, feels patient does not need CPT and saline nebs anymore, de-escalating care and downgrading to MedSurg telemetry today.  Patient feels that he is getting close to his baseline and discharge.  He notes he is having stools daily, tolerating p.o. without difficulty.  No other concerns mentioned.    OBJECTIVE:    /71 (BP Location: Right arm, Patient Position: Lying)   Pulse 87   Temp 97.3 °F (36.3 °C) (Oral)   Resp 20   Ht 182.9 cm (72\")   Wt 117 kg (258 lb)   SpO2 90%   BMI 34.99 kg/m²     MEDS/LABS REVIEWED AND ORDERED    0.9% sodium chloride for irrigation-mineral oil-glycerin 300 mL Rectal Once   arformoterol 15 mcg Nebulization BID - RT   atorvastatin 10 mg Oral Nightly   bisacodyl 10 mg Rectal Daily   budesonide 0.5 mg Nebulization BID - RT   dicyclomine 10 mg Oral 4x Daily   docusate sodium 100 mg Oral BID   enoxaparin 40 mg Subcutaneous Q24H   fluticasone 2 spray Each Nare BID   furosemide 40 mg Oral Daily   HYDROcodone-acetaminophen 1 tablet Oral 4x Daily   insulin aspart 0-24 Units Subcutaneous 4x Daily AC & at Bedtime   insulin detemir 20 Units Subcutaneous Nightly   [START ON 12/18/2019] insulin detemir 45 Units Subcutaneous QAM   ipratropium-albuterol 3 mL Nebulization Q6H While Awake - RT   nicotine 1 patch Transdermal Q24H   pioglitazone 45 mg Oral Daily   polyethylene glycol 17 g Oral Daily   predniSONE 20 mg Oral BID With Meals   sodium chloride 10 mL Intravenous Q12H   sodium chloride 10 mL Intravenous Q12H   sodium chloride 10 mL Intravenous Q12H   terazosin 1 mg Oral Nightly   theophylline 400 mg Oral Q24H       Physical Exam   Constitutional: He is oriented to person, place, and " time. He appears well-developed and well-nourished.   Obese   HENT:   Head: Normocephalic and atraumatic.   Eyes: Pupils are equal, round, and reactive to light. EOM are normal.   Cardiovascular: Normal rate and regular rhythm.   Pulmonary/Chest: Effort normal.   Much improved air movement since my last exam, diminished throughout and especially bilateral bases with scattered rhonchi   Abdominal: Soft. Bowel sounds are normal. He exhibits no distension. There is no tenderness. There is no guarding.   Obese   Musculoskeletal:   Trace bilateral lower extremity pitting edema   Neurological: He is alert and oriented to person, place, and time.   Skin: Skin is warm and dry. No erythema.   Psychiatric: He has a normal mood and affect. His behavior is normal.   Vitals reviewed.    LAB/DIAGNOSTICS:    Lab Results (last 24 hours)     Procedure Component Value Units Date/Time    POC Glucose Once [[id number]]  (Abnormal) Collected:  12/17/19 1136    Specimen:  Blood Updated:  12/17/19 1154     Glucose 300 mg/dL     POC Glucose Once [[id number]]  (Abnormal) Collected:  12/17/19 0724    Specimen:  Blood Updated:  12/17/19 0730     Glucose 241 mg/dL     Basic Metabolic Panel [[id number]]  (Abnormal) Collected:  12/17/19 0503    Specimen:  Blood Updated:  12/17/19 0625     Glucose 299 mg/dL      BUN 20 mg/dL      Creatinine 0.62 mg/dL      Sodium 133 mmol/L      Potassium 4.2 mmol/L      Chloride 94 mmol/L      CO2 29.0 mmol/L      Calcium 8.9 mg/dL      eGFR Non African Amer 129 mL/min/1.73      BUN/Creatinine Ratio 32.3     Anion Gap 10.0 mmol/L     Narrative:       GFR Normal >60  Chronic Kidney Disease <60  Kidney Failure <15      CBC & Differential [[id number]] Collected:  12/17/19 0507    Specimen:  Blood Updated:  12/17/19 0605    Narrative:       The following orders were created for panel order CBC & Differential.  Procedure                               Abnormality         Status                     ---------                                -----------         ------                     CBC Auto Differential[[id number]]        Abnormal            Final result                 Please view results for these tests on the individual orders.    CBC Auto Differential [[id number]]  (Abnormal) Collected:  12/17/19 0507    Specimen:  Blood Updated:  12/17/19 0605     WBC 12.53 10*3/mm3      RBC 4.52 10*6/mm3      Hemoglobin 14.2 g/dL      Hematocrit 43.2 %      MCV 95.6 fL      MCH 31.4 pg      MCHC 32.9 g/dL      RDW 12.5 %      RDW-SD 44.0 fl      MPV 11.7 fL      Platelets 268 10*3/mm3      Neutrophil % 75.8 %      Lymphocyte % 12.0 %      Monocyte % 6.5 %      Eosinophil % 0.2 %      Basophil % 0.6 %      Immature Grans % 4.9 %      Neutrophils, Absolute 9.51 10*3/mm3      Lymphocytes, Absolute 1.50 10*3/mm3      Monocytes, Absolute 0.82 10*3/mm3      Eosinophils, Absolute 0.02 10*3/mm3      Basophils, Absolute 0.07 10*3/mm3      Immature Grans, Absolute 0.61 10*3/mm3      nRBC 0.0 /100 WBC     POC Glucose Once [[id number]]  (Abnormal) Collected:  12/16/19 2017    Specimen:  Blood Updated:  12/16/19 2026     Glucose 344 mg/dL     POC Glucose Once [[id number]]  (Abnormal) Collected:  12/16/19 1630    Specimen:  Blood Updated:  12/16/19 1637     Glucose 256 mg/dL         ECG 12 Lead   Final Result   HEART RATE= 97  bpm   RR Interval= 616  ms   WI Interval= 155  ms   P Horizontal Axis= 10  deg   P Front Axis= 55  deg   QRSD Interval= 96  ms   QT Interval= 332  ms   QRS Axis= 36  deg   T Wave Axis= 48  deg   - NORMAL ECG -   Sinus rhythm   NO SIGNIFICANT CHANGE FROM PREVIOUS ECG   Electronically Signed By: Demetri Zhang ([facility]) 10-Dec-2019 12:18:19   Date and Time of Study: 2019-12-09 14:11:27      ECG 12 Lead   Final Result   HEART RATE= 111  bpm   RR Interval= 540  ms   WI Interval= 165  ms   P Horizontal Axis= 4  deg   P Front Axis= 57  deg   QRSD Interval= 103  ms   QT Interval= 334  ms   QRS Axis= 59  deg   T Wave Axis= 35  deg   - OTHERWISE NORMAL  ECG -   Poor quality tracing repeat   Electronically Signed By: Lyle Middleton ([facility]) 08-Dec-2019 17:47:26   Date and Time of Study: 2019-12-08 08:23:27      ECG 12 Lead   Final Result   HEART RATE= 131  bpm   RR Interval= 456  ms   PA Interval= 127  ms   P Horizontal Axis= -5  deg   P Front Axis= 49  deg   QRSD Interval= 95  ms   QT Interval= 303  ms   QRS Axis= 135  deg   T Wave Axis= 21  deg   - OTHERWISE NORMAL ECG -   Poor quality tracing repeat   Electronically Signed By: Lyle Middleton ([facility]) 07-Dec-2019 18:45:33   Date and Time of Study: 2019-12-07 11:26:15        Results for orders placed during the hospital encounter of 12/07/19   Adult Transthoracic Echo Complete W/ Cont if Necessary Per Protocol    Narrative · Left ventricular systolic function is normal. Estimated EF appears to be   in the range of 56 - 60%. Normal left ventricular cavity size, wall   thickness and mass noted. Normal diastolic function.  · No significant valvular stenosis or regurgitation noted.        Xr Chest 1 View    Result Date: 12/16/2019  1. Left arm approach PICC line tip SVC. 2. Poor inspiratory result with atelectasis or infiltrate left lower lobe.  This report was finalized on 12/16/2019 3:10 PM by Dr. Festus Powers MD.      ASSESSMENT/PLAN:  A/C hypoxic respiratory failure:  Chronic nocturnal hypoxia:  Acute bronchitis:  AE COPD:  Bilateral lower lobe atelectasis with possible pleural effusions: Pulmonary following  Now on 6 L, off high flow  Change saline nebs to as needed, change duo nebs to every 6 hours while awake, theophylline 400 mg daily, started this admission  Discontinue IV Lasix, changed to oral Lasix 40 mg daily  Continue oral steroids  Completed course of doxycycline  Wean oxygen as tolerated  Check overnight oximetry tonight, walking oximetry in a.m.  De-escalate to MedSurg telemetry today     Acute volume overload:  Responding well with improved pulmonary status on Lasix  Discontinue Lasix 20 mg IV,  "changed to oral 40 mg and monitor for effect, continue daily weight, strict I&O    DM2 in obese with steroid-induced hyperglycemia: A1c 9.1%  Glucose range past 24 hours approximately 101-316  A.m. glucose remains well above goal however steroids are being tapered currently  Continues Actos 45 mg daily, Levemir 40 units every morning, Levemir 15 units nightly    Narcotic induced constipation with chronic narcotic dependence ends for chronic back pain:  IBS:  Remained on home Norco 10 mg 4 times daily  Aggressive bowel regimen initiated while here with Colace, MiraLAX daily, enemas were given as needed  Bentyl home dosing decreased this admission     Dyslipidemia: Continues Lipitor 10 mg nightly    BPH: No acute issues on home terazosin    Tobacco abuse: No acute issues on nicotine patch    Ruled out dysphagia: SLP evaluated and found no difficulty swallowing    PLAN FOR DISPOSITION: home with  vs pulmonary rehab    NARENDRA Blanco  Hospitalist, [facility]  12/17/19  9:55 AM    \"Dictated utilizing Dragon dictation\"    "

## 2019-12-17 NOTE — PLAN OF CARE
Problem: Patient Care Overview  Goal: Individualization and Mutuality  Outcome: Ongoing (interventions implemented as appropriate)     Problem: Patient Care Overview  Goal: Plan of Care Review  Flowsheets (Taken 12/16/2019 6998)  Progress: improving  Plan of Care Reviewed With: patient  Outcome Summary: O2 on 7L HFNC, doing better     Problem: Chronic Obstructive Pulmonary Disease (Adult)  Intervention: Optimize Oxygenation/Ventilation/Perfusion  Flowsheets (Taken 12/16/2019 8550)  Head of Bed (HOB): HOB elevated  Airway/Ventilation Management: airway patency maintained; humidification applied; pulmonary hygiene promoted  Breathing Techniques/Airway Clearance: deep/controlled cough encouraged

## 2019-12-17 NOTE — PROGRESS NOTES
"                                              LOS: 8 days   Patient Care Team:  Ney Kathleen MD as PCP - General (Family Medicine)    Chief Complaint:  F/up respiratory failure, COPD exacerbation and medical problems listed below    Subjective   Interval History  Remains on high flow oxygen currently on 6 L.  Still coughing up purulent sputum.    REVIEW OF SYSTEMS:   CARDIOVASCULAR: No chest pain, chest pressure or chest discomfort. No palpitations or edema.   Constitutional: No fever or chills                 Physical Exam:     Vital Signs  Temp:  [96.6 °F (35.9 °C)-98.1 °F (36.7 °C)] 97.6 °F (36.4 °C)  Heart Rate:  [] 87  Resp:  [16-25] 20  BP: (105-139)/() 128/76    Intake/Output Summary (Last 24 hours) at 12/17/2019 0733  Last data filed at 12/17/2019 0200  Gross per 24 hour   Intake 1540 ml   Output 2710 ml   Net -1170 ml     Flowsheet Rows      First Filed Value   Admission Height  187.9 cm (73.98\") Documented at 12/07/2019 1126   Admission Weight  130 kg (286 lb) Documented at 12/07/2019 1126          General Appearance:    Alert, cooperative, in no acute distress   ENMT:  Mallampati score 3, dry mucous membrane   Eyes: Pupils equals and reactive to light. EOMI   Neck:   Large. Trachea midline. No thyromegaly.   Lungs:    Diminished breath sounds bilateral crackles present.    Heart:    Regular rhythm and normal rate, normal S1 and S2, no            murmur   Skin:    No rash.   Abdomen:     Obese. Soft. No tenderness. No HSM.   Neuro:   Conscious, alert, oriented x3. Strength 5/5 in upper and lower ext   Extremities:   Moves all extremities well, trace edema, no cyanosis, no             Redness          Results Review:        Results from last 7 days   Lab Units 12/17/19  0503 12/16/19  0646 12/15/19  0531   SODIUM mmol/L 133* 130* 136   POTASSIUM mmol/L 4.2 4.2 4.6   CHLORIDE mmol/L 94* 91* 94*   CO2 mmol/L 29.0 26.9 29.5*   BUN mg/dL 20 19 20   CREATININE mg/dL 0.62* 0.63* 0.68*   GLUCOSE " mg/dL 299* 316* 224*   CALCIUM mg/dL 8.9 8.9 9.0         Results from last 7 days   Lab Units 12/17/19  0507 12/16/19  0645 12/15/19  0531   WBC 10*3/mm3 12.53* 9.93 9.35   HEMOGLOBIN g/dL 14.2 14.7 14.7   HEMATOCRIT % 43.2 46.6 43.8   PLATELETS 10*3/mm3 268 287 271         Results from last 7 days   Lab Units 12/12/19  0401   PROBNP pg/mL 50.3       I reviewed the patient's new clinical results.  I personally viewed and interpreted the patient's CXR        Medication Review:     0.9% sodium chloride for irrigation-mineral oil-glycerin 300 mL Rectal Once   arformoterol 15 mcg Nebulization BID - RT   atorvastatin 10 mg Oral Nightly   bisacodyl 10 mg Rectal Daily   budesonide 0.5 mg Nebulization BID - RT   dicyclomine 10 mg Oral 4x Daily   docusate sodium 100 mg Oral BID   enoxaparin 40 mg Subcutaneous Q24H   fluticasone 2 spray Each Nare BID   furosemide 20 mg Intravenous Daily   HYDROcodone-acetaminophen 1 tablet Oral 4x Daily   insulin aspart 0-24 Units Subcutaneous 4x Daily AC & at Bedtime   insulin detemir 15 Units Subcutaneous Nightly   insulin detemir 40 Units Subcutaneous QAM   ipratropium-albuterol 3 mL Nebulization 4x Daily - RT   methylPREDNISolone sodium succinate 40 mg Intravenous Q12H   nicotine 1 patch Transdermal Q24H   pioglitazone 45 mg Oral Daily   polyethylene glycol 17 g Oral Daily   sodium chloride 10 mL Intravenous Q12H   sodium chloride 10 mL Intravenous Q12H   sodium chloride 10 mL Intravenous Q12H   sodium chloride 4 mL Nebulization BID - RT   terazosin 1 mg Oral Nightly   theophylline 400 mg Oral Q24H            Diagnostic imaging:  I personally and independently reviewed the following images:  CXR 12/9/2019: Low lung volumes.  Increased pulmonary vascular markings.  CT chest 12/8/2019: Upper lobe predominant emphysema.  Peribronchial cuffing in the lower lobes.     KUB 12/11/2019:  Infiltrates/fluid/consolidation in the left lower lobe.  Gastric distention.    CXR 12/12/2019: Increased  pulmonary vascular markings.  Worsening atelectasis in the right lower lobe and possible left pleural effusion.    Assessment   1. Acute exacerbation of COPD  2. Acute on chronic hypoxic respiratory failure, on oxygen 3.5 L/min at baseline  3. Acute bronchitis  4. Tobacco abuse  5. Ileus, probable  6. Bilateral lower lobe atelectasis  7. Possible pleural effusion    Pertinent medical problems:  · Diabetes mellitus type 2 with worsening hyperglycemia due to steroid        Plan   · Switched to oxygen high flow nasal cannula, currently on 6 L.  Wean down oxygen as tolerated  · Finish doxycycline.  Antibiotics as guided by sputum cultures  · Hypertonic saline nebulizer twice a day  · DuoNeb every 6, Brovana and Pulmicort  · Continue Lasix 20 mg daily.  · Switch to oral steroids this morning.  · Continue theophylline 400 mg day 2.  We will check levels in the morning  · Flutter valve to advance mucus clearance  · Insulin per primary for hyperglycemia              Harvey Shahid MD  12/17/19  7:33 AM          This note was dictated utilizing [employer] dictation

## 2019-12-17 NOTE — NURSING NOTE
CWON consult received. I was asked to evaluate skin under O2 tubing r/t pt complaints of area being tender to touch. Left ear is clear and intact. Right ear, just under where tubing lies, is red but skin is intact and blanchable. Will recommend protecting with thin DuoDerm to prevent device related pressure injury. Patient agreeable and dressing applied. Discussed with KIT Ravi.

## 2019-12-17 NOTE — PLAN OF CARE
Pt rested well throughout night. SR-ST per monitor . Pt remains SOA with minimal activity. )2 sats remain>88% on 6L Medicated x 2 for generalized pain

## 2019-12-17 NOTE — NURSING NOTE
Continued Stay Note  MURIEL Chavez     Patient Name: Kt Armstrong  MRN: [id number]  Today's Date: 12/17/2019    Admit Date: 12/7/2019    Discharge Plan     Row Name 12/17/19 1257       Plan    Plan  Home with [facility]    Patient/Family in Agreement with Plan  yes    Plan Comments  Spoke with patient today regarding referral for inpatient pulmonary rehab. [facility] that offers this. Patient states he is not interested in going anywhere but would like to go back to his daughter's house with home health to follow. Patient provided a list of home health agencies, patient refused quality indicators on the services. CM read the differed agencies to patient and he is agreeable to [facility]. Referral made to Trini with [facility]. CM will continue to follow for needs.        Discharge Codes    No documentation.             Glenys Marley RN

## 2019-12-17 NOTE — PLAN OF CARE
Problem: Patient Care Overview  Goal: Plan of Care Review  Outcome: Ongoing (interventions implemented as appropriate)  Flowsheets (Taken 12/16/2019 1925)  Progress: improving  Plan of Care Reviewed With: patient; son  Note:   Pt has been up to dangle sob and to BSC this shift numerous times, he does desat mid 80's on exertion at times. Now on 7 liter high flow. Sats upper 80's low 90's at rest. Coughing thick tan sputum. Pulm aware. Pt had PICC line placed this shift.

## 2019-12-18 VITALS
HEART RATE: 84 BPM | DIASTOLIC BLOOD PRESSURE: 95 MMHG | BODY MASS INDEX: 35.19 KG/M2 | TEMPERATURE: 97.3 F | SYSTOLIC BLOOD PRESSURE: 134 MMHG | WEIGHT: 259.8 LBS | OXYGEN SATURATION: 92 % | RESPIRATION RATE: 20 BRPM | HEIGHT: 72 IN

## 2019-12-18 LAB
ANION GAP SERPL CALCULATED.3IONS-SCNC: 8.5 MMOL/L (ref 5–15)
BASOPHILS # BLD AUTO: 0.1 10*3/MM3 (ref 0–0.2)
BASOPHILS NFR BLD AUTO: 0.6 % (ref 0–1.5)
BUN BLD-MCNC: 22 MG/DL (ref 8–23)
BUN/CREAT SERPL: 30.6 (ref 7–25)
CALCIUM SPEC-SCNC: 9.2 MG/DL (ref 8.6–10.5)
CHLORIDE SERPL-SCNC: 92 MMOL/L (ref 98–107)
CO2 SERPL-SCNC: 29.5 MMOL/L (ref 22–29)
CREAT BLD-MCNC: 0.72 MG/DL (ref 0.76–1.27)
DEPRECATED RDW RBC AUTO: 44.8 FL (ref 37–54)
EOSINOPHIL # BLD AUTO: 0.01 10*3/MM3 (ref 0–0.4)
EOSINOPHIL NFR BLD AUTO: 0.1 % (ref 0.3–6.2)
ERYTHROCYTE [DISTWIDTH] IN BLOOD BY AUTOMATED COUNT: 12.6 % (ref 12.3–15.4)
GFR SERPL CREATININE-BSD FRML MDRD: 109 ML/MIN/1.73
GLUCOSE BLD-MCNC: 168 MG/DL (ref 65–99)
GLUCOSE BLDC GLUCOMTR-MCNC: 171 MG/DL (ref 70–130)
HCT VFR BLD AUTO: 43.1 % (ref 37.5–51)
HGB BLD-MCNC: 14.4 G/DL (ref 13–17.7)
IMM GRANULOCYTES # BLD AUTO: 0.74 10*3/MM3 (ref 0–0.05)
IMM GRANULOCYTES NFR BLD AUTO: 4.8 % (ref 0–0.5)
LYMPHOCYTES # BLD AUTO: 2.02 10*3/MM3 (ref 0.7–3.1)
LYMPHOCYTES NFR BLD AUTO: 13.1 % (ref 19.6–45.3)
MCH RBC QN AUTO: 32.1 PG (ref 26.6–33)
MCHC RBC AUTO-ENTMCNC: 33.4 G/DL (ref 31.5–35.7)
MCV RBC AUTO: 96 FL (ref 79–97)
MONOCYTES # BLD AUTO: 1.01 10*3/MM3 (ref 0.1–0.9)
MONOCYTES NFR BLD AUTO: 6.5 % (ref 5–12)
NEUTROPHILS # BLD AUTO: 11.59 10*3/MM3 (ref 1.7–7)
NEUTROPHILS NFR BLD AUTO: 74.9 % (ref 42.7–76)
NRBC BLD AUTO-RTO: 0 /100 WBC (ref 0–0.2)
PLATELET # BLD AUTO: 210 10*3/MM3 (ref 140–450)
PMV BLD AUTO: 11.5 FL (ref 6–12)
POTASSIUM BLD-SCNC: 4.1 MMOL/L (ref 3.5–5.2)
RBC # BLD AUTO: 4.49 10*6/MM3 (ref 4.14–5.8)
SODIUM BLD-SCNC: 130 MMOL/L (ref 136–145)
THEOPHYLLINE SERPL-MCNC: 2.9 MCG/ML (ref 10–20)
WBC NRBC COR # BLD: 15.47 10*3/MM3 (ref 3.4–10.8)

## 2019-12-18 PROCEDURE — 63710000001 INSULIN ASPART PER 5 UNITS: Performed by: NURSE PRACTITIONER

## 2019-12-18 PROCEDURE — 85025 COMPLETE CBC W/AUTO DIFF WBC: CPT | Performed by: NURSE PRACTITIONER

## 2019-12-18 PROCEDURE — 99239 HOSP IP/OBS DSCHRG MGMT >30: CPT | Performed by: NURSE PRACTITIONER

## 2019-12-18 PROCEDURE — 94668 MNPJ CHEST WALL SBSQ: CPT

## 2019-12-18 PROCEDURE — 82962 GLUCOSE BLOOD TEST: CPT

## 2019-12-18 PROCEDURE — 94799 UNLISTED PULMONARY SVC/PX: CPT

## 2019-12-18 PROCEDURE — 80198 ASSAY OF THEOPHYLLINE: CPT | Performed by: NURSE PRACTITIONER

## 2019-12-18 PROCEDURE — 63710000001 INSULIN DETEMIR PER 5 UNITS: Performed by: NURSE PRACTITIONER

## 2019-12-18 PROCEDURE — 80048 BASIC METABOLIC PNL TOTAL CA: CPT | Performed by: NURSE PRACTITIONER

## 2019-12-18 PROCEDURE — 63710000001 PREDNISONE PER 1 MG: Performed by: NURSE PRACTITIONER

## 2019-12-18 PROCEDURE — 94618 PULMONARY STRESS TESTING: CPT

## 2019-12-18 RX ORDER — SODIUM CHLORIDE FOR INHALATION 7 %
4 VIAL, NEBULIZER (ML) INHALATION 2 TIMES DAILY PRN
Qty: 240 ML | Refills: 0 | Status: ON HOLD | OUTPATIENT
Start: 2019-12-18 | End: 2022-07-25

## 2019-12-18 RX ORDER — FUROSEMIDE 40 MG/1
20 TABLET ORAL DAILY
Qty: 15 TABLET | Refills: 1 | Status: SHIPPED | OUTPATIENT
Start: 2019-12-19 | End: 2020-11-15

## 2019-12-18 RX ORDER — NICOTINE 21 MG/24HR
1 PATCH, TRANSDERMAL 24 HOURS TRANSDERMAL
Qty: 21 EACH | Refills: 0 | Status: SHIPPED | OUTPATIENT
Start: 2019-12-19 | End: 2020-11-15

## 2019-12-18 RX ORDER — PSEUDOEPHEDRINE HCL 30 MG
100 TABLET ORAL 2 TIMES DAILY
Qty: 60 EACH | Refills: 1 | Status: ON HOLD | OUTPATIENT
Start: 2019-12-18 | End: 2022-07-07

## 2019-12-18 RX ORDER — THEOPHYLLINE 400 MG/1
400 TABLET, EXTENDED RELEASE ORAL
Qty: 30 TABLET | Refills: 1 | Status: SHIPPED | OUTPATIENT
Start: 2019-12-19 | End: 2021-05-04 | Stop reason: SDUPTHER

## 2019-12-18 RX ORDER — BUDESONIDE 0.5 MG/2ML
0.5 INHALANT ORAL
Qty: 120 EACH | Refills: 1 | OUTPATIENT
Start: 2019-12-18 | End: 2021-07-14

## 2019-12-18 RX ORDER — ARFORMOTEROL TARTRATE 15 UG/2ML
15 SOLUTION RESPIRATORY (INHALATION)
Qty: 120 ML | Refills: 1 | Status: SHIPPED | OUTPATIENT
Start: 2019-12-18 | End: 2021-05-31 | Stop reason: SDUPTHER

## 2019-12-18 RX ORDER — DICYCLOMINE HCL 20 MG
10 TABLET ORAL 4 TIMES DAILY
Status: ON HOLD
Start: 2019-12-18 | End: 2022-07-07

## 2019-12-18 RX ORDER — FLUTICASONE PROPIONATE 50 MCG
2 SPRAY, SUSPENSION (ML) NASAL 2 TIMES DAILY
Qty: 1 BOTTLE | Refills: 1 | Status: SHIPPED | OUTPATIENT
Start: 2019-12-18 | End: 2020-11-15

## 2019-12-18 RX ORDER — BISACODYL 10 MG
10 SUPPOSITORY, RECTAL RECTAL DAILY
Status: ON HOLD
Start: 2019-12-19 | End: 2023-03-05

## 2019-12-18 RX ORDER — PREDNISONE 10 MG/1
TABLET ORAL
Qty: 20 TABLET | Refills: 0 | Status: SHIPPED | OUTPATIENT
Start: 2019-12-18 | End: 2020-11-15

## 2019-12-18 RX ORDER — BISACODYL 5 MG/1
10 TABLET, DELAYED RELEASE ORAL DAILY PRN
Start: 2019-12-18 | End: 2021-07-14

## 2019-12-18 RX ADMIN — PIOGLITAZONE 45 MG: 45 TABLET ORAL at 09:32

## 2019-12-18 RX ADMIN — ACETAMINOPHEN 650 MG: 325 TABLET ORAL at 06:37

## 2019-12-18 RX ADMIN — NICOTINE 1 PATCH: 21 PATCH TRANSDERMAL at 08:02

## 2019-12-18 RX ADMIN — THEOPHYLLINE 400 MG: 400 TABLET, EXTENDED RELEASE ORAL at 09:32

## 2019-12-18 RX ADMIN — SODIUM CHLORIDE, PRESERVATIVE FREE 10 ML: 5 INJECTION INTRAVENOUS at 09:33

## 2019-12-18 RX ADMIN — SODIUM CHLORIDE, PRESERVATIVE FREE 10 ML: 5 INJECTION INTRAVENOUS at 09:32

## 2019-12-18 RX ADMIN — PREDNISONE 20 MG: 20 TABLET ORAL at 07:56

## 2019-12-18 RX ADMIN — DOCUSATE SODIUM 100 MG: 100 CAPSULE, LIQUID FILLED ORAL at 09:32

## 2019-12-18 RX ADMIN — IPRATROPIUM BROMIDE AND ALBUTEROL SULFATE 3 ML: .5; 3 SOLUTION RESPIRATORY (INHALATION) at 06:58

## 2019-12-18 RX ADMIN — INSULIN DETEMIR 45 UNITS: 100 INJECTION, SOLUTION SUBCUTANEOUS at 06:38

## 2019-12-18 RX ADMIN — FUROSEMIDE 40 MG: 40 TABLET ORAL at 09:32

## 2019-12-18 RX ADMIN — INSULIN ASPART 4 UNITS: 100 INJECTION, SOLUTION INTRAVENOUS; SUBCUTANEOUS at 07:56

## 2019-12-18 RX ADMIN — HYDROCODONE BITARTRATE AND ACETAMINOPHEN 1 TABLET: 10; 325 TABLET ORAL at 07:56

## 2019-12-18 RX ADMIN — HYDROCODONE BITARTRATE AND ACETAMINOPHEN 1 TABLET: 10; 325 TABLET ORAL at 12:16

## 2019-12-18 RX ADMIN — ARFORMOTEROL TARTRATE 15 MCG: 15 SOLUTION RESPIRATORY (INHALATION) at 09:54

## 2019-12-18 RX ADMIN — BUDESONIDE 0.5 MG: 0.5 SUSPENSION RESPIRATORY (INHALATION) at 06:58

## 2019-12-18 RX ADMIN — DICYCLOMINE HYDROCHLORIDE 10 MG: 20 TABLET ORAL at 12:16

## 2019-12-18 RX ADMIN — DICYCLOMINE HYDROCHLORIDE 10 MG: 20 TABLET ORAL at 07:56

## 2019-12-18 RX ADMIN — POLYETHYLENE GLYCOL 3350 17 G: 17 POWDER, FOR SOLUTION ORAL at 09:32

## 2019-12-18 RX ADMIN — INSULIN ASPART 4 UNITS: 100 INJECTION, SOLUTION INTRAVENOUS; SUBCUTANEOUS at 12:16

## 2019-12-18 RX ADMIN — FLUTICASONE PROPIONATE 2 SPRAY: 50 SPRAY, METERED NASAL at 09:32

## 2019-12-18 NOTE — PROCEDURES
Exercise Oximetry    Patient Name:Kt Armstrong   MRN: [id number]   Date: 12/18/19             ROOM AIR BASELINE   SpO2% 90   Heart Rate 108   Blood Pressure      EXERCISE ON ROOM AIR SpO2% EXERCISE ON O2 @  LPM SpO2%   1 MINUTE 86 1 MINUTE 1lpm started  86   2 MINUTES  2 MINUTES increased to 2lpm  87   3 MINUTES  3 MINUTES  89   4 MINUTES  4 MINUTES increased to 3lpm  87   5 MINUTES  5 MINUTES  90   6 MINUTES  6 MINUTES  90              Distance Walked  200ft Distance Walked   Dyspnea (Jackson Scale)  0/3 Dyspnea (Jackson Scale)   Fatigue (Jackson Scale)  182 Fatigue (Jackson Scale)   SpO2% Post Exercise  91 SpO2% Post Exercise   HR Post Exercise  121 HR Post Exercise   Time to Recovery  2 min Time to Recovery     Comments:

## 2019-12-18 NOTE — NURSING NOTE
Pt discharged and left property before it was brought to my attention that there was  A prescription on the printer in med surg

## 2019-12-18 NOTE — PLAN OF CARE
Problem: Patient Care Overview  Goal: Individualization and Mutuality  Outcome: Ongoing (interventions implemented as appropriate)

## 2019-12-18 NOTE — NURSING NOTE
Case Management Discharge Note      Final Note: dc home with [facility]         Destination      No service has been selected for the patient.      Durable Medical Equipment - Selection Complete      Service Provider Request Status Selected Services Address Phone Number Fax Number    [facility] Selected Durable Medical Equipment [address], [facility] [id number] [phone]      Dialysis/Infusion      No service has been selected for the patient.      Home Medical Care - Selection Complete      Service Provider Request Status Selected Services Address Phone Number Fax Number    [facility] Selected Home Health Services [address] [place] [address] [address] [phone]      Therapy      No service has been selected for the patient.      Community Resources      No service has been selected for the patient.             Final Discharge Disposition Code: 06 - home with home health care

## 2019-12-18 NOTE — DISCHARGE SUMMARY
"Kt Armstrong  1950  [id number]    Hospitalists Discharge Summary    Date of Admission: 12/7/2019  Date of Discharge:  12/18/2019    History of Present Illness: Taken from consult note per Dr. Woods:  \"Thank you for this consultation.  69-year-old male who follows with pulmonary at [facility].  He remains a current smoker of at least a few cigarettes a day.  Previously half pack a day smoker.  He has underlying COPD and his last admission here was in April this year with COPD exacerbation.  He uses inhalers and nebulizers at home.  For the past 5 days he has had increased cough.  He feels congested but not expectorating.  He states he is wheezing.  He uses oxygen at home and has been compliant.  He has increased his nebulizer treatments.  Despite this he is continued to get worse.  He states his daughter had the flu recently.  His rapid influenza test here was negative.  He denies leg swelling or heart problems.  He is unaware if he has been treated for diastolic heart failure in the past.  He denies any chest pain.  He denies fever.     Kt Armstrong  reports that he does not drink alcohol.,  reports that he has been smoking cigarettes. He has a 29.00 pack-year smoking history. He has never used smokeless tobacco.\"  Hospital Course Summary/Primary Discharge Diagnoses:   A/C hypoxic respiratory failure: POA  Chronic nocturnal hypoxia: POA  Acute bronchitis: POA  AE COPD: POA  Steroid induced leukocytosis:  Bilateral lower lobe atelectasis with possible pleural effusions: Pulmonary followed  -Initially very poor air movement quickly worsened, moved to ICU and placed on heated high flow by nasal cannula with as much as 90% oxygen bled in  Gradually weaned oxygen down to 3 L at all times confirmed with walking oximetry and overnight oximetry  -Remained on maximum pulmonary medications including theophylline, that was initiated this admission  -Theophylline level today remains low at 2.9 on 400 mg daily  -Now on oral " steroids tapering from IV steroids  -Now on oral Lasix from IV Lasix  -Completed full course of doxycycline  Home today with HH on duonebs/brovana/budesonide/hypertonic saline prn/mucinex/acapella  F/U pulmonary 2 weeks     Acute volume overload: resolved  Echo essentially normal however clinically volume overloaded  Diuresed well on IV then oral Lasix  Instructed to continue daily weight  Follow-up PCP 1 week    Secondary Discharge Diagnoses:    DM2 in obese with steroid-induced hyperglycemia: A1c 9.1%  Glucose highly variable with highs in the 300 range  Levemir utilized substitution for home Lantus  Continue Lantus or Levemir 45 units a.m., 20 units p.m.  Resume home metformin, continued home Actos  Log glucose and bring to appointment with PCP     Narcotic induced constipation/ileus with chronic narcotic dependence ends for chronic back pain:  IBS:  Remained on home Norco 10 mg 4 times daily  Aggressive bowel regimen initiated while here with Colace, MiraLAX daily, enemas were given as needed  Bentyl home dosing decreased this admission to 10 mg     Dyslipidemia: Continued Lipitor 10 mg nightly     BPH: No acute issues on home terazosin     Tobacco abuse: No acute issues on nicotine patch     Ruled out dysphagia: SLP evaluated and found no difficulty swallowing    PCP  Patient Care Team:  Ney Kathleen MD as PCP - General (Family Medicine)    Consults:   Consults     Date and Time Order Name Status Description    12/7/2019 1512 Inpatient Pulmonology Consult Completed           Operations and Procedures Performed:    12/18 0956 Note By: Ingrid Gonzalez, CRT  Xr Abdomen Flat & Upright    Result Date: 12/11/2019  Narrative: CR Abdomen AP W Decub or Erect INDICATION: Abdominal fullness with nausea and belching. COMPARISON: None available FINDINGS: Upright and supine AP radiographs of the abdomen. Moderate air distention of the stomach without corresponding small bowel or colonic distention. This could be seen  with gastric ileus or proximal obstruction. Air is demonstrated within the duodenal bulb. Surgical clips right upper quadrant. No free air. Moderate amount of bibasilar atelectasis. No organomegaly. No abnormal calcifications.     Impression: Moderate gastric distention possibly the sequela of an ileus. Moderate bibasilar atelectasis. Signer Name: KEATON Martin MD  Signed: 12/11/2019 6:48 AM  Workstation Name: [facility]  [facility]    Xr Abdomen 2 View With Chest 1 View    Result Date: 12/12/2019  Narrative: XR ABDOMEN 2 VW W CHEST 1 VW-: 12/12/2019 9:54 AM  INDICATION: COPD. Controlled hypertension. Mid abdominal pain that began this morning.  COMPARISON: 12/09/2019  FINDINGS: Chest: PA view of the chest. The heart is enlarged but stable lung volumes remain low with bronchovascular crowding. There is bibasilar atelectasis or faint infiltrate and there are some faint new opacities in the right midlung zone which may reflect developing pneumonia. No pneumothorax. Trace to small right effusion..  Abdomen: Upright and supine AP radiographs of the abdomen. Exam degraded by body habitus. No distinct evidence of free air. Gaseous distention of the stomach. There are no dilated air-filled loops of large or small bowel..      Impression:  1. Cardiomegaly with persistent bibasilar atelectasis or infiltrates. Interval worsening of opacities in the midlung zone on the right suspicious for pneumonia. Persistent trace right effusion. 2. No free air. 3. Nonspecific but nonobstructive bowel gas pattern. Ileus could present similarly. Gaseous distention of the stomach, nonspecific.  This report was finalized on 12/12/2019 10:06 AM by Dr. Jaden Danielle MD.      Ct Angiogram Chest With & Without Contrast    Result Date: 12/8/2019  Narrative: CT ANGIOGRAM CHEST W WO CONTRAST INDICATION: 69-year-old male with worsening shortness of air and congestion for 5 days. COPD. TECHNIQUE: CT angiogram of the chest  with IV contrast. 3-D reconstructions were obtained and reviewed.   Radiation dose reduction techniques included automated exposure control or exposure modulation based on body size. Count of known CT and cardiac nuc med studies performed in previous 12 months: 1. COMPARISON: Chest CT 1/4/2019. FINDINGS: Adequate opacification of the pulmonary arteries with no filling defects. Thoracic aorta normal in course and caliber without dissection. Heart size normal. No pericardial effusion. No mediastinal, hilar, or axillary lymphadenopathy by size criteria. No pleural effusions. No pneumothorax. Emphysema. Mild dependent bibasilar atelectasis/infiltrate. Visualized upper abdomen is unremarkable. No acute osseous abnormality.     Impression: 1. Negative for pulmonary embolus. 2. Negative for thoracic aortic aneurysm/dissection. 3. Mild bibasilar atelectasis/infiltrate. Correlation with clinical symptoms to exclude superimposed pneumonia. 4. Emphysema. Signer Name: Reginaldo Montemayor MD  Signed: 12/8/2019 9:27 AM  [facility]: [email or web address]  [facility]    Xr Chest 1 View    Result Date: 12/16/2019  Narrative: CHEST ONE VIEW 12/16/2019  HISTORY: Post invasive procedure, PICC line placement today.  FINDINGS: The heart is normal in size. Left arm approach PICC line tip is in the superior vena cava. Poor inspiratory result with infiltrate or atelectasis left lower lobe and discoid atelectasis at the right base. The lungs are otherwise clear. There are no pleural effusions.      Impression: 1. Left arm approach PICC line tip SVC. 2. Poor inspiratory result with atelectasis or infiltrate left lower lobe.  This report was finalized on 12/16/2019 3:10 PM by Dr. Festus Powers MD.      Xr Chest 1 View    Result Date: 12/9/2019  Narrative: XR CHEST 1 VW-: 12/9/2019 12:45 PM  INDICATION: Decreased O2 sats. Short of air today.  COMPARISON:  12/07/2019.  FINDINGS: Portable AP view of the chest.   No visible  support lines  The heart is borderline enlarged but stable. Lung volumes are lower. There is bronchovascular crowding favored over mild central vascular congestion.  Bibasilar atelectasis or infiltrates have developed, right greater than left. No pneumothorax.      Impression:  1. Lung volumes are lower. New bibasilar atelectasis or infiltrates right greater the left.  This report was finalized on 12/9/2019 1:03 PM by Dr. Jaden Danielle MD.      Xr Chest 1 View    Result Date: 12/7/2019  Narrative: CR Chest 1 Vw INDICATION: 69-year-old male with worsening shortness of air for 3 days. History of COPD. COMPARISON:  Chest 11/9/2019 FINDINGS: 2 portable AP view(s) of the chest.  Heart size is upper limits. Mild central vascular congestion and bilateral perihilar interstitial opacities. No pleural effusions or pneumothorax. Mediastinum is within normal limits.     Impression: Borderline heart size with mild central vascular congestion and bilateral perihilar interstitial opacities. Correlate for clinical symptoms of congestive failure. Signer Name: Reginaldo Montemayor MD  Signed: 12/7/2019 12:06 PM  Workstation Name: [id number]  [facility]    Xr Chest Pa & Lateral    Result Date: 12/7/2019  Narrative: CR Chest 2 Vws INDICATION:  Hypoxia. Question CHF. COPD with acute exacerbation. COMPARISON:  12/7/2019 1130 hours FINDINGS: PA and lateral views of the chest.  Improved lung volumes. Heart size within normal limits. Mild pulmonary vascular congestion. No interstitial or alveolar edema. No focal consolidation. No effusions or pneumothorax.      Impression: Improved lung volumes with continued mild prominence of the pulmonary vascularity but no convincing evidence of CHF. Signer Name: KEATON Martin MD  Signed: 12/7/2019 4:15 PM  Workstation Name: [id number]  [facility]    Allergies:  has No Known Allergies.    Efren  Hydrocodone 11/2019 per report of 12/7/2019, reviewed by  me    Discharge Medications:     Discharge Medications      New Medications      Instructions Start Date   arformoterol 15 MCG/2ML nebulizer solution  Commonly known as:  BROVANA   15 mcg, Nebulization, 2 Times Daily - RT      bisacodyl 5 MG EC tablet  Commonly known as:  DULCOLAX   10 mg, Oral, Daily PRN      bisacodyl 10 MG suppository  Commonly known as:  DULCOLAX   10 mg, Rectal, Daily   Start Date:  December 19, 2019     budesonide 0.5 MG/2ML nebulizer solution  Commonly known as:  PULMICORT   0.5 mg, Nebulization, 2 Times Daily - RT, J 44.9, J 18.9      docusate sodium 100 MG capsule   100 mg, Oral, 2 Times Daily      fluticasone 50 MCG/ACT nasal spray  Commonly known as:  FLONASE   2 sprays, Each Nare, 2 Times Daily      furosemide 40 MG tablet  Commonly known as:  LASIX   20 mg, Oral, Daily   Start Date:  December 19, 2019     insulin detemir 100 UNIT/ML injection  Commonly known as:  LEVEMIR   20 Units, Subcutaneous, Nightly      insulin detemir 100 UNIT/ML injection  Commonly known as:  LEVEMIR  Replaces:  NON FORMULARY   45 Units, Subcutaneous, Every Morning   Start Date:  December 19, 2019     nicotine 21 MG/24HR patch  Commonly known as:  NICODERM CQ   1 patch, Transdermal, Every 24 Hours Scheduled   Start Date:  December 19, 2019     polyethylene glycol pack packet  Commonly known as:  MIRALAX   17 g, Oral, Daily   Start Date:  December 19, 2019     predniSONE 10 MG tablet  Commonly known as:  DELTASONE   40 mg x 2 days, 30 mg x 2 days, 20 mg x 2 days, 10 mg x 2 days      sodium chloride 7 % nebulizer solution nebulizer solution   4 mL, Nebulization, 2 Times Daily PRN, J 44.9, J 18.9      theophylline 400 MG 24 hr tablet  Commonly known as:  UNIPHYL   400 mg, Oral, Every 24 Hours Scheduled   Start Date:  December 19, 2019        Changes to Medications      Instructions Start Date   dicyclomine 20 MG tablet  Commonly known as:  BENTYL  What changed:  how much to take   10 mg, Oral, 4 Times Daily          Continue These Medications      Instructions Start Date   JEWELL CONTOUR TEST test strip  Generic drug:  glucose blood   No dose, route, or frequency recorded.      doxazosin 1 MG tablet  Commonly known as:  CARDURA   1 mg, Oral, Daily      HYDROcodone-acetaminophen  MG per tablet  Commonly known as:  NORCO   1 tablet, 4 Times Daily      ipratropium-albuterol 0.5-2.5 mg/3 ml nebulizer  Commonly known as:  DUO-NEB   3 mL, 4 Times Daily - RT      metFORMIN 500 MG tablet  Commonly known as:  GLUCOPHAGE   500 mg, Oral, 2 Times Daily With Meals      pioglitazone 45 MG tablet  Commonly known as:  ACTOS   45 mg, Oral, Daily      SIMVASTATIN PO   40 mg, Oral, Every Evening         Stop These Medications    budesonide-formoterol 160-4.5 MCG/ACT inhaler  Commonly known as:  SYMBICORT     glipiZIDE 5 MG tablet 5 mg, metFORMIN 500 MG tablet 500 mg     NON FORMULARY  Replaced by:  insulin detemir 100 UNIT/ML injection            Last Lab Results:   Lab Results (most recent)     Procedure Component Value Units Date/Time    POC Glucose Once [[id number]]  (Abnormal) Collected:  12/18/19 1155    Specimen:  Blood Updated:  12/18/19 1218     Glucose 171 mg/dL     Theophylline Level [[id number]]  (Abnormal) Collected:  12/18/19 0435    Specimen:  Blood Updated:  12/18/19 0633     Theophylline Level 2.9 mcg/mL     Basic Metabolic Panel [[id number]]  (Abnormal) Collected:  12/18/19 0435    Specimen:  Blood Updated:  12/18/19 0611     Glucose 168 mg/dL      BUN 22 mg/dL      Creatinine 0.72 mg/dL      Sodium 130 mmol/L      Potassium 4.1 mmol/L      Chloride 92 mmol/L      CO2 29.5 mmol/L      Calcium 9.2 mg/dL      eGFR Non African Amer 109 mL/min/1.73      BUN/Creatinine Ratio 30.6     Anion Gap 8.5 mmol/L     Narrative:       GFR Normal >60  Chronic Kidney Disease <60  Kidney Failure <15      CBC & Differential [[id number]] Collected:  12/18/19 0435    Specimen:  Blood Updated:  12/18/19 0531    Narrative:       The following orders  were created for panel order CBC & Differential.  Procedure                               Abnormality         Status                     ---------                               -----------         ------                     CBC Auto Differential[[id number]]        Abnormal            Final result                 Please view results for these tests on the individual orders.    CBC Auto Differential [[id number]]  (Abnormal) Collected:  12/18/19 0435    Specimen:  Blood Updated:  12/18/19 0531     WBC 15.47 10*3/mm3      RBC 4.49 10*6/mm3      Hemoglobin 14.4 g/dL      Hematocrit 43.1 %      MCV 96.0 fL      MCH 32.1 pg      MCHC 33.4 g/dL      RDW 12.6 %      RDW-SD 44.8 fl      MPV 11.5 fL      Platelets 210 10*3/mm3      Neutrophil % 74.9 %      Lymphocyte % 13.1 %      Monocyte % 6.5 %      Eosinophil % 0.1 %      Basophil % 0.6 %      Immature Grans % 4.8 %      Neutrophils, Absolute 11.59 10*3/mm3      Lymphocytes, Absolute 2.02 10*3/mm3      Monocytes, Absolute 1.01 10*3/mm3      Eosinophils, Absolute 0.01 10*3/mm3      Basophils, Absolute 0.10 10*3/mm3      Immature Grans, Absolute 0.74 10*3/mm3      nRBC 0.0 /100 WBC     POC Glucose Once [[id number]]  (Abnormal) Collected:  12/17/19 2019    Specimen:  Blood Updated:  12/17/19 2025     Glucose 394 mg/dL     Basic Metabolic Panel [[id number]]  (Abnormal) Collected:  12/17/19 0503    Specimen:  Blood Updated:  12/17/19 0625     Glucose 299 mg/dL      BUN 20 mg/dL      Creatinine 0.62 mg/dL      Sodium 133 mmol/L      Potassium 4.2 mmol/L      Chloride 94 mmol/L      CO2 29.0 mmol/L      Calcium 8.9 mg/dL      eGFR Non African Amer 129 mL/min/1.73      BUN/Creatinine Ratio 32.3     Anion Gap 10.0 mmol/L     Narrative:       GFR Normal >60  Chronic Kidney Disease <60  Kidney Failure <15      CBC & Differential [[id number]] Collected:  12/17/19 0507    Specimen:  Blood Updated:  12/17/19 0605    Narrative:       The following orders were created for panel order CBC &  Differential.  Procedure                               Abnormality         Status                     ---------                               -----------         ------                     CBC Auto Differential[[id number]]        Abnormal            Final result                 Please view results for these tests on the individual orders.    CBC Auto Differential [[id number]]  (Abnormal) Collected:  12/17/19 0507    Specimen:  Blood Updated:  12/17/19 0605     WBC 12.53 10*3/mm3      RBC 4.52 10*6/mm3      Hemoglobin 14.2 g/dL      Hematocrit 43.2 %      MCV 95.6 fL      MCH 31.4 pg      MCHC 32.9 g/dL      RDW 12.5 %      RDW-SD 44.0 fl      MPV 11.7 fL      Platelets 268 10*3/mm3      Neutrophil % 75.8 %      Lymphocyte % 12.0 %      Monocyte % 6.5 %      Eosinophil % 0.2 %      Basophil % 0.6 %      Immature Grans % 4.9 %      Neutrophils, Absolute 9.51 10*3/mm3      Lymphocytes, Absolute 1.50 10*3/mm3      Monocytes, Absolute 0.82 10*3/mm3      Eosinophils, Absolute 0.02 10*3/mm3      Basophils, Absolute 0.07 10*3/mm3      Immature Grans, Absolute 0.61 10*3/mm3      nRBC 0.0 /100 WBC     C-reactive Protein [[id number]]  (Normal) Collected:  12/16/19 0646    Specimen:  Blood Updated:  12/16/19 1057     C-Reactive Protein 0.43 mg/dL     Phosphorus [[id number]]  (Normal) Collected:  12/16/19 0646    Specimen:  Blood Updated:  12/16/19 0716     Phosphorus 3.1 mg/dL     Magnesium [[id number]]  (Normal) Collected:  12/16/19 0646    Specimen:  Blood Updated:  12/16/19 0716     Magnesium 1.9 mg/dL     Procalcitonin [[id number]]  (Normal) Collected:  12/16/19 0646    Specimen:  Blood Updated:  12/16/19 0714     Procalcitonin 0.11 ng/mL     Theophylline Level [[id number]]  (Abnormal) Collected:  12/14/19 0558    Specimen:  Blood Updated:  12/14/19 0628     Theophylline Level 1.4 mcg/mL     Blood Culture - Blood, Arm, Right [[id number]] Collected:  12/08/19 1225    Specimen:  Blood from Arm, Right Updated:  12/13/19 1246     Blood  Culture No growth at 5 days    Blood Culture - Blood, Arm, Right [[id number]] Collected:  12/08/19 1235    Specimen:  Blood from Arm, Right Updated:  12/13/19 1245     Blood Culture No growth at 5 days    Blood Gas, Arterial [[id number]]  (Abnormal) Collected:  12/13/19 0950    Specimen:  Arterial Blood Updated:  12/13/19 0954     Site Arterial Line     Rich's Test N/A     pH, Arterial 7.464 pH units      Comment: 83 Value above reference range        pCO2, Arterial 47.9 mm Hg      Comment: 83 Value above reference range        pO2, Arterial 80.5 mm Hg      Comment: 84 Value below reference range        HCO3, Arterial 34.3 mmol/L      Comment: 83 Value above reference range        Base Excess, Arterial 9.0 mmol/L      Comment: 83 Value above reference range        O2 Saturation, Arterial 96.1 %      Hemoglobin, Blood Gas 15.7 g/dL      Temperature 37.0 C      Barometric Pressure for Blood Gas 740 mmHg      Modality Heated HFNC     FIO2 90 %      Flow Rate 60.0 lpm      Ventilator Mode NA     Comment: Meter: [id number]     :  [id number]        pCO2, Temperature Corrected 47.9 mm Hg      pH, Temp Corrected 7.464 pH Units      pO2, Temperature Corrected 80.5 mm Hg     BNP [[id number]]  (Normal) Collected:  12/12/19 0401    Specimen:  Blood Updated:  12/12/19 0853     proBNP 50.3 pg/mL     Narrative:       Among patients with dyspnea, NT-proBNP is highly sensitive for the detection of acute congestive heart failure. In addition NT-proBNP of <300 pg/ml effectively rules out acute congestive heart failure with 99% negative predictive value.      Respiratory Culture - Sputum, Cough [[id number]] Collected:  12/09/19 1742    Specimen:  Sputum from Cough Updated:  12/11/19 0813     Respiratory Culture Moderate growth (3+) Normal Respiratory Veronica     Gram Stain Few (2+) WBCs seen      Rare (1+) Epithelial cells seen      Rare (1+) Gram positive cocci in clusters      Rare (1+) Gram positive bacilli      Few (2+)  Yeast    Blood Gas, Arterial [[id number]]  (Abnormal) Collected:  12/11/19 0715    Specimen:  Arterial Blood Updated:  12/11/19 0710     Site Left Radial     Rich's Test Positive     pH, Arterial 7.411 pH units      pCO2, Arterial 46.9 mm Hg      Comment: 83 Value above reference range        pO2, Arterial 59.1 mm Hg      Comment: 84 Value below reference range        HCO3, Arterial 29.8 mmol/L      Comment: 83 Value above reference range        Base Excess, Arterial 4.2 mmol/L      Comment: 83 Value above reference range        O2 Saturation, Arterial 89.9 %      Comment: 84 Value below reference range        Hemoglobin, Blood Gas 15.1 g/dL      Temperature 37.0 C      Barometric Pressure for Blood Gas 749 mmHg      Modality Heated HFNC     FIO2 60 %      Flow Rate 40.0 lpm      Ventilator Mode NA     Comment: Meter: [id number]     :  [id number]        pCO2, Temperature Corrected 46.9 mm Hg      pH, Temp Corrected 7.411 pH Units      pO2, Temperature Corrected 59.1 mm Hg     Beta-Hydroxybutyrate [[id number]] Collected:  12/08/19 1055    Specimen:  Blood Updated:  12/10/19 1610     Beta-Hydroxybutyrate Acid 2.1 mg/dL      Comment: Reference Range:  All Ages (fasting): 0.2 - 2.8       Narrative:       Performed at:  [facility]  [address] [place] [address]  [id number]  : Bonilla Gutierrez MD, Phone:  [phone]    Magnesium [[id number]]  (Normal) Collected:  12/10/19 0415    Specimen:  Blood from Arm, Right Updated:  12/10/19 0554     Magnesium 2.1 mg/dL     Uric Acid [[id number]]  (Normal) Collected:  12/09/19 0630    Specimen:  Blood Updated:  12/09/19 1422     Uric Acid 4.0 mg/dL     C-reactive Protein [[id number]]  (Abnormal) Collected:  12/09/19 0630    Specimen:  Blood Updated:  12/09/19 1050     C-Reactive Protein 4.66 mg/dL     Procalcitonin [[id number]]  (Normal) Collected:  12/09/19 0630    Specimen:  Blood Updated:  12/09/19 0715     Procalcitonin 0.10 ng/mL      Comprehensive Metabolic Panel [[id number]]  (Abnormal) Collected:  12/09/19 0630    Specimen:  Blood Updated:  12/09/19 0713     Glucose 256 mg/dL      BUN 15 mg/dL      Creatinine 0.67 mg/dL      Sodium 132 mmol/L      Potassium 4.6 mmol/L      Chloride 96 mmol/L      CO2 21.6 mmol/L      Calcium 8.8 mg/dL      Total Protein 7.2 g/dL      Albumin 3.80 g/dL      ALT (SGPT) 40 U/L      AST (SGOT) 62 U/L      Alkaline Phosphatase 42 U/L      Total Bilirubin 0.4 mg/dL      eGFR Non African Amer 118 mL/min/1.73      Globulin 3.4 gm/dL      A/G Ratio 1.1 g/dL      BUN/Creatinine Ratio 22.4     Anion Gap 14.4 mmol/L     Narrative:       GFR Normal >60  Chronic Kidney Disease <60  Kidney Failure <15      Lactic Acid, Plasma [[id number]]  (Normal) Collected:  12/09/19 0630    Specimen:  Blood Updated:  12/09/19 0705     Lactate 1.0 mmol/L     Lactic Acid, Plasma [[id number]]  (Normal) Collected:  12/09/19 0007    Specimen:  Blood Updated:  12/09/19 0028     Lactate 2.0 mmol/L     Respiratory Culture - Sputum, Cough [[id number]] Collected:  12/08/19 1426    Specimen:  Sputum from Cough Updated:  12/08/19 1853     Respiratory Culture Rejected     Gram Stain Rare (1+) WBCs seen      Few (2+) Epithelial cells seen    Lactic Acid, Reflex [[id number]]  (Abnormal) Collected:  12/08/19 1746    Specimen:  Blood Updated:  12/08/19 1809     Lactate 2.4 mmol/L     Lactic Acid, Reflex Timer (This will reflex a repeat order 3-3:15 hours after ordered.) [[id number]] Collected:  12/08/19 1103    Specimen:  Blood Updated:  12/08/19 1445     Extra Tube Hold for add-ons.     Comment: Auto resulted.       Respiratory Panel, PCR - Swab, Nasopharynx [[id number]]  (Normal) Collected:  12/07/19 1709    Specimen:  Swab from Nasopharynx Updated:  12/08/19 1328     ADENOVIRUS, PCR Not Detected     Coronavirus 229E Not Detected     Coronavirus HKU1 Not Detected     Coronavirus NL63 Not Detected     Coronavirus OC43 Not Detected     Human Metapneumovirus Not  Detected     Human Rhinovirus/Enterovirus Not Detected     Influenza B PCR Not Detected     Parainfluenza Virus 1 Not Detected     Parainfluenza Virus 2 Not Detected     Parainfluenza Virus 3 Not Detected     Parainfluenza Virus 4 Not Detected     Bordetella pertussis pcr Not Detected     Influenza A H1 2009 PCR Not Detected     Chlamydophila pneumoniae PCR Not Detected     Mycoplasma pneumo by PCR Not Detected     Influenza A PCR Not Detected     Influenza A H3 Not Detected     Influenza A H1 Not Detected     RSV, PCR Not Detected     Bordetella parapertussis PCR Not Detected    Comprehensive Metabolic Panel [[id number]]  (Abnormal) Collected:  12/08/19 1235    Specimen:  Blood Updated:  12/08/19 1308     Glucose 333 mg/dL      BUN 19 mg/dL      Creatinine 1.09 mg/dL      Sodium 133 mmol/L      Potassium 4.5 mmol/L      Chloride 93 mmol/L      CO2 22.6 mmol/L      Calcium 9.7 mg/dL      Total Protein 7.6 g/dL      Albumin 4.30 g/dL      ALT (SGPT) 35 U/L      AST (SGOT) 41 U/L      Alkaline Phosphatase 51 U/L      Total Bilirubin 0.3 mg/dL      eGFR Non African Amer 67 mL/min/1.73      Globulin 3.3 gm/dL      A/G Ratio 1.3 g/dL      BUN/Creatinine Ratio 17.4     Anion Gap 17.4 mmol/L     Narrative:       GFR Normal >60  Chronic Kidney Disease <60  Kidney Failure <15      Phosphorus [[id number]]  (Normal) Collected:  12/08/19 1151    Specimen:  Blood Updated:  12/08/19 1225     Phosphorus 3.4 mg/dL     Protime-INR [[id number]]  (Normal) Collected:  12/08/19 1151    Specimen:  Blood Updated:  12/08/19 1206     Protime 12.4 Seconds      INR 0.95    Narrative:       Therapeutic Ranges for INR: 2.0-3.0 (PT 20-30)                              2.5-3.5 (PT 25-34)    aPTT [[id number]]  (Normal) Collected:  12/08/19 1151    Specimen:  Blood Updated:  12/08/19 1206     PTT 25.3 seconds     Narrative:       PTT = The equivalent PTT values for the therapeutic range of heparin levels at 0.1 to 0.7 U/ml are 53 to 110 seconds.       BNP [[id number]]  (Normal) Collected:  12/08/19 1103    Specimen:  Blood Updated:  12/08/19 1138     proBNP 129.2 pg/mL     Narrative:       Among patients with dyspnea, NT-proBNP is highly sensitive for the detection of acute congestive heart failure. In addition NT-proBNP of <300 pg/ml effectively rules out acute congestive heart failure with 99% negative predictive value.      Troponin [[id number]]  (Normal) Collected:  12/08/19 1103    Specimen:  Blood Updated:  12/08/19 1129     Troponin T <0.010 ng/mL     Narrative:       Troponin T Reference Range:  <= 0.03 ng/mL-   Negative for AMI  >0.03 ng/mL-     Abnormal for myocardial necrosis.  Clinicians would have to utilize clinical acumen, EKG, Troponin and serial changes to determine if it is an Acute Myocardial Infarction or myocardial injury due to an underlying chronic condition.     Hemoglobin A1c [[id number]]  (Abnormal) Collected:  12/07/19 1130    Specimen:  Blood Updated:  12/07/19 1604     Hemoglobin A1C 9.10 %     Narrative:       Hemoglobin A1C Ranges:    Increased Risk for Diabetes  5.7% to 6.4%  Diabetes                     >= 6.5%  Diabetic Goal                < 7.0%    TSH [[id number]]  (Normal) Collected:  12/07/19 1130    Specimen:  Blood Updated:  12/07/19 1601     TSH 0.584 uIU/mL     Ketone Bodies, Serum (Not performed at [facility]) [[id number]] Collected:  12/07/19 1130    Specimen:  Blood Updated:  12/07/19 1358    Narrative:       The following orders were created for panel order Ketone Bodies, Serum (Not performed at [facility]).  Procedure                               Abnormality         Status                     ---------                               -----------         ------                     Acetone[[id number]]                      Abnormal            Final result                 Please view results for these tests on the individual orders.    Acetone [[id number]]  (Abnormal) Collected:  12/07/19 1130    Specimen:  Blood Updated:  12/07/19  1358     Acetone Small    Urinalysis, Microscopic Only - Urine, Clean Catch [[id number]]  (Abnormal) Collected:  12/07/19 1214    Specimen:  Urine, Clean Catch Updated:  12/07/19 1250     RBC, UA 0-2 /HPF      WBC, UA 0-2 /HPF      Bacteria, UA None Seen /HPF      Squamous Epithelial Cells, UA None Seen /HPF      Hyaline Casts, UA None Seen /LPF      Methodology Manual Light Microscopy    Urinalysis With Culture If Indicated - Urine, Clean Catch [[id number]]  (Abnormal) Collected:  12/07/19 1214    Specimen:  Urine, Clean Catch Updated:  12/07/19 1242     Color, UA Yellow     Appearance, UA Clear     pH, UA 6.0     Specific Gravity, UA 1.010     Glucose,  mg/dL (2+)     Ketones, UA 80 mg/dL (3+)     Bilirubin, UA Small (1+)     Blood, UA Small (1+)     Protein, UA 30 mg/dL (1+)     Leuk Esterase, UA Negative     Nitrite, UA Negative     Urobilinogen, UA 0.2 E.U./dL    Troponin [[id number]]  (Normal) Collected:  12/07/19 1130    Specimen:  Blood Updated:  12/07/19 1205     Troponin T <0.010 ng/mL     Narrative:       Troponin T Reference Range:  <= 0.03 ng/mL-   Negative for AMI  >0.03 ng/mL-     Abnormal for myocardial necrosis.  Clinicians would have to utilize clinical acumen, EKG, Troponin and serial changes to determine if it is an Acute Myocardial Infarction or myocardial injury due to an underlying chronic condition.     Influenza Antigen, Rapid - Swab, Nasopharynx [[id number]]  (Normal) Collected:  12/07/19 1131    Specimen:  Swab from Nasopharynx Updated:  12/07/19 1204     Influenza A Ag, EIA Negative     Influenza B Ag, EIA Negative    D-dimer, Quantitative [[id number]]  (Normal) Collected:  12/07/19 1132    Specimen:  Blood Updated:  12/07/19 1200     D-Dimer, Quantitative 0.28 MCGFEU/mL     Narrative:       Can be elevated in, but is not diagnostic for deep vein thrombosis (DVT) or pulmonary embolis (PE).  It is also elevated in other medical conditions.  Clinical correlation is required.  The negative  cut-off value for the D-Dimer is 0.50 mcg FEU/mL for DVT and PE.          Imaging Results (Most Recent)     Procedure Component Value Units Date/Time    XR Chest 1 View [[id number]] Collected:  12/16/19 1507     Updated:  12/16/19 1512    Narrative:       CHEST ONE VIEW 12/16/2019     HISTORY:   Post invasive procedure, PICC line placement today.     FINDINGS:  The heart is normal in size. Left arm approach PICC line tip is in the  superior vena cava. Poor inspiratory result with infiltrate or  atelectasis left lower lobe and discoid atelectasis at the right base.  The lungs are otherwise clear. There are no pleural effusions.       Impression:       1. Left arm approach PICC line tip SVC.  2. Poor inspiratory result with atelectasis or infiltrate left lower  lobe.     This report was finalized on 12/16/2019 3:10 PM by Dr. Festus Powers MD.       XR Abdomen 2 View With Chest 1 View [[id number]] Collected:  12/12/19 1002     Updated:  12/12/19 1008    Narrative:       XR ABDOMEN 2 VW W CHEST 1 VW-: 12/12/2019 9:54 AM     INDICATION:   COPD. Controlled hypertension. Mid abdominal pain that began this  morning.     COMPARISON:   12/09/2019     FINDINGS:  Chest: PA view of the chest. The heart is enlarged but stable lung  volumes remain low with bronchovascular crowding. There is bibasilar  atelectasis or faint infiltrate and there are some faint new opacities  in the right midlung zone which may reflect developing pneumonia. No  pneumothorax. Trace to small right effusion..     Abdomen: Upright and supine AP radiographs of the abdomen. Exam degraded  by body habitus. No distinct evidence of free air. Gaseous distention of  the stomach. There are no dilated air-filled loops of large or small  bowel..       Impression:          1. Cardiomegaly with persistent bibasilar atelectasis or infiltrates.  Interval worsening of opacities in the midlung zone on the right  suspicious for pneumonia. Persistent trace right  effusion.  2. No free air.  3. Nonspecific but nonobstructive bowel gas pattern. Ileus could present  similarly. Gaseous distention of the stomach, nonspecific.     This report was finalized on 12/12/2019 10:06 AM by Dr. Jaden Danielle MD.       XR Abdomen Flat & Upright [[id number]] Collected:  12/11/19 0648     Updated:  12/11/19 0650    Narrative:       CR Abdomen AP W Decub or Erect    INDICATION:   Abdominal fullness with nausea and belching.    COMPARISON:   None available    FINDINGS:   Upright and supine AP radiographs of the abdomen.    Moderate air distention of the stomach without corresponding small bowel or colonic distention. This could be seen with gastric ileus or proximal obstruction. Air is demonstrated within the duodenal bulb. Surgical clips right upper quadrant. No free air.  Moderate amount of bibasilar atelectasis. No organomegaly. No abnormal calcifications.      Impression:       Moderate gastric distention possibly the sequela of an ileus. Moderate bibasilar atelectasis.    Signer Name: KEATON Martin MD   Signed: 12/11/2019 6:48 AM   Workstation Name: [email or web address]    [facility]    XR Chest 1 View [[id number]] Collected:  12/09/19 1301     Updated:  12/09/19 1305    Narrative:       XR CHEST 1 VW-: 12/9/2019 12:45 PM     INDICATION:   Decreased O2 sats. Short of air today.      COMPARISON:    12/07/2019.     FINDINGS:  Portable AP view of the chest.       No visible support lines     The heart is borderline enlarged but stable. Lung volumes are lower.  There is bronchovascular crowding favored over mild central vascular  congestion.  Bibasilar atelectasis or infiltrates have developed, right  greater than left. No pneumothorax.       Impression:          1. Lung volumes are lower. New bibasilar atelectasis or infiltrates  right greater the left.     This report was finalized on 12/9/2019 1:03 PM by Dr. Jaden Danielle MD.       CT Angiogram Chest With & Without  Contrast [[id number]] Collected:  12/08/19 0927     Updated:  12/08/19 0929    Narrative:       CT ANGIOGRAM CHEST W WO CONTRAST    INDICATION:   69-year-old male with worsening shortness of air and congestion for 5 days. COPD.    TECHNIQUE:   CT angiogram of the chest with IV contrast. 3-D reconstructions were obtained and reviewed.   Radiation dose reduction techniques included automated exposure control or exposure modulation based on body size. Count of known CT and cardiac nuc med studies  performed in previous 12 months: 1.     COMPARISON:   Chest CT 1/4/2019.    FINDINGS:   Adequate opacification of the pulmonary arteries with no filling defects. Thoracic aorta normal in course and caliber without dissection. Heart size normal. No pericardial effusion. No mediastinal, hilar, or axillary lymphadenopathy by size criteria.    No pleural effusions. No pneumothorax. Emphysema. Mild dependent bibasilar atelectasis/infiltrate.    Visualized upper abdomen is unremarkable. No acute osseous abnormality.      Impression:       1. Negative for pulmonary embolus.  2. Negative for thoracic aortic aneurysm/dissection.  3. Mild bibasilar atelectasis/infiltrate. Correlation with clinical symptoms to exclude superimposed pneumonia.  4. Emphysema.    Signer Name: Reginaldo Montemayor MD   Signed: 12/8/2019 9:27 AM   Workstation [facility]: [email or web address]    [facility]    XR Chest PA & Lateral [[id number]] Collected:  12/07/19 1615     Updated:  12/07/19 1617    Narrative:       CR Chest 2 Vws    INDICATION:    Hypoxia. Question CHF. COPD with acute exacerbation.    COMPARISON:    12/7/2019 1130 hours    FINDINGS:   PA and lateral views of the chest.  Improved lung volumes. Heart size within normal limits. Mild pulmonary vascular congestion. No interstitial or alveolar edema. No focal consolidation. No effusions or pneumothorax.        Impression:       Improved lung volumes with continued mild prominence of the  pulmonary vascularity but no convincing evidence of CHF.    Signer Name: KEATON Martin MD   Signed: 12/7/2019 4:15 PM   Workstation Name: [email or web address]    [facility]    XR Chest 1 View [[id number]] Collected:  12/07/19 1206     Updated:  12/07/19 1208    Narrative:       CR Chest 1 Vw    INDICATION:   69-year-old male with worsening shortness of air for 3 days. History of COPD.     COMPARISON:    Chest 11/9/2019    FINDINGS:  2 portable AP view(s) of the chest.  Heart size is upper limits. Mild central vascular congestion and bilateral perihilar interstitial opacities. No pleural effusions or pneumothorax. Mediastinum is within normal limits.      Impression:       Borderline heart size with mild central vascular congestion and bilateral perihilar interstitial opacities. Correlate for clinical symptoms of congestive failure.    Signer Name: Reginaldo Montemayor MD   Signed: 12/7/2019 12:06 PM   Workstation Name: [facility]    [facility]        PROCEDURES: None    Condition on Discharge: Stable    Physical Exam at Discharge  Vital Signs  Temp:  [97.3 °F (36.3 °C)-97.6 °F (36.4 °C)] 97.3 °F (36.3 °C)  Heart Rate:  [] 84  Resp:  [20-28] 20  BP: (112-131)/(69-79) 119/73   Body mass index is 35.24 kg/m².    Physical Exam   Constitutional: He is oriented to person, place, and time. He appears well-developed and well-nourished.   Obese   HENT:   Head: Normocephalic and atraumatic.   Edentulous   Eyes: Pupils are equal, round, and reactive to light. EOM are normal.   Cardiovascular: Normal rate and regular rhythm.   Pulmonary/Chest: Effort normal.   Diminished throughout with scattered rhonchi   Abdominal: Soft. Bowel sounds are normal. He exhibits no distension. There is no tenderness. There is no guarding.   Musculoskeletal: He exhibits no edema.   Neurological: He is alert and oriented to person, place, and time.   Skin: Skin is warm and dry. No erythema.    Psychiatric: He has a normal mood and affect. His behavior is normal.   Vitals reviewed.    Discharge Disposition  Home    Visiting Nurse:    Yes     Home PT/OT:  Yes     Home Safety Evaluation:  Yes     DME  Oxygen    Discharge Diet:      Dietary Orders (From admission, onward)     Start     Ordered    12/12/19 1800  Dietary Nutrition Supplement: [place] Breakfast Essentials Light  Daily With Breakfast, Lunch & Dinner     Comments:  Or supplement of choice with diabetic restriction   Question:  Select Supplement:  Answer:  [place] Breakfast Essentials Light    12/12/19 1356    12/10/19 1151  Diet Soft Texture; Ground; Cardiac, Consistent Carbohydrate  Diet Effective Now     Question Answer Comment   Diet Texture / Consistency Soft Texture    Select Texture: Ground    Common Modifiers Cardiac    Common Modifiers Consistent Carbohydrate        12/10/19 1150                Activity at Discharge:  As tolerated    Pre-discharge education  Diabetic, Cardiac, medications, follow-up    Follow-up Appointments  No future appointments.  Additional Instructions for the Follow-ups that You Need to Schedule     Discharge Follow-up with PCP   As directed       Currently Documented PCP:    Ney Kathleen MD    PCP Phone Number:    [phone]     Follow Up Details:  1 week         Discharge Follow-up with Specified Provider: Pulmonary; 2 Weeks   As directed      To:  Pulmonary    Follow Up:  2 Weeks               Test Results Pending at Discharge: None     NARENDRA Banks  12/18/19  12:49 PM    Time: Discharge Over 30 min (if over 30 minutes give explanation as to why it took greater than 30 minutes)  Secondary to:   Coordination of care/follow up  Medication reconciliation  D/W patient and specialist

## 2019-12-18 NOTE — NURSING NOTE
Continued Stay Note  MURIEL Chavez     Patient Name: Kt Armstrong  MRN: [id number]  Today's Date: 12/18/2019    Admit Date: 12/7/2019    Discharge Plan     Row Name 12/18/19 1422       Plan    Plan  plan home with [facility]    Patient/Family in Agreement with Plan  yes    Plan Comments  Spoke with patient at bedside, IMM updated. discussed possible outpatient pulmonary rehab at [facility], patient would prefer  to follow. [facility] has been arranged. Patient currently on home 02 @ [facility], now needs 02 with excertion, DWO obtained and clinicals faxed to [place]. Portable 02 tank has been delivered to patient room. CM will follow thru dc.        Discharge Codes    No documentation.       Expected Discharge Date and Time     Expected Discharge Date Expected Discharge Time    Dec 18, 2019             Constantino May RN

## 2019-12-18 NOTE — PLAN OF CARE
Problem: Patient Care Overview  Goal: Plan of Care Review  Outcome: Ongoing (interventions implemented as appropriate)  Flowsheets (Taken 12/18/2019 0314)  Progress: improving  Outcome Summary: ambered fair, currently on oxygen at 5L/NC, overnight pulse ox study in progress. SR on the monitor, VSS

## 2019-12-18 NOTE — PROGRESS NOTES
"                                              LOS: 9 days   Patient Care Team:  Ney Kathleen MD as PCP - General (Family Medicine)    Chief Complaint:  F/up respiratory failure, COPD exacerbation and medical problems listed below    Subjective   Interval History  Remains on high flow oxygen currentlydown to  5 L.  Still coughing up purulent sputum off and on.    REVIEW OF SYSTEMS:   CARDIOVASCULAR: No chest pain, chest pressure or chest discomfort. No palpitations or edema.   Constitutional: No fever or chills                 Physical Exam:     Vital Signs  Temp:  [97.3 °F (36.3 °C)-97.6 °F (36.4 °C)] 97.3 °F (36.3 °C)  Heart Rate:  [] 84  Resp:  [20-28] 20  BP: (112-131)/(69-92) 119/73    Intake/Output Summary (Last 24 hours) at 12/18/2019 1102  Last data filed at 12/18/2019 0430  Gross per 24 hour   Intake 1560 ml   Output 3200 ml   Net -1640 ml     Flowsheet Rows      First Filed Value   Admission Height  187.9 cm (73.98\") Documented at 12/07/2019 1126   Admission Weight  130 kg (286 lb) Documented at 12/07/2019 1126          General Appearance:    Alert, cooperative, in no acute distress   ENMT:  Mallampati score 3, dry mucous membrane   Eyes: Pupils equals and reactive to light. EOMI   Neck:   Large. Trachea midline. No thyromegaly.   Lungs:    Diminished breath sounds bilateral occ crackles present.    Heart:    Regular rhythm and normal rate, normal S1 and S2, no            murmur   Skin:    No rash.   Abdomen:     Obese. Soft. No tenderness. No HSM.   Neuro:   Conscious, alert, oriented x3. Strength 5/5 in upper and lower ext   Extremities:   Moves all extremities well, trace edema, no cyanosis, no             Redness          Results Review:        Results from last 7 days   Lab Units 12/18/19  0435 12/17/19  0503 12/16/19  0646   SODIUM mmol/L 130* 133* 130*   POTASSIUM mmol/L 4.1 4.2 4.2   CHLORIDE mmol/L 92* 94* 91*   CO2 mmol/L 29.5* 29.0 26.9   BUN mg/dL 22 20 19   CREATININE mg/dL 0.72* " 0.62* 0.63*   GLUCOSE mg/dL 168* 299* 316*   CALCIUM mg/dL 9.2 8.9 8.9         Results from last 7 days   Lab Units 12/18/19  0435 12/17/19  0507 12/16/19  0645   WBC 10*3/mm3 15.47* 12.53* 9.93   HEMOGLOBIN g/dL 14.4 14.2 14.7   HEMATOCRIT % 43.1 43.2 46.6   PLATELETS 10*3/mm3 210 268 287         Results from last 7 days   Lab Units 12/12/19  0401   PROBNP pg/mL 50.3       I reviewed the patient's new clinical results.  I personally viewed and interpreted the patient's CXR        Medication Review:     0.9% sodium chloride for irrigation-mineral oil-glycerin 300 mL Rectal Once   arformoterol 15 mcg Nebulization BID - RT   atorvastatin 10 mg Oral Nightly   bisacodyl 10 mg Rectal Daily   budesonide 0.5 mg Nebulization BID - RT   dicyclomine 10 mg Oral 4x Daily   docusate sodium 100 mg Oral BID   enoxaparin 40 mg Subcutaneous Q24H   fluticasone 2 spray Each Nare BID   furosemide 40 mg Oral Daily   HYDROcodone-acetaminophen 1 tablet Oral 4x Daily   insulin aspart 0-24 Units Subcutaneous 4x Daily AC & at Bedtime   insulin detemir 20 Units Subcutaneous Nightly   insulin detemir 45 Units Subcutaneous QAM   ipratropium-albuterol 3 mL Nebulization Q6H While Awake - RT   nicotine 1 patch Transdermal Q24H   pioglitazone 45 mg Oral Daily   polyethylene glycol 17 g Oral Daily   predniSONE 20 mg Oral BID With Meals   sodium chloride 10 mL Intravenous Q12H   sodium chloride 10 mL Intravenous Q12H   sodium chloride 10 mL Intravenous Q12H   terazosin 1 mg Oral Nightly   theophylline 400 mg Oral Q24H            Diagnostic imaging:  I personally and independently reviewed the following images:  CXR 12/9/2019: Low lung volumes.  Increased pulmonary vascular markings.  CT chest 12/8/2019: Upper lobe predominant emphysema.  Peribronchial cuffing in the lower lobes.     KUB 12/11/2019:  Infiltrates/fluid/consolidation in the left lower lobe.  Gastric distention.    CXR 12/12/2019: Increased pulmonary vascular markings.  Worsening  atelectasis in the right lower lobe and possible left pleural effusion.    Assessment   1. Acute exacerbation of COPD  2. Acute on chronic hypoxic respiratory failure, on oxygen 3.5 L/min at baseline  3. Acute bronchitis  4. Tobacco abuse  5. Ileus, probable  6. Bilateral lower lobe atelectasis  7. Possible pleural effusion    Pertinent medical problems:  · Diabetes mellitus type 2 with worsening hyperglycemia due to steroid        Plan   · Switched to oxygen high flow nasal cannula, currently on 5 L.  Wean down oxygen as tolerated.at baseline he uses 2.5 liter   · Finish doxycycline.  Antibiotics as guided by sputum cultures  · Hypertonic saline nebulizer twice a day  · DuoNeb every 6, Brovana and Pulmicort  · Continue Lasix 20 mg daily.good diuresis  · Switch to oral steroids this morning.  · Continue theophylline theophylline level pending  · Flutter valve to advance mucus clearance  · Insulin per primary for hyperglycemia              Harvey Shahid MD  12/18/19  11:02 AM          This note was dictated utilizing Livefy[employer] dictation

## 2019-12-18 NOTE — PLAN OF CARE
Problem: Patient Care Overview  Goal: Plan of Care Review  Outcome: Ongoing (interventions implemented as appropriate)  Flowsheets (Taken 12/17/2019 1942)  Progress: improving  Plan of Care Reviewed With: patient  Note:   Pt had a better day today. He is now a med-surg tele pt. He has been up in room. O2 High Mike cont now 5 l and sat currently 91%. Family at bedside, tele is SR/ST . Some soa cont with exertion, prednisone po, and lasix po was started  overnight pulse ox tonight to be done. Overall much better.

## 2019-12-19 ENCOUNTER — READMISSION MANAGEMENT (OUTPATIENT)
Dept: CALL CENTER | Facility: HOSPITAL | Age: 69
End: 2019-12-19

## 2019-12-19 NOTE — OUTREACH NOTE
Prep Survey      Responses   Facility patient discharged from?  [facility]   Is LACE score < 7 ?  No   Is patient eligible?  Yes   Discharge diagnosis  A/C hypoxic resp. failure, chronic nocturnal hypoxia, acute bronchitis, AE COPD, BLL atelectasis,    Does the patient have one of the following disease processes/diagnoses(primary or secondary)?  COPD/Pneumonia   Does the patient have Home health ordered?  Yes   What is the Home health agency?   [facility]   Is there a DME ordered?  No   What DME was ordered?  Has home O2 thru [facility]   Comments regarding appointments  Pt to schedule follow up appointments   Prep survey completed?  Yes          Ariana Stafford RN

## 2019-12-20 ENCOUNTER — READMISSION MANAGEMENT (OUTPATIENT)
Dept: CALL CENTER | Facility: HOSPITAL | Age: 69
End: 2019-12-20

## 2019-12-20 NOTE — OUTREACH NOTE
COPD/PN Week 1 Survey      Responses   Facility patient discharged from?  [facility]   Does the patient have one of the following disease processes/diagnoses(primary or secondary)?  COPD/Pneumonia   Is there a successful TCM telephone encounter documented?  No   Was the primary reason for admission:  COPD exacerbation   Week 1 attempt successful?  Yes   Call start time  1507   Call end time  1513   Discharge diagnosis  A/C hypoxic resp. failure, chronic nocturnal hypoxia, acute bronchitis, AE COPD, BLL atelectasis,    Is patient permission given to speak with other caregiver?  Yes   List who call center can speak with  Kt Friend reviewed with patient/caregiver?  Yes   Is the patient having any side effects they believe may be caused by any medication additions or changes?  No   Does the patient have all medications ordered at discharge?  Yes   Is the patient taking all medications as directed (includes completed medication regime)?  Yes   Does the patient have a primary care provider?   Yes   Does the patient have an appointment with their PCP or pulmonologist within 7 days of discharge?  Greater than 7 days   What is preventing the patient from scheduling follow up appointments within 7 days of discharge?  Earlier appointment not available   Has the patient kept scheduled appointments due by today?  N/A   What is the Home health agency?   [facility]   Has home health visited the patient within 72 hours of discharge?  Call prior to 72 hours   What DME was ordered?  Has home O2 thru [facility]   DME comments  Nebulizer BID   Psychosocial issues?  No   Did the patient receive a copy of their discharge instructions?  Yes   Nursing interventions  Reviewed instructions with patient   What is the patient's perception of their health status since discharge?  Improving   Is the patient/caregiver able to teach back the hierarchy of who to call/visit for symptoms/problems? PCP, Specialist, Home health nurse, Urgent Care, ED,  911  Yes   Is the patient able to teach back COPD zones?  Yes   Nursing interventions  Education provided on various zones   Patient reports what zone on this call?  Green Zone   Week 1 call completed?  Yes          Liza Rock RN

## 2019-12-30 ENCOUNTER — READMISSION MANAGEMENT (OUTPATIENT)
Dept: CALL CENTER | Facility: HOSPITAL | Age: 69
End: 2019-12-30

## 2019-12-30 NOTE — OUTREACH NOTE
COPD/PN Week 2 Survey      Responses   Facility patient discharged from?  [facility]   Does the patient have one of the following disease processes/diagnoses(primary or secondary)?  COPD/Pneumonia   Was the primary reason for admission:  COPD exacerbation   Week 2 attempt successful?  Yes   Call start time  1353   Call end time  1354   Discharge diagnosis  A/C hypoxic resp. failure, chronic nocturnal hypoxia, acute bronchitis, AE COPD, BLL atelectasis,    Meds reviewed with patient/caregiver?  Yes   Is the patient having any side effects they believe may be caused by any medication additions or changes?  No   Does the patient have all medications ordered at discharge?  Yes   Is the patient taking all medications as directed (includes completed medication regime)?  Yes   Does the patient have a primary care provider?   Yes   Does the patient have an appointment with their PCP or pulmonologist within 7 days of discharge?  Yes   Has the patient kept scheduled appointments due by today?  Yes   Psychosocial issues?  No   Did the patient receive a copy of their discharge instructions?  Yes   Nursing interventions  Reviewed instructions with patient   What is the patient's perception of their health status since discharge?  Improving   Nursing Interventions  Nurse provided patient education   Are the patient's immunizations up to date?   Yes   Nursing interventions  Educated on importance of maintaining up to date immunizations as advised by provider   Is the patient/caregiver able to teach back the hierarchy of who to call/visit for symptoms/problems? PCP, Specialist, Home health nurse, Urgent Care, ED, 911  Yes   Is the patient able to teach back COPD zones?  Yes   Nursing interventions  Education provided on various zones   Patient reports what zone on this call?  Green Zone   Green Zone  Reports doing well, Breathing without shortness of breath, Usual activity and exercise level, Usual amount of phlegm/mucus without  difficulty coughing up, Sleeping well, Appetite is good   Green Zone interventions:  Take daily medications, Continue regular exercise/diet plan, Use oxygen as prescribed, Avoid indoor/outdoor triggers   Week 2 call completed?  Yes          Leisa Brown RN

## 2020-01-08 ENCOUNTER — READMISSION MANAGEMENT (OUTPATIENT)
Dept: CALL CENTER | Facility: HOSPITAL | Age: 70
End: 2020-01-08

## 2020-01-08 NOTE — OUTREACH NOTE
COPD/PN Week 3 Survey      Responses   Facility patient discharged from?  [facility]   Does the patient have one of the following disease processes/diagnoses(primary or secondary)?  COPD/Pneumonia   Was the primary reason for admission:  COPD exacerbation   Week 3 attempt successful?  Yes   Call start time  1132   Call end time  1134   Discharge diagnosis  A/C hypoxic resp. failure, chronic nocturnal hypoxia, acute bronchitis, AE COPD, BLL atelectasis,    Meds reviewed with patient/caregiver?  Yes   Is the patient having any side effects they believe may be caused by any medication additions or changes?  No   Does the patient have all medications ordered at discharge?  Yes   Is the patient taking all medications as directed (includes completed medication regime)?  Yes   Comments regarding appointments  Pt to schedule follow up appointments   Does the patient have a primary care provider?   Yes   Does the patient have an appointment with their PCP or pulmonologist within 7 days of discharge?  Yes   Has the patient kept scheduled appointments due by today?  Yes   What is the Home health agency?   [facility]   Has home health visited the patient within 72 hours of discharge?  Yes   Home health comments  d/c'd from  1/2020   Psychosocial issues?  No   Did the patient receive a copy of their discharge instructions?  Yes   Nursing interventions  Reviewed instructions with patient   What is the patient's perception of their health status since discharge?  Improving   Nursing Interventions  Nurse provided patient education   Are the patient's immunizations up to date?   Yes   Nursing interventions  Educated on importance of maintaining up to date immunizations as advised by provider   If the patient is a current smoker, are they able to teach back resources for cessation?  Smoking cessation medications   Is the patient/caregiver able to teach back the hierarchy of who to call/visit for symptoms/problems? PCP, Specialist, [facility] nurse, Urgent Care, ED, 911  Yes   Is the patient able to teach back COPD zones?  Yes   Nursing interventions  Education provided on various zones   Patient reports what zone on this call?  Green Zone   Green Zone  Reports doing well, Breathing without shortness of breath, Usual activity and exercise level, Usual amount of phlegm/mucus without difficulty coughing up, Sleeping well, Appetite is good   Green Zone interventions:  Take daily medications, Continue regular exercise/diet plan, Do not smoke, Avoid indoor/outdoor triggers, Use oxygen as prescribed   Week 3 call completed?  Yes          Misty Rogers, RN

## 2020-01-16 ENCOUNTER — READMISSION MANAGEMENT (OUTPATIENT)
Dept: CALL CENTER | Facility: HOSPITAL | Age: 70
End: 2020-01-16

## 2020-01-16 NOTE — OUTREACH NOTE
COPD/PN Week 4 Survey      Responses   Facility patient discharged from?  [facility]   Does the patient have one of the following disease processes/diagnoses(primary or secondary)?  COPD/Pneumonia   Was the primary reason for admission:  COPD exacerbation   Week 4 attempt successful?  No          Kris Garcia RN

## 2020-03-10 ENCOUNTER — TRANSCRIBE ORDERS (OUTPATIENT)
Dept: ADMINISTRATIVE | Facility: HOSPITAL | Age: 70
End: 2020-03-10

## 2020-03-10 DIAGNOSIS — J44.9 CHRONIC OBSTRUCTIVE PULMONARY DISEASE, UNSPECIFIED COPD TYPE (HCC): Primary | ICD-10-CM

## 2020-06-10 ENCOUNTER — TRANSCRIBE ORDERS (OUTPATIENT)
Dept: ADMINISTRATIVE | Facility: HOSPITAL | Age: 70
End: 2020-06-10

## 2020-06-10 DIAGNOSIS — Z01.818 OTHER SPECIFIED PRE-OPERATIVE EXAMINATION: Primary | ICD-10-CM

## 2020-06-15 ENCOUNTER — APPOINTMENT (OUTPATIENT)
Dept: PULMONOLOGY | Facility: HOSPITAL | Age: 70
End: 2020-06-15

## 2020-06-29 ENCOUNTER — LAB (OUTPATIENT)
Dept: LAB | Facility: HOSPITAL | Age: 70
End: 2020-06-29

## 2020-07-01 ENCOUNTER — HOSPITAL ENCOUNTER (OUTPATIENT)
Dept: PULMONOLOGY | Facility: HOSPITAL | Age: 70
Discharge: HOME | End: 2020-07-01
Admitting: INTERNAL MEDICINE

## 2020-07-01 VITALS — OXYGEN SATURATION: 92 % | RESPIRATION RATE: 20 BRPM | HEART RATE: 107 BPM

## 2020-07-01 DIAGNOSIS — J44.9 CHRONIC OBSTRUCTIVE PULMONARY DISEASE, UNSPECIFIED COPD TYPE (HCC): ICD-10-CM

## 2020-07-01 LAB
BDY SITE: ABNORMAL
HGB BLDA-MCNC: 15.7 G/DL (ref 14–18)
SAO2 % BLDCOA: 92 % (ref 94–99)

## 2020-07-01 PROCEDURE — 82820 HEMOGLOBIN-OXYGEN AFFINITY: CPT

## 2020-07-01 PROCEDURE — 94729 DIFFUSING CAPACITY: CPT

## 2020-07-01 PROCEDURE — A9270 NON-COVERED ITEM OR SERVICE: HCPCS | Performed by: INTERNAL MEDICINE

## 2020-07-01 PROCEDURE — 94060 EVALUATION OF WHEEZING: CPT

## 2020-07-01 PROCEDURE — 63710000001 ALBUTEROL SULFATE HFA 108 (90 BASE) MCG/ACT AEROSOL SOLUTION 18 G INHALER: Performed by: INTERNAL MEDICINE

## 2020-07-01 PROCEDURE — 94726 PLETHYSMOGRAPHY LUNG VOLUMES: CPT

## 2020-07-01 RX ORDER — ALBUTEROL SULFATE 90 UG/1
4 AEROSOL, METERED RESPIRATORY (INHALATION) ONCE
Status: COMPLETED | OUTPATIENT
Start: 2020-07-01 | End: 2020-07-01

## 2020-07-01 RX ADMIN — ALBUTEROL SULFATE 4 PUFF: 90 AEROSOL, METERED RESPIRATORY (INHALATION) at 13:10

## 2020-07-15 ENCOUNTER — HOSPITAL ENCOUNTER (OUTPATIENT)
Dept: GENERAL RADIOLOGY | Facility: HOSPITAL | Age: 70
Discharge: HOME | End: 2020-07-15
Admitting: FAMILY MEDICINE

## 2020-07-15 ENCOUNTER — TRANSCRIBE ORDERS (OUTPATIENT)
Dept: ADMINISTRATIVE | Facility: HOSPITAL | Age: 70
End: 2020-07-15

## 2020-07-15 DIAGNOSIS — R52 PAIN: ICD-10-CM

## 2020-07-15 DIAGNOSIS — R52 PAIN: Primary | ICD-10-CM

## 2020-07-15 PROCEDURE — 73020 X-RAY EXAM OF SHOULDER: CPT

## 2020-07-15 PROCEDURE — 73030 X-RAY EXAM OF SHOULDER: CPT

## 2021-05-04 ENCOUNTER — APPOINTMENT (OUTPATIENT)
Dept: GENERAL RADIOLOGY | Facility: HOSPITAL | Age: 71
End: 2021-05-04

## 2021-05-04 ENCOUNTER — HOSPITAL ENCOUNTER (EMERGENCY)
Facility: HOSPITAL | Age: 71
Discharge: HOME | End: 2021-05-04
Attending: EMERGENCY MEDICINE | Admitting: EMERGENCY MEDICINE

## 2021-05-04 VITALS
SYSTOLIC BLOOD PRESSURE: 139 MMHG | OXYGEN SATURATION: 91 % | HEART RATE: 104 BPM | BODY MASS INDEX: 34.65 KG/M2 | TEMPERATURE: 98.5 F | HEIGHT: 74 IN | RESPIRATION RATE: 16 BRPM | DIASTOLIC BLOOD PRESSURE: 89 MMHG | WEIGHT: 270 LBS

## 2021-05-04 DIAGNOSIS — J44.1 COPD WITH ACUTE EXACERBATION (HCC): Primary | ICD-10-CM

## 2021-05-04 DIAGNOSIS — Z72.0 TOBACCO ABUSE: ICD-10-CM

## 2021-05-04 LAB
ALBUMIN SERPL-MCNC: 4.7 G/DL (ref 3.5–5.2)
ALBUMIN/GLOB SERPL: 1.9 G/DL
ALP SERPL-CCNC: 49 U/L (ref 39–117)
ALT SERPL W P-5'-P-CCNC: 15 U/L (ref 1–41)
ANION GAP SERPL CALCULATED.3IONS-SCNC: 8.5 MMOL/L (ref 5–15)
AST SERPL-CCNC: 17 U/L (ref 1–40)
BASOPHILS # BLD AUTO: 0.1 10*3/MM3 (ref 0–0.2)
BASOPHILS NFR BLD AUTO: 1.1 % (ref 0–1.5)
BILIRUB SERPL-MCNC: 0.3 MG/DL (ref 0–1.2)
BUN SERPL-MCNC: 10 MG/DL (ref 8–23)
BUN/CREAT SERPL: 15.4 (ref 7–25)
CALCIUM SPEC-SCNC: 9.2 MG/DL (ref 8.6–10.5)
CHLORIDE SERPL-SCNC: 102 MMOL/L (ref 98–107)
CO2 SERPL-SCNC: 29.5 MMOL/L (ref 22–29)
CREAT SERPL-MCNC: 0.65 MG/DL (ref 0.76–1.27)
D DIMER PPP FEU-MCNC: <0.27 MCGFEU/ML (ref 0–0.46)
DEPRECATED RDW RBC AUTO: 49.2 FL (ref 37–54)
EOSINOPHIL # BLD AUTO: 0.23 10*3/MM3 (ref 0–0.4)
EOSINOPHIL NFR BLD AUTO: 2.6 % (ref 0.3–6.2)
ERYTHROCYTE [DISTWIDTH] IN BLOOD BY AUTOMATED COUNT: 13.2 % (ref 12.3–15.4)
GFR SERPL CREATININE-BSD FRML MDRD: 122 ML/MIN/1.73
GLOBULIN UR ELPH-MCNC: 2.5 GM/DL
GLUCOSE SERPL-MCNC: 137 MG/DL (ref 65–99)
HCT VFR BLD AUTO: 47.9 % (ref 37.5–51)
HGB BLD-MCNC: 15.2 G/DL (ref 13–17.7)
IMM GRANULOCYTES # BLD AUTO: 0.04 10*3/MM3 (ref 0–0.05)
IMM GRANULOCYTES NFR BLD AUTO: 0.5 % (ref 0–0.5)
LYMPHOCYTES # BLD AUTO: 2.27 10*3/MM3 (ref 0.7–3.1)
LYMPHOCYTES NFR BLD AUTO: 25.6 % (ref 19.6–45.3)
MCH RBC QN AUTO: 31.8 PG (ref 26.6–33)
MCHC RBC AUTO-ENTMCNC: 31.7 G/DL (ref 31.5–35.7)
MCV RBC AUTO: 100.2 FL (ref 79–97)
MONOCYTES # BLD AUTO: 0.62 10*3/MM3 (ref 0.1–0.9)
MONOCYTES NFR BLD AUTO: 7 % (ref 5–12)
NEUTROPHILS NFR BLD AUTO: 5.59 10*3/MM3 (ref 1.7–7)
NEUTROPHILS NFR BLD AUTO: 63.2 % (ref 42.7–76)
NRBC BLD AUTO-RTO: 0 /100 WBC (ref 0–0.2)
NT-PROBNP SERPL-MCNC: 7.6 PG/ML (ref 0–900)
PLATELET # BLD AUTO: 184 10*3/MM3 (ref 140–450)
PMV BLD AUTO: 12.4 FL (ref 6–12)
POTASSIUM SERPL-SCNC: 4.3 MMOL/L (ref 3.5–5.2)
PROT SERPL-MCNC: 7.2 G/DL (ref 6–8.5)
RBC # BLD AUTO: 4.78 10*6/MM3 (ref 4.14–5.8)
SODIUM SERPL-SCNC: 140 MMOL/L (ref 136–145)
THEOPHYLLINE SERPL-MCNC: <0.8 MCG/ML (ref 10–20)
TROPONIN T SERPL-MCNC: <0.01 NG/ML (ref 0–0.03)
WBC # BLD AUTO: 8.85 10*3/MM3 (ref 3.4–10.8)

## 2021-05-04 PROCEDURE — 99284 EMERGENCY DEPT VISIT MOD MDM: CPT | Performed by: EMERGENCY MEDICINE

## 2021-05-04 PROCEDURE — 96374 THER/PROPH/DIAG INJ IV PUSH: CPT

## 2021-05-04 PROCEDURE — 71045 X-RAY EXAM CHEST 1 VIEW: CPT

## 2021-05-04 PROCEDURE — 99284 EMERGENCY DEPT VISIT MOD MDM: CPT

## 2021-05-04 PROCEDURE — 80053 COMPREHEN METABOLIC PANEL: CPT | Performed by: EMERGENCY MEDICINE

## 2021-05-04 PROCEDURE — 94799 UNLISTED PULMONARY SVC/PX: CPT

## 2021-05-04 PROCEDURE — 85379 FIBRIN DEGRADATION QUANT: CPT | Performed by: EMERGENCY MEDICINE

## 2021-05-04 PROCEDURE — 93010 ELECTROCARDIOGRAM REPORT: CPT | Performed by: INTERNAL MEDICINE

## 2021-05-04 PROCEDURE — 25010000002 METHYLPREDNISOLONE PER 125 MG: Performed by: EMERGENCY MEDICINE

## 2021-05-04 PROCEDURE — 85025 COMPLETE CBC W/AUTO DIFF WBC: CPT | Performed by: EMERGENCY MEDICINE

## 2021-05-04 PROCEDURE — 83880 ASSAY OF NATRIURETIC PEPTIDE: CPT | Performed by: EMERGENCY MEDICINE

## 2021-05-04 PROCEDURE — 93005 ELECTROCARDIOGRAM TRACING: CPT | Performed by: EMERGENCY MEDICINE

## 2021-05-04 PROCEDURE — 80198 ASSAY OF THEOPHYLLINE: CPT | Performed by: EMERGENCY MEDICINE

## 2021-05-04 PROCEDURE — 84484 ASSAY OF TROPONIN QUANT: CPT | Performed by: EMERGENCY MEDICINE

## 2021-05-04 PROCEDURE — 94640 AIRWAY INHALATION TREATMENT: CPT

## 2021-05-04 RX ORDER — THEOPHYLLINE 300 MG/1
TABLET, EXTENDED RELEASE ORAL
Status: COMPLETED
Start: 2021-05-04 | End: 2021-05-04

## 2021-05-04 RX ORDER — IPRATROPIUM BROMIDE AND ALBUTEROL SULFATE 2.5; .5 MG/3ML; MG/3ML
3 SOLUTION RESPIRATORY (INHALATION) ONCE
Status: COMPLETED | OUTPATIENT
Start: 2021-05-04 | End: 2021-05-04

## 2021-05-04 RX ORDER — SODIUM CHLORIDE 0.9 % (FLUSH) 0.9 %
10 SYRINGE (ML) INJECTION AS NEEDED
Status: DISCONTINUED | OUTPATIENT
Start: 2021-05-04 | End: 2021-05-04 | Stop reason: HOSPADM

## 2021-05-04 RX ORDER — THEOPHYLLINE 400 MG/1
400 TABLET, EXTENDED RELEASE ORAL DAILY
Qty: 30 TABLET | Refills: 0 | OUTPATIENT
Start: 2021-05-04 | End: 2021-07-14

## 2021-05-04 RX ORDER — PREDNISONE 10 MG/1
TABLET ORAL
Qty: 21 TABLET | Refills: 0 | OUTPATIENT
Start: 2021-05-04 | End: 2021-05-31

## 2021-05-04 RX ORDER — MYCOPHENOLATE MOFETIL 500 MG/1
1000 TABLET ORAL 2 TIMES DAILY
Status: ON HOLD | COMMUNITY
Start: 2021-04-08 | End: 2022-07-07

## 2021-05-04 RX ORDER — ALBUTEROL SULFATE 2.5 MG/3ML
2.5 SOLUTION RESPIRATORY (INHALATION)
Status: COMPLETED | OUTPATIENT
Start: 2021-05-04 | End: 2021-05-04

## 2021-05-04 RX ORDER — METHYLPREDNISOLONE SODIUM SUCCINATE 125 MG/2ML
125 INJECTION, POWDER, LYOPHILIZED, FOR SOLUTION INTRAMUSCULAR; INTRAVENOUS ONCE
Status: COMPLETED | OUTPATIENT
Start: 2021-05-04 | End: 2021-05-04

## 2021-05-04 RX ADMIN — THEOPHYLLINE 300 MG: 300 TABLET, EXTENDED RELEASE ORAL at 20:47

## 2021-05-04 RX ADMIN — METHYLPREDNISOLONE SODIUM SUCCINATE 125 MG: 125 INJECTION, POWDER, FOR SOLUTION INTRAMUSCULAR; INTRAVENOUS at 18:51

## 2021-05-04 RX ADMIN — IPRATROPIUM BROMIDE AND ALBUTEROL SULFATE 3 ML: .5; 3 SOLUTION RESPIRATORY (INHALATION) at 18:44

## 2021-05-04 RX ADMIN — ALBUTEROL SULFATE 2.5 MG: 2.5 SOLUTION RESPIRATORY (INHALATION) at 18:53

## 2021-05-04 RX ADMIN — ALBUTEROL SULFATE 2.5 MG: 2.5 SOLUTION RESPIRATORY (INHALATION) at 19:16

## 2021-05-06 LAB — QT INTERVAL: 340 MS

## 2021-05-31 ENCOUNTER — HOSPITAL ENCOUNTER (EMERGENCY)
Facility: HOSPITAL | Age: 71
Discharge: HOME | End: 2021-05-31
Attending: EMERGENCY MEDICINE | Admitting: EMERGENCY MEDICINE

## 2021-05-31 ENCOUNTER — APPOINTMENT (OUTPATIENT)
Dept: GENERAL RADIOLOGY | Facility: HOSPITAL | Age: 71
End: 2021-05-31

## 2021-05-31 VITALS
OXYGEN SATURATION: 90 % | TEMPERATURE: 97.8 F | BODY MASS INDEX: 33.57 KG/M2 | HEIGHT: 75 IN | DIASTOLIC BLOOD PRESSURE: 80 MMHG | WEIGHT: 270 LBS | HEART RATE: 94 BPM | SYSTOLIC BLOOD PRESSURE: 131 MMHG | RESPIRATION RATE: 24 BRPM

## 2021-05-31 DIAGNOSIS — J44.1 COPD EXACERBATION (HCC): Primary | ICD-10-CM

## 2021-05-31 LAB
ALBUMIN SERPL-MCNC: 4.2 G/DL (ref 3.5–5.2)
ALBUMIN/GLOB SERPL: 1.6 G/DL
ALP SERPL-CCNC: 67 U/L (ref 39–117)
ALT SERPL W P-5'-P-CCNC: 13 U/L (ref 1–41)
ANION GAP SERPL CALCULATED.3IONS-SCNC: 9.3 MMOL/L (ref 5–15)
AST SERPL-CCNC: 13 U/L (ref 1–40)
BASOPHILS # BLD AUTO: 0.06 10*3/MM3 (ref 0–0.2)
BASOPHILS NFR BLD AUTO: 0.7 % (ref 0–1.5)
BILIRUB SERPL-MCNC: 0.3 MG/DL (ref 0–1.2)
BILIRUB UR QL STRIP: NEGATIVE
BUN SERPL-MCNC: 7 MG/DL (ref 8–23)
BUN/CREAT SERPL: 10.3 (ref 7–25)
CALCIUM SPEC-SCNC: 9.8 MG/DL (ref 8.6–10.5)
CHLORIDE SERPL-SCNC: 100 MMOL/L (ref 98–107)
CLARITY UR: CLEAR
CO2 SERPL-SCNC: 28.7 MMOL/L (ref 22–29)
COLOR UR: YELLOW
CREAT SERPL-MCNC: 0.68 MG/DL (ref 0.76–1.27)
DEPRECATED RDW RBC AUTO: 47.4 FL (ref 37–54)
EOSINOPHIL # BLD AUTO: 0.18 10*3/MM3 (ref 0–0.4)
EOSINOPHIL NFR BLD AUTO: 2.2 % (ref 0.3–6.2)
ERYTHROCYTE [DISTWIDTH] IN BLOOD BY AUTOMATED COUNT: 12.8 % (ref 12.3–15.4)
GFR SERPL CREATININE-BSD FRML MDRD: 116 ML/MIN/1.73
GLOBULIN UR ELPH-MCNC: 2.7 GM/DL
GLUCOSE SERPL-MCNC: 212 MG/DL (ref 65–99)
GLUCOSE UR STRIP-MCNC: ABNORMAL MG/DL
HCT VFR BLD AUTO: 43.6 % (ref 37.5–51)
HGB BLD-MCNC: 14 G/DL (ref 13–17.7)
HGB UR QL STRIP.AUTO: NEGATIVE
IMM GRANULOCYTES # BLD AUTO: 0.06 10*3/MM3 (ref 0–0.05)
IMM GRANULOCYTES NFR BLD AUTO: 0.7 % (ref 0–0.5)
KETONES UR QL STRIP: NEGATIVE
LEUKOCYTE ESTERASE UR QL STRIP.AUTO: NEGATIVE
LIPASE SERPL-CCNC: 15 U/L (ref 13–60)
LYMPHOCYTES # BLD AUTO: 1.61 10*3/MM3 (ref 0.7–3.1)
LYMPHOCYTES NFR BLD AUTO: 19.9 % (ref 19.6–45.3)
MCH RBC QN AUTO: 32 PG (ref 26.6–33)
MCHC RBC AUTO-ENTMCNC: 32.1 G/DL (ref 31.5–35.7)
MCV RBC AUTO: 99.5 FL (ref 79–97)
MONOCYTES # BLD AUTO: 0.86 10*3/MM3 (ref 0.1–0.9)
MONOCYTES NFR BLD AUTO: 10.6 % (ref 5–12)
NEUTROPHILS NFR BLD AUTO: 5.34 10*3/MM3 (ref 1.7–7)
NEUTROPHILS NFR BLD AUTO: 65.9 % (ref 42.7–76)
NITRITE UR QL STRIP: NEGATIVE
NRBC BLD AUTO-RTO: 0 /100 WBC (ref 0–0.2)
NT-PROBNP SERPL-MCNC: 67.3 PG/ML (ref 0–900)
PH UR STRIP.AUTO: 7.5 [PH] (ref 4.5–8)
PLATELET # BLD AUTO: 199 10*3/MM3 (ref 140–450)
PMV BLD AUTO: 12.4 FL (ref 6–12)
POTASSIUM SERPL-SCNC: 4.1 MMOL/L (ref 3.5–5.2)
PROCALCITONIN SERPL-MCNC: 0.1 NG/ML (ref 0–0.25)
PROT SERPL-MCNC: 6.9 G/DL (ref 6–8.5)
PROT UR QL STRIP: NEGATIVE
RBC # BLD AUTO: 4.38 10*6/MM3 (ref 4.14–5.8)
SODIUM SERPL-SCNC: 138 MMOL/L (ref 136–145)
SP GR UR STRIP: 1.02 (ref 1–1.03)
THEOPHYLLINE SERPL-MCNC: <0.8 MCG/ML (ref 10–20)
TROPONIN T SERPL-MCNC: <0.01 NG/ML (ref 0–0.03)
UROBILINOGEN UR QL STRIP: ABNORMAL
WBC # BLD AUTO: 8.11 10*3/MM3 (ref 3.4–10.8)

## 2021-05-31 PROCEDURE — 93005 ELECTROCARDIOGRAM TRACING: CPT | Performed by: EMERGENCY MEDICINE

## 2021-05-31 PROCEDURE — 99283 EMERGENCY DEPT VISIT LOW MDM: CPT

## 2021-05-31 PROCEDURE — 84145 PROCALCITONIN (PCT): CPT | Performed by: EMERGENCY MEDICINE

## 2021-05-31 PROCEDURE — 94640 AIRWAY INHALATION TREATMENT: CPT

## 2021-05-31 PROCEDURE — 85025 COMPLETE CBC W/AUTO DIFF WBC: CPT | Performed by: EMERGENCY MEDICINE

## 2021-05-31 PROCEDURE — 84484 ASSAY OF TROPONIN QUANT: CPT | Performed by: EMERGENCY MEDICINE

## 2021-05-31 PROCEDURE — 99283 EMERGENCY DEPT VISIT LOW MDM: CPT | Performed by: EMERGENCY MEDICINE

## 2021-05-31 PROCEDURE — 96374 THER/PROPH/DIAG INJ IV PUSH: CPT

## 2021-05-31 PROCEDURE — 25010000002 METHYLPREDNISOLONE PER 125 MG: Performed by: EMERGENCY MEDICINE

## 2021-05-31 PROCEDURE — 80053 COMPREHEN METABOLIC PANEL: CPT | Performed by: EMERGENCY MEDICINE

## 2021-05-31 PROCEDURE — 83880 ASSAY OF NATRIURETIC PEPTIDE: CPT | Performed by: EMERGENCY MEDICINE

## 2021-05-31 PROCEDURE — 71045 X-RAY EXAM CHEST 1 VIEW: CPT

## 2021-05-31 PROCEDURE — 93010 ELECTROCARDIOGRAM REPORT: CPT | Performed by: INTERNAL MEDICINE

## 2021-05-31 PROCEDURE — 94799 UNLISTED PULMONARY SVC/PX: CPT

## 2021-05-31 PROCEDURE — 83690 ASSAY OF LIPASE: CPT | Performed by: EMERGENCY MEDICINE

## 2021-05-31 PROCEDURE — 80198 ASSAY OF THEOPHYLLINE: CPT | Performed by: EMERGENCY MEDICINE

## 2021-05-31 PROCEDURE — 81003 URINALYSIS AUTO W/O SCOPE: CPT | Performed by: EMERGENCY MEDICINE

## 2021-05-31 RX ORDER — ALBUTEROL SULFATE 2.5 MG/3ML
2.5 SOLUTION RESPIRATORY (INHALATION) EVERY 4 HOURS PRN
Qty: 30 EACH | Refills: 0 | Status: SHIPPED | OUTPATIENT
Start: 2021-05-31

## 2021-05-31 RX ORDER — IPRATROPIUM BROMIDE AND ALBUTEROL SULFATE 2.5; .5 MG/3ML; MG/3ML
3 SOLUTION RESPIRATORY (INHALATION) ONCE
Status: COMPLETED | OUTPATIENT
Start: 2021-05-31 | End: 2021-05-31

## 2021-05-31 RX ORDER — METHYLPREDNISOLONE SODIUM SUCCINATE 125 MG/2ML
125 INJECTION, POWDER, LYOPHILIZED, FOR SOLUTION INTRAMUSCULAR; INTRAVENOUS ONCE
Status: COMPLETED | OUTPATIENT
Start: 2021-05-31 | End: 2021-05-31

## 2021-05-31 RX ORDER — ARFORMOTEROL TARTRATE 15 UG/2ML
15 SOLUTION RESPIRATORY (INHALATION)
Qty: 120 ML | Refills: 0 | Status: ON HOLD | OUTPATIENT
Start: 2021-05-31 | End: 2022-07-07

## 2021-05-31 RX ORDER — ALBUTEROL SULFATE 90 UG/1
2 AEROSOL, METERED RESPIRATORY (INHALATION)
Status: DISCONTINUED | OUTPATIENT
Start: 2021-05-31 | End: 2021-05-31

## 2021-05-31 RX ORDER — ALBUTEROL SULFATE 1.25 MG/3ML
1 SOLUTION RESPIRATORY (INHALATION) EVERY 6 HOURS PRN
Qty: 30 EACH | Refills: 0 | Status: SHIPPED | OUTPATIENT
Start: 2021-05-31 | End: 2021-05-31

## 2021-05-31 RX ORDER — METHYLPREDNISOLONE 4 MG/1
TABLET ORAL
Qty: 21 TABLET | Refills: 0 | OUTPATIENT
Start: 2021-05-31 | End: 2021-07-14

## 2021-05-31 RX ORDER — SODIUM CHLORIDE 0.9 % (FLUSH) 0.9 %
10 SYRINGE (ML) INJECTION AS NEEDED
Status: DISCONTINUED | OUTPATIENT
Start: 2021-05-31 | End: 2021-06-01 | Stop reason: HOSPADM

## 2021-05-31 RX ORDER — ALBUTEROL SULFATE 2.5 MG/3ML
2.5 SOLUTION RESPIRATORY (INHALATION) ONCE
Status: COMPLETED | OUTPATIENT
Start: 2021-05-31 | End: 2021-05-31

## 2021-05-31 RX ADMIN — ALBUTEROL SULFATE 2.5 MG: 2.5 SOLUTION RESPIRATORY (INHALATION) at 20:54

## 2021-05-31 RX ADMIN — IPRATROPIUM BROMIDE AND ALBUTEROL SULFATE 3 ML: .5; 3 SOLUTION RESPIRATORY (INHALATION) at 20:48

## 2021-05-31 RX ADMIN — METHYLPREDNISOLONE SODIUM SUCCINATE 125 MG: 125 INJECTION, POWDER, FOR SOLUTION INTRAMUSCULAR; INTRAVENOUS at 20:37

## 2021-06-01 NOTE — ED NOTES
"Pt arrives to ED c/o shortness of breath, abdominal pain, decreased urine output, nausea, and bilateral leg edema X3 days. Pt has a HX of COPD and states he took a breathing treatment PTA. He is a smoker. Pt states he uses 2L NC at night, he is unaware of what his oxygen level usually runs because, \"I don't have a machine to measure it.\"   Pt states the abdominal pain \"sometimes\" radiates to his back. He denies a history of kidney issues or kidney stones. He states his abdominal pain is, \"probably because my stomach is so big and swollen.\" Pt does not have a diuretic on his medication list and denies being on one.            Magy Harry RN  05/31/21 0505    "

## 2021-06-01 NOTE — ED PROVIDER NOTES
Subjective   History of Present Illness  History of Present Illness    Chief complaint: Shortness of breath    Location: Home    Quality/Severity: Moderate    Timing/Onset/Duration: X3 days    Modifying Factors: Worse with exertion, better with breathing    Associated Symptoms: No headache.  No fever or chills.  The pain is in a cough productive of greenish sputum.  No chest pain.  No abdominal pain.  He states his belly is swollen.  No diarrhea or burning when he urinates.  No nausea or vomiting    Narrative: This 70-year-old white male with a history of COPD, who smokes, presents with shortness of breath.  His second Covid vaccine was April 27, 2021.  The patient has also been complaining of some difficulty urinating and right flank pain radiating around to the right upper quadrant.    PCP:Ney Kathleen MD          Review of Systems   Constitutional: Negative for chills and fever.   HENT: Negative for congestion and sore throat.    Respiratory: Positive for cough and shortness of breath.    Cardiovascular: Negative for chest pain and leg swelling.   Gastrointestinal: Positive for abdominal distention. Negative for abdominal pain, diarrhea, nausea and vomiting.   Genitourinary: Negative for dysuria.   Musculoskeletal: Negative for back pain.   Skin: Negative for rash.   Neurological: Negative for headaches.   Psychiatric/Behavioral: Negative for confusion.        Medication List      CONTINUE taking these medications    BD Pen Needle Yaritza U/F 32G X 4 MM misc  Generic drug: Insulin Pen Needle        ASK your doctor about these medications    arformoterol 15 MCG/2ML nebulizer solution  Commonly known as: BROVANA  Take 2 mL by nebulization 2 (Two) Times a Day.     Kd Contour Test test strip  Generic drug: glucose blood     Bevespi Aerosphere 9-4.8 MCG/ACT aerosol  Generic drug: Glycopyrrolate-Formoterol     * bisacodyl 5 MG EC tablet  Commonly known as: DULCOLAX  Take 2 tablets by mouth Daily As Needed for  Constipation.     * bisacodyl 10 MG suppository  Commonly known as: DULCOLAX  Insert 1 suppository into the rectum Daily.     budesonide 0.5 MG/2ML nebulizer solution  Commonly known as: PULMICORT  Take 2 mL by nebulization 2 (Two) Times a Day. J 44.9, J 18.9     dicyclomine 20 MG tablet  Commonly known as: BENTYL  Take 0.5 tablets by mouth 4 (Four) Times a Day.     docusate sodium 100 MG capsule  Take 100 mg by mouth 2 (Two) Times a Day.     doxazosin 1 MG tablet  Commonly known as: CARDURA     glipiZIDE-metFORMIN 5-500 MG per tablet  Commonly known as: METAGLIP     HYDROcodone-acetaminophen  MG per tablet  Commonly known as: NORCO     insulin detemir 100 UNIT/ML injection  Commonly known as: LEVEMIR  Inject 45 Units under the skin into the appropriate area as directed Every Morning.     * mycophenolate 500 MG tablet  Commonly known as: CELLCEPT     * mycophenolate 500 MG tablet  Commonly known as: CELLCEPT     pioglitazone 45 MG tablet  Commonly known as: ACTOS     polyethylene glycol pack packet  Commonly known as: MIRALAX  Take 17 g by mouth Daily.     predniSONE 10 MG tablet  Commonly known as: DELTASONE  6 tabs by mouth day 1, 5 tabs by mouth day 2, continue tapering one tablet daily: Coarse 6 days.     pyridostigmine 60 MG tablet  Commonly known as: MESTINON     SIMVASTATIN PO     sodium chloride 7 % nebulizer solution nebulizer solution  Take 4 mL by nebulization 2 (Two) Times a Day As Needed (thick sputum). J 44.9, J 18.9     theophylline 400 MG 24 hr tablet  Commonly known as: UNIPHYL  Take 1 tablet by mouth Daily for 30 days.         * This list has 4 medication(s) that are the same as other medications prescribed for you. Read the directions carefully, and ask your doctor or other care provider to review them with you.                Past Medical History:   Diagnosis Date   • Arthritis of back    • Arthritis of neck    • Asthma    • COPD (chronic obstructive pulmonary disease) (CMS/[facility])    •  Depression    • DM (diabetes mellitus) (CMS/[facility])    • GERD (gastroesophageal reflux disease)    • Hip arthrosis    • Hyperlipidemia    • Hypertension    • Knee swelling    • Periarthritis of shoulder    • Rotator cuff syndrome        No Known Allergies    Past Surgical History:   Procedure Laterality Date   • APPENDECTOMY     • CHOLECYSTECTOMY     • ROTATOR CUFF REPAIR Right        Family History   Problem Relation Age of Onset   • Diabetes Mother    • COPD Father        Social History     Socioeconomic History   • Marital status:      Spouse name: Not on file   • Number of children: Not on file   • Years of education: Not on file   • Highest education level: Not on file   Tobacco Use   • Smoking status: Current Every Day Smoker     Packs/day: 0.50     Years: 58.00     Pack years: 29.00     Types: Cigarettes   • Smokeless tobacco: Never Used   • Tobacco comment: pt asking for nicotine patch   Vaping Use   • Vaping Use: Never used   Substance and Sexual Activity   • Alcohol use: No   • Drug use: No   • Sexual activity: Defer           Objective   Physical Exam  Vitals and nursing note reviewed.   Constitutional:       Appearance: He is well-developed.   HENT:      Head: Normocephalic and atraumatic.   Cardiovascular:      Rate and Rhythm: Normal rate and regular rhythm.      Heart sounds: No murmur heard.   No friction rub. No gallop.    Pulmonary:      Effort: No tachypnea or respiratory distress.      Breath sounds: Wheezing (Bilateral expiratory wheeze) present.   Chest:      Chest wall: No mass or tenderness.   Abdominal:      Palpations: There is no mass.      Tenderness: There is no abdominal tenderness. There is no guarding or rebound.      Comments: Swollen   Musculoskeletal:      Cervical back: Normal range of motion and neck supple.   Skin:     General: Skin is warm and dry.      Capillary Refill: Capillary refill takes less than 2 seconds.      Findings: No rash.   Neurological:      General: No  focal deficit present.      Mental Status: He is alert and oriented to person, place, and time.         Procedures           ED Course  ED Course as of May 31 2137   Mon May 31, 2021   2106 The patient's urinalysis shows greater than 1000 mg/dL glucose.  The urinalysis is otherwise unremarkable    [RC]   2109 The troponin is normal.    [RC]   2115 The BNP is normal    [RC]   2120 The lipase is normal    [RC]   2136 Procalcitonin is normal.    [RC]      ED Course User Index  [RC] Mason Ling MD      20:28 EDT, 05/31/21:  The EKG was obtained at 2025 and read by me at 2027.  EKG shows a normal sinus rhythm with rate of 94.  There is a normal axis with no hypertrophy.  The ME, QRS, and QT intervals are unremarkable.  There is no ectopy.  There is poor anterior R wave progression.  There is no acute ST elevation or depression.          21:37 EDT, 05/31/21:  The patient was reassessed.  He feels better.  His vital signs reviewed and are stable.  Lung exam: Clear to auscultation equal bilaterally.    21:37 EDT, 05/31/21:  The patient's diagnosis of acute exacerbation of COPD was discussed with him the patient will be given a prescription for Medrol Dosepak.  He should use his Proventil nebulizer every 4 hours while he is awake for 3 days.  The patient should stop smoking.  Patient should follow-up with Dr. Kathleen in 1 week.  The patient should return to emergency department if there is worsening shortness of breath, worse in any way at all.  All the patient's questions were answered he'll be discharged in good condition                           MDM    Final diagnoses:   COPD exacerbation (CMS/HCC)       ED Disposition  ED Disposition     None          No follow-up provider specified.       Medication List      No changes were made to your prescriptions during this visit.          Mason Ling MD  05/31/21 9509

## 2021-06-01 NOTE — DISCHARGE INSTRUCTIONS
Take your Proventil nebulizer every 4 hours as needed for shortness of breath.  Stop smoking.  Follow-up with Dr. Kathleen in 1 week.  Return to the emergency department there is worsening shortness of breath, chest pain, worse in any way at all.

## 2021-06-02 LAB — QT INTERVAL: 351 MS

## 2021-07-14 ENCOUNTER — HOSPITAL ENCOUNTER (EMERGENCY)
Facility: HOSPITAL | Age: 71
Discharge: HOME | End: 2021-07-14
Attending: EMERGENCY MEDICINE | Admitting: EMERGENCY MEDICINE

## 2021-07-14 ENCOUNTER — APPOINTMENT (OUTPATIENT)
Dept: GENERAL RADIOLOGY | Facility: HOSPITAL | Age: 71
End: 2021-07-14

## 2021-07-14 VITALS
DIASTOLIC BLOOD PRESSURE: 75 MMHG | HEART RATE: 86 BPM | TEMPERATURE: 97.8 F | HEIGHT: 74 IN | SYSTOLIC BLOOD PRESSURE: 121 MMHG | WEIGHT: 274 LBS | BODY MASS INDEX: 35.16 KG/M2 | OXYGEN SATURATION: 95 % | RESPIRATION RATE: 18 BRPM

## 2021-07-14 DIAGNOSIS — J44.1 COPD EXACERBATION (HCC): Primary | ICD-10-CM

## 2021-07-14 LAB
ALBUMIN SERPL-MCNC: 4.3 G/DL (ref 3.5–5.2)
ALBUMIN/GLOB SERPL: 1.9 G/DL
ALP SERPL-CCNC: 50 U/L (ref 39–117)
ALT SERPL W P-5'-P-CCNC: <5 U/L (ref 1–41)
ANION GAP SERPL CALCULATED.3IONS-SCNC: 10.5 MMOL/L (ref 5–15)
AST SERPL-CCNC: 14 U/L (ref 1–40)
BASOPHILS # BLD AUTO: 0.09 10*3/MM3 (ref 0–0.2)
BASOPHILS NFR BLD AUTO: 1 % (ref 0–1.5)
BILIRUB SERPL-MCNC: 0.2 MG/DL (ref 0–1.2)
BUN SERPL-MCNC: 19 MG/DL (ref 8–23)
BUN/CREAT SERPL: 26 (ref 7–25)
CALCIUM SPEC-SCNC: 9.3 MG/DL (ref 8.6–10.5)
CHLORIDE SERPL-SCNC: 101 MMOL/L (ref 98–107)
CO2 SERPL-SCNC: 27.5 MMOL/L (ref 22–29)
CREAT SERPL-MCNC: 0.73 MG/DL (ref 0.76–1.27)
DEPRECATED RDW RBC AUTO: 47.7 FL (ref 37–54)
EOSINOPHIL # BLD AUTO: 0.11 10*3/MM3 (ref 0–0.4)
EOSINOPHIL NFR BLD AUTO: 1.2 % (ref 0.3–6.2)
ERYTHROCYTE [DISTWIDTH] IN BLOOD BY AUTOMATED COUNT: 12.8 % (ref 12.3–15.4)
GFR SERPL CREATININE-BSD FRML MDRD: 107 ML/MIN/1.73
GLOBULIN UR ELPH-MCNC: 2.3 GM/DL
GLUCOSE SERPL-MCNC: 101 MG/DL (ref 65–99)
HCT VFR BLD AUTO: 44.7 % (ref 37.5–51)
HGB BLD-MCNC: 13.9 G/DL (ref 13–17.7)
IMM GRANULOCYTES # BLD AUTO: 0.05 10*3/MM3 (ref 0–0.05)
IMM GRANULOCYTES NFR BLD AUTO: 0.5 % (ref 0–0.5)
LYMPHOCYTES # BLD AUTO: 2.17 10*3/MM3 (ref 0.7–3.1)
LYMPHOCYTES NFR BLD AUTO: 23 % (ref 19.6–45.3)
MCH RBC QN AUTO: 31.2 PG (ref 26.6–33)
MCHC RBC AUTO-ENTMCNC: 31.1 G/DL (ref 31.5–35.7)
MCV RBC AUTO: 100.4 FL (ref 79–97)
MONOCYTES # BLD AUTO: 0.75 10*3/MM3 (ref 0.1–0.9)
MONOCYTES NFR BLD AUTO: 8 % (ref 5–12)
NEUTROPHILS NFR BLD AUTO: 6.26 10*3/MM3 (ref 1.7–7)
NEUTROPHILS NFR BLD AUTO: 66.3 % (ref 42.7–76)
NRBC BLD AUTO-RTO: 0 /100 WBC (ref 0–0.2)
PLATELET # BLD AUTO: 244 10*3/MM3 (ref 140–450)
PMV BLD AUTO: 11 FL (ref 6–12)
POTASSIUM SERPL-SCNC: 4.1 MMOL/L (ref 3.5–5.2)
PROT SERPL-MCNC: 6.6 G/DL (ref 6–8.5)
QT INTERVAL: 371 MS
RBC # BLD AUTO: 4.45 10*6/MM3 (ref 4.14–5.8)
SODIUM SERPL-SCNC: 139 MMOL/L (ref 136–145)
TROPONIN T SERPL-MCNC: 0.01 NG/ML (ref 0–0.03)
WBC # BLD AUTO: 9.43 10*3/MM3 (ref 3.4–10.8)

## 2021-07-14 PROCEDURE — 71046 X-RAY EXAM CHEST 2 VIEWS: CPT

## 2021-07-14 PROCEDURE — 99283 EMERGENCY DEPT VISIT LOW MDM: CPT | Performed by: EMERGENCY MEDICINE

## 2021-07-14 PROCEDURE — 85025 COMPLETE CBC W/AUTO DIFF WBC: CPT | Performed by: EMERGENCY MEDICINE

## 2021-07-14 PROCEDURE — 25010000002 METHYLPREDNISOLONE PER 125 MG: Performed by: EMERGENCY MEDICINE

## 2021-07-14 PROCEDURE — 93010 ELECTROCARDIOGRAM REPORT: CPT | Performed by: INTERNAL MEDICINE

## 2021-07-14 PROCEDURE — 94640 AIRWAY INHALATION TREATMENT: CPT

## 2021-07-14 PROCEDURE — 93005 ELECTROCARDIOGRAM TRACING: CPT | Performed by: EMERGENCY MEDICINE

## 2021-07-14 PROCEDURE — 80053 COMPREHEN METABOLIC PANEL: CPT | Performed by: EMERGENCY MEDICINE

## 2021-07-14 PROCEDURE — 99284 EMERGENCY DEPT VISIT MOD MDM: CPT

## 2021-07-14 PROCEDURE — 96374 THER/PROPH/DIAG INJ IV PUSH: CPT

## 2021-07-14 PROCEDURE — 84484 ASSAY OF TROPONIN QUANT: CPT | Performed by: EMERGENCY MEDICINE

## 2021-07-14 RX ORDER — METHYLPREDNISOLONE SODIUM SUCCINATE 125 MG/2ML
125 INJECTION, POWDER, LYOPHILIZED, FOR SOLUTION INTRAMUSCULAR; INTRAVENOUS ONCE
Status: COMPLETED | OUTPATIENT
Start: 2021-07-14 | End: 2021-07-14

## 2021-07-14 RX ORDER — IPRATROPIUM BROMIDE AND ALBUTEROL SULFATE 2.5; .5 MG/3ML; MG/3ML
3 SOLUTION RESPIRATORY (INHALATION) ONCE
Status: COMPLETED | OUTPATIENT
Start: 2021-07-14 | End: 2021-07-14

## 2021-07-14 RX ORDER — DOXYCYCLINE 100 MG/1
100 CAPSULE ORAL 2 TIMES DAILY
Qty: 14 CAPSULE | Refills: 0 | Status: SHIPPED | OUTPATIENT
Start: 2021-07-14 | End: 2021-07-21

## 2021-07-14 RX ORDER — PREDNISONE 20 MG/1
60 TABLET ORAL DAILY
Qty: 15 TABLET | Refills: 0 | Status: SHIPPED | OUTPATIENT
Start: 2021-07-14 | End: 2021-07-19

## 2021-07-14 RX ADMIN — IPRATROPIUM BROMIDE AND ALBUTEROL SULFATE 3 ML: .5; 3 SOLUTION RESPIRATORY (INHALATION) at 02:49

## 2021-07-14 RX ADMIN — METHYLPREDNISOLONE SODIUM SUCCINATE 125 MG: 125 INJECTION, POWDER, FOR SOLUTION INTRAMUSCULAR; INTRAVENOUS at 02:39

## 2021-07-14 NOTE — ED PROVIDER NOTES
"Subjective   70-year-old male with history of oxygen dependent COPD presents with 3 to 4 days of worsening cough, shortness of breath.  Denies chest pain.  Cough is sometimes productive of green sputum.  No fevers or chills.  Patient reports he had a coughing fit tonight which made him quite short of breath and concerned him, which is the reason he presents here this evening.  States he has been using his albuterol nebulizer 4 5 times per day for the last few days and does get some relief when he uses it.  No recent leg pains or swelling.  Does get some dyspnea on exertion but reports this is his baseline.  Patient is a current everyday smoker.  Patient stated his been \"a few months\" when he was last on steroids.  Per chart review was treated for COPD exacerbation at this facility in May of this year, reports that this was probably the last time he was on any.  He is no longer on chronic steroids for myasthenia gravis and has been off this for a while.          Review of Systems   Constitutional: Negative.    HENT: Negative.    Eyes: Negative.    Respiratory:        Per HPI   Cardiovascular: Negative.    Gastrointestinal: Negative.    Genitourinary: Negative.    Musculoskeletal: Negative.    Skin: Negative.    Neurological: Negative.    Hematological: Negative.    Psychiatric/Behavioral: Negative.        Past Medical History:   Diagnosis Date   • Arthritis of back    • Arthritis of neck    • Asthma    • COPD (chronic obstructive pulmonary disease) (CMS/[facility])    • Depression    • DM (diabetes mellitus) (CMS/[facility])    • GERD (gastroesophageal reflux disease)    • Hip arthrosis    • Hyperlipidemia    • Hypertension    • Knee swelling    • Periarthritis of shoulder    • Rotator cuff syndrome        No Known Allergies    Past Surgical History:   Procedure Laterality Date   • APPENDECTOMY     • CHOLECYSTECTOMY     • ROTATOR CUFF REPAIR Right        Family History   Problem Relation Age of Onset   • Diabetes Mother    • COPD " Father        Social History     Socioeconomic History   • Marital status:      Spouse name: Not on file   • Number of children: Not on file   • Years of education: Not on file   • Highest education level: Not on file   Tobacco Use   • Smoking status: Current Every Day Smoker     Packs/day: 0.50     Years: 58.00     Pack years: 29.00     Types: Cigarettes   • Smokeless tobacco: Never Used   • Tobacco comment: pt asking for nicotine patch   Vaping Use   • Vaping Use: Never used   Substance and Sexual Activity   • Alcohol use: No   • Drug use: No   • Sexual activity: Defer           Objective   Physical Exam  Constitutional:       General: He is not in acute distress.     Appearance: He is not toxic-appearing.   HENT:      Head: Normocephalic and atraumatic.      Mouth/Throat:      Mouth: Mucous membranes are moist.      Pharynx: Oropharynx is clear.   Eyes:      Extraocular Movements: Extraocular movements intact.   Cardiovascular:      Rate and Rhythm: Normal rate and regular rhythm.      Pulses: Normal pulses.      Heart sounds: Normal heart sounds.   Pulmonary:      Comments: Respiratory rate 18-20, unlabored, speaking full sentences, diffuse expiratory wheezes in all fields.  On 2 L nasal cannula oxygen.  Chest:      Chest wall: No mass, deformity, tenderness or crepitus.   Abdominal:      Palpations: Abdomen is soft.      Tenderness: There is no abdominal tenderness.   Musculoskeletal:         General: Normal range of motion.      Cervical back: Normal range of motion and neck supple.      Right lower leg: No tenderness. No edema.      Left lower leg: No tenderness. No edema.   Skin:     General: Skin is warm and dry.      Capillary Refill: Capillary refill takes less than 2 seconds.   Neurological:      General: No focal deficit present.      Mental Status: He is alert and oriented to person, place, and time.   Psychiatric:         Mood and Affect: Mood normal.         Behavior: Behavior normal.          Procedures           ED Course  ED Course as of Jul 14 0348 Wed Jul 14, 2021   0332 EKG obtained at 0244 shows sinus rhythm, rate 88, normal [place] and QTC, no ST segment or T wave changes.  Normal EKG.    [TD]   0345 Patient received IV steroids, 1 breathing treatment here.  Did have significant improvement of his symptoms with breathing treatment but still has quite a bit of wheezing on exam.  Had plan to give patient additional albuterol treatment here and reassess but he states he feels much better and would just prefer to go home and take some albuterol there if he feels he needs it.  Will prescribe short course of steroids and antibiotics.  Advised patient to follow-up with primary care within the next couple of days.  Advised him again to quit smoking.  Expected course return precautions discussed.  Patient discharged without portable oxygen as he states that he never uses it when he is out and about.  Did not appear to have a shortness of breath or distress as he ambulated out of the department.    [TD]      ED Course User Index  [TD] Gio Judd MD                                           [facility]    Final diagnoses:   COPD exacerbation (CMS/[facility])       ED Disposition  ED Disposition     ED Disposition Condition Comment    Discharge Stable           Ney Kathleen MD  [address] [facility] [address]  [phone]    In 2 days           Medication List      New Prescriptions    doxycycline 100 MG capsule  Commonly known as: MONODOX  Take 1 capsule by mouth 2 (Two) Times a Day for 7 days.     predniSONE 20 MG tablet  Commonly known as: DELTASONE  Take 3 tablets by mouth Daily for 5 days.        Changed    bisacodyl 10 MG suppository  Commonly known as: DULCOLAX  Insert 1 suppository into the rectum Daily.  What changed: Another medication with the same name was removed. Continue taking this medication, and follow the directions you see here.     insulin detemir 100 UNIT/ML injection  Commonly  known as: LEVEMIR  Inject 45 Units under the skin into the appropriate area as directed Every Morning.  What changed:   · how much to take  · when to take this        Stop    budesonide 0.5 MG/2ML nebulizer solution  Commonly known as: PULMICORT     methylPREDNISolone 4 MG dose pack  Commonly known as: Medrol     polyethylene glycol pack packet  Commonly known as: MIRALAX     theophylline 400 MG 24 hr tablet  Commonly known as: UNIPHYL           Where to Get Your Medications      These medications were sent to [id number] - [address] [place] [address] - [phone]  - [phone] [facility] [address]    Phone: [phone]   · doxycycline 100 MG capsule  · predniSONE 20 MG tablet          Gio Judd MD  07/14/21 034

## 2022-07-06 ENCOUNTER — HOSPITAL ENCOUNTER (OUTPATIENT)
Facility: HOSPITAL | Age: 72
Setting detail: OBSERVATION
Discharge: HOME | End: 2022-07-08
Attending: EMERGENCY MEDICINE | Admitting: INTERNAL MEDICINE

## 2022-07-06 ENCOUNTER — APPOINTMENT (OUTPATIENT)
Dept: GENERAL RADIOLOGY | Facility: HOSPITAL | Age: 72
End: 2022-07-06

## 2022-07-06 DIAGNOSIS — J44.1 ACUTE EXACERBATION OF CHRONIC OBSTRUCTIVE PULMONARY DISEASE (COPD): Primary | ICD-10-CM

## 2022-07-06 LAB
ALBUMIN SERPL-MCNC: 4.5 G/DL (ref 3.5–5.2)
ALBUMIN/GLOB SERPL: 1.8 G/DL
ALP SERPL-CCNC: 61 U/L (ref 39–117)
ALT SERPL W P-5'-P-CCNC: 14 U/L (ref 1–41)
ANION GAP SERPL CALCULATED.3IONS-SCNC: 10.1 MMOL/L (ref 5–15)
AST SERPL-CCNC: 9 U/L (ref 1–40)
BASOPHILS # BLD AUTO: 0.05 10*3/MM3 (ref 0–0.2)
BASOPHILS NFR BLD AUTO: 0.5 % (ref 0–1.5)
BILIRUB SERPL-MCNC: 0.2 MG/DL (ref 0–1.2)
BUN SERPL-MCNC: 14 MG/DL (ref 8–23)
BUN/CREAT SERPL: 17.1 (ref 7–25)
CALCIUM SPEC-SCNC: 9.6 MG/DL (ref 8.6–10.5)
CHLORIDE SERPL-SCNC: 100 MMOL/L (ref 98–107)
CO2 SERPL-SCNC: 28.9 MMOL/L (ref 22–29)
CREAT SERPL-MCNC: 0.82 MG/DL (ref 0.76–1.27)
D-LACTATE SERPL-SCNC: 1.9 MMOL/L (ref 0.5–2)
DEPRECATED RDW RBC AUTO: 45.8 FL (ref 37–54)
EGFRCR SERPLBLD CKD-EPI 2021: 95.1 ML/MIN/1.73
EOSINOPHIL # BLD AUTO: 0.01 10*3/MM3 (ref 0–0.4)
EOSINOPHIL NFR BLD AUTO: 0.1 % (ref 0.3–6.2)
ERYTHROCYTE [DISTWIDTH] IN BLOOD BY AUTOMATED COUNT: 12.3 % (ref 12.3–15.4)
GLOBULIN UR ELPH-MCNC: 2.5 GM/DL
GLUCOSE SERPL-MCNC: 250 MG/DL (ref 65–99)
HCT VFR BLD AUTO: 45.7 % (ref 37.5–51)
HGB BLD-MCNC: 14.3 G/DL (ref 13–17.7)
IMM GRANULOCYTES # BLD AUTO: 0.09 10*3/MM3 (ref 0–0.05)
IMM GRANULOCYTES NFR BLD AUTO: 0.8 % (ref 0–0.5)
LYMPHOCYTES # BLD AUTO: 1.4 10*3/MM3 (ref 0.7–3.1)
LYMPHOCYTES NFR BLD AUTO: 12.8 % (ref 19.6–45.3)
MCH RBC QN AUTO: 31.4 PG (ref 26.6–33)
MCHC RBC AUTO-ENTMCNC: 31.3 G/DL (ref 31.5–35.7)
MCV RBC AUTO: 100.2 FL (ref 79–97)
MONOCYTES # BLD AUTO: 0.77 10*3/MM3 (ref 0.1–0.9)
MONOCYTES NFR BLD AUTO: 7.1 % (ref 5–12)
NEUTROPHILS NFR BLD AUTO: 78.7 % (ref 42.7–76)
NEUTROPHILS NFR BLD AUTO: 8.58 10*3/MM3 (ref 1.7–7)
NRBC BLD AUTO-RTO: 0 /100 WBC (ref 0–0.2)
NT-PROBNP SERPL-MCNC: 23.6 PG/ML (ref 0–900)
PLATELET # BLD AUTO: 228 10*3/MM3 (ref 140–450)
PMV BLD AUTO: 12.2 FL (ref 6–12)
POTASSIUM SERPL-SCNC: 4.4 MMOL/L (ref 3.5–5.2)
PROCALCITONIN SERPL-MCNC: 0.04 NG/ML (ref 0–0.25)
PROT SERPL-MCNC: 7 G/DL (ref 6–8.5)
RBC # BLD AUTO: 4.56 10*6/MM3 (ref 4.14–5.8)
SODIUM SERPL-SCNC: 139 MMOL/L (ref 136–145)
TROPONIN T SERPL-MCNC: <0.01 NG/ML (ref 0–0.03)
WBC NRBC COR # BLD: 10.9 10*3/MM3 (ref 3.4–10.8)

## 2022-07-06 PROCEDURE — 25010000002 METHYLPREDNISOLONE PER 125 MG: Performed by: EMERGENCY MEDICINE

## 2022-07-06 PROCEDURE — 0202U NFCT DS 22 TRGT SARS-COV-2: CPT | Performed by: NURSE PRACTITIONER

## 2022-07-06 PROCEDURE — 36415 COLL VENOUS BLD VENIPUNCTURE: CPT

## 2022-07-06 PROCEDURE — 85025 COMPLETE CBC W/AUTO DIFF WBC: CPT | Performed by: EMERGENCY MEDICINE

## 2022-07-06 PROCEDURE — G0378 HOSPITAL OBSERVATION PER HR: HCPCS

## 2022-07-06 PROCEDURE — 87040 BLOOD CULTURE FOR BACTERIA: CPT | Performed by: EMERGENCY MEDICINE

## 2022-07-06 PROCEDURE — 84484 ASSAY OF TROPONIN QUANT: CPT | Performed by: EMERGENCY MEDICINE

## 2022-07-06 PROCEDURE — 93010 ELECTROCARDIOGRAM REPORT: CPT | Performed by: INTERNAL MEDICINE

## 2022-07-06 PROCEDURE — 83605 ASSAY OF LACTIC ACID: CPT | Performed by: EMERGENCY MEDICINE

## 2022-07-06 PROCEDURE — 99284 EMERGENCY DEPT VISIT MOD MDM: CPT

## 2022-07-06 PROCEDURE — 93005 ELECTROCARDIOGRAM TRACING: CPT | Performed by: EMERGENCY MEDICINE

## 2022-07-06 PROCEDURE — 94640 AIRWAY INHALATION TREATMENT: CPT

## 2022-07-06 PROCEDURE — 94799 UNLISTED PULMONARY SVC/PX: CPT

## 2022-07-06 PROCEDURE — 71046 X-RAY EXAM CHEST 2 VIEWS: CPT

## 2022-07-06 PROCEDURE — 99220 PR INITIAL OBSERVATION CARE/DAY 70 MINUTES: CPT | Performed by: NURSE PRACTITIONER

## 2022-07-06 PROCEDURE — 80053 COMPREHEN METABOLIC PANEL: CPT | Performed by: EMERGENCY MEDICINE

## 2022-07-06 PROCEDURE — 84145 PROCALCITONIN (PCT): CPT | Performed by: EMERGENCY MEDICINE

## 2022-07-06 PROCEDURE — 96374 THER/PROPH/DIAG INJ IV PUSH: CPT

## 2022-07-06 PROCEDURE — 83880 ASSAY OF NATRIURETIC PEPTIDE: CPT | Performed by: EMERGENCY MEDICINE

## 2022-07-06 PROCEDURE — 94664 DEMO&/EVAL PT USE INHALER: CPT

## 2022-07-06 PROCEDURE — 83036 HEMOGLOBIN GLYCOSYLATED A1C: CPT | Performed by: NURSE PRACTITIONER

## 2022-07-06 PROCEDURE — 84443 ASSAY THYROID STIM HORMONE: CPT | Performed by: NURSE PRACTITIONER

## 2022-07-06 RX ORDER — ALBUTEROL SULFATE 2.5 MG/3ML
2.5 SOLUTION RESPIRATORY (INHALATION)
Status: COMPLETED | OUTPATIENT
Start: 2022-07-06 | End: 2022-07-06

## 2022-07-06 RX ORDER — METHYLPREDNISOLONE SODIUM SUCCINATE 125 MG/2ML
125 INJECTION, POWDER, LYOPHILIZED, FOR SOLUTION INTRAMUSCULAR; INTRAVENOUS ONCE
Status: COMPLETED | OUTPATIENT
Start: 2022-07-06 | End: 2022-07-06

## 2022-07-06 RX ORDER — IPRATROPIUM BROMIDE AND ALBUTEROL SULFATE 2.5; .5 MG/3ML; MG/3ML
3 SOLUTION RESPIRATORY (INHALATION) ONCE
Status: COMPLETED | OUTPATIENT
Start: 2022-07-06 | End: 2022-07-06

## 2022-07-06 RX ORDER — SODIUM CHLORIDE 0.9 % (FLUSH) 0.9 %
10 SYRINGE (ML) INJECTION AS NEEDED
Status: DISCONTINUED | OUTPATIENT
Start: 2022-07-06 | End: 2022-07-08 | Stop reason: HOSPADM

## 2022-07-06 RX ADMIN — ALBUTEROL SULFATE 2.5 MG: 2.5 SOLUTION RESPIRATORY (INHALATION) at 20:52

## 2022-07-06 RX ADMIN — METHYLPREDNISOLONE SODIUM SUCCINATE 125 MG: 125 INJECTION, POWDER, FOR SOLUTION INTRAMUSCULAR; INTRAVENOUS at 19:54

## 2022-07-06 RX ADMIN — ALBUTEROL SULFATE 2.5 MG: 2.5 SOLUTION RESPIRATORY (INHALATION) at 20:27

## 2022-07-06 RX ADMIN — IPRATROPIUM BROMIDE AND ALBUTEROL SULFATE 3 ML: .5; 2.5 SOLUTION RESPIRATORY (INHALATION) at 20:04

## 2022-07-07 PROBLEM — J96.21 ACUTE ON CHRONIC RESPIRATORY FAILURE WITH HYPOXIA: Status: ACTIVE | Noted: 2022-07-07

## 2022-07-07 LAB
ANION GAP SERPL CALCULATED.3IONS-SCNC: 8.1 MMOL/L (ref 5–15)
B PARAPERT DNA SPEC QL NAA+PROBE: NOT DETECTED
B PERT DNA SPEC QL NAA+PROBE: NOT DETECTED
BASOPHILS # BLD AUTO: 0.02 10*3/MM3 (ref 0–0.2)
BASOPHILS NFR BLD AUTO: 0.2 % (ref 0–1.5)
BUN SERPL-MCNC: 14 MG/DL (ref 8–23)
BUN/CREAT SERPL: 18.7 (ref 7–25)
C PNEUM DNA NPH QL NAA+NON-PROBE: NOT DETECTED
CALCIUM SPEC-SCNC: 9.4 MG/DL (ref 8.6–10.5)
CHLORIDE SERPL-SCNC: 97 MMOL/L (ref 98–107)
CO2 SERPL-SCNC: 29.9 MMOL/L (ref 22–29)
CREAT SERPL-MCNC: 0.75 MG/DL (ref 0.76–1.27)
DEPRECATED RDW RBC AUTO: 44.6 FL (ref 37–54)
EGFRCR SERPLBLD CKD-EPI 2021: 97.7 ML/MIN/1.73
EOSINOPHIL # BLD AUTO: 0 10*3/MM3 (ref 0–0.4)
EOSINOPHIL NFR BLD AUTO: 0 % (ref 0.3–6.2)
ERYTHROCYTE [DISTWIDTH] IN BLOOD BY AUTOMATED COUNT: 12.2 % (ref 12.3–15.4)
FLUAV SUBTYP SPEC NAA+PROBE: NOT DETECTED
FLUBV RNA ISLT QL NAA+PROBE: NOT DETECTED
GLUCOSE BLDC GLUCOMTR-MCNC: 195 MG/DL (ref 70–130)
GLUCOSE BLDC GLUCOMTR-MCNC: 221 MG/DL (ref 70–130)
GLUCOSE BLDC GLUCOMTR-MCNC: 239 MG/DL (ref 70–130)
GLUCOSE BLDC GLUCOMTR-MCNC: 320 MG/DL (ref 70–130)
GLUCOSE SERPL-MCNC: 353 MG/DL (ref 65–99)
HADV DNA SPEC NAA+PROBE: NOT DETECTED
HBA1C MFR BLD: 7.4 % (ref 4.8–5.6)
HCOV 229E RNA SPEC QL NAA+PROBE: NOT DETECTED
HCOV HKU1 RNA SPEC QL NAA+PROBE: NOT DETECTED
HCOV NL63 RNA SPEC QL NAA+PROBE: NOT DETECTED
HCOV OC43 RNA SPEC QL NAA+PROBE: NOT DETECTED
HCT VFR BLD AUTO: 43.3 % (ref 37.5–51)
HGB BLD-MCNC: 13.8 G/DL (ref 13–17.7)
HMPV RNA NPH QL NAA+NON-PROBE: NOT DETECTED
HPIV1 RNA ISLT QL NAA+PROBE: NOT DETECTED
HPIV2 RNA SPEC QL NAA+PROBE: NOT DETECTED
HPIV3 RNA NPH QL NAA+PROBE: NOT DETECTED
HPIV4 P GENE NPH QL NAA+PROBE: NOT DETECTED
IMM GRANULOCYTES # BLD AUTO: 0.09 10*3/MM3 (ref 0–0.05)
IMM GRANULOCYTES NFR BLD AUTO: 1 % (ref 0–0.5)
L PNEUMO1 AG UR QL IA: NEGATIVE
LYMPHOCYTES # BLD AUTO: 0.59 10*3/MM3 (ref 0.7–3.1)
LYMPHOCYTES NFR BLD AUTO: 6.8 % (ref 19.6–45.3)
M PNEUMO IGG SER IA-ACNC: NOT DETECTED
MCH RBC QN AUTO: 31.7 PG (ref 26.6–33)
MCHC RBC AUTO-ENTMCNC: 31.9 G/DL (ref 31.5–35.7)
MCV RBC AUTO: 99.3 FL (ref 79–97)
MONOCYTES # BLD AUTO: 0.12 10*3/MM3 (ref 0.1–0.9)
MONOCYTES NFR BLD AUTO: 1.4 % (ref 5–12)
NEUTROPHILS NFR BLD AUTO: 7.8 10*3/MM3 (ref 1.7–7)
NEUTROPHILS NFR BLD AUTO: 90.6 % (ref 42.7–76)
NRBC BLD AUTO-RTO: 0 /100 WBC (ref 0–0.2)
PLATELET # BLD AUTO: 191 10*3/MM3 (ref 140–450)
PMV BLD AUTO: 12 FL (ref 6–12)
POTASSIUM SERPL-SCNC: 4.4 MMOL/L (ref 3.5–5.2)
PROCALCITONIN SERPL-MCNC: 0.03 NG/ML (ref 0–0.25)
QT INTERVAL: 343 MS
RBC # BLD AUTO: 4.36 10*6/MM3 (ref 4.14–5.8)
RHINOVIRUS RNA SPEC NAA+PROBE: NOT DETECTED
RSV RNA NPH QL NAA+NON-PROBE: NOT DETECTED
S PNEUM AG SPEC QL LA: NEGATIVE
SARS-COV-2 RNA NPH QL NAA+NON-PROBE: NOT DETECTED
SARS-COV-2 RNA PNL SPEC NAA+PROBE: NOT DETECTED
SODIUM SERPL-SCNC: 135 MMOL/L (ref 136–145)
TSH SERPL DL<=0.05 MIU/L-ACNC: 1.09 UIU/ML (ref 0.27–4.2)
WBC NRBC COR # BLD: 8.62 10*3/MM3 (ref 3.4–10.8)

## 2022-07-07 PROCEDURE — 94799 UNLISTED PULMONARY SVC/PX: CPT

## 2022-07-07 PROCEDURE — 87899 AGENT NOS ASSAY W/OPTIC: CPT | Performed by: NURSE PRACTITIONER

## 2022-07-07 PROCEDURE — 82962 GLUCOSE BLOOD TEST: CPT

## 2022-07-07 PROCEDURE — 87449 NOS EACH ORGANISM AG IA: CPT | Performed by: NURSE PRACTITIONER

## 2022-07-07 PROCEDURE — 96372 THER/PROPH/DIAG INJ SC/IM: CPT

## 2022-07-07 PROCEDURE — 84145 PROCALCITONIN (PCT): CPT | Performed by: NURSE PRACTITIONER

## 2022-07-07 PROCEDURE — 87205 SMEAR GRAM STAIN: CPT | Performed by: NURSE PRACTITIONER

## 2022-07-07 PROCEDURE — 94664 DEMO&/EVAL PT USE INHALER: CPT

## 2022-07-07 PROCEDURE — 87070 CULTURE OTHR SPECIMN AEROBIC: CPT | Performed by: NURSE PRACTITIONER

## 2022-07-07 PROCEDURE — 63710000001 MYCOPHENOLATE MOFETIL PER 250 MG: Performed by: NURSE PRACTITIONER

## 2022-07-07 PROCEDURE — 63710000001 INSULIN ASPART PER 5 UNITS: Performed by: NURSE PRACTITIONER

## 2022-07-07 PROCEDURE — 80048 BASIC METABOLIC PNL TOTAL CA: CPT | Performed by: NURSE PRACTITIONER

## 2022-07-07 PROCEDURE — 25010000002 ENOXAPARIN PER 10 MG: Performed by: NURSE PRACTITIONER

## 2022-07-07 PROCEDURE — 63710000001 INSULIN DETEMIR PER 5 UNITS: Performed by: NURSE PRACTITIONER

## 2022-07-07 PROCEDURE — 87635 SARS-COV-2 COVID-19 AMP PRB: CPT | Performed by: NURSE PRACTITIONER

## 2022-07-07 PROCEDURE — 96376 TX/PRO/DX INJ SAME DRUG ADON: CPT

## 2022-07-07 PROCEDURE — G0378 HOSPITAL OBSERVATION PER HR: HCPCS

## 2022-07-07 PROCEDURE — 99225 PR SBSQ OBSERVATION CARE/DAY 25 MINUTES: CPT | Performed by: NURSE PRACTITIONER

## 2022-07-07 PROCEDURE — 85025 COMPLETE CBC W/AUTO DIFF WBC: CPT | Performed by: NURSE PRACTITIONER

## 2022-07-07 PROCEDURE — 25010000002 METHYLPREDNISOLONE PER 40 MG: Performed by: NURSE PRACTITIONER

## 2022-07-07 RX ORDER — INSULIN ASPART 100 [IU]/ML
0-14 INJECTION, SOLUTION INTRAVENOUS; SUBCUTANEOUS
Status: DISCONTINUED | OUTPATIENT
Start: 2022-07-07 | End: 2022-07-08 | Stop reason: HOSPADM

## 2022-07-07 RX ORDER — GUAIFENESIN 600 MG/1
1200 TABLET, EXTENDED RELEASE ORAL 2 TIMES DAILY
Status: DISCONTINUED | OUTPATIENT
Start: 2022-07-07 | End: 2022-07-08 | Stop reason: HOSPADM

## 2022-07-07 RX ORDER — INSULIN GLARGINE 100 [IU]/ML
45 INJECTION, SOLUTION SUBCUTANEOUS EVERY MORNING
COMMUNITY

## 2022-07-07 RX ORDER — TERAZOSIN 1 MG/1
1 CAPSULE ORAL NIGHTLY
Status: DISCONTINUED | OUTPATIENT
Start: 2022-07-07 | End: 2022-07-08 | Stop reason: HOSPADM

## 2022-07-07 RX ORDER — INSULIN GLARGINE 100 [IU]/ML
35 INJECTION, SOLUTION SUBCUTANEOUS NIGHTLY
COMMUNITY

## 2022-07-07 RX ORDER — SODIUM CHLORIDE 9 MG/ML
40 INJECTION, SOLUTION INTRAVENOUS AS NEEDED
Status: DISCONTINUED | OUTPATIENT
Start: 2022-07-07 | End: 2022-07-08 | Stop reason: HOSPADM

## 2022-07-07 RX ORDER — BISACODYL 10 MG
10 SUPPOSITORY, RECTAL RECTAL DAILY
Status: DISCONTINUED | OUTPATIENT
Start: 2022-07-07 | End: 2022-07-08 | Stop reason: HOSPADM

## 2022-07-07 RX ORDER — DOCUSATE SODIUM 100 MG/1
100 CAPSULE, LIQUID FILLED ORAL 2 TIMES DAILY
Refills: 1 | Status: DISCONTINUED | OUTPATIENT
Start: 2022-07-07 | End: 2022-07-08 | Stop reason: HOSPADM

## 2022-07-07 RX ORDER — NICOTINE 21 MG/24HR
1 PATCH, TRANSDERMAL 24 HOURS TRANSDERMAL
Status: DISCONTINUED | OUTPATIENT
Start: 2022-07-07 | End: 2022-07-08 | Stop reason: HOSPADM

## 2022-07-07 RX ORDER — ACETAMINOPHEN 325 MG/1
650 TABLET ORAL EVERY 4 HOURS PRN
Status: DISCONTINUED | OUTPATIENT
Start: 2022-07-07 | End: 2022-07-08 | Stop reason: HOSPADM

## 2022-07-07 RX ORDER — PYRIDOSTIGMINE BROMIDE 60 MG/1
60 TABLET ORAL EVERY 4 HOURS
Status: DISCONTINUED | OUTPATIENT
Start: 2022-07-07 | End: 2022-07-08 | Stop reason: HOSPADM

## 2022-07-07 RX ORDER — NICOTINE POLACRILEX 4 MG
15 LOZENGE BUCCAL
Status: DISCONTINUED | OUTPATIENT
Start: 2022-07-07 | End: 2022-07-08 | Stop reason: HOSPADM

## 2022-07-07 RX ORDER — ONDANSETRON 2 MG/ML
4 INJECTION INTRAMUSCULAR; INTRAVENOUS EVERY 6 HOURS PRN
Status: DISCONTINUED | OUTPATIENT
Start: 2022-07-07 | End: 2022-07-08 | Stop reason: HOSPADM

## 2022-07-07 RX ORDER — ACETAMINOPHEN 160 MG/5ML
650 SOLUTION ORAL EVERY 4 HOURS PRN
Status: DISCONTINUED | OUTPATIENT
Start: 2022-07-07 | End: 2022-07-08 | Stop reason: HOSPADM

## 2022-07-07 RX ORDER — INSULIN ASPART 100 [IU]/ML
0-7 INJECTION, SOLUTION INTRAVENOUS; SUBCUTANEOUS
Status: DISCONTINUED | OUTPATIENT
Start: 2022-07-07 | End: 2022-07-07

## 2022-07-07 RX ORDER — PIOGLITAZONEHYDROCHLORIDE 30 MG/1
45 TABLET ORAL DAILY
Status: DISCONTINUED | OUTPATIENT
Start: 2022-07-07 | End: 2022-07-08 | Stop reason: HOSPADM

## 2022-07-07 RX ORDER — CHOLECALCIFEROL (VITAMIN D3) 125 MCG
5 CAPSULE ORAL NIGHTLY PRN
Status: DISCONTINUED | OUTPATIENT
Start: 2022-07-07 | End: 2022-07-08 | Stop reason: HOSPADM

## 2022-07-07 RX ORDER — ALBUTEROL SULFATE 2.5 MG/3ML
2.5 SOLUTION RESPIRATORY (INHALATION) EVERY 4 HOURS PRN
Status: DISCONTINUED | OUTPATIENT
Start: 2022-07-07 | End: 2022-07-08 | Stop reason: HOSPADM

## 2022-07-07 RX ORDER — IPRATROPIUM BROMIDE AND ALBUTEROL SULFATE 2.5; .5 MG/3ML; MG/3ML
3 SOLUTION RESPIRATORY (INHALATION)
Status: DISCONTINUED | OUTPATIENT
Start: 2022-07-07 | End: 2022-07-08 | Stop reason: HOSPADM

## 2022-07-07 RX ORDER — HYDROCODONE BITARTRATE AND ACETAMINOPHEN 10; 325 MG/1; MG/1
1 TABLET ORAL EVERY 6 HOURS SCHEDULED
Status: DISCONTINUED | OUTPATIENT
Start: 2022-07-07 | End: 2022-07-08 | Stop reason: HOSPADM

## 2022-07-07 RX ORDER — ACETAMINOPHEN 650 MG/1
650 SUPPOSITORY RECTAL EVERY 4 HOURS PRN
Status: DISCONTINUED | OUTPATIENT
Start: 2022-07-07 | End: 2022-07-08 | Stop reason: HOSPADM

## 2022-07-07 RX ORDER — ONDANSETRON 4 MG/1
4 TABLET, FILM COATED ORAL EVERY 6 HOURS PRN
Status: DISCONTINUED | OUTPATIENT
Start: 2022-07-07 | End: 2022-07-08 | Stop reason: HOSPADM

## 2022-07-07 RX ORDER — SODIUM CHLORIDE 0.9 % (FLUSH) 0.9 %
10 SYRINGE (ML) INJECTION EVERY 12 HOURS SCHEDULED
Status: DISCONTINUED | OUTPATIENT
Start: 2022-07-07 | End: 2022-07-08 | Stop reason: HOSPADM

## 2022-07-07 RX ORDER — ENOXAPARIN SODIUM 100 MG/ML
40 INJECTION SUBCUTANEOUS NIGHTLY
Status: DISCONTINUED | OUTPATIENT
Start: 2022-07-07 | End: 2022-07-08 | Stop reason: HOSPADM

## 2022-07-07 RX ORDER — ATORVASTATIN CALCIUM 20 MG/1
20 TABLET, FILM COATED ORAL DAILY
Status: DISCONTINUED | OUTPATIENT
Start: 2022-07-07 | End: 2022-07-08 | Stop reason: HOSPADM

## 2022-07-07 RX ORDER — MYCOPHENOLATE MOFETIL 250 MG/1
1000 CAPSULE ORAL EVERY 12 HOURS SCHEDULED
Status: DISCONTINUED | OUTPATIENT
Start: 2022-07-07 | End: 2022-07-08 | Stop reason: HOSPADM

## 2022-07-07 RX ORDER — SODIUM CHLORIDE 0.9 % (FLUSH) 0.9 %
10 SYRINGE (ML) INJECTION AS NEEDED
Status: DISCONTINUED | OUTPATIENT
Start: 2022-07-07 | End: 2022-07-08 | Stop reason: HOSPADM

## 2022-07-07 RX ORDER — DEXTROSE MONOHYDRATE 25 G/50ML
25 INJECTION, SOLUTION INTRAVENOUS
Status: DISCONTINUED | OUTPATIENT
Start: 2022-07-07 | End: 2022-07-08 | Stop reason: HOSPADM

## 2022-07-07 RX ORDER — METHYLPREDNISOLONE SODIUM SUCCINATE 40 MG/ML
40 INJECTION, POWDER, LYOPHILIZED, FOR SOLUTION INTRAMUSCULAR; INTRAVENOUS EVERY 8 HOURS
Status: DISCONTINUED | OUTPATIENT
Start: 2022-07-07 | End: 2022-07-08 | Stop reason: HOSPADM

## 2022-07-07 RX ORDER — ARFORMOTEROL TARTRATE 15 UG/2ML
15 SOLUTION RESPIRATORY (INHALATION)
Status: DISCONTINUED | OUTPATIENT
Start: 2022-07-07 | End: 2022-07-08 | Stop reason: HOSPADM

## 2022-07-07 RX ADMIN — INSULIN DETEMIR 25 UNITS: 100 INJECTION, SOLUTION SUBCUTANEOUS at 01:51

## 2022-07-07 RX ADMIN — INSULIN DETEMIR 25 UNITS: 100 INJECTION, SOLUTION SUBCUTANEOUS at 20:44

## 2022-07-07 RX ADMIN — METFORMIN HYDROCHLORIDE: 500 TABLET, FILM COATED ORAL at 06:30

## 2022-07-07 RX ADMIN — MYCOPHENOLATE MOFETIL 1000 MG: 250 CAPSULE ORAL at 08:19

## 2022-07-07 RX ADMIN — PYRIDOSTIGMINE BROMIDE 60 MG: 60 TABLET ORAL at 09:16

## 2022-07-07 RX ADMIN — PYRIDOSTIGMINE BROMIDE 60 MG: 60 TABLET ORAL at 21:56

## 2022-07-07 RX ADMIN — HYDROCODONE BITARTRATE AND ACETAMINOPHEN 1 TABLET: 10; 325 TABLET ORAL at 01:49

## 2022-07-07 RX ADMIN — GUAIFENESIN 1200 MG: 600 TABLET, EXTENDED RELEASE ORAL at 01:50

## 2022-07-07 RX ADMIN — IPRATROPIUM BROMIDE AND ALBUTEROL SULFATE 3 ML: .5; 2.5 SOLUTION RESPIRATORY (INHALATION) at 23:13

## 2022-07-07 RX ADMIN — TERAZOSIN HYDROCHLORIDE 1 MG: 1 CAPSULE ORAL at 20:36

## 2022-07-07 RX ADMIN — DOCUSATE SODIUM 100 MG: 100 CAPSULE, LIQUID FILLED ORAL at 20:35

## 2022-07-07 RX ADMIN — METHYLPREDNISOLONE SODIUM SUCCINATE 40 MG: 40 INJECTION, POWDER, FOR SOLUTION INTRAMUSCULAR; INTRAVENOUS at 04:39

## 2022-07-07 RX ADMIN — INSULIN ASPART 3 UNITS: 100 INJECTION, SOLUTION INTRAVENOUS; SUBCUTANEOUS at 11:57

## 2022-07-07 RX ADMIN — HYDROCODONE BITARTRATE AND ACETAMINOPHEN 1 TABLET: 10; 325 TABLET ORAL at 11:58

## 2022-07-07 RX ADMIN — DOCUSATE SODIUM 100 MG: 100 CAPSULE, LIQUID FILLED ORAL at 08:19

## 2022-07-07 RX ADMIN — IPRATROPIUM BROMIDE AND ALBUTEROL SULFATE 3 ML: .5; 2.5 SOLUTION RESPIRATORY (INHALATION) at 02:26

## 2022-07-07 RX ADMIN — Medication 10 ML: at 20:37

## 2022-07-07 RX ADMIN — HYDROCODONE BITARTRATE AND ACETAMINOPHEN 1 TABLET: 10; 325 TABLET ORAL at 17:39

## 2022-07-07 RX ADMIN — INSULIN DETEMIR 25 UNITS: 100 INJECTION, SOLUTION SUBCUTANEOUS at 08:19

## 2022-07-07 RX ADMIN — PYRIDOSTIGMINE BROMIDE 60 MG: 60 TABLET ORAL at 01:50

## 2022-07-07 RX ADMIN — GUAIFENESIN 1200 MG: 600 TABLET, EXTENDED RELEASE ORAL at 20:35

## 2022-07-07 RX ADMIN — NICOTINE 1 PATCH: 21 PATCH, EXTENDED RELEASE TRANSDERMAL at 08:18

## 2022-07-07 RX ADMIN — ARFORMOTEROL TARTRATE 15 MCG: 15 SOLUTION RESPIRATORY (INHALATION) at 07:58

## 2022-07-07 RX ADMIN — MELATONIN TAB 5 MG 5 MG: 5 TAB at 20:35

## 2022-07-07 RX ADMIN — MYCOPHENOLATE MOFETIL 1000 MG: 250 CAPSULE ORAL at 01:49

## 2022-07-07 RX ADMIN — ENOXAPARIN SODIUM 40 MG: 40 INJECTION SUBCUTANEOUS at 20:36

## 2022-07-07 RX ADMIN — PYRIDOSTIGMINE BROMIDE 60 MG: 60 TABLET ORAL at 13:31

## 2022-07-07 RX ADMIN — PYRIDOSTIGMINE BROMIDE 60 MG: 60 TABLET ORAL at 17:38

## 2022-07-07 RX ADMIN — ENOXAPARIN SODIUM 40 MG: 40 INJECTION SUBCUTANEOUS at 01:48

## 2022-07-07 RX ADMIN — METFORMIN HYDROCHLORIDE: 500 TABLET, FILM COATED ORAL at 17:38

## 2022-07-07 RX ADMIN — METHYLPREDNISOLONE SODIUM SUCCINATE 40 MG: 40 INJECTION, POWDER, FOR SOLUTION INTRAMUSCULAR; INTRAVENOUS at 11:58

## 2022-07-07 RX ADMIN — IPRATROPIUM BROMIDE AND ALBUTEROL SULFATE 3 ML: .5; 2.5 SOLUTION RESPIRATORY (INHALATION) at 15:59

## 2022-07-07 RX ADMIN — IPRATROPIUM BROMIDE AND ALBUTEROL SULFATE 3 ML: .5; 2.5 SOLUTION RESPIRATORY (INHALATION) at 07:53

## 2022-07-07 RX ADMIN — ARFORMOTEROL TARTRATE 15 MCG: 15 SOLUTION RESPIRATORY (INHALATION) at 20:24

## 2022-07-07 RX ADMIN — INSULIN ASPART 5 UNITS: 100 INJECTION, SOLUTION INTRAVENOUS; SUBCUTANEOUS at 08:20

## 2022-07-07 RX ADMIN — Medication 10 ML: at 00:52

## 2022-07-07 RX ADMIN — MYCOPHENOLATE MOFETIL 1000 MG: 250 CAPSULE ORAL at 20:35

## 2022-07-07 RX ADMIN — INSULIN ASPART 5 UNITS: 100 INJECTION, SOLUTION INTRAVENOUS; SUBCUTANEOUS at 17:38

## 2022-07-07 RX ADMIN — Medication 10 ML: at 08:20

## 2022-07-07 RX ADMIN — IPRATROPIUM BROMIDE AND ALBUTEROL SULFATE 3 ML: .5; 2.5 SOLUTION RESPIRATORY (INHALATION) at 19:46

## 2022-07-07 RX ADMIN — METHYLPREDNISOLONE SODIUM SUCCINATE 40 MG: 40 INJECTION, POWDER, FOR SOLUTION INTRAMUSCULAR; INTRAVENOUS at 20:36

## 2022-07-07 RX ADMIN — TERAZOSIN HYDROCHLORIDE 1 MG: 1 CAPSULE ORAL at 01:49

## 2022-07-07 RX ADMIN — PIOGLITAZONE HYDROCHLORIDE 45 MG: 30 TABLET ORAL at 08:18

## 2022-07-07 RX ADMIN — GUAIFENESIN 1200 MG: 600 TABLET, EXTENDED RELEASE ORAL at 08:20

## 2022-07-07 RX ADMIN — ATORVASTATIN CALCIUM 20 MG: 20 TABLET, FILM COATED ORAL at 08:18

## 2022-07-07 RX ADMIN — HYDROCODONE BITARTRATE AND ACETAMINOPHEN 1 TABLET: 10; 325 TABLET ORAL at 06:20

## 2022-07-07 RX ADMIN — PYRIDOSTIGMINE BROMIDE 60 MG: 60 TABLET ORAL at 06:20

## 2022-07-07 RX ADMIN — IPRATROPIUM BROMIDE AND ALBUTEROL SULFATE 3 ML: .5; 2.5 SOLUTION RESPIRATORY (INHALATION) at 11:47

## 2022-07-07 NOTE — PLAN OF CARE
Problem: Adult Inpatient Plan of Care  Goal: Plan of Care Review  Outcome: Ongoing, Progressing  Flowsheets (Taken 7/7/2022 0643)  Progress: improving  Plan of Care Reviewed With: patient

## 2022-07-07 NOTE — ED PROVIDER NOTES
Subjective     History provided by:  Patient    History of Present Illness    · Chief complaint: Shortness of breath    · Location: Lungs    · Quality/Severity: The patient reports severe shortness of breath with wheezing.    · Timing/Onset: Started a week ago.    · Modifying Factors: The patient has been utilizing his nebulizer and oxygen which he is increased without improvement.  He states he was given steroids and antibiotics last week which failed to help.    · Associated symptoms: Shortness of breath with a slight cough and diaphoresis.  He reports wheezing.  Denies chest pain.    · Narrative: The patient is a 71-year-old white male with a history of COPD on home O2 2 L via nasal cannula.  In spite of his COPD the patient continues to smoke half a pack per day.  He developed shortness of breath with wheezing a week ago and saw his PCP Dr. Kathleen the last week who gave him steroids and antibiotics which the patient states normally helps him get over his COPD exacerbations, however this time it did not help.  He states he is continue to use his home nebulizer without improvement.  He is increased his home O2 from 2 L to 2-1/2 L without improvement.    Review of Systems   Constitutional: Positive for activity change, diaphoresis and fatigue. Negative for appetite change and chills.   HENT: Negative for congestion, ear pain, rhinorrhea, sinus pressure, sinus pain, sore throat, trouble swallowing and voice change.    Eyes: Negative for pain and visual disturbance.   Respiratory: Positive for cough, shortness of breath and wheezing.    Cardiovascular: Negative for chest pain.   Gastrointestinal: Negative for abdominal pain, diarrhea, nausea and vomiting.   Endocrine: Negative for polydipsia and polyuria.   Genitourinary: Negative for difficulty urinating, dysuria and flank pain.   Musculoskeletal: Negative for back pain, neck pain and neck stiffness.   Skin: Negative for rash.   Neurological: Negative for dizziness,  "weakness, numbness and headaches.   Psychiatric/Behavioral: Negative for confusion.     Past Medical History:   Diagnosis Date   • Arthritis of back    • Arthritis of neck    • Asthma    • COPD (chronic obstructive pulmonary disease) ([facility])    • Depression    • DM (diabetes mellitus) ([facility])    • GERD (gastroesophageal reflux disease)    • Hip arthrosis    • Hyperlipidemia    • Hypertension    • Knee swelling    • Myasthenia gravis ([facility])    • Periarthritis of shoulder    • Rotator cuff syndrome      /74 (BP Location: Left arm, Patient Position: Lying)   Pulse 90   Temp 97.7 °F (36.5 °C) (Oral)   Resp 22   Ht 188 cm (74\")   Wt 122 kg (270 lb)   SpO2 90%   BMI 34.67 kg/m²     Past Medical History:   Diagnosis Date   • Arthritis of back    • Arthritis of neck    • Asthma    • COPD (chronic obstructive pulmonary disease) ([facility])    • Depression    • DM (diabetes mellitus) ([facility])    • GERD (gastroesophageal reflux disease)    • Hip arthrosis    • Hyperlipidemia    • Hypertension    • Knee swelling    • Myasthenia gravis ([facility])    • Periarthritis of shoulder    • Rotator cuff syndrome        No Known Allergies    Past Surgical History:   Procedure Laterality Date   • APPENDECTOMY     • CHOLECYSTECTOMY     • ROTATOR CUFF REPAIR Right        Family History   Problem Relation Age of Onset   • Diabetes Mother    • COPD Father        Social History     Socioeconomic History   • Marital status:    Tobacco Use   • Smoking status: Current Every Day Smoker     Packs/day: 0.50     Years: 58.00     Pack years: 29.00     Types: Cigarettes   • Smokeless tobacco: Never Used   • Tobacco comment: pt asking for nicotine patch   Vaping Use   • Vaping Use: Never used   Substance and Sexual Activity   • Alcohol use: No   • Drug use: No   • Sexual activity: Defer           Objective   Physical Exam  Vitals and nursing note reviewed.   Constitutional:       Appearance: He is obese. He is not toxic-appearing or diaphoretic.      " Comments: The patient appears in mild respiratory distress.  He does not appear toxic.  Review of his vital signs: He is afebrile with a temperature of 97.7, tachypneic with a respiratory rate of 25 with a low O2 saturation of 91%, tachycardic with a heart rate of 107, blood pressure slightly elevated 149/91.   HENT:      Head: Normocephalic and atraumatic.      Mouth/Throat:      Mouth: Mucous membranes are moist.      Pharynx: Oropharynx is clear.   Eyes:      Pupils: Pupils are equal, round, and reactive to light.   Neck:      Vascular: No JVD.   Cardiovascular:      Rate and Rhythm: Regular rhythm. Tachycardia present.      Heart sounds: No murmur heard.  Pulmonary:      Effort: Tachypnea present.      Breath sounds: Examination of the right-upper field reveals decreased breath sounds and wheezing. Examination of the left-upper field reveals decreased breath sounds and wheezing. Examination of the right-middle field reveals decreased breath sounds and wheezing. Examination of the left-middle field reveals decreased breath sounds and wheezing. Examination of the right-lower field reveals decreased breath sounds and wheezing. Examination of the left-lower field reveals decreased breath sounds and wheezing. Decreased breath sounds and wheezing ( Inspiratory and expiratory phase) present. No rhonchi or rales.   Abdominal:      General: Bowel sounds are normal.      Palpations: Abdomen is soft.      Tenderness: There is no abdominal tenderness.   Musculoskeletal:      Cervical back: Normal range of motion and neck supple.      Right lower leg: No tenderness. No edema.      Left lower leg: No tenderness. No edema.   Lymphadenopathy:      Cervical: No cervical adenopathy.   Skin:     General: Skin is warm and dry.      Capillary Refill: Capillary refill takes less than 2 seconds.      Coloration: Skin is not cyanotic or pale.      Findings: No erythema.      Nails: There is no clubbing.   Neurological:      General: No  focal deficit present.      Mental Status: He is alert and oriented to person, place, and time.      Cranial Nerves: No cranial nerve deficit.      Motor: No weakness.   Psychiatric:         Mood and Affect: Mood normal.         Behavior: Behavior normal.         Procedures           ED Course  ED Course as of 07/07/22 0030   Wed Jul 06, 2022 2054 My interpretation of the patient is EKG tracing performed 20: 05 is normal sinus rhythm with a rate of 96, normal axis, normal conduction, no acute ST segment elevation or depression consistent with ischemia, no ectopy, normal DC and QT intervals.  Probable left atrial enlargement. [TP]   2055 Review the patient's laboratory studies: His cardiac troponin was negative.  proBNP normal.  Lactic acid and procalcitonin within normal limits.  CBC has a slightly elevated white count of 10.9 with a left shift.  Hemoglobin, hematocrit and platelets within normal limits.  CMP has a elevated blood glucose of 250, otherwise normal electrolytes and normal renal and liver function test. [TP]   2056 The patient's chest x-ray was interpreted by me and the radiologist as no acute disease. [TP]   2056 Is my impression the patient has an exacerbation of COPD.  He was administered Solu-Medrol 125 mg IV.  He was administered a DuoNeb followed by 2 albuterol nebulizer treatments. [TP]   2143 Repeat examination at 21: 37 the patient is slightly improved air movement with persistent fullface expiratory wheezing.  His oxygen saturation is 88 to 89%, [TP]   2145 21:40 patient discussed with NARENRDA Rodgers hospitalist, who agrees to admit the patient to observation. [TP]      ED Course User Index  [TP] Gio Nix MD                                           MDM  Number of Diagnoses or Management Options  Acute exacerbation of chronic obstructive pulmonary disease (COPD) (HCC): new and requires workup     Amount and/or Complexity of Data Reviewed  Clinical lab tests: ordered and  reviewed  Tests in the radiology section of CPT®: ordered and reviewed  Tests in the medicine section of CPT®: ordered and reviewed  Discuss the patient with other providers: yes    Risk of Complications, Morbidity, and/or Mortality  Presenting problems: high  Diagnostic procedures: high  Management options: high  General comments: My differential diagnosis for dyspnea includes but is not limited to:  Asthma, COPD, pneumonia, pulmonary embolus, acute respiratory distress syndrome, pneumothorax, pleural effusion, pulmonary fibrosis, congestive heart failure, myocardial infarction, DKA, uremia, acidosis, sepsis, anemia, drug related, hyperventilation, CNS disease    Patient Progress  Patient progress: stable      Final diagnoses:   Acute exacerbation of chronic obstructive pulmonary disease (COPD) ([facility])       ED Disposition  ED Disposition     ED Disposition   Decision to Admit    Condition   --    Comment   Level of Care: Med/Surg [1]   Diagnosis: Acute exacerbation of chronic obstructive pulmonary disease (COPD) ([facility]) [[id number]]   Admitting Physician: MCIHOACANO RUANO [[id number]]   Bed Request Comments: Acute exacerbation of COPD               No follow-up provider specified.       Medication List      No changes were made to your prescriptions during this visit.         Labs Reviewed   COMPREHENSIVE METABOLIC PANEL - Abnormal; Notable for the following components:       Result Value    Glucose 250 (*)     All other components within normal limits    Narrative:     GFR Normal >60  Chronic Kidney Disease <60  Kidney Failure <15     CBC WITH AUTO DIFFERENTIAL - Abnormal; Notable for the following components:    WBC 10.90 (*)     .2 (*)     MCHC 31.3 (*)     MPV 12.2 (*)     Neutrophil % 78.7 (*)     Lymphocyte % 12.8 (*)     Eosinophil % 0.1 (*)     Immature Grans % 0.8 (*)     Neutrophils, Absolute 8.58 (*)     Immature Grans, Absolute 0.09 (*)     All other components within normal limits   BNP (IN-HOUSE) -  "Normal    Narrative:     Among patients with dyspnea, NT-proBNP is highly sensitive for the detection of acute congestive heart failure. In addition NT-proBNP of <300 pg/ml effectively rules out acute congestive heart failure with 99% negative predictive value.    Results may be falsely decreased if patient taking Biotin.     TROPONIN (IN-HOUSE) - Normal    Narrative:     Troponin T Reference Range:  <= 0.03 ng/mL-   Negative for AMI  >0.03 ng/mL-     Abnormal for myocardial necrosis.  Clinicians would have to utilize clinical acumen, EKG, Troponin and serial changes to determine if it is an Acute Myocardial Infarction or myocardial injury due to an underlying chronic condition.       Results may be falsely decreased if patient taking Biotin.     LACTIC ACID, PLASMA - Normal   PROCALCITONIN - Normal    Narrative:     As a Marker for Sepsis (Non-Neonates):    1. <0.5 ng/mL represents a low risk of severe sepsis and/or septic shock.  2. >2 ng/mL represents a high risk of severe sepsis and/or septic shock.    As a Marker for Lower Respiratory Tract Infections that require antibiotic therapy:    PCT on Admission    Antibiotic Therapy       6-12 Hrs later    >0.5                Strongly Recommended  >0.25 - <0.5        Recommended   0.1 - 0.25          Discouraged              Remeasure/reassess PCT  <0.1                Strongly Discouraged     Remeasure/reassess PCT    As 28 day mortality risk marker: \"Change in Procalcitonin Result\" (>80% or <=80%) if Day 0 (or Day 1) and Day 4 values are available. Refer to http://www.[facility]-pct-calculator.com    Change in PCT <=80%  A decrease of PCT levels below or equal to 80% defines a positive change in PCT test result representing a higher risk for 28-day all-cause mortality of patients diagnosed with severe sepsis for septic shock.    Change in PCT >80%  A decrease of PCT levels of more than 80% defines a negative change in PCT result representing a lower risk for 28-day " all-cause mortality of patients diagnosed with severe sepsis or septic shock.      BLOOD CULTURE   BLOOD CULTURE   RESPIRATORY PANEL PCR W/ COVID-19 (SARS-COV-2) ZEUS/SHAW/FRANCIA/PAD/COR/MAD/GIL IN-HOUSE, NP SWAB IN [place], 3-4 HR TAT   COVID PRE-OP / PRE-PROCEDURE SCREENING ORDER (NO ISOLATION)    Narrative:     The following orders were created for panel order COVID PRE-OP / PRE-PROCEDURE SCREENING ORDER (NO ISOLATION) - Swab, Nasopharynx.  Procedure                               Abnormality         Status                     ---------                               -----------         ------                     COVID-19,[facility]...[[id number]]                                                   Please view results for these tests on the individual orders.   COVID-19,[facility] IN-HOUSE,NASAL SWAB NO TRANSPORT MEDIA 2 HR TAT   RESPIRATORY CULTURE   STREP PNEUMO AG, URINE OR CSF   LEGIONELLA ANTIGEN, URINE   HEMOGLOBIN A1C   TSH   BASIC METABOLIC PANEL   PROCALCITONIN   CBC WITH AUTO DIFFERENTIAL   CBC AND DIFFERENTIAL    Narrative:     The following orders were created for panel order CBC & Differential.  Procedure                               Abnormality         Status                     ---------                               -----------         ------                     CBC Auto Differential[[id number]]        Abnormal            Final result                 Please view results for these tests on the individual orders.   CBC AND DIFFERENTIAL    Narrative:     The following orders were created for panel order CBC & Differential.  Procedure                               Abnormality         Status                     ---------                               -----------         ------                     CBC Auto Differential[[id number]]                                                         Please view results for these tests on the individual orders.     XR Chest 2 View   Final Result   Shallower inspiratory  effort. Taking this into account, no definite changes are seen since the previous examination.      Signer Name: James Mary MD    Signed: 7/6/2022 8:51 PM    Workstation Name: [facility]     [facility]             Medication List      No changes were made to your prescriptions during this visit.              Gio Nix MD  07/07/22 0030

## 2022-07-07 NOTE — CONSULTS
"Adult Nutrition  Assessment/PES    Patient Name:  Kt Armstrong  YOB: 1950  MRN: [id number]  Admit Date:  7/6/2022    Assessment Date:  7/7/2022    Comments:  Pt states he doesn't read or write much, pictures included on DM material.   Pt gets easily side tracked & reports drinking reg soda to quench thirst.   Expect poor to limited compliance with diet restrictions for DM.    Will cont to follow and monitor.      Reason for Assessment     Row Name 07/07/22 1203          Reason for Assessment    Reason For Assessment nurse/nurse practitioner consult     Diagnosis diabetes diagnosis/complications;pulmonary disease  AE COPD hx DM                Nutrition/Diet History     Row Name 07/07/22 1204          Nutrition/Diet History    Typical Intake (Food/Fluid/EN/PN) Spoke w pt at bedside, who gets off phone. Pt reports lives w daughter & grandson, Pt easily sidetracks from topic at hand to medication cost & skipping some, also reports takes pills help him swallow? He eats maybe twice per day. Has teeth but not currently wearing them. Pt does report water doesn't quench thirst, in middle of night he has to drink reg soda \"not much\"                Anthropometrics     Row Name 07/07/22 1207          Anthropometrics    Weight --  270#                Labs/Tests/Procedures/Meds     Row Name 07/07/22 1206          Labs/Procedures/Meds    Lab Results Reviewed reviewed     Lab Results Comments HgA1c 7.4 H            Diagnostic Tests/Procedures    Diagnostic Test/Procedure Reviewed reviewed            Medications    Pertinent Medications Reviewed reviewed     Pertinent Medications Comments actos, glucotrol, novolog, levemir, solumedrol                  Estimated/Assessed Needs - Anthropometrics     Row Name 07/07/22 1207          Anthropometrics    Weight --  270#            Estimated/Assessed Needs    Additional Documentation Estimated Calorie Needs (Group);Fluid Requirements (Group);Protein Requirements (Group)     "        Estimated Calorie Needs    Estimated Calorie Requirement (kcal/day) 2062 kcal ( mifflin no factor obese)  232 gm CHO, 45% kcal     Estimated Calorie Need Method [place] Jeor            Protein Requirements    Est Protein Requirement Amount (gms/kg) 0.8 gm protein  98 gm pro            Fluid Requirements    Estimated Fluid Requirement Method RDA Method  2062 ml                Nutrition Prescription Ordered     Row Name 07/07/22 1208          Nutrition Prescription PO    Common Modifiers Consistent Carbohydrate                Evaluation of Received Nutrient/Fluid Intake     Row Name 07/07/22 1208          Fluid Intake Evaluation    Oral Fluid (mL) 120  insufficient data            PO Evaluation    Number of Days PO Intake Evaluated Insufficient Data                     Problem/Interventions:   Problem 1     Row Name 07/07/22 1209          Nutrition Diagnoses Problem 1    Problem 1 Overweight/Obesity     Etiology (related to) Medical Diagnosis;MNT for Treatment/Condition     Signs/Symptoms (evidenced by) Report/Observation;BMI     BMI 30 - 34.9                      Intervention Goal     Row Name 07/07/22 1211          Intervention Goal    General Meet nutritional needs for age/condition     PO Tolerate PO;PO intake (%)     PO Intake % 50 %  or greater                Nutrition Intervention     Row Name 07/07/22 1213          Nutrition Intervention    RD/Tech Action Interview for preference;Follow Tx progress                  Education/Evaluation     Row Name 07/07/22 1213          Education    Education Education topics;Advised regarding habits/behavior;Provided education regarding  edu importance eating reg intervals not skipping meals. edu making half plate veggies non starchy 2 meals per day. edu using diet/zero soda ok but not REG soda. Edu water is best thing to drink , soda never queches thirst     Provided education regarding Healthy eating for diabetes     Education Topics Diabetes  Dm Plate Method  provided w RD contact (pictures included)     Advised Regarding Habits/Behavior Eating pattern;Food choices;Appropriate portions            Monitor/Evaluation    Monitor Per protocol;I&O;PO intake;Pertinent labs;Weight;Symptoms     Education Follow-up Other (comment)  pt gets side tracked & not super engaged, uncertain pt will comply                 Electronically signed by:  Nicole España RD  07/07/22 12:15 EDT

## 2022-07-07 NOTE — PLAN OF CARE
Problem: Adult Inpatient Plan of Care  Goal: Plan of Care Review  Outcome: Ongoing, Progressing  Flowsheets (Taken 7/7/2022 0240)  Progress: no change  Plan of Care Reviewed With: patient  Outcome Evaluation: O2 2L NC, spo2 93%. Aerokiba, IS, Neb tx started.     Problem: Respiratory Compromise COPD (Chronic Obstructive Pulmonary Disease)  Goal: Effective Oxygenation and Ventilation  Intervention: Promote Airway Secretion Clearance  Recent Flowsheet Documentation  Taken 7/7/2022 0226 by Tucker Mathews, RRT  Cough And Deep Breathing: done independently per patient     Problem: Respiratory Compromise COPD (Chronic Obstructive Pulmonary Disease)  Goal: Effective Oxygenation and Ventilation  Intervention: Optimize Oxygenation and Ventilation  Recent Flowsheet Documentation  Taken 7/7/2022 0237 by Tucker Mathews, RRT  Airway/Ventilation Management: pulmonary hygiene promoted   Goal Outcome Evaluation:  Plan of Care Reviewed With: patient        Progress: no change  Outcome Evaluation: O2 2L NC, spo2 93%. Aerokiba, IS, Neb tx started.

## 2022-07-07 NOTE — PLAN OF CARE
Goal Outcome Evaluation:  Plan of Care Reviewed With: patient        Progress: improving  Outcome Evaluation: Pt VSS this shift. Pt reports mild SOA on exertion. Pt continues on scheduled Norco and reports no breakthrough pain. Pt tolerating diet well at this time.

## 2022-07-07 NOTE — PROGRESS NOTES
"SERVICE: [facility] HOSPITALIST    CONSULTANTS:    CHIEF COMPLAINT: f/u AECOPD    SUBJECTIVE: The patient reports he is feeling much improved overnight.  Much less coughing and feels less short of breath.  Agreeable to ambulate later today.  Appreciative of care.    OBJECTIVE:    /84 (BP Location: Left arm, Patient Position: Lying)   Pulse 77   Temp 98.4 °F (36.9 °C) (Oral)   Resp 22   Ht 188 cm (74\")   Wt 122 kg (270 lb)   SpO2 94%   BMI 34.67 kg/m²     MEDS/LABS REVIEWED AND ORDERED    arformoterol, 15 mcg, Nebulization, BID - RT  atorvastatin, 20 mg, Oral, Daily  bisacodyl, 10 mg, Rectal, Daily  docusate sodium, 100 mg, Oral, BID  enoxaparin, 40 mg, Subcutaneous, Nightly  glipiZIDE-metFORMIN 5-500 mg combo dose, , Oral, BID AC  guaiFENesin, 1,200 mg, Oral, BID  HYDROcodone-acetaminophen, 1 tablet, Oral, Q6H  Insulin Aspart, 0-14 Units, Subcutaneous, TID AC  insulin detemir, 25 Units, Subcutaneous, Q12H  ipratropium-albuterol, 3 mL, Nebulization, Q4H - RT  methylPREDNISolone sodium succinate, 40 mg, Intravenous, Q8H  mycophenolate, 1,000 mg, Oral, Q12H  nicotine, 1 patch, Transdermal, Q24H  pioglitazone, 45 mg, Oral, Daily  pyridostigmine, 60 mg, Oral, Q4H  sodium chloride, 10 mL, Intravenous, Q12H  terazosin, 1 mg, Oral, Nightly        Physical Exam  Vitals reviewed.   Constitutional:       General: He is not in acute distress.     Appearance: He is obese.      Comments: Appears older than stated age, chronically ill appearance   HENT:      Head: Normocephalic and atraumatic.      Mouth/Throat:      Mouth: Mucous membranes are moist.      Comments: Edentulous  Eyes:      Extraocular Movements: Extraocular movements intact.      Pupils: Pupils are equal, round, and reactive to light.   Cardiovascular:      Rate and Rhythm: Normal rate and regular rhythm.   Pulmonary:      Effort: No respiratory distress.      Comments: Mildly dyspneic with speech, scattered expiratory " wheezes  Abdominal:      General: Abdomen is flat. Bowel sounds are normal. There is no distension.      Palpations: Abdomen is soft.      Tenderness: There is no abdominal tenderness. There is no guarding.   Musculoskeletal:         General: No swelling.   Skin:     General: Skin is warm and dry.      Capillary Refill: Capillary refill takes less than 2 seconds.      Findings: No erythema.      Comments: Brownish discoloration to lower extremity skin   Neurological:      General: No focal deficit present.      Mental Status: He is alert and oriented to person, place, and time.   Psychiatric:         Mood and Affect: Mood normal.         Behavior: Behavior normal.       LAB/DIAGNOSTICS:    Lab Results (last 24 hours)     Procedure Component Value Units Date/Time    Basic Metabolic Panel [[id number]]  (Abnormal) Collected: 07/07/22 0407    Specimen: Blood Updated: 07/07/22 0459     Glucose 353 mg/dL      BUN 14 mg/dL      Creatinine 0.75 mg/dL      Sodium 135 mmol/L      Potassium 4.4 mmol/L      Chloride 97 mmol/L      CO2 29.9 mmol/L      Calcium 9.4 mg/dL      BUN/Creatinine Ratio 18.7     Anion Gap 8.1 mmol/L      eGFR 97.7 mL/min/1.73      Comment: National Kidney Foundation and American Society of Nephrology (ASN) Task Force recommended calculation based on the Chronic Kidney Disease Epidemiology Collaboration (CKD-EPI) equation refit without adjustment for race.       Narrative:      GFR Normal >60  Chronic Kidney Disease <60  Kidney Failure <15      Procalcitonin [[id number]]  (Normal) Collected: 07/07/22 0407    Specimen: Blood Updated: 07/07/22 0455     Procalcitonin 0.03 ng/mL     Narrative:      As a Marker for Sepsis (Non-Neonates):    1. <0.5 ng/mL represents a low risk of severe sepsis and/or septic shock.  2. >2 ng/mL represents a high risk of severe sepsis and/or septic shock.    As a Marker for Lower Respiratory Tract Infections that require antibiotic therapy:    PCT on Admission    Antibiotic  "Therapy       6-12 Hrs later    >0.5                Strongly Recommended  >0.25 - <0.5        Recommended   0.1 - 0.25          Discouraged              Remeasure/reassess PCT  <0.1                Strongly Discouraged     Remeasure/reassess PCT    As 28 day mortality risk marker: \"Change in Procalcitonin Result\" (>80% or <=80%) if Day 0 (or Day 1) and Day 4 values are available. Refer to http://[email or web address]-[email or web address]    Change in PCT <=80%  A decrease of PCT levels below or equal to 80% defines a positive change in PCT test result representing a higher risk for 28-day all-cause mortality of patients diagnosed with severe sepsis for septic shock.    Change in PCT >80%  A decrease of PCT levels of more than 80% defines a negative change in PCT result representing a lower risk for 28-day all-cause mortality of patients diagnosed with severe sepsis or septic shock.       Respiratory Culture - Sputum, Cough [[id number]] Collected: 07/07/22 0251    Specimen: Sputum from Cough Updated: 07/07/22 0445     Gram Stain Occasional Red blood cells      Rare (1+) WBCs seen      Few (2+) Squamous epithelial cells      Rare (1+) Yeast      Occasional Gram negative bacilli      Occasional Gram positive bacilli      Many (4+) Gram positive cocci    CBC & Differential [[id number]]  (Abnormal) Collected: 07/07/22 0407    Specimen: Blood Updated: 07/07/22 0418    Narrative:      The following orders were created for panel order CBC & Differential.  Procedure                               Abnormality         Status                     ---------                               -----------         ------                     CBC Auto Differential[[id number]]        Abnormal            Final result                 Please view results for these tests on the individual orders.    CBC Auto Differential [[id number]]  (Abnormal) Collected: 07/07/22 0407    Specimen: Blood Updated: 07/07/22 0418     WBC 8.62 10*3/mm3      RBC 4.36 10*6/mm3      " Hemoglobin 13.8 g/dL      Hematocrit 43.3 %      MCV 99.3 fL      MCH 31.7 pg      MCHC 31.9 g/dL      RDW 12.2 %      RDW-SD 44.6 fl      MPV 12.0 fL      Platelets 191 10*3/mm3      Neutrophil % 90.6 %      Lymphocyte % 6.8 %      Monocyte % 1.4 %      Eosinophil % 0.0 %      Basophil % 0.2 %      Immature Grans % 1.0 %      Neutrophils, Absolute 7.80 10*3/mm3      Lymphocytes, Absolute 0.59 10*3/mm3      Monocytes, Absolute 0.12 10*3/mm3      Eosinophils, Absolute 0.00 10*3/mm3      Basophils, Absolute 0.02 10*3/mm3      Immature Grans, Absolute 0.09 10*3/mm3      nRBC 0.0 /100 WBC     COVID PRE-OP / PRE-PROCEDURE SCREENING ORDER (NO ISOLATION) - Swab, Nasal Cavity [[id number]]  (Normal) Collected: 07/07/22 0054    Specimen: Swab from Nasal Cavity Updated: 07/07/22 0133    Narrative:      The following orders were created for panel order COVID PRE-OP / PRE-PROCEDURE SCREENING ORDER (NO ISOLATION) - Swab, Nasal Cavity.  Procedure                               Abnormality         Status                     ---------                               -----------         ------                     COVID-19,[facility] [employer] IN-B[facility]...[[id number]]  Normal              Final result                 Please view results for these tests on the individual orders.    COVID-19,[facility] [employer] IN-HOUSE,Nasal Swab No Transport Media 3-4 HR TAT - Swab, Nasal Cavity [[id number]]  (Normal) Collected: 07/07/22 0054    Specimen: Swab from Nasal Cavity Updated: 07/07/22 0133     COVID19 Not Detected    Narrative:      Fact sheet for providers: https://www.fda.gov/media/952587/download     Fact sheet for patients: https://www.fda.gov/media/236165/download    Test performed by PCR.    Consider negative results in combination with clinical observations, patient history, and epidemiological information.    S. Pneumo Ag Urine or CSF - Urine, Urine, Clean Catch [[id number]]  (Normal) Collected: 07/07/22 0054    Specimen: Urine, Clean Catch Updated: 07/07/22 0130      Strep Pneumo Ag Negative    Legionella Antigen, Urine - Urine, Urine, Clean Catch [[id number]]  (Normal) Collected: 07/07/22 0054    Specimen: Urine, Clean Catch Updated: 07/07/22 0130     LEGIONELLA ANTIGEN, URINE Negative    TSH [[id number]]  (Normal) Collected: 07/06/22 1953    Specimen: Blood Updated: 07/07/22 0048     TSH 1.090 uIU/mL     Hemoglobin A1c [[id number]]  (Abnormal) Collected: 07/06/22 1953    Specimen: Blood Updated: 07/07/22 0041     Hemoglobin A1C 7.40 %     Narrative:      Hemoglobin A1C Ranges:    Increased Risk for Diabetes  5.7% to 6.4%  Diabetes                     >= 6.5%  Diabetic Goal                < 7.0%    Blood Culture - Blood, Arm, Right [[id number]] Collected: 07/06/22 2234    Specimen: Blood from Arm, Right Updated: 07/06/22 2234    Respiratory Panel PCR w/COVID-19(SARS-CoV-2) ZEUS/SHAW/FRANCIA/PAD/COR/MAD/GIL In-House, NP Swab in [facility], 3-4 HR TAT - Swab, Nasopharynx [[id number]] Collected: 07/06/22 2220    Specimen: Swab from Nasopharynx Updated: 07/06/22 2233    BNP [[id number]]  (Normal) Collected: 07/06/22 1953    Specimen: Blood Updated: 07/06/22 2041     proBNP 23.6 pg/mL     Narrative:      Among patients with dyspnea, NT-proBNP is highly sensitive for the detection of acute congestive heart failure. In addition NT-proBNP of <300 pg/ml effectively rules out acute congestive heart failure with 99% negative predictive value.    Results may be falsely decreased if patient taking Biotin.      Procalcitonin [[id number]]  (Normal) Collected: 07/06/22 1953    Specimen: Blood Updated: 07/06/22 2041     Procalcitonin 0.04 ng/mL     Narrative:      As a Marker for Sepsis (Non-Neonates):    1. <0.5 ng/mL represents a low risk of severe sepsis and/or septic shock.  2. >2 ng/mL represents a high risk of severe sepsis and/or septic shock.    As a Marker for Lower Respiratory Tract Infections that require antibiotic therapy:    PCT on Admission    Antibiotic Therapy       6-12 Hrs later    >0.5   "              Strongly Recommended  >0.25 - <0.5        Recommended   0.1 - 0.25          Discouraged              Remeasure/reassess PCT  <0.1                Strongly Discouraged     Remeasure/reassess PCT    As 28 day mortality risk marker: \"Change in Procalcitonin Result\" (>80% or <=80%) if Day 0 (or Day 1) and Day 4 values are available. Refer to [email or web address]-pct-[email or web address]    Change in PCT <=80%  A decrease of PCT levels below or equal to 80% defines a positive change in PCT test result representing a higher risk for 28-day all-cause mortality of patients diagnosed with severe sepsis for septic shock.    Change in PCT >80%  A decrease of PCT levels of more than 80% defines a negative change in PCT result representing a lower risk for 28-day all-cause mortality of patients diagnosed with severe sepsis or septic shock.       Troponin [[id number]]  (Normal) Collected: 07/06/22 1953    Specimen: Blood Updated: 07/06/22 2041     Troponin T <0.010 ng/mL     Narrative:      Troponin T Reference Range:  <= 0.03 ng/mL-   Negative for AMI  >0.03 ng/mL-     Abnormal for myocardial necrosis.  Clinicians would have to utilize clinical acumen, EKG, Troponin and serial changes to determine if it is an Acute Myocardial Infarction or myocardial injury due to an underlying chronic condition.       Results may be falsely decreased if patient taking Biotin.      Comprehensive Metabolic Panel [[id number]]  (Abnormal) Collected: 07/06/22 1953    Specimen: Blood Updated: 07/06/22 2035     Glucose 250 mg/dL      BUN 14 mg/dL      Creatinine 0.82 mg/dL      Sodium 139 mmol/L      Potassium 4.4 mmol/L      Chloride 100 mmol/L      CO2 28.9 mmol/L      Calcium 9.6 mg/dL      Total Protein 7.0 g/dL      Albumin 4.50 g/dL      ALT (SGPT) 14 U/L      AST (SGOT) 9 U/L      Alkaline Phosphatase 61 U/L      Total Bilirubin 0.2 mg/dL      Globulin 2.5 gm/dL      A/G Ratio 1.8 g/dL      BUN/Creatinine Ratio 17.1     Anion Gap 10.1 mmol/L  "     eGFR 95.1 mL/min/1.73      Comment: National Kidney Foundation and American Society of Nephrology (ASN) Task Force recommended calculation based on the Chronic Kidney Disease Epidemiology Collaboration (CKD-EPI) equation refit without adjustment for race.       Narrative:      GFR Normal >60  Chronic Kidney Disease <60  Kidney Failure <15      Lactic Acid, Plasma [[id number]]  (Normal) Collected: 07/06/22 1953    Specimen: Blood Updated: 07/06/22 2029     Lactate 1.9 mmol/L     CBC & Differential [[id number]]  (Abnormal) Collected: 07/06/22 1953    Specimen: Blood Updated: 07/06/22 2012    Narrative:      The following orders were created for panel order CBC & Differential.  Procedure                               Abnormality         Status                     ---------                               -----------         ------                     CBC Auto Differential[[id number]]        Abnormal            Final result                 Please view results for these tests on the individual orders.    CBC Auto Differential [[id number]]  (Abnormal) Collected: 07/06/22 1953    Specimen: Blood Updated: 07/06/22 2012     WBC 10.90 10*3/mm3      RBC 4.56 10*6/mm3      Hemoglobin 14.3 g/dL      Hematocrit 45.7 %      .2 fL      MCH 31.4 pg      MCHC 31.3 g/dL      RDW 12.3 %      RDW-SD 45.8 fl      MPV 12.2 fL      Platelets 228 10*3/mm3      Neutrophil % 78.7 %      Lymphocyte % 12.8 %      Monocyte % 7.1 %      Eosinophil % 0.1 %      Basophil % 0.5 %      Immature Grans % 0.8 %      Neutrophils, Absolute 8.58 10*3/mm3      Lymphocytes, Absolute 1.40 10*3/mm3      Monocytes, Absolute 0.77 10*3/mm3      Eosinophils, Absolute 0.01 10*3/mm3      Basophils, Absolute 0.05 10*3/mm3      Immature Grans, Absolute 0.09 10*3/mm3      nRBC 0.0 /100 WBC     Blood Culture - Blood, Arm, Left [[id number]] Collected: 07/06/22 1953    Specimen: Blood from Arm, Left Updated: 07/06/22 2010        ECG 12 Lead   Preliminary Result    HEART RATE= 96  bpm   RR Interval= 624  ms   NV Interval= 156  ms   P Horizontal Axis= -8  deg   P Front Axis= 59  deg   QRSD Interval= 109  ms   QT Interval= 343  ms   QRS Axis= 70  deg   T Wave Axis= 46  deg   - BORDERLINE ECG -   Sinus rhythm   Probable left atrial enlargement   Electronically Signed By:    Date and Time of Study: 2022-07-06 20:05:31        Results for orders placed during the hospital encounter of 12/07/19    Adult Transthoracic Echo Complete W/ Cont if Necessary Per Protocol    Interpretation Summary  · Left ventricular systolic function is normal. Estimated EF appears to be in the range of 56 - 60%. Normal left ventricular cavity size, wall thickness and mass noted. Normal diastolic function.  · No significant valvular stenosis or regurgitation noted.    XR Chest 2 View    Result Date: 7/6/2022  Shallower inspiratory effort. Taking this into account, no definite changes are seen since the previous examination. 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   Continue home levemir, metformin/glipizide  Add home dose actos  Continue accuchecks  Increase SSI to moderately high dose     Myasthenia gravis:   Nothing acute currently, continue home CellCept and Mestinon     Chronic pain with chronic narcotic use and chronic narcotic induced constipation:  Continue home Norco  Continue home Dulcolax and Colace  Efren reviewed, last Rx Norco 6/2022     HLD: No current acute issues, continue Lipitor,  "substitute for home simvastatin     BPH: no current acute issues, add hytrin sub for home Cardura     Tobacco abuse: continue nicotine patch    PLAN FOR DISPOSITION: Home when able    NARENDRA Blanco  Hospitalist, [facility]  07/07/22  03:08 EDT    As of April 2021, as required by the Federal 21st [employer] Cures Act, medical records (including provider notes and laboratory/imaging results) are to be made available to patients and/or their designees as soon as the documents are signed/resulted. While the intention is to ensure transparency and to engage patients in their healthcare, this immediate access may create unintended consequences because this document uses language intended for communication between medical providers for interpretation with the entirety of the patient's clinical picture in mind. It is recommended that patients and/or their designees review all available information with their primary or specialist providers for explanation and to avoid misinterpretation of this information.    \"Dictated utilizing Dragon dictation\"      "

## 2022-07-07 NOTE — H&P
[facility] HOSPITALIST     Ney Kathleen MD    CHIEF COMPLAINT: Short of air    HISTORY OF PRESENT ILLNESS:  The patient is a 71-year-old male that presented to the emergency department secondary to 2 nights of increasing cough, shortness of air, wheezing.  He notes that he was seen recently by his PCP, started on antibiotics and steroids without any improvement.  He notes that he is coughing incessantly and wheezing despite increasing his chronic oxygen to 2.5 L from 2 L and using increased amounts of nebulizers.  He does note some lower extremity edema as well as some subjective weight gain.  He has been able to eat and drink without difficulty.  He reports that he has been out of of his CellCept and Mestinon due to inability to pay for it.  He recently resumed it with improvement in his chewing and swallowing with myasthenia gravis.  He notes poor exercise tolerance at his baseline and is unable to walk up more than a few stairs without increased shortness of air at his baseline.  He takes chronic narcotics for chronic generalized fibromyalgia pain.    Diagnostics in ER showed WBC 10.90, glucose 250.  CXR without acute findings.  Oxygen saturations noted at 89% on room air in ER, currently on home dose 2 L oxygen at time of this exam.  He received Solu-Medrol and multiple nebs without much improvement in ER and admission was requested    He has a H/O oxygen dependent COPD, DM2 in obese with hyperglycemia, myasthenia gravis, chronic pain with chronic narcotic induced constipation, hyperlipidemia, BPH, tobacco abuse    He otherwise denies f/c/headache/lightheadedness/syncopal sensation/n/v/d/chest pain/abdominal pain/recent illness/sick exposures/change in bowel or bladder habits/bloody emesis or bloody stools/change in medications or any other new concerns.    Past Medical History:   Diagnosis Date   • Arthritis of back    • Arthritis of neck    • Asthma    • COPD (chronic obstructive  pulmonary disease) ([facility])    • Depression    • DM (diabetes mellitus) ([facility])    • GERD (gastroesophageal reflux disease)    • Hip arthrosis    • Hyperlipidemia    • Hypertension    • Knee swelling    • Myasthenia gravis ([facility])    • Periarthritis of shoulder    • Rotator cuff syndrome      Past Surgical History:   Procedure Laterality Date   • APPENDECTOMY     • CHOLECYSTECTOMY     • ROTATOR CUFF REPAIR Right      Family History   Problem Relation Age of Onset   • Diabetes Mother    • COPD Father      Social History     Tobacco Use   • Smoking status: Current Every Day Smoker     Packs/day: 0.50     Years: 58.00     Pack years: 29.00     Types: Cigarettes   • Smokeless tobacco: Never Used   • Tobacco comment: pt asking for nicotine patch   Vaping Use   • Vaping Use: Never used   Substance Use Topics   • Alcohol use: No   • Drug use: No     Medications Prior to Admission   Medication Sig Dispense Refill Last Dose   • albuterol (PROVENTIL) (2.5 MG/3ML) 0.083% nebulizer solution Take 2.5 mg by nebulization Every 4 (Four) Hours As Needed for Wheezing. 30 each 0 7/6/2022 at Unknown time   • arformoterol (BROVANA) 15 MCG/2ML nebulizer solution Take 2 mL by nebulization 2 (Two) Times a Day. Hold if using the Proventil nebulized 120 mL 0 7/6/2022 at Unknown time   • JEWELL CONTOUR TEST test strip   1    • bisacodyl (DULCOLAX) 10 MG suppository Insert 1 suppository into the rectum Daily.      • dicyclomine (BENTYL) 20 MG tablet Take 0.5 tablets by mouth 4 (Four) Times a Day.      • docusate sodium 100 MG capsule Take 100 mg by mouth 2 (Two) Times a Day. 60 each 1    • doxazosin (CARDURA) 1 MG tablet Take 1 mg by mouth Daily.      • glipiZIDE-metFORMIN (METAGLIP) 5-500 MG per tablet Take 2 tablets by mouth.      • Glycopyrrolate-Formoterol (Bevespi Aerosphere) 9-4.8 MCG/ACT aerosol INL 2 PFS PO BID      • HYDROcodone-acetaminophen (NORCO)  MG per tablet 1 tablet 4 (Four) Times a Day.  0    • insulin detemir (LEVEMIR) 100  "UNIT/ML injection Inject 45 Units under the skin into the appropriate area as directed Every Morning. (Patient taking differently: Inject 25 Units under the skin into the appropriate area as directed 2 (Two) Times a Day.)  12    • Insulin Pen Needle (BD Pen Needle Yaritza U/F) 32G X 4 MM misc USE 1 NEEDLE SUBCUTANEOUSLY QD      • mycophenolate (CELLCEPT) 500 MG tablet Take 1,000 mg by mouth.      • mycophenolate (CELLCEPT) 500 MG tablet Take 1,000 mg by mouth 2 (two) times a day.      • pioglitazone (ACTOS) 45 MG tablet Take 45 mg by mouth Daily.      • pyridostigmine (MESTINON) 60 MG tablet Take 60 mg by mouth Every 4 (Four) Hours.      • SIMVASTATIN PO Take 40 mg by mouth Every Evening.      • sodium chloride 7 % nebulizer solution nebulizer solution Take 4 mL by nebulization 2 (Two) Times a Day As Needed (thick sputum). J 44.9, J 18.9 240 mL 0      Allergies:  Patient has no known allergies.    Immunization History   Administered Date(s) Administered   • COVID-19 (PFIZER) PURPLE CAP 04/06/2021, 04/27/2021, 10/27/2021   • FLUAD TRI 65YR+ 09/25/2018   • Flu Vaccine Quad PF >36MO 08/03/2017   • Fluzone High Dose =>65 Years ([employer] ONLY) 10/24/2019   • Pneumococcal Conjugate 13-Valent (PCV13) 09/25/2018       REVIEW OF SYSTEMS:  Please see the above history of present illness for pertinent positives and negatives.  The remainder of the patient's systems have been reviewed and are negative.     Vital Signs  Temp:  [97.7 °F (36.5 °C)] 97.7 °F (36.5 °C)  Heart Rate:  [] 90  Resp:  [20-25] 22  BP: (117-157)/(73-91) 157/74   Body mass index is 34.67 kg/m².    Flowsheet Rows    Flowsheet Row First Filed Value   Admission Height 188 cm (74\") Documented at 07/06/2022 1921   Admission Weight 127 kg (280 lb 11.2 oz) Documented at 07/06/2022 1921             Physical Exam  Vitals reviewed.   Constitutional:       General: He is not in acute distress.     Appearance: He is obese.      Comments: Appears much older than stated " age, chronically ill appearance   HENT:      Head: Normocephalic.      Mouth/Throat:      Mouth: Mucous membranes are moist.      Comments: Edentulous  Eyes:      Extraocular Movements: Extraocular movements intact.      Pupils: Pupils are equal, round, and reactive to light.   Cardiovascular:      Rate and Rhythm: Normal rate and regular rhythm.   Pulmonary:      Comments: Mildly dyspneic with speech, diminished with expiratory wheezes throughout  Abdominal:      General: Abdomen is flat. Bowel sounds are normal. There is no distension.      Palpations: Abdomen is soft.      Tenderness: There is no abdominal tenderness. There is no guarding.   Musculoskeletal:      Comments: Trace bilateral ankle edema   Skin:     General: Skin is warm and dry.      Capillary Refill: Capillary refill takes less than 2 seconds.      Findings: No erythema.   Neurological:      General: No focal deficit present.      Mental Status: He is alert and oriented to person, place, and time.   Psychiatric:         Mood and Affect: Mood normal.         Behavior: Behavior normal.       Emotional Behavior:    Judgement and Insight: Fair   Mental Status:  Alertness alert   Memory: Good   Mood and Affect:         Depression none               Anxiety none    Debilities:   Physical Weakness chronic    Handicaps unknown   Disabilities unknown   Agitation none     Results Review:    I reviewed the patient's new clinical results.  Lab Results (most recent)     Procedure Component Value Units Date/Time    BNP [[id number]]  (Normal) Collected: 07/06/22 1953    Specimen: Blood Updated: 07/06/22 2041     proBNP 23.6 pg/mL     Narrative:      Among patients with dyspnea, NT-proBNP is highly sensitive for the detection of acute congestive heart failure. In addition NT-proBNP of <300 pg/ml effectively rules out acute congestive heart failure with 99% negative predictive value.    Results may be falsely decreased if patient taking Biotin.      Procalcitonin  "[[id number]]  (Normal) Collected: 07/06/22 1953    Specimen: Blood Updated: 07/06/22 2041     Procalcitonin 0.04 ng/mL     Narrative:      As a Marker for Sepsis (Non-Neonates):    1. <0.5 ng/mL represents a low risk of severe sepsis and/or septic shock.  2. >2 ng/mL represents a high risk of severe sepsis and/or septic shock.    As a Marker for Lower Respiratory Tract Infections that require antibiotic therapy:    PCT on Admission    Antibiotic Therapy       6-12 Hrs later    >0.5                Strongly Recommended  >0.25 - <0.5        Recommended   0.1 - 0.25          Discouraged              Remeasure/reassess PCT  <0.1                Strongly Discouraged     Remeasure/reassess PCT    As 28 day mortality risk marker: \"Change in Procalcitonin Result\" (>80% or <=80%) if Day 0 (or Day 1) and Day 4 values are available. Refer to http://[email or web address] [email or web address]pct[place]calculator[email or web address]    Change in PCT <=80%  A decrease of PCT levels below or equal to 80% defines a positive change in PCT test result representing a higher risk for 28-day all-cause mortality of patients diagnosed with severe sepsis for septic shock.    Change in PCT >80%  A decrease of PCT levels of more than 80% defines a negative change in PCT result representing a lower risk for 28-day all-cause mortality of patients diagnosed with severe sepsis or septic shock.       Troponin [[id number]]  (Normal) Collected: 07/06/22 1953    Specimen: Blood Updated: 07/06/22 2041     Troponin T <0.010 ng/mL     Narrative:      Troponin T Reference Range:  <= 0.03 ng/mL-   Negative for AMI  >0.03 ng/mL-     Abnormal for myocardial necrosis.  Clinicians would have to utilize clinical acumen, EKG, Troponin and serial changes to determine if it is an Acute Myocardial Infarction or myocardial injury due to an underlying chronic condition.       Results may be falsely decreased if patient taking Biotin.      Comprehensive Metabolic Panel [[id number]]  (Abnormal) Collected: 07/06/22 1953    " Specimen: Blood Updated: 07/06/22 2035     Glucose 250 mg/dL      BUN 14 mg/dL      Creatinine 0.82 mg/dL      Sodium 139 mmol/L      Potassium 4.4 mmol/L      Chloride 100 mmol/L      CO2 28.9 mmol/L      Calcium 9.6 mg/dL      Total Protein 7.0 g/dL      Albumin 4.50 g/dL      ALT (SGPT) 14 U/L      AST (SGOT) 9 U/L      Alkaline Phosphatase 61 U/L      Total Bilirubin 0.2 mg/dL      Globulin 2.5 gm/dL      A/G Ratio 1.8 g/dL      BUN/Creatinine Ratio 17.1     Anion Gap 10.1 mmol/L      eGFR 95.1 mL/min/1.73      Comment: National Kidney Foundation and American Society of Nephrology (ASN) Task Force recommended calculation based on the Chronic Kidney Disease Epidemiology Collaboration (CKD-EPI) equation refit without adjustment for race.       Narrative:      GFR Normal >60  Chronic Kidney Disease <60  Kidney Failure <15      Lactic Acid, Plasma [[id number]]  (Normal) Collected: 07/06/22 1953    Specimen: Blood Updated: 07/06/22 2029     Lactate 1.9 mmol/L     CBC & Differential [[id number]]  (Abnormal) Collected: 07/06/22 1953    Specimen: Blood Updated: 07/06/22 2012    Narrative:      The following orders were created for panel order CBC & Differential.  Procedure                               Abnormality         Status                     ---------                               -----------         ------                     CBC Auto Differential[[id number]]        Abnormal            Final result                 Please view results for these tests on the individual orders.    CBC Auto Differential [[id number]]  (Abnormal) Collected: 07/06/22 1953    Specimen: Blood Updated: 07/06/22 2012     WBC 10.90 10*3/mm3      RBC 4.56 10*6/mm3      Hemoglobin 14.3 g/dL      Hematocrit 45.7 %      .2 fL      MCH 31.4 pg      MCHC 31.3 g/dL      RDW 12.3 %      RDW-SD 45.8 fl      MPV 12.2 fL      Platelets 228 10*3/mm3      Neutrophil % 78.7 %      Lymphocyte % 12.8 %      Monocyte % 7.1 %      Eosinophil % 0.1 %       Basophil % 0.5 %      Immature Grans % 0.8 %      Neutrophils, Absolute 8.58 10*3/mm3      Lymphocytes, Absolute 1.40 10*3/mm3      Monocytes, Absolute 0.77 10*3/mm3      Eosinophils, Absolute 0.01 10*3/mm3      Basophils, Absolute 0.05 10*3/mm3      Immature Grans, Absolute 0.09 10*3/mm3      nRBC 0.0 /100 WBC     Blood Culture - Blood, Arm, Left [[id number]] Collected: 07/06/22 1953    Specimen: Blood from Arm, Left Updated: 07/06/22 2010          Imaging Results (Most Recent)     Procedure Component Value Units Date/Time    XR Chest 2 View [[id number]] Collected: 07/06/22 2051     Updated: 07/06/22 2053    Narrative:      CR Chest 2 Vws    INDICATION:    Shortness of breath for 4 days with exacerbation of COPD    COMPARISON:    7/14/2021    FINDINGS:   PA and lateral views of the chest.  The heart and mediastinum are stable. The inspiratory effort is shallower compared to the previous examination. Lung markings are increased at the bases likely due to a shallow inspiratory effort. No definite new focal  infiltrates are seen. Vascular markings are normal. No effusions are seen.        Impression:      Shallower inspiratory effort. Taking this into account, no definite changes are seen since the previous examination.    Signer Name: James Mary MD   Signed: 7/6/2022 8:51 PM   Workstation Name: [id number]    [facility]        reviewed    ECG/EMG Results (most recent)     Procedure Component Value Units Date/Time    ECG 12 Lead [[id number]] Collected: 07/06/22 2005     Updated: 07/06/22 2007     QT Interval 343 ms     Narrative:      HEART RATE= 96  bpm  RR Interval= 624  ms  MO Interval= 156  ms  P Horizontal Axis= -8  deg  P Front Axis= 59  deg  QRSD Interval= 109  ms  QT Interval= 343  ms  QRS Axis= 70  deg  T Wave Axis= 46  deg  - BORDERLINE ECG -  Sinus rhythm  Probable left atrial enlargement  Electronically Signed By:   Date and Time of Study: 2022-07-06 20:05:31     "    reviewed    Assessment & Plan    Acute on chronic hypoxic respiratory failure  AECOPD:  Continue Solu-Medrol  Add duo nebs every 4 hours, Mucinex, Acapella, I-S  Wean oxygen as tolerated  Check COVID and RVP, sputum culture, strep pneumo, Legionella  Monitor    DM2 in obese with hyperglycemia: A1c 7.4%  Steroid-induced hyperglycemia  Body mass index is 34.67 kg/m².   Glucose not at goal  Add low-dose SSI, home glipizide/metformin, Levemir  Monitor Accu-Cheks AC/at bedtime    Myasthenia gravis:   Add home CellCept and Mestinon    Chronic pain with chronic narcotic use and chronic narcotic induced constipation:  Add home Norco  Add home Dulcolax and Colace  Efren reviewed, last Rx Norco 6/2022    HLD: No current acute issues, add Lipitor, substitute for home simvastatin    BPH: Add home Cardura    Tobacco abuse: Add nicotine patch    I discussed the patient's findings and my recommendations with patient and staff.     Archana Yang, APRN  07/07/22  00:57 EDT    As of April 2021, as required by the Federal 21st Century Cures Act, medical records (including provider notes and laboratory/imaging results) are to be made available to patients and/or their designees as soon as the documents are signed/resulted. While the intention is to ensure transparency and to engage patients in their healthcare, this immediate access may create unintended consequences because this document uses language intended for communication between medical providers for interpretation with the entirety of the patient's clinical picture in mind. It is recommended that patients and/or their designees review all available information with their primary or specialist providers for explanation and to avoid misinterpretation of this information.     \"Dictated utilizing Dragon dictation\"      "

## 2022-07-07 NOTE — CASE MANAGEMENT/SOCIAL WORK
Discharge Planning Assessment  MURIEL Chavez     Patient Name: Kt Armstrong  MRN: [id number]  Today's Date: 7/7/2022    Admit Date: 7/6/2022     Discharge Needs Assessment     Row Name 07/07/22 1515       Living Environment    People in Home child(becki), adult;grandchild(becki)    Current Living Arrangements home    Primary Care Provided by self    Provides Primary Care For no one    Family Caregiver if Needed child(becki), adult    Quality of Family Relationships involved;supportive    Able to Return to Prior Arrangements yes       Resource/Environmental Concerns    Resource/Environmental Concerns none       Transition Planning    Patient/Family Anticipates Transition to home with family    Patient/Family Anticipated Services at Transition none    Transportation Anticipated family or friend will provide       Discharge Needs Assessment    Readmission Within the Last 30 Days no previous admission in last 30 days    Equipment Currently Used at Home oxygen;nebulizer    Concerns to be Addressed denies needs/concerns at this time    Anticipated Changes Related to Illness none    Equipment Needed After Discharge none    Provided Post Acute Provider List? N/A    Provided Post Acute Provider Quality & Resource List? N/A               Discharge Plan     Row Name 07/07/22 1517       Plan    Plan plan home w dtr, assess needs    Patient/Family in Agreement with Plan yes    Plan Comments Spoke with patient at bedside. Face sheet verified. Patient lives with his daughter and 15yo grandson. He is independent of ADLs including driving. He has a nebulizer and 02 per Tiki. He has used HH in the past, but does not recall the agency. He has not used inpatient rehab previously.  He uses [facility] and states he has had issues paying for medications in the past, but now gets the generic med and has no problem paying for his medication.  He states he has scales at home he can use to monitor his weight daily, but he does not  always do so. His daughter will provide a ride home and is available to assist him as needed at dc. CM # placed on white board, will continue to follow for dc needs.              Continued Care and Services - Admitted Since 7/6/2022    Coordination has not been started for this encounter.          Demographic Summary     Row Name 07/07/22 1514       General Information    Admission Type observation    Arrived From home    Referral Source admission list    Reason for Consult discharge planning    Preferred Language English       Contact Information    Permission Granted to Share Info With                Functional Status    No documentation.                Psychosocial    No documentation.                Abuse/Neglect    No documentation.                Legal    No documentation.                Substance Abuse    No documentation.                Patient Forms    No documentation.                   Constantino May RN

## 2022-07-07 NOTE — PROGRESS NOTES
"[facility] Clinical Pharmacy Services: Enoxaparin Consult    Kt Armstrong has a pharmacy consult to dose prophylactic enoxaparin per Archana MACKEY's request.     Indication: VTE prophylaxis  Home Anticoagulation: N/A     Relevant clinical data and objective history reviewed:  71 y.o. male 188 cm (74\") 122 kg (270 lb)   Body mass index is 34.67 kg/m².   Results from last 7 days   Lab Units 07/06/22  1953   PLATELETS 10*3/mm3 228     Estimated Creatinine Clearance: 118 mL/min (by C-G formula based on SCr of 0.82 mg/dL).    Assessment/Plan    Will start patient on 40mg subcutaneous every 24 hours, adjusted for renal function. Consult order will be discontinued but pharmacy will continue to follow.     Sanjeev Villanueva III, [facility]  Clinical Pharmacist    "

## 2022-07-08 VITALS
RESPIRATION RATE: 20 BRPM | HEIGHT: 74 IN | HEART RATE: 68 BPM | WEIGHT: 270 LBS | DIASTOLIC BLOOD PRESSURE: 76 MMHG | SYSTOLIC BLOOD PRESSURE: 122 MMHG | TEMPERATURE: 97.2 F | BODY MASS INDEX: 34.65 KG/M2 | OXYGEN SATURATION: 91 %

## 2022-07-08 PROBLEM — J44.1 ACUTE EXACERBATION OF CHRONIC OBSTRUCTIVE PULMONARY DISEASE (COPD) (HCC): Status: RESOLVED | Noted: 2022-07-06 | Resolved: 2022-07-08

## 2022-07-08 LAB — GLUCOSE BLDC GLUCOMTR-MCNC: 255 MG/DL (ref 70–130)

## 2022-07-08 PROCEDURE — 82962 GLUCOSE BLOOD TEST: CPT

## 2022-07-08 PROCEDURE — 25010000002 METHYLPREDNISOLONE PER 40 MG: Performed by: NURSE PRACTITIONER

## 2022-07-08 PROCEDURE — 63710000001 MYCOPHENOLATE MOFETIL PER 250 MG: Performed by: NURSE PRACTITIONER

## 2022-07-08 PROCEDURE — 94664 DEMO&/EVAL PT USE INHALER: CPT

## 2022-07-08 PROCEDURE — 94799 UNLISTED PULMONARY SVC/PX: CPT

## 2022-07-08 PROCEDURE — 99217 PR OBSERVATION CARE DISCHARGE MANAGEMENT: CPT | Performed by: INTERNAL MEDICINE

## 2022-07-08 PROCEDURE — 63710000001 INSULIN ASPART PER 5 UNITS: Performed by: NURSE PRACTITIONER

## 2022-07-08 PROCEDURE — 96376 TX/PRO/DX INJ SAME DRUG ADON: CPT

## 2022-07-08 PROCEDURE — 63710000001 INSULIN DETEMIR PER 5 UNITS: Performed by: NURSE PRACTITIONER

## 2022-07-08 PROCEDURE — G0378 HOSPITAL OBSERVATION PER HR: HCPCS

## 2022-07-08 RX ORDER — METHYLPREDNISOLONE 4 MG/1
1 TABLET ORAL DAILY
Qty: 21 TABLET | Refills: 0 | Status: ON HOLD | OUTPATIENT
Start: 2022-07-08 | End: 2022-07-25

## 2022-07-08 RX ORDER — GUAIFENESIN 600 MG/1
1200 TABLET, EXTENDED RELEASE ORAL 2 TIMES DAILY
Qty: 60 TABLET | Refills: 0 | Status: ON HOLD | OUTPATIENT
Start: 2022-07-08 | End: 2022-07-25

## 2022-07-08 RX ADMIN — PYRIDOSTIGMINE BROMIDE 60 MG: 60 TABLET ORAL at 05:36

## 2022-07-08 RX ADMIN — INSULIN ASPART 8 UNITS: 100 INJECTION, SOLUTION INTRAVENOUS; SUBCUTANEOUS at 08:45

## 2022-07-08 RX ADMIN — ARFORMOTEROL TARTRATE 15 MCG: 15 SOLUTION RESPIRATORY (INHALATION) at 07:34

## 2022-07-08 RX ADMIN — ATORVASTATIN CALCIUM 20 MG: 20 TABLET, FILM COATED ORAL at 08:45

## 2022-07-08 RX ADMIN — NICOTINE 1 PATCH: 21 PATCH, EXTENDED RELEASE TRANSDERMAL at 08:46

## 2022-07-08 RX ADMIN — GUAIFENESIN 1200 MG: 600 TABLET, EXTENDED RELEASE ORAL at 08:44

## 2022-07-08 RX ADMIN — METHYLPREDNISOLONE SODIUM SUCCINATE 40 MG: 40 INJECTION, POWDER, FOR SOLUTION INTRAMUSCULAR; INTRAVENOUS at 05:36

## 2022-07-08 RX ADMIN — DOCUSATE SODIUM 100 MG: 100 CAPSULE, LIQUID FILLED ORAL at 08:44

## 2022-07-08 RX ADMIN — IPRATROPIUM BROMIDE AND ALBUTEROL SULFATE 3 ML: .5; 2.5 SOLUTION RESPIRATORY (INHALATION) at 03:09

## 2022-07-08 RX ADMIN — MYCOPHENOLATE MOFETIL 1000 MG: 250 CAPSULE ORAL at 08:44

## 2022-07-08 RX ADMIN — PYRIDOSTIGMINE BROMIDE 60 MG: 60 TABLET ORAL at 00:45

## 2022-07-08 RX ADMIN — Medication 10 ML: at 08:46

## 2022-07-08 RX ADMIN — PYRIDOSTIGMINE BROMIDE 60 MG: 60 TABLET ORAL at 09:34

## 2022-07-08 RX ADMIN — HYDROCODONE BITARTRATE AND ACETAMINOPHEN 1 TABLET: 10; 325 TABLET ORAL at 05:36

## 2022-07-08 RX ADMIN — IPRATROPIUM BROMIDE AND ALBUTEROL SULFATE 3 ML: .5; 2.5 SOLUTION RESPIRATORY (INHALATION) at 07:26

## 2022-07-08 RX ADMIN — HYDROCODONE BITARTRATE AND ACETAMINOPHEN 1 TABLET: 10; 325 TABLET ORAL at 00:42

## 2022-07-08 RX ADMIN — PIOGLITAZONE HYDROCHLORIDE 45 MG: 30 TABLET ORAL at 09:34

## 2022-07-08 RX ADMIN — METFORMIN HYDROCHLORIDE: 500 TABLET, FILM COATED ORAL at 08:44

## 2022-07-08 RX ADMIN — INSULIN DETEMIR 45 UNITS: 100 INJECTION, SOLUTION SUBCUTANEOUS at 08:52

## 2022-07-08 NOTE — DISCHARGE SUMMARY
Date of Admission: 7/6/2022    Date of Discharge:  7/8/2022    Length of stay:  LOS: 0 days     Presenting Problem:   Acute exacerbation of chronic obstructive pulmonary disease (COPD) ([facility]) [J44.1]      Active Diagnosis During Hospital Stay/Discharge Diagnoses/Course by Diagnoses:     Acute on chronic hypoxic respiratory failure:   AECOPD:  Both improving  Chronically on 2 L at the same at discharge  Continue pulmonary toilet nebulizers inhalers at home  Strep pneumo/legionella negative  RVP negative, sputum culture growing normal respiratory karely  Procalcitonin x2 negative  Ambulate as able     DM2 in obese with hyperglycemia: A1c 7.4%  Steroid-induced hyperglycemia:  Body mass index is 34.67 kg/m².   Continue home meds    Myasthenia gravis:   continue home CellCept and Mestinon     Chronic pain with chronic narcotic use and chronic narcotic induced constipation:  Continue home Norco  Continue home Dulcolax and Colace  Efren reviewed, last Rx Norco 6/2022     HLD: No current acute issues, continue Lipitor, substitute for home simvastatin     BPH: no current acute issues, add hytrin sub for home Cardura     Tobacco abuse: continue nicotine patch    Active Hospital Problems    Diagnosis  POA   • Acute on chronic respiratory failure with hypoxia ([facility]) [J96.21]  Yes   • Obesity (BMI 30-39.9) [E66.9]  Yes      Resolved Hospital Problems    Diagnosis Date Resolved POA   • Acute exacerbation of chronic obstructive pulmonary disease (COPD) ([facility]) [J44.1] 07/08/2022 Yes         Hospital Course  Patient is a 71 y.o. male presented with the COPD and issues as noted above.  He was admitted treated and stabilized to his home level of oxygen.  He was requesting discharge home.  [place] stable to do so and will need close outpatient follow-up.        Procedures Performed:as noted          Consults:   Consults     No orders found from 6/7/2022 to 7/7/2022.          Pertinent Test Results:     Results from last 7 days   Lab Units  07/07/22 0407 07/06/22 1953   WBC 10*3/mm3 8.62 10.90*   HEMOGLOBIN g/dL 13.8 14.3   HEMATOCRIT % 43.3 45.7   PLATELETS 10*3/mm3 191 228     Results from last 7 days   Lab Units 07/07/22 0407 07/06/22 1953   SODIUM mmol/L 135* 139   POTASSIUM mmol/L 4.4 4.4   CHLORIDE mmol/L 97* 100   CO2 mmol/L 29.9* 28.9   BUN mg/dL 14 14   CREATININE mg/dL 0.75* 0.82   CALCIUM mg/dL 9.4 9.6   BILIRUBIN mg/dL  --  0.2   ALK PHOS U/L  --  61   ALT (SGPT) U/L  --  14   AST (SGOT) U/L  --  9   GLUCOSE mg/dL 353* 250*       Microbiology Results (last 10 days)     Procedure Component Value - Date/Time    Respiratory Culture - Sputum, Cough [[id number]] Collected: 07/07/22 0251    Lab Status: Preliminary result Specimen: Sputum from Cough Updated: 07/08/22 0947     Respiratory Culture Heavy growth (4+) The culture consists of normal respiratory karely. This is a preliminary report; final report to follow.     Gram Stain Occasional Red blood cells      Rare (1+) WBCs seen      Few (2+) Squamous epithelial cells      Rare (1+) Yeast      Occasional Gram negative bacilli      Occasional Gram positive bacilli      Many (4+) Gram positive cocci    COVID PRE-OP / PRE-PROCEDURE SCREENING ORDER (NO ISOLATION) - Swab, Nasal Cavity [[id number]]  (Normal) Collected: 07/07/22 0054    Lab Status: Final result Specimen: Swab from Nasal Cavity Updated: 07/07/22 0133    Narrative:      The following orders were created for panel order COVID PRE-OP / PRE-PROCEDURE SCREENING ORDER (NO ISOLATION) - Swab, Nasal Cavity.  Procedure                               Abnormality         Status                     ---------                               -----------         ------                     COVID-19,[facility]...[[id number]]  Normal              Final result                 Please view results for these tests on the individual orders.    COVID-19,[facility] IN-HOUSE,Nasal Swab No Transport Media 3-4 HR TAT - Swab, Nasal Cavity [[id number]]  (Normal)  Collected: 07/07/22 0054    Lab Status: Final result Specimen: Swab from Nasal Cavity Updated: 07/07/22 0133     COVID19 Not Detected    Narrative:      Fact sheet for providers: https://www.fda.gov/media/088739/download     Fact sheet for patients: https://www.fda.gov/media/352743/download    Test performed by PCR.    Consider negative results in combination with clinical observations, patient history, and epidemiological information.    S. Pneumo Ag Urine or CSF - Urine, Urine, Clean Catch [[id number]]  (Normal) Collected: 07/07/22 0054    Lab Status: Final result Specimen: Urine, Clean Catch Updated: 07/07/22 0130     Strep Pneumo Ag Negative    Legionella Antigen, Urine - Urine, Urine, Clean Catch [[id number]]  (Normal) Collected: 07/07/22 0054    Lab Status: Final result Specimen: Urine, Clean Catch Updated: 07/07/22 0130     LEGIONELLA ANTIGEN, URINE Negative    Blood Culture - Blood, Arm, Right [[id number]]  (Normal) Collected: 07/06/22 2234    Lab Status: Preliminary result Specimen: Blood from Arm, Right Updated: 07/07/22 2249     Blood Culture No growth at 24 hours    Respiratory Panel PCR w/COVID-19(SARS-CoV-2) ZEUS/SHAW/FRANCIA/PAD/COR/MAD/GIL In-House, NP Swab in UTM/VTM, 3-4 HR TAT - Swab, Nasopharynx [[id number]]  (Normal) Collected: 07/06/22 2220    Lab Status: Final result Specimen: Swab from Nasopharynx Updated: 07/07/22 1141     ADENOVIRUS, PCR Not Detected     Coronavirus 229E Not Detected     Coronavirus HKU1 Not Detected     Coronavirus NL63 Not Detected     Coronavirus OC43 Not Detected     COVID19 Not Detected     Human Metapneumovirus Not Detected     Human Rhinovirus/Enterovirus Not Detected     Influenza A PCR Not Detected     Influenza B PCR Not Detected     Parainfluenza Virus 1 Not Detected     Parainfluenza Virus 2 Not Detected     Parainfluenza Virus 3 Not Detected     Parainfluenza Virus 4 Not Detected     RSV, PCR Not Detected     Bordetella pertussis pcr Not Detected     Bordetella  parapertussis PCR Not Detected     Chlamydophila pneumoniae PCR Not Detected     Mycoplasma pneumo by PCR Not Detected    Narrative:      In the setting of a positive respiratory panel with a viral infection PLUS a negative procalcitonin without other underlying concern for bacterial infection, consider observing off antibiotics or discontinuation of antibiotics and continue supportive care. If the respiratory panel is positive for atypical bacterial infection (Bordetella pertussis, Chlamydophila pneumoniae, or Mycoplasma pneumoniae), consider antibiotic de-escalation to target atypical bacterial infection.    Blood Culture - Blood, Arm, Left [[id number]]  (Normal) Collected: 07/06/22 1953    Lab Status: Preliminary result Specimen: Blood from Arm, Left Updated: 07/07/22 2017     Blood Culture No growth at 24 hours          Results for orders placed during the hospital encounter of 12/07/19    Adult Transthoracic Echo Complete W/ Cont if Necessary Per Protocol    Interpretation Summary  · Left ventricular systolic function is normal. Estimated EF appears to be in the range of 56 - 60%. Normal left ventricular cavity size, wall thickness and mass noted. Normal diastolic function.  · No significant valvular stenosis or regurgitation noted.      Imaging Results (All)     Procedure Component Value Units Date/Time    XR Chest 2 View [[id number]] Collected: 07/06/22 2051     Updated: 07/06/22 2053    Narrative:      CR Chest 2 Vws    INDICATION:    Shortness of breath for 4 days with exacerbation of COPD    COMPARISON:    7/14/2021    FINDINGS:   PA and lateral views of the chest.  The heart and mediastinum are stable. The inspiratory effort is shallower compared to the previous examination. Lung markings are increased at the bases likely due to a shallow inspiratory effort. No definite new focal  infiltrates are seen. Vascular markings are normal. No effusions are seen.        Impression:      Shallower inspiratory effort.  Taking this into account, no definite changes are seen since the previous examination.    Signer Name: James Mary MD   Signed: 7/6/2022 8:51 PM   Workstation Name: [id number]    [facility]            Condition on Discharge:  Stable     Vital Signs  Temp:  [97 °F (36.1 °C)-98 °F (36.7 °C)] 97.2 °F (36.2 °C)  Heart Rate:  [68-95] 68  Resp:  [18-24] 20  BP: (122-144)/(71-76) 122/76    Physical Exam:  Physical Exam  Vitals reviewed.   Cardiovascular:      Rate and Rhythm: Normal rate.   Pulmonary:      Comments: Mild wheezes noted but improving aeration  Abdominal:      General: There is no distension.      Palpations: Abdomen is soft.   Musculoskeletal:      Right lower leg: No edema.      Left lower leg: No edema.   Skin:     General: Skin is warm.   Neurological:      Mental Status: He is alert.   Psychiatric:         Mood and Affect: Mood normal.         Discharge Disposition  Home or Self Care    Discharge Medications     Discharge Medications      New Medications      Instructions Start Date   guaiFENesin 600 MG 12 hr tablet  Commonly known as: MUCINEX   1,200 mg, Oral, 2 Times Daily      methylPREDNISolone 4 MG dose pack  Commonly known as: MEDROL   4 mg, Oral, Daily, Take as directed on package instructions.         Continue These Medications      Instructions Start Date   albuterol (2.5 MG/3ML) 0.083% nebulizer solution  Commonly known as: PROVENTIL   2.5 mg, Nebulization, Every 4 Hours PRN      Kd Contour Test test strip  Generic drug: glucose blood   No dose, route, or frequency recorded.      BD Pen Needle Yaritza U/F 32G X 4 MM misc  Generic drug: Insulin Pen Needle   USE 1 NEEDLE SUBCUTANEOUSLY QD      bisacodyl 10 MG suppository  Commonly known as: DULCOLAX   10 mg, Rectal, Daily      HYDROcodone-acetaminophen  MG per tablet  Commonly known as: NORCO   1 tablet, 4 Times Daily      insulin glargine 100 UNIT/ML injection  Commonly known as: LANTUS, SEMGLEE   45 Units,  Subcutaneous, Every morning      insulin glargine 100 UNIT/ML injection  Commonly known as: LANTUS, SEMGLEE   35 Units, Subcutaneous, Nightly      mycophenolate 500 MG tablet  Commonly known as: CELLCEPT   500 mg, Oral, 2 Times Daily      pyridostigmine 60 MG tablet  Commonly known as: MESTINON   60 mg, Oral, Every 4 Hours      SIMVASTATIN PO   40 mg, Oral, Every Evening      sodium chloride 7 % nebulizer solution nebulizer solution   4 mL, Nebulization, 2 Times Daily PRN, J 44.9, J 18.9      Trelegy Ellipta 100-62.5-25 MCG/INH inhaler  Generic drug: Fluticasone-Umeclidin-Vilant   1 puff, Inhalation, Daily - RT             Discharge Diet:   Diet Instructions     Diet: Regular, Consistent Carbohydrate      Discharge Diet:  Regular  Consistent Carbohydrate             Activity at Discharge:   Activity Instructions     Gradually Increase Activity Until at Pre-Hospitalization Level            Follow-up Appointments  No future appointments.  Additional Instructions for the Follow-ups that You Need to Schedule     Discharge Follow-up with PCP   As directed       Currently Documented PCP:    Ney Kathleen MD    PCP Phone Number:    [phone]     Follow Up Details: one week               Test Results Pending at Discharge  Pending Labs     Order Current Status    Blood Culture - Blood, Arm, Left Preliminary result    Blood Culture - Blood, Arm, Right Preliminary result    Respiratory Culture - Sputum, Cough Preliminary result           Risk for Readmission (LACE) Score: 5 (7/8/2022  6:01 AM)      This patient has been examined wearing appropriate Personal Protective Equipment . 07/08/22      Time: Discharge 30 min with face-to-face history exam, writing of prescriptions, and documenting discharge data including care coordination with the nursing staff.      Electronically signed by Cj Villanueva DO, 07/08/22, 09:54 EDT.

## 2022-07-08 NOTE — PLAN OF CARE
Goal Outcome Evaluation:           Progress: improving  Outcome Evaluation: VSS, up to bathroom with stand by assist this morning, IVF continue, IV cefzolin and flagyl continue, prn pain meds effective, moderate serosanguinous output from BYRON drain.

## 2022-07-08 NOTE — PLAN OF CARE
Goal Outcome Evaluation:           Progress: improving  Outcome Evaluation: VSS, urinal output adequate, awaiting walking oximetry today, O2 @ 2L NC, ambulating with stand by assist, scheduled Norco effective per patient.

## 2022-07-08 NOTE — CASE MANAGEMENT/SOCIAL WORK
Case Management Discharge Note      Final Note: dc home    Provided Post Acute Provider List?: N/A  Provided Post Acute Provider Quality & Resource List?: N/A    Selected Continued Care - Discharged on 7/8/2022 Admission date: 7/6/2022 - Discharge disposition: Home or Self Care    Destination    No services have been selected for the patient.              Durable Medical Equipment    No services have been selected for the patient.              Dialysis/Infusion    No services have been selected for the patient.              Home Medical Care    No services have been selected for the patient.              Therapy    No services have been selected for the patient.              Community Resources    No services have been selected for the patient.              Community & DME    No services have been selected for the patient.                       Final Discharge Disposition Code: 01 - home or self-care

## 2022-07-08 NOTE — PLAN OF CARE
Problem: Adult Inpatient Plan of Care  Goal: Plan of Care Review  Outcome: Ongoing, Progressing  Flowsheets (Taken 7/8/2022 0312)  Progress: improving  Plan of Care Reviewed With: patient  Outcome Evaluation: O2 2L NC, 94%. Feeling better.     Problem: Respiratory Compromise COPD (Chronic Obstructive Pulmonary Disease)  Goal: Effective Oxygenation and Ventilation  Intervention: Optimize Oxygenation and Ventilation  Recent Flowsheet Documentation  Taken 7/8/2022 0309 by Tucker Mathews RRT  Airway/Ventilation Management: pulmonary hygiene promoted   Goal Outcome Evaluation:  Plan of Care Reviewed With: patient        Progress: improving  Outcome Evaluation: O2 2L NC, 94%. Feeling better.

## 2022-07-09 LAB
BACTERIA SPEC RESP CULT: NORMAL
GRAM STN SPEC: NORMAL

## 2022-07-11 LAB
BACTERIA SPEC AEROBE CULT: NORMAL
BACTERIA SPEC AEROBE CULT: NORMAL

## 2022-07-25 ENCOUNTER — APPOINTMENT (OUTPATIENT)
Dept: GENERAL RADIOLOGY | Facility: HOSPITAL | Age: 72
End: 2022-07-25

## 2022-07-25 ENCOUNTER — HOSPITAL ENCOUNTER (INPATIENT)
Facility: HOSPITAL | Age: 72
LOS: 1 days | Discharge: HOME | End: 2022-07-29
Attending: EMERGENCY MEDICINE | Admitting: INTERNAL MEDICINE

## 2022-07-25 DIAGNOSIS — J44.1 COPD WITH ACUTE EXACERBATION: Primary | ICD-10-CM

## 2022-07-25 DIAGNOSIS — E11.65 TYPE 2 DIABETES MELLITUS WITH HYPERGLYCEMIA, WITH LONG-TERM CURRENT USE OF INSULIN: ICD-10-CM

## 2022-07-25 DIAGNOSIS — R29.818 SUSPECTED SLEEP APNEA: ICD-10-CM

## 2022-07-25 DIAGNOSIS — Z79.4 TYPE 2 DIABETES MELLITUS WITH HYPERGLYCEMIA, WITH LONG-TERM CURRENT USE OF INSULIN: ICD-10-CM

## 2022-07-25 LAB
ALBUMIN SERPL-MCNC: 4.6 G/DL (ref 3.5–5.2)
ALBUMIN/GLOB SERPL: 2 G/DL
ALP SERPL-CCNC: 62 U/L (ref 39–117)
ALT SERPL W P-5'-P-CCNC: 18 U/L (ref 1–41)
ANION GAP SERPL CALCULATED.3IONS-SCNC: 12.4 MMOL/L (ref 5–15)
AST SERPL-CCNC: 17 U/L (ref 1–40)
BASOPHILS # BLD AUTO: 0.03 10*3/MM3 (ref 0–0.2)
BASOPHILS NFR BLD AUTO: 0.4 % (ref 0–1.5)
BILIRUB SERPL-MCNC: 0.3 MG/DL (ref 0–1.2)
BUN SERPL-MCNC: 17 MG/DL (ref 8–23)
BUN/CREAT SERPL: 19.5 (ref 7–25)
CALCIUM SPEC-SCNC: 9.5 MG/DL (ref 8.6–10.5)
CHLORIDE SERPL-SCNC: 97 MMOL/L (ref 98–107)
CO2 SERPL-SCNC: 27.6 MMOL/L (ref 22–29)
CREAT SERPL-MCNC: 0.87 MG/DL (ref 0.76–1.27)
D-LACTATE SERPL-SCNC: 2.3 MMOL/L (ref 0.5–2)
D-LACTATE SERPL-SCNC: 2.8 MMOL/L (ref 0.5–2)
DEPRECATED RDW RBC AUTO: 45.1 FL (ref 37–54)
EGFRCR SERPLBLD CKD-EPI 2021: 93.4 ML/MIN/1.73
EOSINOPHIL # BLD AUTO: 0.01 10*3/MM3 (ref 0–0.4)
EOSINOPHIL NFR BLD AUTO: 0.1 % (ref 0.3–6.2)
ERYTHROCYTE [DISTWIDTH] IN BLOOD BY AUTOMATED COUNT: 12.5 % (ref 12.3–15.4)
FLUAV RNA RESP QL NAA+PROBE: NOT DETECTED
FLUBV RNA RESP QL NAA+PROBE: NOT DETECTED
GLOBULIN UR ELPH-MCNC: 2.3 GM/DL
GLUCOSE BLDC GLUCOMTR-MCNC: 310 MG/DL (ref 70–130)
GLUCOSE SERPL-MCNC: 407 MG/DL (ref 65–99)
HCT VFR BLD AUTO: 45.9 % (ref 37.5–51)
HGB BLD-MCNC: 14.7 G/DL (ref 13–17.7)
HOLD SPECIMEN: NORMAL
IMM GRANULOCYTES # BLD AUTO: 0.07 10*3/MM3 (ref 0–0.05)
IMM GRANULOCYTES NFR BLD AUTO: 1 % (ref 0–0.5)
LYMPHOCYTES # BLD AUTO: 0.79 10*3/MM3 (ref 0.7–3.1)
LYMPHOCYTES NFR BLD AUTO: 11.3 % (ref 19.6–45.3)
MCH RBC QN AUTO: 31.1 PG (ref 26.6–33)
MCHC RBC AUTO-ENTMCNC: 32 G/DL (ref 31.5–35.7)
MCV RBC AUTO: 97.2 FL (ref 79–97)
MONOCYTES # BLD AUTO: 0.58 10*3/MM3 (ref 0.1–0.9)
MONOCYTES NFR BLD AUTO: 8.3 % (ref 5–12)
NEUTROPHILS NFR BLD AUTO: 5.54 10*3/MM3 (ref 1.7–7)
NEUTROPHILS NFR BLD AUTO: 78.9 % (ref 42.7–76)
NRBC BLD AUTO-RTO: 0 /100 WBC (ref 0–0.2)
PLATELET # BLD AUTO: 184 10*3/MM3 (ref 140–450)
PMV BLD AUTO: 11.8 FL (ref 6–12)
POTASSIUM SERPL-SCNC: 4.4 MMOL/L (ref 3.5–5.2)
PROCALCITONIN SERPL-MCNC: 0.06 NG/ML (ref 0–0.25)
PROT SERPL-MCNC: 6.9 G/DL (ref 6–8.5)
QT INTERVAL: 341 MS
RBC # BLD AUTO: 4.72 10*6/MM3 (ref 4.14–5.8)
SARS-COV-2 RNA RESP QL NAA+PROBE: NOT DETECTED
SODIUM SERPL-SCNC: 137 MMOL/L (ref 136–145)
WBC NRBC COR # BLD: 7.02 10*3/MM3 (ref 3.4–10.8)
WHOLE BLOOD HOLD COAG: NORMAL
WHOLE BLOOD HOLD SPECIMEN: NORMAL

## 2022-07-25 PROCEDURE — 93010 ELECTROCARDIOGRAM REPORT: CPT | Performed by: INTERNAL MEDICINE

## 2022-07-25 PROCEDURE — 25010000002 ENOXAPARIN PER 10 MG: Performed by: INTERNAL MEDICINE

## 2022-07-25 PROCEDURE — 93005 ELECTROCARDIOGRAM TRACING: CPT | Performed by: EMERGENCY MEDICINE

## 2022-07-25 PROCEDURE — 94799 UNLISTED PULMONARY SVC/PX: CPT

## 2022-07-25 PROCEDURE — 94761 N-INVAS EAR/PLS OXIMETRY MLT: CPT

## 2022-07-25 PROCEDURE — 93005 ELECTROCARDIOGRAM TRACING: CPT

## 2022-07-25 PROCEDURE — 63710000001 MYCOPHENOLATE MOFETIL PER 250 MG: Performed by: INTERNAL MEDICINE

## 2022-07-25 PROCEDURE — 80053 COMPREHEN METABOLIC PANEL: CPT | Performed by: EMERGENCY MEDICINE

## 2022-07-25 PROCEDURE — 99285 EMERGENCY DEPT VISIT HI MDM: CPT

## 2022-07-25 PROCEDURE — 87636 SARSCOV2 & INF A&B AMP PRB: CPT | Performed by: EMERGENCY MEDICINE

## 2022-07-25 PROCEDURE — 25010000002 METHYLPREDNISOLONE PER 125 MG: Performed by: EMERGENCY MEDICINE

## 2022-07-25 PROCEDURE — 87040 BLOOD CULTURE FOR BACTERIA: CPT | Performed by: EMERGENCY MEDICINE

## 2022-07-25 PROCEDURE — 85025 COMPLETE CBC W/AUTO DIFF WBC: CPT | Performed by: EMERGENCY MEDICINE

## 2022-07-25 PROCEDURE — 84145 PROCALCITONIN (PCT): CPT | Performed by: INTERNAL MEDICINE

## 2022-07-25 PROCEDURE — 63710000001 INSULIN REGULAR HUMAN PER 5 UNITS: Performed by: EMERGENCY MEDICINE

## 2022-07-25 PROCEDURE — 71045 X-RAY EXAM CHEST 1 VIEW: CPT

## 2022-07-25 PROCEDURE — 82962 GLUCOSE BLOOD TEST: CPT

## 2022-07-25 PROCEDURE — 99223 1ST HOSP IP/OBS HIGH 75: CPT | Performed by: INTERNAL MEDICINE

## 2022-07-25 PROCEDURE — 36415 COLL VENOUS BLD VENIPUNCTURE: CPT

## 2022-07-25 PROCEDURE — 63710000001 INSULIN DETEMIR PER 5 UNITS: Performed by: INTERNAL MEDICINE

## 2022-07-25 PROCEDURE — 94664 DEMO&/EVAL PT USE INHALER: CPT

## 2022-07-25 PROCEDURE — G0378 HOSPITAL OBSERVATION PER HR: HCPCS

## 2022-07-25 PROCEDURE — 94640 AIRWAY INHALATION TREATMENT: CPT

## 2022-07-25 PROCEDURE — 83605 ASSAY OF LACTIC ACID: CPT | Performed by: EMERGENCY MEDICINE

## 2022-07-25 RX ORDER — NICOTINE 21 MG/24HR
1 PATCH, TRANSDERMAL 24 HOURS TRANSDERMAL
Status: DISCONTINUED | OUTPATIENT
Start: 2022-07-25 | End: 2022-07-29 | Stop reason: HOSPADM

## 2022-07-25 RX ORDER — ACETAMINOPHEN 160 MG/5ML
650 SOLUTION ORAL EVERY 4 HOURS PRN
Status: DISCONTINUED | OUTPATIENT
Start: 2022-07-25 | End: 2022-07-29 | Stop reason: HOSPADM

## 2022-07-25 RX ORDER — SODIUM CHLORIDE 9 MG/ML
40 INJECTION, SOLUTION INTRAVENOUS AS NEEDED
Status: DISCONTINUED | OUTPATIENT
Start: 2022-07-25 | End: 2022-07-29 | Stop reason: HOSPADM

## 2022-07-25 RX ORDER — ONDANSETRON 4 MG/1
4 TABLET, FILM COATED ORAL EVERY 6 HOURS PRN
Status: DISCONTINUED | OUTPATIENT
Start: 2022-07-25 | End: 2022-07-29 | Stop reason: HOSPADM

## 2022-07-25 RX ORDER — SODIUM CHLORIDE 0.9 % (FLUSH) 0.9 %
10 SYRINGE (ML) INJECTION AS NEEDED
Status: DISCONTINUED | OUTPATIENT
Start: 2022-07-25 | End: 2022-07-29 | Stop reason: HOSPADM

## 2022-07-25 RX ORDER — NICOTINE 21 MG/24HR
1 PATCH, TRANSDERMAL 24 HOURS TRANSDERMAL
Status: DISCONTINUED | OUTPATIENT
Start: 2022-07-26 | End: 2022-07-25

## 2022-07-25 RX ORDER — SODIUM CHLORIDE 0.9 % (FLUSH) 0.9 %
10 SYRINGE (ML) INJECTION EVERY 12 HOURS SCHEDULED
Status: DISCONTINUED | OUTPATIENT
Start: 2022-07-25 | End: 2022-07-29 | Stop reason: HOSPADM

## 2022-07-25 RX ORDER — IPRATROPIUM BROMIDE AND ALBUTEROL SULFATE 2.5; .5 MG/3ML; MG/3ML
3 SOLUTION RESPIRATORY (INHALATION) ONCE
Status: COMPLETED | OUTPATIENT
Start: 2022-07-25 | End: 2022-07-25

## 2022-07-25 RX ORDER — CHOLECALCIFEROL (VITAMIN D3) 125 MCG
5 CAPSULE ORAL NIGHTLY PRN
Status: DISCONTINUED | OUTPATIENT
Start: 2022-07-25 | End: 2022-07-29 | Stop reason: HOSPADM

## 2022-07-25 RX ORDER — IPRATROPIUM BROMIDE AND ALBUTEROL SULFATE 2.5; .5 MG/3ML; MG/3ML
3 SOLUTION RESPIRATORY (INHALATION)
Status: DISCONTINUED | OUTPATIENT
Start: 2022-07-25 | End: 2022-07-29 | Stop reason: HOSPADM

## 2022-07-25 RX ORDER — MYCOPHENOLATE MOFETIL 250 MG/1
500 CAPSULE ORAL EVERY 12 HOURS SCHEDULED
Status: DISCONTINUED | OUTPATIENT
Start: 2022-07-25 | End: 2022-07-29 | Stop reason: HOSPADM

## 2022-07-25 RX ORDER — NICOTINE POLACRILEX 4 MG
15 LOZENGE BUCCAL
Status: DISCONTINUED | OUTPATIENT
Start: 2022-07-25 | End: 2022-07-29 | Stop reason: HOSPADM

## 2022-07-25 RX ORDER — BISACODYL 10 MG
10 SUPPOSITORY, RECTAL RECTAL DAILY PRN
Status: DISCONTINUED | OUTPATIENT
Start: 2022-07-25 | End: 2022-07-29 | Stop reason: HOSPADM

## 2022-07-25 RX ORDER — ATORVASTATIN CALCIUM 10 MG/1
10 TABLET, FILM COATED ORAL DAILY
Status: DISCONTINUED | OUTPATIENT
Start: 2022-07-26 | End: 2022-07-29 | Stop reason: HOSPADM

## 2022-07-25 RX ORDER — HYDROCODONE BITARTRATE AND ACETAMINOPHEN 10; 325 MG/1; MG/1
1 TABLET ORAL EVERY 6 HOURS PRN
Status: DISCONTINUED | OUTPATIENT
Start: 2022-07-25 | End: 2022-07-29 | Stop reason: HOSPADM

## 2022-07-25 RX ORDER — IPRATROPIUM BROMIDE AND ALBUTEROL SULFATE 2.5; .5 MG/3ML; MG/3ML
3 SOLUTION RESPIRATORY (INHALATION) EVERY 4 HOURS PRN
Status: DISCONTINUED | OUTPATIENT
Start: 2022-07-25 | End: 2022-07-27

## 2022-07-25 RX ORDER — METHYLPREDNISOLONE SODIUM SUCCINATE 40 MG/ML
40 INJECTION, POWDER, LYOPHILIZED, FOR SOLUTION INTRAMUSCULAR; INTRAVENOUS EVERY 8 HOURS
Status: DISCONTINUED | OUTPATIENT
Start: 2022-07-26 | End: 2022-07-27

## 2022-07-25 RX ORDER — ENOXAPARIN SODIUM 100 MG/ML
40 INJECTION SUBCUTANEOUS NIGHTLY
Status: DISCONTINUED | OUTPATIENT
Start: 2022-07-25 | End: 2022-07-29 | Stop reason: HOSPADM

## 2022-07-25 RX ORDER — INSULIN ASPART 100 [IU]/ML
0-14 INJECTION, SOLUTION INTRAVENOUS; SUBCUTANEOUS
Status: DISCONTINUED | OUTPATIENT
Start: 2022-07-26 | End: 2022-07-26

## 2022-07-25 RX ORDER — METHYLPREDNISOLONE SODIUM SUCCINATE 125 MG/2ML
125 INJECTION, POWDER, LYOPHILIZED, FOR SOLUTION INTRAMUSCULAR; INTRAVENOUS ONCE
Status: COMPLETED | OUTPATIENT
Start: 2022-07-25 | End: 2022-07-25

## 2022-07-25 RX ORDER — ONDANSETRON 2 MG/ML
4 INJECTION INTRAMUSCULAR; INTRAVENOUS EVERY 6 HOURS PRN
Status: DISCONTINUED | OUTPATIENT
Start: 2022-07-25 | End: 2022-07-29 | Stop reason: HOSPADM

## 2022-07-25 RX ORDER — BUDESONIDE 0.5 MG/2ML
0.5 INHALANT ORAL
Status: DISCONTINUED | OUTPATIENT
Start: 2022-07-25 | End: 2022-07-29 | Stop reason: HOSPADM

## 2022-07-25 RX ORDER — ACETAMINOPHEN 325 MG/1
650 TABLET ORAL EVERY 4 HOURS PRN
Status: DISCONTINUED | OUTPATIENT
Start: 2022-07-25 | End: 2022-07-29 | Stop reason: HOSPADM

## 2022-07-25 RX ORDER — ALBUTEROL SULFATE 2.5 MG/3ML
2.5 SOLUTION RESPIRATORY (INHALATION)
Status: COMPLETED | OUTPATIENT
Start: 2022-07-25 | End: 2022-07-25

## 2022-07-25 RX ORDER — PYRIDOSTIGMINE BROMIDE 60 MG/1
60 TABLET ORAL EVERY 4 HOURS
Status: DISCONTINUED | OUTPATIENT
Start: 2022-07-25 | End: 2022-07-29 | Stop reason: HOSPADM

## 2022-07-25 RX ORDER — DEXTROSE MONOHYDRATE 25 G/50ML
25 INJECTION, SOLUTION INTRAVENOUS
Status: DISCONTINUED | OUTPATIENT
Start: 2022-07-25 | End: 2022-07-29 | Stop reason: HOSPADM

## 2022-07-25 RX ORDER — ACETAMINOPHEN 650 MG/1
650 SUPPOSITORY RECTAL EVERY 4 HOURS PRN
Status: DISCONTINUED | OUTPATIENT
Start: 2022-07-25 | End: 2022-07-29 | Stop reason: HOSPADM

## 2022-07-25 RX ADMIN — HUMAN INSULIN 10 UNITS: 100 INJECTION, SOLUTION SUBCUTANEOUS at 18:49

## 2022-07-25 RX ADMIN — IPRATROPIUM BROMIDE AND ALBUTEROL SULFATE 3 ML: 2.5; .5 SOLUTION RESPIRATORY (INHALATION) at 17:59

## 2022-07-25 RX ADMIN — ALBUTEROL SULFATE 2.5 MG: 2.5 SOLUTION RESPIRATORY (INHALATION) at 18:05

## 2022-07-25 RX ADMIN — SODIUM CHLORIDE 1000 ML: 9 INJECTION, SOLUTION INTRAVENOUS at 18:49

## 2022-07-25 RX ADMIN — BUDESONIDE 0.5 MG: 0.5 SUSPENSION RESPIRATORY (INHALATION) at 22:13

## 2022-07-25 RX ADMIN — INSULIN DETEMIR 25 UNITS: 100 INJECTION, SOLUTION SUBCUTANEOUS at 23:14

## 2022-07-25 RX ADMIN — HYDROCODONE BITARTRATE AND ACETAMINOPHEN 1 TABLET: 10; 325 TABLET ORAL at 23:13

## 2022-07-25 RX ADMIN — METHYLPREDNISOLONE SODIUM SUCCINATE 125 MG: 125 INJECTION, POWDER, FOR SOLUTION INTRAMUSCULAR; INTRAVENOUS at 17:23

## 2022-07-25 RX ADMIN — MELATONIN TAB 5 MG 5 MG: 5 TAB at 23:13

## 2022-07-25 RX ADMIN — Medication 1 PATCH: at 23:14

## 2022-07-25 RX ADMIN — ALBUTEROL SULFATE 2.5 MG: 2.5 SOLUTION RESPIRATORY (INHALATION) at 18:51

## 2022-07-25 RX ADMIN — Medication 10 ML: at 23:15

## 2022-07-25 RX ADMIN — IPRATROPIUM BROMIDE AND ALBUTEROL SULFATE 3 ML: 2.5; .5 SOLUTION RESPIRATORY (INHALATION) at 22:13

## 2022-07-25 RX ADMIN — METHYLPREDNISOLONE SODIUM SUCCINATE 40 MG: 40 INJECTION, POWDER, FOR SOLUTION INTRAMUSCULAR; INTRAVENOUS at 23:14

## 2022-07-25 RX ADMIN — PYRIDOSTIGMINE BROMIDE 60 MG: 60 TABLET ORAL at 23:13

## 2022-07-25 RX ADMIN — ENOXAPARIN SODIUM 40 MG: 40 INJECTION SUBCUTANEOUS at 23:14

## 2022-07-25 RX ADMIN — MYCOPHENOLATE MOFETIL 500 MG: 250 CAPSULE ORAL at 23:13

## 2022-07-26 LAB
ANION GAP SERPL CALCULATED.3IONS-SCNC: 11.1 MMOL/L (ref 5–15)
BASOPHILS # BLD AUTO: 0.02 10*3/MM3 (ref 0–0.2)
BASOPHILS NFR BLD AUTO: 0.2 % (ref 0–1.5)
BUN SERPL-MCNC: 17 MG/DL (ref 8–23)
BUN/CREAT SERPL: 21.8 (ref 7–25)
CALCIUM SPEC-SCNC: 9.5 MG/DL (ref 8.6–10.5)
CHLORIDE SERPL-SCNC: 98 MMOL/L (ref 98–107)
CO2 SERPL-SCNC: 26.9 MMOL/L (ref 22–29)
CREAT SERPL-MCNC: 0.78 MG/DL (ref 0.76–1.27)
D-LACTATE SERPL-SCNC: 1 MMOL/L (ref 0.5–2)
D-LACTATE SERPL-SCNC: 2.2 MMOL/L (ref 0.5–2)
DEPRECATED RDW RBC AUTO: 45 FL (ref 37–54)
EGFRCR SERPLBLD CKD-EPI 2021: 96.5 ML/MIN/1.73
EOSINOPHIL # BLD AUTO: 0 10*3/MM3 (ref 0–0.4)
EOSINOPHIL NFR BLD AUTO: 0 % (ref 0.3–6.2)
ERYTHROCYTE [DISTWIDTH] IN BLOOD BY AUTOMATED COUNT: 12.5 % (ref 12.3–15.4)
GLUCOSE BLDC GLUCOMTR-MCNC: 213 MG/DL (ref 70–130)
GLUCOSE BLDC GLUCOMTR-MCNC: 315 MG/DL (ref 70–130)
GLUCOSE BLDC GLUCOMTR-MCNC: 436 MG/DL (ref 70–130)
GLUCOSE BLDC GLUCOMTR-MCNC: 95 MG/DL (ref 70–130)
GLUCOSE SERPL-MCNC: 451 MG/DL (ref 65–99)
HCT VFR BLD AUTO: 43.3 % (ref 37.5–51)
HGB BLD-MCNC: 13.8 G/DL (ref 13–17.7)
IMM GRANULOCYTES # BLD AUTO: 0.09 10*3/MM3 (ref 0–0.05)
IMM GRANULOCYTES NFR BLD AUTO: 1 % (ref 0–0.5)
LYMPHOCYTES # BLD AUTO: 0.47 10*3/MM3 (ref 0.7–3.1)
LYMPHOCYTES NFR BLD AUTO: 5.3 % (ref 19.6–45.3)
MCH RBC QN AUTO: 31.4 PG (ref 26.6–33)
MCHC RBC AUTO-ENTMCNC: 31.9 G/DL (ref 31.5–35.7)
MCV RBC AUTO: 98.4 FL (ref 79–97)
MONOCYTES # BLD AUTO: 0.14 10*3/MM3 (ref 0.1–0.9)
MONOCYTES NFR BLD AUTO: 1.6 % (ref 5–12)
NEUTROPHILS NFR BLD AUTO: 8.18 10*3/MM3 (ref 1.7–7)
NEUTROPHILS NFR BLD AUTO: 91.9 % (ref 42.7–76)
NRBC BLD AUTO-RTO: 0 /100 WBC (ref 0–0.2)
PLATELET # BLD AUTO: 156 10*3/MM3 (ref 140–450)
PMV BLD AUTO: 12.4 FL (ref 6–12)
POTASSIUM SERPL-SCNC: 4.8 MMOL/L (ref 3.5–5.2)
PROCALCITONIN SERPL-MCNC: 0.05 NG/ML (ref 0–0.25)
RBC # BLD AUTO: 4.4 10*6/MM3 (ref 4.14–5.8)
SODIUM SERPL-SCNC: 136 MMOL/L (ref 136–145)
WBC NRBC COR # BLD: 8.9 10*3/MM3 (ref 3.4–10.8)

## 2022-07-26 PROCEDURE — 63710000001 INSULIN DETEMIR PER 5 UNITS: Performed by: INTERNAL MEDICINE

## 2022-07-26 PROCEDURE — 83605 ASSAY OF LACTIC ACID: CPT | Performed by: EMERGENCY MEDICINE

## 2022-07-26 PROCEDURE — 80048 BASIC METABOLIC PNL TOTAL CA: CPT | Performed by: INTERNAL MEDICINE

## 2022-07-26 PROCEDURE — 63710000001 INSULIN ASPART PER 5 UNITS: Performed by: INTERNAL MEDICINE

## 2022-07-26 PROCEDURE — 85025 COMPLETE CBC W/AUTO DIFF WBC: CPT | Performed by: INTERNAL MEDICINE

## 2022-07-26 PROCEDURE — 99232 SBSQ HOSP IP/OBS MODERATE 35: CPT | Performed by: INTERNAL MEDICINE

## 2022-07-26 PROCEDURE — 84145 PROCALCITONIN (PCT): CPT | Performed by: INTERNAL MEDICINE

## 2022-07-26 PROCEDURE — G0378 HOSPITAL OBSERVATION PER HR: HCPCS

## 2022-07-26 PROCEDURE — 63710000001 MYCOPHENOLATE MOFETIL PER 250 MG: Performed by: INTERNAL MEDICINE

## 2022-07-26 PROCEDURE — 63710000001 INSULIN REGULAR HUMAN PER 5 UNITS: Performed by: NURSE PRACTITIONER

## 2022-07-26 PROCEDURE — 25010000002 ENOXAPARIN PER 10 MG: Performed by: INTERNAL MEDICINE

## 2022-07-26 PROCEDURE — 94799 UNLISTED PULMONARY SVC/PX: CPT

## 2022-07-26 PROCEDURE — 25010000002 METHYLPREDNISOLONE PER 40 MG: Performed by: INTERNAL MEDICINE

## 2022-07-26 PROCEDURE — 82962 GLUCOSE BLOOD TEST: CPT

## 2022-07-26 PROCEDURE — 94664 DEMO&/EVAL PT USE INHALER: CPT

## 2022-07-26 RX ORDER — INSULIN ASPART 100 [IU]/ML
0-24 INJECTION, SOLUTION INTRAVENOUS; SUBCUTANEOUS
Status: DISCONTINUED | OUTPATIENT
Start: 2022-07-27 | End: 2022-07-29 | Stop reason: HOSPADM

## 2022-07-26 RX ADMIN — IPRATROPIUM BROMIDE AND ALBUTEROL SULFATE 3 ML: 2.5; .5 SOLUTION RESPIRATORY (INHALATION) at 11:49

## 2022-07-26 RX ADMIN — PYRIDOSTIGMINE BROMIDE 60 MG: 60 TABLET ORAL at 12:51

## 2022-07-26 RX ADMIN — METHYLPREDNISOLONE SODIUM SUCCINATE 40 MG: 40 INJECTION, POWDER, FOR SOLUTION INTRAMUSCULAR; INTRAVENOUS at 16:14

## 2022-07-26 RX ADMIN — PYRIDOSTIGMINE BROMIDE 60 MG: 60 TABLET ORAL at 06:00

## 2022-07-26 RX ADMIN — MYCOPHENOLATE MOFETIL 500 MG: 250 CAPSULE ORAL at 21:10

## 2022-07-26 RX ADMIN — PYRIDOSTIGMINE BROMIDE 60 MG: 60 TABLET ORAL at 03:05

## 2022-07-26 RX ADMIN — HYDROCODONE BITARTRATE AND ACETAMINOPHEN 1 TABLET: 10; 325 TABLET ORAL at 22:32

## 2022-07-26 RX ADMIN — PYRIDOSTIGMINE BROMIDE 60 MG: 60 TABLET ORAL at 18:03

## 2022-07-26 RX ADMIN — PYRIDOSTIGMINE BROMIDE 60 MG: 60 TABLET ORAL at 22:28

## 2022-07-26 RX ADMIN — IPRATROPIUM BROMIDE AND ALBUTEROL SULFATE 3 ML: 2.5; .5 SOLUTION RESPIRATORY (INHALATION) at 08:07

## 2022-07-26 RX ADMIN — INSULIN ASPART 10 UNITS: 100 INJECTION, SOLUTION INTRAVENOUS; SUBCUTANEOUS at 18:03

## 2022-07-26 RX ADMIN — BUDESONIDE 0.5 MG: 0.5 SUSPENSION RESPIRATORY (INHALATION) at 08:07

## 2022-07-26 RX ADMIN — INSULIN ASPART 14 UNITS: 100 INJECTION, SOLUTION INTRAVENOUS; SUBCUTANEOUS at 08:57

## 2022-07-26 RX ADMIN — HYDROCODONE BITARTRATE AND ACETAMINOPHEN 1 TABLET: 10; 325 TABLET ORAL at 16:14

## 2022-07-26 RX ADMIN — HUMAN INSULIN 10 UNITS: 100 INJECTION, SOLUTION SUBCUTANEOUS at 21:09

## 2022-07-26 RX ADMIN — INSULIN DETEMIR 35 UNITS: 100 INJECTION, SOLUTION SUBCUTANEOUS at 08:56

## 2022-07-26 RX ADMIN — METHYLPREDNISOLONE SODIUM SUCCINATE 40 MG: 40 INJECTION, POWDER, FOR SOLUTION INTRAMUSCULAR; INTRAVENOUS at 08:58

## 2022-07-26 RX ADMIN — INSULIN DETEMIR 35 UNITS: 100 INJECTION, SOLUTION SUBCUTANEOUS at 21:08

## 2022-07-26 RX ADMIN — ENOXAPARIN SODIUM 40 MG: 40 INJECTION SUBCUTANEOUS at 21:11

## 2022-07-26 RX ADMIN — IPRATROPIUM BROMIDE AND ALBUTEROL SULFATE 3 ML: 2.5; .5 SOLUTION RESPIRATORY (INHALATION) at 20:15

## 2022-07-26 RX ADMIN — Medication 10 ML: at 21:17

## 2022-07-26 RX ADMIN — BUDESONIDE 0.5 MG: 0.5 SUSPENSION RESPIRATORY (INHALATION) at 20:15

## 2022-07-26 RX ADMIN — INSULIN ASPART 15 UNITS: 100 INJECTION, SOLUTION INTRAVENOUS; SUBCUTANEOUS at 06:00

## 2022-07-26 RX ADMIN — ATORVASTATIN CALCIUM 10 MG: 10 TABLET, FILM COATED ORAL at 08:58

## 2022-07-26 RX ADMIN — IPRATROPIUM BROMIDE AND ALBUTEROL SULFATE 3 ML: 2.5; .5 SOLUTION RESPIRATORY (INHALATION) at 15:40

## 2022-07-26 RX ADMIN — HYDROCODONE BITARTRATE AND ACETAMINOPHEN 1 TABLET: 10; 325 TABLET ORAL at 09:05

## 2022-07-26 RX ADMIN — Medication 10 ML: at 08:59

## 2022-07-26 RX ADMIN — Medication 1 PATCH: at 22:30

## 2022-07-26 RX ADMIN — MYCOPHENOLATE MOFETIL 500 MG: 250 CAPSULE ORAL at 08:58

## 2022-07-27 LAB
B PARAPERT DNA SPEC QL NAA+PROBE: NOT DETECTED
B PERT DNA SPEC QL NAA+PROBE: NOT DETECTED
C PNEUM DNA NPH QL NAA+NON-PROBE: NOT DETECTED
FLUAV SUBTYP SPEC NAA+PROBE: NOT DETECTED
FLUBV RNA ISLT QL NAA+PROBE: NOT DETECTED
GLUCOSE BLDC GLUCOMTR-MCNC: 213 MG/DL (ref 70–130)
GLUCOSE BLDC GLUCOMTR-MCNC: 257 MG/DL (ref 70–130)
GLUCOSE BLDC GLUCOMTR-MCNC: 287 MG/DL (ref 70–130)
GLUCOSE BLDC GLUCOMTR-MCNC: 366 MG/DL (ref 70–130)
HADV DNA SPEC NAA+PROBE: NOT DETECTED
HCOV 229E RNA SPEC QL NAA+PROBE: NOT DETECTED
HCOV HKU1 RNA SPEC QL NAA+PROBE: NOT DETECTED
HCOV NL63 RNA SPEC QL NAA+PROBE: NOT DETECTED
HCOV OC43 RNA SPEC QL NAA+PROBE: NOT DETECTED
HMPV RNA NPH QL NAA+NON-PROBE: NOT DETECTED
HPIV1 RNA ISLT QL NAA+PROBE: NOT DETECTED
HPIV2 RNA SPEC QL NAA+PROBE: NOT DETECTED
HPIV3 RNA NPH QL NAA+PROBE: NOT DETECTED
HPIV4 P GENE NPH QL NAA+PROBE: NOT DETECTED
M PNEUMO IGG SER IA-ACNC: NOT DETECTED
RHINOVIRUS RNA SPEC NAA+PROBE: NOT DETECTED
RSV RNA NPH QL NAA+NON-PROBE: DETECTED
SARS-COV-2 RNA NPH QL NAA+NON-PROBE: NOT DETECTED

## 2022-07-27 PROCEDURE — 63710000001 INSULIN DETEMIR PER 5 UNITS: Performed by: HOSPITALIST

## 2022-07-27 PROCEDURE — 25010000002 METHYLPREDNISOLONE PER 125 MG: Performed by: HOSPITALIST

## 2022-07-27 PROCEDURE — 94799 UNLISTED PULMONARY SVC/PX: CPT

## 2022-07-27 PROCEDURE — G0378 HOSPITAL OBSERVATION PER HR: HCPCS

## 2022-07-27 PROCEDURE — 94664 DEMO&/EVAL PT USE INHALER: CPT

## 2022-07-27 PROCEDURE — 99232 SBSQ HOSP IP/OBS MODERATE 35: CPT | Performed by: HOSPITALIST

## 2022-07-27 PROCEDURE — 82962 GLUCOSE BLOOD TEST: CPT

## 2022-07-27 PROCEDURE — 0202U NFCT DS 22 TRGT SARS-COV-2: CPT | Performed by: HOSPITALIST

## 2022-07-27 PROCEDURE — 25010000002 METHYLPREDNISOLONE PER 40 MG: Performed by: INTERNAL MEDICINE

## 2022-07-27 PROCEDURE — 63710000001 INSULIN ASPART PER 5 UNITS: Performed by: NURSE PRACTITIONER

## 2022-07-27 PROCEDURE — 25010000002 ENOXAPARIN PER 10 MG: Performed by: INTERNAL MEDICINE

## 2022-07-27 PROCEDURE — 63710000001 MYCOPHENOLATE MOFETIL PER 250 MG: Performed by: INTERNAL MEDICINE

## 2022-07-27 RX ORDER — LIDOCAINE 50 MG/G
1 PATCH TOPICAL
Status: DISCONTINUED | OUTPATIENT
Start: 2022-07-27 | End: 2022-07-29 | Stop reason: HOSPADM

## 2022-07-27 RX ORDER — METHYLPREDNISOLONE SODIUM SUCCINATE 125 MG/2ML
60 INJECTION, POWDER, LYOPHILIZED, FOR SOLUTION INTRAMUSCULAR; INTRAVENOUS EVERY 8 HOURS
Status: DISCONTINUED | OUTPATIENT
Start: 2022-07-27 | End: 2022-07-28

## 2022-07-27 RX ORDER — IPRATROPIUM BROMIDE AND ALBUTEROL SULFATE 2.5; .5 MG/3ML; MG/3ML
3 SOLUTION RESPIRATORY (INHALATION)
Status: DISCONTINUED | OUTPATIENT
Start: 2022-07-27 | End: 2022-07-29 | Stop reason: HOSPADM

## 2022-07-27 RX ADMIN — INSULIN DETEMIR 40 UNITS: 100 INJECTION, SOLUTION SUBCUTANEOUS at 08:41

## 2022-07-27 RX ADMIN — IPRATROPIUM BROMIDE AND ALBUTEROL SULFATE 3 ML: 2.5; .5 SOLUTION RESPIRATORY (INHALATION) at 11:43

## 2022-07-27 RX ADMIN — INSULIN DETEMIR 45 UNITS: 100 INJECTION, SOLUTION SUBCUTANEOUS at 21:36

## 2022-07-27 RX ADMIN — INSULIN ASPART 8 UNITS: 100 INJECTION, SOLUTION INTRAVENOUS; SUBCUTANEOUS at 12:22

## 2022-07-27 RX ADMIN — HYDROCODONE BITARTRATE AND ACETAMINOPHEN 1 TABLET: 10; 325 TABLET ORAL at 18:54

## 2022-07-27 RX ADMIN — METHYLPREDNISOLONE SODIUM SUCCINATE 40 MG: 40 INJECTION, POWDER, FOR SOLUTION INTRAMUSCULAR; INTRAVENOUS at 00:27

## 2022-07-27 RX ADMIN — ACETAMINOPHEN 650 MG: 325 TABLET ORAL at 14:45

## 2022-07-27 RX ADMIN — PYRIDOSTIGMINE BROMIDE 60 MG: 60 TABLET ORAL at 14:31

## 2022-07-27 RX ADMIN — PYRIDOSTIGMINE BROMIDE 60 MG: 60 TABLET ORAL at 18:54

## 2022-07-27 RX ADMIN — ENOXAPARIN SODIUM 40 MG: 40 INJECTION SUBCUTANEOUS at 20:19

## 2022-07-27 RX ADMIN — BUDESONIDE 0.5 MG: 0.5 SUSPENSION RESPIRATORY (INHALATION) at 20:45

## 2022-07-27 RX ADMIN — METHYLPREDNISOLONE SODIUM SUCCINATE 40 MG: 40 INJECTION, POWDER, FOR SOLUTION INTRAMUSCULAR; INTRAVENOUS at 08:40

## 2022-07-27 RX ADMIN — PYRIDOSTIGMINE BROMIDE 60 MG: 60 TABLET ORAL at 05:38

## 2022-07-27 RX ADMIN — METHYLPREDNISOLONE SODIUM SUCCINATE 60 MG: 125 INJECTION, POWDER, FOR SOLUTION INTRAMUSCULAR; INTRAVENOUS at 16:55

## 2022-07-27 RX ADMIN — MYCOPHENOLATE MOFETIL 500 MG: 250 CAPSULE ORAL at 20:19

## 2022-07-27 RX ADMIN — IPRATROPIUM BROMIDE AND ALBUTEROL SULFATE 3 ML: 2.5; .5 SOLUTION RESPIRATORY (INHALATION) at 15:29

## 2022-07-27 RX ADMIN — IPRATROPIUM BROMIDE AND ALBUTEROL SULFATE 3 ML: 2.5; .5 SOLUTION RESPIRATORY (INHALATION) at 07:33

## 2022-07-27 RX ADMIN — Medication 10 ML: at 20:21

## 2022-07-27 RX ADMIN — PYRIDOSTIGMINE BROMIDE 60 MG: 60 TABLET ORAL at 01:52

## 2022-07-27 RX ADMIN — BUDESONIDE 0.5 MG: 0.5 SUSPENSION RESPIRATORY (INHALATION) at 07:33

## 2022-07-27 RX ADMIN — LIDOCAINE 1 PATCH: 50 PATCH CUTANEOUS at 14:45

## 2022-07-27 RX ADMIN — MYCOPHENOLATE MOFETIL 500 MG: 250 CAPSULE ORAL at 08:38

## 2022-07-27 RX ADMIN — MELATONIN TAB 5 MG 5 MG: 5 TAB at 21:36

## 2022-07-27 RX ADMIN — ATORVASTATIN CALCIUM 10 MG: 10 TABLET, FILM COATED ORAL at 08:38

## 2022-07-27 RX ADMIN — Medication 1 PATCH: at 08:40

## 2022-07-27 RX ADMIN — Medication 10 ML: at 08:41

## 2022-07-27 RX ADMIN — HYDROCODONE BITARTRATE AND ACETAMINOPHEN 1 TABLET: 10; 325 TABLET ORAL at 05:40

## 2022-07-27 RX ADMIN — PYRIDOSTIGMINE BROMIDE 60 MG: 60 TABLET ORAL at 21:35

## 2022-07-27 RX ADMIN — IPRATROPIUM BROMIDE AND ALBUTEROL SULFATE 3 ML: 2.5; .5 SOLUTION RESPIRATORY (INHALATION) at 20:45

## 2022-07-27 RX ADMIN — HYDROCODONE BITARTRATE AND ACETAMINOPHEN 1 TABLET: 10; 325 TABLET ORAL at 12:22

## 2022-07-27 RX ADMIN — PYRIDOSTIGMINE BROMIDE 60 MG: 60 TABLET ORAL at 10:02

## 2022-07-27 RX ADMIN — INSULIN ASPART 20 UNITS: 100 INJECTION, SOLUTION INTRAVENOUS; SUBCUTANEOUS at 16:55

## 2022-07-27 RX ADMIN — INSULIN ASPART 12 UNITS: 100 INJECTION, SOLUTION INTRAVENOUS; SUBCUTANEOUS at 08:39

## 2022-07-28 LAB
ALBUMIN SERPL-MCNC: 4.1 G/DL (ref 3.5–5.2)
ALBUMIN/GLOB SERPL: 1.9 G/DL
ALP SERPL-CCNC: 52 U/L (ref 39–117)
ALT SERPL W P-5'-P-CCNC: 23 U/L (ref 1–41)
ANION GAP SERPL CALCULATED.3IONS-SCNC: 7.7 MMOL/L (ref 5–15)
AST SERPL-CCNC: 17 U/L (ref 1–40)
BILIRUB SERPL-MCNC: 0.3 MG/DL (ref 0–1.2)
BUN SERPL-MCNC: 21 MG/DL (ref 8–23)
BUN/CREAT SERPL: 31.3 (ref 7–25)
CALCIUM SPEC-SCNC: 9.6 MG/DL (ref 8.6–10.5)
CHLORIDE SERPL-SCNC: 95 MMOL/L (ref 98–107)
CO2 SERPL-SCNC: 27.3 MMOL/L (ref 22–29)
CREAT SERPL-MCNC: 0.67 MG/DL (ref 0.76–1.27)
EGFRCR SERPLBLD CKD-EPI 2021: 101.1 ML/MIN/1.73
GLOBULIN UR ELPH-MCNC: 2.2 GM/DL
GLUCOSE BLDC GLUCOMTR-MCNC: 263 MG/DL (ref 70–130)
GLUCOSE BLDC GLUCOMTR-MCNC: 316 MG/DL (ref 70–130)
GLUCOSE BLDC GLUCOMTR-MCNC: 334 MG/DL (ref 70–130)
GLUCOSE BLDC GLUCOMTR-MCNC: 442 MG/DL (ref 70–130)
GLUCOSE BLDC GLUCOMTR-MCNC: 483 MG/DL (ref 70–130)
GLUCOSE SERPL-MCNC: 314 MG/DL (ref 65–99)
POTASSIUM SERPL-SCNC: 4.8 MMOL/L (ref 3.5–5.2)
PROCALCITONIN SERPL-MCNC: 0.06 NG/ML (ref 0–0.25)
PROT SERPL-MCNC: 6.3 G/DL (ref 6–8.5)
SODIUM SERPL-SCNC: 130 MMOL/L (ref 136–145)

## 2022-07-28 PROCEDURE — 94799 UNLISTED PULMONARY SVC/PX: CPT

## 2022-07-28 PROCEDURE — 80053 COMPREHEN METABOLIC PANEL: CPT | Performed by: HOSPITALIST

## 2022-07-28 PROCEDURE — 25010000002 ENOXAPARIN PER 10 MG: Performed by: INTERNAL MEDICINE

## 2022-07-28 PROCEDURE — 63710000001 PREDNISONE PER 1 MG: Performed by: HOSPITALIST

## 2022-07-28 PROCEDURE — 84145 PROCALCITONIN (PCT): CPT | Performed by: HOSPITALIST

## 2022-07-28 PROCEDURE — 82962 GLUCOSE BLOOD TEST: CPT

## 2022-07-28 PROCEDURE — 63710000001 INSULIN ASPART PER 5 UNITS: Performed by: NURSE PRACTITIONER

## 2022-07-28 PROCEDURE — 99232 SBSQ HOSP IP/OBS MODERATE 35: CPT | Performed by: HOSPITALIST

## 2022-07-28 PROCEDURE — 94664 DEMO&/EVAL PT USE INHALER: CPT

## 2022-07-28 PROCEDURE — 25010000002 METHYLPREDNISOLONE PER 125 MG: Performed by: HOSPITALIST

## 2022-07-28 PROCEDURE — 63710000001 INSULIN REGULAR HUMAN PER 5 UNITS: Performed by: NURSE PRACTITIONER

## 2022-07-28 PROCEDURE — 63710000001 MYCOPHENOLATE MOFETIL PER 250 MG: Performed by: INTERNAL MEDICINE

## 2022-07-28 PROCEDURE — 63710000001 INSULIN DETEMIR PER 5 UNITS: Performed by: HOSPITALIST

## 2022-07-28 RX ORDER — PREDNISONE 20 MG/1
20 TABLET ORAL 2 TIMES DAILY WITH MEALS
Status: DISCONTINUED | OUTPATIENT
Start: 2022-07-28 | End: 2022-07-29 | Stop reason: HOSPADM

## 2022-07-28 RX ADMIN — HYDROCODONE BITARTRATE AND ACETAMINOPHEN 1 TABLET: 10; 325 TABLET ORAL at 06:04

## 2022-07-28 RX ADMIN — INSULIN ASPART 12 UNITS: 100 INJECTION, SOLUTION INTRAVENOUS; SUBCUTANEOUS at 12:46

## 2022-07-28 RX ADMIN — INSULIN ASPART 16 UNITS: 100 INJECTION, SOLUTION INTRAVENOUS; SUBCUTANEOUS at 17:43

## 2022-07-28 RX ADMIN — IPRATROPIUM BROMIDE AND ALBUTEROL SULFATE 3 ML: 2.5; .5 SOLUTION RESPIRATORY (INHALATION) at 15:29

## 2022-07-28 RX ADMIN — PYRIDOSTIGMINE BROMIDE 60 MG: 60 TABLET ORAL at 06:04

## 2022-07-28 RX ADMIN — BUDESONIDE 0.5 MG: 0.5 SUSPENSION RESPIRATORY (INHALATION) at 07:33

## 2022-07-28 RX ADMIN — Medication 10 ML: at 08:21

## 2022-07-28 RX ADMIN — PREDNISONE 20 MG: 20 TABLET ORAL at 17:43

## 2022-07-28 RX ADMIN — IPRATROPIUM BROMIDE AND ALBUTEROL SULFATE 3 ML: 2.5; .5 SOLUTION RESPIRATORY (INHALATION) at 11:26

## 2022-07-28 RX ADMIN — MYCOPHENOLATE MOFETIL 500 MG: 250 CAPSULE ORAL at 08:20

## 2022-07-28 RX ADMIN — METHYLPREDNISOLONE SODIUM SUCCINATE 60 MG: 125 INJECTION, POWDER, FOR SOLUTION INTRAMUSCULAR; INTRAVENOUS at 00:11

## 2022-07-28 RX ADMIN — ACETAMINOPHEN 650 MG: 325 TABLET ORAL at 18:47

## 2022-07-28 RX ADMIN — ATORVASTATIN CALCIUM 10 MG: 10 TABLET, FILM COATED ORAL at 08:21

## 2022-07-28 RX ADMIN — IPRATROPIUM BROMIDE AND ALBUTEROL SULFATE 3 ML: 2.5; .5 SOLUTION RESPIRATORY (INHALATION) at 20:33

## 2022-07-28 RX ADMIN — Medication 1 PATCH: at 08:21

## 2022-07-28 RX ADMIN — PYRIDOSTIGMINE BROMIDE 60 MG: 60 TABLET ORAL at 21:09

## 2022-07-28 RX ADMIN — PYRIDOSTIGMINE BROMIDE 60 MG: 60 TABLET ORAL at 17:43

## 2022-07-28 RX ADMIN — LIDOCAINE 1 PATCH: 50 PATCH CUTANEOUS at 08:22

## 2022-07-28 RX ADMIN — INSULIN DETEMIR 40 UNITS: 100 INJECTION, SOLUTION SUBCUTANEOUS at 21:37

## 2022-07-28 RX ADMIN — MYCOPHENOLATE MOFETIL 500 MG: 250 CAPSULE ORAL at 21:09

## 2022-07-28 RX ADMIN — PYRIDOSTIGMINE BROMIDE 60 MG: 60 TABLET ORAL at 02:20

## 2022-07-28 RX ADMIN — BUDESONIDE 0.5 MG: 0.5 SUSPENSION RESPIRATORY (INHALATION) at 20:33

## 2022-07-28 RX ADMIN — PYRIDOSTIGMINE BROMIDE 60 MG: 60 TABLET ORAL at 11:59

## 2022-07-28 RX ADMIN — ENOXAPARIN SODIUM 40 MG: 40 INJECTION SUBCUTANEOUS at 21:13

## 2022-07-28 RX ADMIN — HUMAN INSULIN 10 UNITS: 100 INJECTION, SOLUTION SUBCUTANEOUS at 21:39

## 2022-07-28 RX ADMIN — INSULIN DETEMIR 45 UNITS: 100 INJECTION, SOLUTION SUBCUTANEOUS at 08:22

## 2022-07-28 RX ADMIN — HYDROCODONE BITARTRATE AND ACETAMINOPHEN 1 TABLET: 10; 325 TABLET ORAL at 18:47

## 2022-07-28 RX ADMIN — HYDROCODONE BITARTRATE AND ACETAMINOPHEN 1 TABLET: 10; 325 TABLET ORAL at 00:12

## 2022-07-28 RX ADMIN — METHYLPREDNISOLONE SODIUM SUCCINATE 60 MG: 125 INJECTION, POWDER, FOR SOLUTION INTRAMUSCULAR; INTRAVENOUS at 08:22

## 2022-07-28 RX ADMIN — Medication 10 ML: at 21:10

## 2022-07-28 RX ADMIN — INSULIN ASPART 16 UNITS: 100 INJECTION, SOLUTION INTRAVENOUS; SUBCUTANEOUS at 08:20

## 2022-07-28 RX ADMIN — IPRATROPIUM BROMIDE AND ALBUTEROL SULFATE 3 ML: 2.5; .5 SOLUTION RESPIRATORY (INHALATION) at 07:33

## 2022-07-28 RX ADMIN — HYDROCODONE BITARTRATE AND ACETAMINOPHEN 1 TABLET: 10; 325 TABLET ORAL at 12:47

## 2022-07-29 ENCOUNTER — READMISSION MANAGEMENT (OUTPATIENT)
Dept: CALL CENTER | Facility: HOSPITAL | Age: 72
End: 2022-07-29

## 2022-07-29 VITALS
TEMPERATURE: 97.9 F | DIASTOLIC BLOOD PRESSURE: 71 MMHG | HEIGHT: 74 IN | OXYGEN SATURATION: 92 % | HEART RATE: 80 BPM | BODY MASS INDEX: 34.31 KG/M2 | WEIGHT: 267.3 LBS | SYSTOLIC BLOOD PRESSURE: 122 MMHG | RESPIRATION RATE: 20 BRPM

## 2022-07-29 PROBLEM — J44.1 COPD WITH ACUTE EXACERBATION: Status: RESOLVED | Noted: 2019-03-28 | Resolved: 2022-07-29

## 2022-07-29 PROBLEM — J96.21 ACUTE ON CHRONIC RESPIRATORY FAILURE WITH HYPOXIA (HCC): Status: RESOLVED | Noted: 2022-07-07 | Resolved: 2022-07-29

## 2022-07-29 LAB — GLUCOSE BLDC GLUCOMTR-MCNC: 183 MG/DL (ref 70–130)

## 2022-07-29 PROCEDURE — 63710000001 PREDNISONE PER 1 MG: Performed by: HOSPITALIST

## 2022-07-29 PROCEDURE — 99238 HOSP IP/OBS DSCHRG MGMT 30/<: CPT | Performed by: INTERNAL MEDICINE

## 2022-07-29 PROCEDURE — 94799 UNLISTED PULMONARY SVC/PX: CPT

## 2022-07-29 PROCEDURE — 63710000001 INSULIN DETEMIR PER 5 UNITS: Performed by: HOSPITALIST

## 2022-07-29 PROCEDURE — 82962 GLUCOSE BLOOD TEST: CPT

## 2022-07-29 PROCEDURE — 63710000001 MYCOPHENOLATE MOFETIL PER 250 MG: Performed by: INTERNAL MEDICINE

## 2022-07-29 PROCEDURE — 94664 DEMO&/EVAL PT USE INHALER: CPT

## 2022-07-29 PROCEDURE — 94618 PULMONARY STRESS TESTING: CPT

## 2022-07-29 PROCEDURE — 63710000001 INSULIN ASPART PER 5 UNITS: Performed by: NURSE PRACTITIONER

## 2022-07-29 RX ORDER — METHYLPREDNISOLONE 4 MG/1
1 TABLET ORAL DAILY
Qty: 21 TABLET | Refills: 0 | Status: SHIPPED | OUTPATIENT
Start: 2022-07-29 | End: 2022-09-06

## 2022-07-29 RX ORDER — NICOTINE 21 MG/24HR
1 PATCH, TRANSDERMAL 24 HOURS TRANSDERMAL
Qty: 21 EACH | Refills: 0 | Status: SHIPPED | OUTPATIENT
Start: 2022-07-29

## 2022-07-29 RX ADMIN — Medication 10 ML: at 08:33

## 2022-07-29 RX ADMIN — PYRIDOSTIGMINE BROMIDE 60 MG: 60 TABLET ORAL at 08:31

## 2022-07-29 RX ADMIN — HYDROCODONE BITARTRATE AND ACETAMINOPHEN 1 TABLET: 10; 325 TABLET ORAL at 08:49

## 2022-07-29 RX ADMIN — PYRIDOSTIGMINE BROMIDE 60 MG: 60 TABLET ORAL at 06:22

## 2022-07-29 RX ADMIN — INSULIN DETEMIR 40 UNITS: 100 INJECTION, SOLUTION SUBCUTANEOUS at 08:33

## 2022-07-29 RX ADMIN — LIDOCAINE 1 PATCH: 50 PATCH CUTANEOUS at 08:32

## 2022-07-29 RX ADMIN — MYCOPHENOLATE MOFETIL 500 MG: 250 CAPSULE ORAL at 08:31

## 2022-07-29 RX ADMIN — Medication 1 PATCH: at 08:32

## 2022-07-29 RX ADMIN — ATORVASTATIN CALCIUM 10 MG: 10 TABLET, FILM COATED ORAL at 08:31

## 2022-07-29 RX ADMIN — PYRIDOSTIGMINE BROMIDE 60 MG: 60 TABLET ORAL at 01:17

## 2022-07-29 RX ADMIN — BUDESONIDE 0.5 MG: 0.5 SUSPENSION RESPIRATORY (INHALATION) at 07:09

## 2022-07-29 RX ADMIN — ACETAMINOPHEN 650 MG: 325 TABLET ORAL at 01:17

## 2022-07-29 RX ADMIN — IPRATROPIUM BROMIDE AND ALBUTEROL SULFATE 3 ML: 2.5; .5 SOLUTION RESPIRATORY (INHALATION) at 07:09

## 2022-07-29 RX ADMIN — PREDNISONE 20 MG: 20 TABLET ORAL at 08:31

## 2022-07-29 RX ADMIN — HYDROCODONE BITARTRATE AND ACETAMINOPHEN 1 TABLET: 10; 325 TABLET ORAL at 01:17

## 2022-07-29 RX ADMIN — INSULIN ASPART 4 UNITS: 100 INJECTION, SOLUTION INTRAVENOUS; SUBCUTANEOUS at 08:31

## 2022-07-30 LAB
BACTERIA SPEC AEROBE CULT: NORMAL
BACTERIA SPEC AEROBE CULT: NORMAL

## 2022-07-30 NOTE — OUTREACH NOTE
Prep Survey    Flowsheet Row Responses   [facility] facility patient discharged from? [place]   Is LACE score < 7 ? No   Emergency Room discharge w/ pulse ox? No   Eligibility Readm Mgmt   Discharge diagnosis Acute on chronic respiratory failure with hypoxia secondary to acute exacerbation of COPD   Does the patient have one of the following disease processes/diagnoses(primary or secondary)? COPD/Pneumonia   Does the patient have Home health ordered? No   Is there a DME ordered? No   Prep survey completed? Yes          ANNA BRYAN - Registered Nurse

## 2022-08-03 ENCOUNTER — READMISSION MANAGEMENT (OUTPATIENT)
Dept: CALL CENTER | Facility: HOSPITAL | Age: 72
End: 2022-08-03

## 2022-08-03 NOTE — OUTREACH NOTE
COPD/PN Week 1 Survey    Flowsheet Row Responses   [facility] patient discharged from? [facility]   Does the patient have one of the following disease processes/diagnoses(primary or secondary)? COPD/Pneumonia   Was the primary reason for admission: COPD exacerbation   Week 1 attempt successful? Yes   Call start time 1511   Call end time 1514   Discharge diagnosis Acute on chronic respiratory failure with hypoxia secondary to acute exacerbation of COPD   Meds reviewed with patient/caregiver? Yes   Is the patient having any side effects they believe may be caused by any medication additions or changes? No   Does the patient have all medications ordered at discharge? Yes   Is the patient taking all medications as directed (includes completed medication regime)? Yes   Does the patient have a primary care provider?  Yes   Does the patient have an appointment with their PCP or specialist within 7 days of discharge? Yes   Has the patient kept scheduled appointments due by today? N/A   What DME was ordered? wearing home O2 @2L night and 3L during active day   Pulse Ox monitoring None   Psychosocial issues? No   Comments Pt reports breathing has improved. Aching all over, ongoing issue he reports.    Did the patient receive a copy of their discharge instructions? Yes   Nursing interventions Reviewed instructions with patient   What is the patient's perception of their health status since discharge? Improving   Nursing Interventions Nurse provided patient education   If the patient is a current smoker, are they able to teach back resources for cessation? Smoking cessation medications  [using patches, still smoking 4cig/day now.]   Is the patient/caregiver able to teach back the hierarchy of who to call/visit for symptoms/problems? PCP, Specialist, Home health nurse, Urgent Care, ED, 911 Yes   Patient reports what zone on this call? Yellow Zone   Yellow Zone Increased shortness of air, Unable to complete daily activities,  Fever or feeling like have a chest cold, Increased swelling of ankles   Yellow interventions Use quick relief inhaler as ordered, Continue to use daily medications, Use oxygen as ordered   Week 1 call completed? Yes          SEAN ESPINO - Registered Nurse

## 2022-08-11 ENCOUNTER — READMISSION MANAGEMENT (OUTPATIENT)
Dept: CALL CENTER | Facility: HOSPITAL | Age: 72
End: 2022-08-11

## 2022-08-11 NOTE — OUTREACH NOTE
COPD/PN Week 2 Survey    Flowsheet Row Responses   [facility] facility patient discharged from? [place]   Does the patient have one of the following disease processes/diagnoses(primary or secondary)? COPD/Pneumonia   Was the primary reason for admission: COPD exacerbation   Week 2 attempt successful? No   Unsuccessful attempts Attempt 1   Discharge diagnosis Acute on chronic respiratory failure with hypoxia secondary to acute exacerbation of COPD          SHANEKA JACKSON - Registered Nurse

## 2022-08-17 ENCOUNTER — READMISSION MANAGEMENT (OUTPATIENT)
Dept: CALL CENTER | Facility: HOSPITAL | Age: 72
End: 2022-08-17

## 2022-08-17 NOTE — OUTREACH NOTE
COPD/PN Week 3 Survey    Flowsheet Row Responses   [facility] facility patient discharged from? [facility]   Does the patient have one of the following disease processes/diagnoses(primary or secondary)? COPD/Pneumonia   Was the primary reason for admission: COPD exacerbation   Week 3 attempt successful? No   Unsuccessful attempts Attempt 1          RANDY RAMIRES - Registered Nurse

## 2022-09-04 ENCOUNTER — HOSPITAL ENCOUNTER (EMERGENCY)
Facility: HOSPITAL | Age: 72
Discharge: LEFT BEFORE DISCHARGE | End: 2022-09-04
Attending: EMERGENCY MEDICINE | Admitting: EMERGENCY MEDICINE

## 2022-09-04 ENCOUNTER — APPOINTMENT (OUTPATIENT)
Dept: GENERAL RADIOLOGY | Facility: HOSPITAL | Age: 72
End: 2022-09-04

## 2022-09-04 VITALS
RESPIRATION RATE: 16 BRPM | BODY MASS INDEX: 34.65 KG/M2 | HEIGHT: 74 IN | HEART RATE: 103 BPM | OXYGEN SATURATION: 92 % | DIASTOLIC BLOOD PRESSURE: 70 MMHG | TEMPERATURE: 98 F | SYSTOLIC BLOOD PRESSURE: 126 MMHG | WEIGHT: 270 LBS
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
## 2022-09-04 PROCEDURE — 99282 EMERGENCY DEPT VISIT SF MDM: CPT

## 2022-09-04 NOTE — ED PROVIDER NOTES
Subjective   History of Present Illness  History of Present Illness    Chief complaint: Rib pain    Location: Left side    Quality/Severity: Moderate, sharp    Timing/Onset/Duration: Started last night    Modifying Factors: Worse to take a deep breath and move    Associated Symptoms: No loss of consciousness.  No neck or back pain.  No abdominal pain.  No numbness, tingling, weakness, or change in bladder or bowel function.    Narrative: This 72-year-old male presents with left-sided rib pain after a fall at 3 AM.  Patient has history of COPD and is on oxygen at 2 L/min.  The patient is on aspirin but no other blood thinner.  No loss of consciousness.    PCP:Ney Kathleen MD  Review of Systems     Medication List      CONTINUE taking these medications    BD Pen Needle Yaritza U/F 32G X 4 MM misc  Generic drug: Insulin Pen Needle        ASK your doctor about these medications    albuterol (2.5 MG/3ML) 0.083% nebulizer solution  Commonly known as: PROVENTIL  Take 2.5 mg by nebulization Every 4 (Four) Hours As Needed for Wheezing.     Kd Contour Test test strip  Generic drug: glucose blood     bisacodyl 10 MG suppository  Commonly known as: DULCOLAX  Insert 1 suppository into the rectum Daily.     HYDROcodone-acetaminophen  MG per tablet  Commonly known as: NORCO     * insulin glargine 100 UNIT/ML injection  Commonly known as: LANTUS, SEMGLEE     * insulin glargine 100 UNIT/ML injection  Commonly known as: LANTUS, SEMGLEE     methylPREDNISolone 4 MG dose pack  Commonly known as: MEDROL  Take 1 tablet by mouth Daily. Take as directed on package instructions. EHR states 1 tablet daily, pt to follow package directions.     mycophenolate 500 MG tablet  Commonly known as: CELLCEPT     nicotine 14 MG/24HR patch  Commonly known as: NICODERM CQ  Place 1 patch on the skin as directed by provider Daily.     pyridostigmine 60 MG tablet  Commonly known as: MESTINON     SIMVASTATIN PO     Trelegy Ellipta 100-62.5-25  MCG/INH inhaler  Generic drug: Fluticasone-Umeclidin-Vilant         * This list has 2 medication(s) that are the same as other medications prescribed for you. Read the directions carefully, and ask your doctor or other care provider to review them with you.                Past Medical History:   Diagnosis Date   • Arthritis of back    • Arthritis of neck    • Asthma    • COPD (chronic obstructive pulmonary disease) (formerly [facility])    • Depression    • DM (diabetes mellitus) (formerly [facility])    • GERD (gastroesophageal reflux disease)    • Hip arthrosis    • Hyperlipidemia    • Hypertension    • Knee swelling    • Myasthenia gravis (formerly [facility])    • Periarthritis of shoulder    • Rotator cuff syndrome        No Known Allergies    Past Surgical History:   Procedure Laterality Date   • APPENDECTOMY     • CHOLECYSTECTOMY     • ROTATOR CUFF REPAIR Right        Family History   Problem Relation Age of Onset   • Diabetes Mother    • COPD Father        Social History     Socioeconomic History   • Marital status:    Tobacco Use   • Smoking status: Current Every Day Smoker     Packs/day: 0.50     Years: 58.00     Pack years: 29.00     Types: Cigarettes   • Smokeless tobacco: Never Used   • Tobacco comment: pt asking for nicotine patch   Vaping Use   • Vaping Use: Never used   Substance and Sexual Activity   • Alcohol use: No   • Drug use: No   • Sexual activity: Defer           Objective   Physical Exam  Vitals (The temperature is 98 °F, pulse 106, respirations 16, /70, on 2 L/min the pulse ox is 92%) and nursing note reviewed.   Constitutional:       Appearance: Normal appearance.   HENT:      Head: Normocephalic and atraumatic.      Nose: Nose normal.      Mouth/Throat:      Mouth: Mucous membranes are moist.   Neck:      Comments: There is no tenderness, deformity, or bony step-offs upon palpation the cervical, thoracic, lumbar sacrococcygeal spine.  Cardiovascular:      Rate and Rhythm: Normal rate and regular rhythm.      Heart sounds:  No murmur heard.    No friction rub. No gallop.   Pulmonary:      Effort: Pulmonary effort is normal.      Breath sounds: Normal breath sounds.   Chest:      Chest wall: Tenderness (Left anterior inferior chest wall.  No crepitus.  No deformity.) present.   Abdominal:      General: Bowel sounds are normal. There is no distension.      Palpations: There is no mass.      Tenderness: There is no abdominal tenderness. There is no right CVA tenderness, left CVA tenderness or guarding.      Hernia: No hernia is present.      Comments: Obese   Musculoskeletal:         General: No tenderness. Normal range of motion.      Cervical back: Normal range of motion and neck supple.   Skin:     General: Skin is warm and dry.      Findings: No rash.   Neurological:      General: No focal deficit present.      Mental Status: He is alert and oriented to person, place, and time.      Cranial Nerves: No cranial nerve deficit.      Sensory: No sensory deficit.      Motor: No weakness.         Procedures           ED Course      13:12 EDT, 09/04/22:  The patient eloped prior to receiving the x-ray.                                     MDM    Final diagnoses:   None       ED Disposition  ED Disposition     None          No follow-up provider specified.       Medication List      No changes were made to your prescriptions during this visit.          Mason Ling MD  09/04/22 9729

## 2022-09-21 ENCOUNTER — APPOINTMENT (OUTPATIENT)
Dept: CARDIOLOGY | Facility: HOSPITAL | Age: 72
End: 2022-09-21

## 2022-09-21 ENCOUNTER — APPOINTMENT (OUTPATIENT)
Dept: GENERAL RADIOLOGY | Facility: HOSPITAL | Age: 72
End: 2022-09-21

## 2022-09-21 ENCOUNTER — HOSPITAL ENCOUNTER (OUTPATIENT)
Facility: HOSPITAL | Age: 72
Setting detail: OBSERVATION
Discharge: HOME | End: 2022-09-23
Attending: EMERGENCY MEDICINE | Admitting: HOSPITALIST

## 2022-09-21 DIAGNOSIS — R60.0 PEDAL EDEMA: ICD-10-CM

## 2022-09-21 DIAGNOSIS — J44.1 COPD EXACERBATION: Primary | ICD-10-CM

## 2022-09-21 DIAGNOSIS — E87.20 LACTIC ACIDOSIS: ICD-10-CM

## 2022-09-21 PROBLEM — Z99.81 CHRONIC RESPIRATORY FAILURE WITH HYPOXIA, ON HOME OXYGEN THERAPY: Status: ACTIVE | Noted: 2022-09-21

## 2022-09-21 PROBLEM — J96.21 ACUTE ON CHRONIC RESPIRATORY FAILURE WITH HYPOXIA AND HYPERCAPNIA: Status: ACTIVE | Noted: 2022-09-21

## 2022-09-21 PROBLEM — J96.11 CHRONIC RESPIRATORY FAILURE WITH HYPOXIA, ON HOME OXYGEN THERAPY: Status: ACTIVE | Noted: 2022-09-21

## 2022-09-21 PROBLEM — J96.22 ACUTE ON CHRONIC RESPIRATORY FAILURE WITH HYPOXIA AND HYPERCAPNIA (HCC): Status: ACTIVE | Noted: 2022-09-21

## 2022-09-21 PROBLEM — J18.9 ACUTE PNEUMONIA: Status: ACTIVE | Noted: 2022-09-21

## 2022-09-21 PROBLEM — E66.01 MORBID OBESITY WITH BMI OF 50.0-59.9, ADULT (HCC): Status: ACTIVE | Noted: 2022-09-21

## 2022-09-21 LAB
ALBUMIN SERPL-MCNC: 4.5 G/DL (ref 3.5–5.2)
ALBUMIN/GLOB SERPL: 1.8 G/DL
ALP SERPL-CCNC: 67 U/L (ref 39–117)
ALT SERPL W P-5'-P-CCNC: 14 U/L (ref 1–41)
ANION GAP SERPL CALCULATED.3IONS-SCNC: 10.9 MMOL/L (ref 5–15)
AST SERPL-CCNC: 13 U/L (ref 1–40)
ATMOSPHERIC PRESS: 738 MMHG
B PARAPERT DNA SPEC QL NAA+PROBE: NOT DETECTED
B PERT DNA SPEC QL NAA+PROBE: NOT DETECTED
BASE EXCESS BLDV CALC-SCNC: 4.7 MMOL/L (ref 0–2)
BASOPHILS # BLD AUTO: 0.04 10*3/MM3 (ref 0–0.2)
BASOPHILS NFR BLD AUTO: 0.4 % (ref 0–1.5)
BDY SITE: ABNORMAL
BILIRUB SERPL-MCNC: 0.3 MG/DL (ref 0–1.2)
BODY TEMPERATURE: 37 C
BUN SERPL-MCNC: 12 MG/DL (ref 8–23)
BUN/CREAT SERPL: 16.4 (ref 7–25)
C PNEUM DNA NPH QL NAA+NON-PROBE: NOT DETECTED
CALCIUM SPEC-SCNC: 9.5 MG/DL (ref 8.6–10.5)
CHLORIDE SERPL-SCNC: 103 MMOL/L (ref 98–107)
CO2 SERPL-SCNC: 30.1 MMOL/L (ref 22–29)
CREAT SERPL-MCNC: 0.73 MG/DL (ref 0.76–1.27)
D DIMER PPP FEU-MCNC: 0.41 MCGFEU/ML (ref 0–0.46)
D-LACTATE SERPL-SCNC: 2 MMOL/L (ref 0.5–2)
D-LACTATE SERPL-SCNC: 2.2 MMOL/L (ref 0.5–2)
DEPRECATED RDW RBC AUTO: 51 FL (ref 37–54)
EGFRCR SERPLBLD CKD-EPI 2021: 97.9 ML/MIN/1.73
EOSINOPHIL # BLD AUTO: 0.01 10*3/MM3 (ref 0–0.4)
EOSINOPHIL NFR BLD AUTO: 0.1 % (ref 0.3–6.2)
ERYTHROCYTE [DISTWIDTH] IN BLOOD BY AUTOMATED COUNT: 13.3 % (ref 12.3–15.4)
FLUAV SUBTYP SPEC NAA+PROBE: NOT DETECTED
FLUBV RNA ISLT QL NAA+PROBE: NOT DETECTED
GAS FLOW AIRWAY: 2 LPM
GLOBULIN UR ELPH-MCNC: 2.5 GM/DL
GLUCOSE BLDC GLUCOMTR-MCNC: 266 MG/DL (ref 70–130)
GLUCOSE SERPL-MCNC: 95 MG/DL (ref 65–99)
HADV DNA SPEC NAA+PROBE: NOT DETECTED
HBA1C MFR BLD: 8 % (ref 4.8–5.6)
HCO3 BLDV-SCNC: 31.6 MMOL/L (ref 22–28)
HCOV 229E RNA SPEC QL NAA+PROBE: NOT DETECTED
HCOV HKU1 RNA SPEC QL NAA+PROBE: NOT DETECTED
HCOV NL63 RNA SPEC QL NAA+PROBE: NOT DETECTED
HCOV OC43 RNA SPEC QL NAA+PROBE: NOT DETECTED
HCT VFR BLD AUTO: 44.8 % (ref 37.5–51)
HGB BLD-MCNC: 13.6 G/DL (ref 13–17.7)
HGB BLDA-MCNC: 13.2 G/DL (ref 14–18)
HMPV RNA NPH QL NAA+NON-PROBE: NOT DETECTED
HPIV1 RNA ISLT QL NAA+PROBE: NOT DETECTED
HPIV2 RNA SPEC QL NAA+PROBE: NOT DETECTED
HPIV3 RNA NPH QL NAA+PROBE: NOT DETECTED
HPIV4 P GENE NPH QL NAA+PROBE: NOT DETECTED
IMM GRANULOCYTES # BLD AUTO: 0.09 10*3/MM3 (ref 0–0.05)
IMM GRANULOCYTES NFR BLD AUTO: 0.9 % (ref 0–0.5)
LYMPHOCYTES # BLD AUTO: 0.6 10*3/MM3 (ref 0.7–3.1)
LYMPHOCYTES NFR BLD AUTO: 5.9 % (ref 19.6–45.3)
Lab: ABNORMAL
M PNEUMO IGG SER IA-ACNC: NOT DETECTED
MCH RBC QN AUTO: 30.6 PG (ref 26.6–33)
MCHC RBC AUTO-ENTMCNC: 30.4 G/DL (ref 31.5–35.7)
MCV RBC AUTO: 100.7 FL (ref 79–97)
MODALITY: ABNORMAL
MONOCYTES # BLD AUTO: 0.41 10*3/MM3 (ref 0.1–0.9)
MONOCYTES NFR BLD AUTO: 4 % (ref 5–12)
NEUTROPHILS NFR BLD AUTO: 88.7 % (ref 42.7–76)
NEUTROPHILS NFR BLD AUTO: 9.06 10*3/MM3 (ref 1.7–7)
NT-PROBNP SERPL-MCNC: 47.2 PG/ML (ref 0–900)
PCO2 BLDV: 56.1 MM HG (ref 41–51)
PH BLDV: 7.36 PH UNITS (ref 7.32–7.42)
PLATELET # BLD AUTO: 244 10*3/MM3 (ref 140–450)
PMV BLD AUTO: 11.1 FL (ref 6–12)
PO2 BLDV: 44.3 MM HG (ref 27–53)
POTASSIUM SERPL-SCNC: 3.8 MMOL/L (ref 3.5–5.2)
PROCALCITONIN SERPL-MCNC: 0.06 NG/ML (ref 0–0.25)
PROT SERPL-MCNC: 7 G/DL (ref 6–8.5)
QT INTERVAL: 328 MS
RBC # BLD AUTO: 4.45 10*6/MM3 (ref 4.14–5.8)
RHINOVIRUS RNA SPEC NAA+PROBE: NOT DETECTED
RSV RNA NPH QL NAA+NON-PROBE: NOT DETECTED
SAO2 % BLDCOV: 80.8 % (ref 45–75)
SARS-COV-2 RNA NPH QL NAA+NON-PROBE: NOT DETECTED
SODIUM SERPL-SCNC: 144 MMOL/L (ref 136–145)
TROPONIN T SERPL-MCNC: <0.01 NG/ML (ref 0–0.03)
TROPONIN T SERPL-MCNC: <0.01 NG/ML (ref 0–0.03)
VENTILATOR MODE: ABNORMAL
WBC NRBC COR # BLD: 10.21 10*3/MM3 (ref 3.4–10.8)

## 2022-09-21 PROCEDURE — 25010000002 METHYLPREDNISOLONE PER 125 MG: Performed by: EMERGENCY MEDICINE

## 2022-09-21 PROCEDURE — 94761 N-INVAS EAR/PLS OXIMETRY MLT: CPT

## 2022-09-21 PROCEDURE — 93306 TTE W/DOPPLER COMPLETE: CPT | Performed by: INTERNAL MEDICINE

## 2022-09-21 PROCEDURE — 94799 UNLISTED PULMONARY SVC/PX: CPT

## 2022-09-21 PROCEDURE — G0378 HOSPITAL OBSERVATION PER HR: HCPCS

## 2022-09-21 PROCEDURE — 36415 COLL VENOUS BLD VENIPUNCTURE: CPT

## 2022-09-21 PROCEDURE — 25010000002 FUROSEMIDE PER 20 MG: Performed by: EMERGENCY MEDICINE

## 2022-09-21 PROCEDURE — 71045 X-RAY EXAM CHEST 1 VIEW: CPT

## 2022-09-21 PROCEDURE — 82803 BLOOD GASES ANY COMBINATION: CPT

## 2022-09-21 PROCEDURE — 83036 HEMOGLOBIN GLYCOSYLATED A1C: CPT | Performed by: HOSPITALIST

## 2022-09-21 PROCEDURE — 96375 TX/PRO/DX INJ NEW DRUG ADDON: CPT

## 2022-09-21 PROCEDURE — 80053 COMPREHEN METABOLIC PANEL: CPT | Performed by: EMERGENCY MEDICINE

## 2022-09-21 PROCEDURE — 63710000001 PREDNISONE PER 1 MG: Performed by: NURSE PRACTITIONER

## 2022-09-21 PROCEDURE — 63710000001 INSULIN ASPART PER 5 UNITS: Performed by: HOSPITALIST

## 2022-09-21 PROCEDURE — 94640 AIRWAY INHALATION TREATMENT: CPT

## 2022-09-21 PROCEDURE — 84484 ASSAY OF TROPONIN QUANT: CPT | Performed by: HOSPITALIST

## 2022-09-21 PROCEDURE — 96374 THER/PROPH/DIAG INJ IV PUSH: CPT

## 2022-09-21 PROCEDURE — 84484 ASSAY OF TROPONIN QUANT: CPT | Performed by: EMERGENCY MEDICINE

## 2022-09-21 PROCEDURE — 85379 FIBRIN DEGRADATION QUANT: CPT | Performed by: NURSE PRACTITIONER

## 2022-09-21 PROCEDURE — 82962 GLUCOSE BLOOD TEST: CPT

## 2022-09-21 PROCEDURE — 93306 TTE W/DOPPLER COMPLETE: CPT

## 2022-09-21 PROCEDURE — 85025 COMPLETE CBC W/AUTO DIFF WBC: CPT | Performed by: EMERGENCY MEDICINE

## 2022-09-21 PROCEDURE — 83880 ASSAY OF NATRIURETIC PEPTIDE: CPT | Performed by: EMERGENCY MEDICINE

## 2022-09-21 PROCEDURE — 87040 BLOOD CULTURE FOR BACTERIA: CPT | Performed by: EMERGENCY MEDICINE

## 2022-09-21 PROCEDURE — 93005 ELECTROCARDIOGRAM TRACING: CPT | Performed by: EMERGENCY MEDICINE

## 2022-09-21 PROCEDURE — 0202U NFCT DS 22 TRGT SARS-COV-2: CPT | Performed by: EMERGENCY MEDICINE

## 2022-09-21 PROCEDURE — 94664 DEMO&/EVAL PT USE INHALER: CPT

## 2022-09-21 PROCEDURE — 93010 ELECTROCARDIOGRAM REPORT: CPT | Performed by: INTERNAL MEDICINE

## 2022-09-21 PROCEDURE — 83605 ASSAY OF LACTIC ACID: CPT | Performed by: EMERGENCY MEDICINE

## 2022-09-21 PROCEDURE — 84145 PROCALCITONIN (PCT): CPT | Performed by: EMERGENCY MEDICINE

## 2022-09-21 PROCEDURE — 99220 PR INITIAL OBSERVATION CARE/DAY 70 MINUTES: CPT | Performed by: NURSE PRACTITIONER

## 2022-09-21 PROCEDURE — 25010000002 PERFLUTREN (DEFINITY) 8.476 MG IN SODIUM CHLORIDE (PF) 0.9 % 10 ML INJECTION: Performed by: HOSPITALIST

## 2022-09-21 PROCEDURE — 99284 EMERGENCY DEPT VISIT MOD MDM: CPT

## 2022-09-21 RX ORDER — METHYLPREDNISOLONE SODIUM SUCCINATE 125 MG/2ML
125 INJECTION, POWDER, LYOPHILIZED, FOR SOLUTION INTRAMUSCULAR; INTRAVENOUS ONCE
Status: COMPLETED | OUTPATIENT
Start: 2022-09-21 | End: 2022-09-21

## 2022-09-21 RX ORDER — DEXTROSE MONOHYDRATE 25 G/50ML
25 INJECTION, SOLUTION INTRAVENOUS
Status: DISCONTINUED | OUTPATIENT
Start: 2022-09-21 | End: 2022-09-23 | Stop reason: HOSPADM

## 2022-09-21 RX ORDER — BUDESONIDE AND FORMOTEROL FUMARATE DIHYDRATE 160; 4.5 UG/1; UG/1
2 AEROSOL RESPIRATORY (INHALATION)
Status: DISCONTINUED | OUTPATIENT
Start: 2022-09-21 | End: 2022-09-22

## 2022-09-21 RX ORDER — LISINOPRIL 10 MG/1
10 TABLET ORAL
Status: DISCONTINUED | OUTPATIENT
Start: 2022-09-21 | End: 2022-09-23 | Stop reason: HOSPADM

## 2022-09-21 RX ORDER — ALBUTEROL SULFATE 2.5 MG/3ML
2.5 SOLUTION RESPIRATORY (INHALATION) ONCE
Status: COMPLETED | OUTPATIENT
Start: 2022-09-21 | End: 2022-09-21

## 2022-09-21 RX ORDER — NICOTINE POLACRILEX 4 MG
15 LOZENGE BUCCAL
Status: DISCONTINUED | OUTPATIENT
Start: 2022-09-21 | End: 2022-09-23 | Stop reason: HOSPADM

## 2022-09-21 RX ORDER — INSULIN ASPART 100 [IU]/ML
0-7 INJECTION, SOLUTION INTRAVENOUS; SUBCUTANEOUS
Status: DISCONTINUED | OUTPATIENT
Start: 2022-09-21 | End: 2022-09-22

## 2022-09-21 RX ORDER — BENZONATATE 100 MG/1
200 CAPSULE ORAL 3 TIMES DAILY PRN
Status: DISCONTINUED | OUTPATIENT
Start: 2022-09-21 | End: 2022-09-23 | Stop reason: HOSPADM

## 2022-09-21 RX ORDER — INSULIN ASPART 100 [IU]/ML
0-24 INJECTION, SOLUTION INTRAVENOUS; SUBCUTANEOUS
Status: DISCONTINUED | OUTPATIENT
Start: 2022-09-21 | End: 2022-09-23 | Stop reason: HOSPADM

## 2022-09-21 RX ORDER — GUAIFENESIN 600 MG/1
1200 TABLET, EXTENDED RELEASE ORAL 2 TIMES DAILY
Status: DISCONTINUED | OUTPATIENT
Start: 2022-09-21 | End: 2022-09-21

## 2022-09-21 RX ORDER — FAMOTIDINE 20 MG/1
20 TABLET, FILM COATED ORAL
Status: DISCONTINUED | OUTPATIENT
Start: 2022-09-21 | End: 2022-09-23 | Stop reason: HOSPADM

## 2022-09-21 RX ORDER — IPRATROPIUM BROMIDE AND ALBUTEROL SULFATE 2.5; .5 MG/3ML; MG/3ML
3 SOLUTION RESPIRATORY (INHALATION)
Status: DISCONTINUED | OUTPATIENT
Start: 2022-09-21 | End: 2022-09-21

## 2022-09-21 RX ORDER — PREDNISONE 20 MG/1
40 TABLET ORAL
Status: DISCONTINUED | OUTPATIENT
Start: 2022-09-21 | End: 2022-09-23 | Stop reason: HOSPADM

## 2022-09-21 RX ORDER — FUROSEMIDE 10 MG/ML
40 INJECTION INTRAMUSCULAR; INTRAVENOUS ONCE
Status: COMPLETED | OUTPATIENT
Start: 2022-09-21 | End: 2022-09-21

## 2022-09-21 RX ORDER — NICOTINE 21 MG/24HR
1 PATCH, TRANSDERMAL 24 HOURS TRANSDERMAL
Status: DISCONTINUED | OUTPATIENT
Start: 2022-09-21 | End: 2022-09-22

## 2022-09-21 RX ORDER — IPRATROPIUM BROMIDE AND ALBUTEROL SULFATE 2.5; .5 MG/3ML; MG/3ML
3 SOLUTION RESPIRATORY (INHALATION)
Status: DISCONTINUED | OUTPATIENT
Start: 2022-09-21 | End: 2022-09-23 | Stop reason: HOSPADM

## 2022-09-21 RX ADMIN — PREDNISONE 40 MG: 20 TABLET ORAL at 18:34

## 2022-09-21 RX ADMIN — INSULIN ASPART 12 UNITS: 100 INJECTION, SOLUTION INTRAVENOUS; SUBCUTANEOUS at 18:35

## 2022-09-21 RX ADMIN — IPRATROPIUM BROMIDE AND ALBUTEROL SULFATE 3 ML: 2.5; .5 SOLUTION RESPIRATORY (INHALATION) at 19:31

## 2022-09-21 RX ADMIN — FAMOTIDINE 20 MG: 20 TABLET ORAL at 18:34

## 2022-09-21 RX ADMIN — SODIUM CHLORIDE 2 ML: 9 INJECTION INTRAMUSCULAR; INTRAVENOUS; SUBCUTANEOUS at 18:20

## 2022-09-21 RX ADMIN — IPRATROPIUM BROMIDE AND ALBUTEROL SULFATE 3 ML: 2.5; .5 SOLUTION RESPIRATORY (INHALATION) at 23:11

## 2022-09-21 RX ADMIN — METHYLPREDNISOLONE SODIUM SUCCINATE 125 MG: 125 INJECTION, POWDER, FOR SOLUTION INTRAMUSCULAR; INTRAVENOUS at 11:53

## 2022-09-21 RX ADMIN — NICOTINE 1 PATCH: 21 PATCH, EXTENDED RELEASE TRANSDERMAL at 18:34

## 2022-09-21 RX ADMIN — ALBUTEROL SULFATE 2.5 MG: 2.5 SOLUTION RESPIRATORY (INHALATION) at 10:57

## 2022-09-21 RX ADMIN — BUDESONIDE AND FORMOTEROL FUMARATE DIHYDRATE 2 PUFF: 160; 4.5 AEROSOL RESPIRATORY (INHALATION) at 19:31

## 2022-09-21 RX ADMIN — IPRATROPIUM BROMIDE AND ALBUTEROL SULFATE 3 ML: .5; 2.5 SOLUTION RESPIRATORY (INHALATION) at 15:34

## 2022-09-21 RX ADMIN — LISINOPRIL 10 MG: 10 TABLET ORAL at 18:34

## 2022-09-21 RX ADMIN — FUROSEMIDE 40 MG: 10 INJECTION, SOLUTION INTRAMUSCULAR; INTRAVENOUS at 10:50

## 2022-09-21 NOTE — PLAN OF CARE
Goal Outcome Evaluation:  Plan of Care Reviewed With: patient        Progress: no change  Outcome Evaluation: NP ER ADMIT FOR SOA PT ON 2 L O2 AT HOME AND IN PT VIA NC. PT SITTING ON BEDSIDE. SOA ON EXERCION. WILL CONTINUE TO MONITOR

## 2022-09-21 NOTE — ED NOTES
Pt refuses to lie in bed, insists on sitting on side of bed, have replaced monitoring equipment multiple times, instructed pt to keep mask on and to keep monitoring equipment in place

## 2022-09-21 NOTE — ED PROVIDER NOTES
Subjective   History of Present Illness  Patient with history of COPD on home O2 at night was referred to the emergency department by primary care due to 1 week of lower extremity swelling and progressive shortness of breath.  No fever.  Patient reports some chest tightness with breathing.  He was noted to be hypoxic and tachypneic at outpatient setting was referred to the emergency department.  He denies being on diuretic.  He denies ill contacts.  Patient states he used 3 nebulizer treatments this morning without improvement.    History provided by:  Patient  Shortness of Breath  Severity:  Moderate  Onset quality:  Gradual  Duration:  7 days  Timing:  Constant  Progression:  Worsening  Chronicity:  New  Relieved by:  Nothing  Worsened by:  Exertion and coughing  Associated symptoms: cough and wheezing    Associated symptoms: no chest pain, no fever and no sore throat        Review of Systems   Constitutional: Negative.  Negative for chills and fever.   HENT: Negative.  Negative for sore throat.    Respiratory: Positive for cough, shortness of breath and wheezing.    Cardiovascular: Positive for leg swelling. Negative for chest pain.   Gastrointestinal: Negative.    Endocrine: Negative.    Genitourinary: Negative.    Musculoskeletal: Negative.    Skin: Negative.    Allergic/Immunologic: Negative.    Neurological: Negative.    Hematological: Negative.    Psychiatric/Behavioral: Negative.  Negative for suicidal ideas.   All other systems reviewed and are negative.      Past Medical History:   Diagnosis Date   • Arthritis of back    • Arthritis of neck    • Asthma    • COPD (chronic obstructive pulmonary disease) ([facility])    • Depression    • DM (diabetes mellitus) ([facility])    • GERD (gastroesophageal reflux disease)    • Hip arthrosis    • Hyperlipidemia    • Hypertension    • Knee swelling    • Myasthenia gravis ([facility])    • Periarthritis of shoulder    • Rotator cuff syndrome        No Known Allergies    Past Surgical  History:   Procedure Laterality Date   • APPENDECTOMY     • CHOLECYSTECTOMY     • ROTATOR CUFF REPAIR Right        Family History   Problem Relation Age of Onset   • Diabetes Mother    • COPD Father        Social History     Socioeconomic History   • Marital status:    Tobacco Use   • Smoking status: Current Every Day Smoker     Packs/day: 0.50     Years: 58.00     Pack years: 29.00     Types: Cigarettes   • Smokeless tobacco: Never Used   • Tobacco comment: pt asking for nicotine patch   Vaping Use   • Vaping Use: Never used   Substance and Sexual Activity   • Alcohol use: No   • Drug use: No   • Sexual activity: Defer           Objective   Physical Exam  Vitals and nursing note reviewed.   Constitutional:       Appearance: Normal appearance.   HENT:      Head: Normocephalic and atraumatic.      Right Ear: External ear normal.      Left Ear: External ear normal.      Nose: Nose normal.      Mouth/Throat:      Mouth: Mucous membranes are dry.   Eyes:      Extraocular Movements: Extraocular movements intact.      Pupils: Pupils are equal, round, and reactive to light.   Cardiovascular:      Rate and Rhythm: Regular rhythm. Tachycardia present.   Pulmonary:      Effort: Tachypnea, accessory muscle usage and respiratory distress present.      Breath sounds: Wheezing and rales present.   Abdominal:      General: Abdomen is flat.      Palpations: Abdomen is soft.   Musculoskeletal:         General: Normal range of motion.      Cervical back: Normal range of motion and neck supple.      Right lower leg: Edema present.      Left lower leg: Edema present.   Skin:     General: Skin is warm and dry.   Neurological:      General: No focal deficit present.      Mental Status: He is alert and oriented to person, place, and time.   Psychiatric:         Mood and Affect: Mood normal.         Behavior: Behavior normal.         Procedures           ED Course  Sinus tachycardia  Normal rate.  Normal axis.  Normal  intervals.  Normal ST segments.  Normal T waves.  Normal EKG.  Wandering baseline      Lab Results (last 24 hours)     Procedure Component Value Units Date/Time    CBC & Differential [[id number]]  (Abnormal) Collected: 09/21/22 1037    Specimen: Blood Updated: 09/21/22 1044    Narrative:      The following orders were created for panel order CBC & Differential.  Procedure                               Abnormality         Status                     ---------                               -----------         ------                     CBC Auto Differential[[id number]]        Abnormal            Final result                 Please view results for these tests on the individual orders.    Comprehensive Metabolic Panel [[id number]]  (Abnormal) Collected: 09/21/22 1037    Specimen: Blood Updated: 09/21/22 1105     Glucose 95 mg/dL      BUN 12 mg/dL      Creatinine 0.73 mg/dL      Sodium 144 mmol/L      Potassium 3.8 mmol/L      Chloride 103 mmol/L      CO2 30.1 mmol/L      Calcium 9.5 mg/dL      Total Protein 7.0 g/dL      Albumin 4.50 g/dL      ALT (SGPT) 14 U/L      AST (SGOT) 13 U/L      Alkaline Phosphatase 67 U/L      Total Bilirubin 0.3 mg/dL      Globulin 2.5 gm/dL      A/G Ratio 1.8 g/dL      BUN/Creatinine Ratio 16.4     Anion Gap 10.9 mmol/L      eGFR 97.9 mL/min/1.73      Comment: National Kidney Foundation and American Society of Nephrology (ASN) Task Force recommended calculation based on the Chronic Kidney Disease Epidemiology Collaboration (CKD-EPI) equation refit without adjustment for race.       Narrative:      GFR Normal >60  Chronic Kidney Disease <60  Kidney Failure <15      Troponin [[id number]]  (Normal) Collected: 09/21/22 1037    Specimen: Blood Updated: 09/21/22 1108     Troponin T <0.010 ng/mL     Narrative:      Troponin T Reference Range:  <= 0.03 ng/mL-   Negative for AMI  >0.03 ng/mL-     Abnormal for myocardial necrosis.  Clinicians would have to utilize clinical acumen, EKG, Troponin and  serial changes to determine if it is an Acute Myocardial Infarction or myocardial injury due to an underlying chronic condition.       Results may be falsely decreased if patient taking Biotin.      BNP [[id number]]  (Normal) Collected: 09/21/22 1037    Specimen: Blood Updated: 09/21/22 1129     proBNP 47.2 pg/mL     Narrative:      Among patients with dyspnea, NT-proBNP is highly sensitive for the detection of acute congestive heart failure. In addition NT-proBNP of <300 pg/ml effectively rules out acute congestive heart failure with 99% negative predictive value.    Results may be falsely decreased if patient taking Biotin.      CBC Auto Differential [[id number]]  (Abnormal) Collected: 09/21/22 1037    Specimen: Blood Updated: 09/21/22 1044     WBC 10.21 10*3/mm3      RBC 4.45 10*6/mm3      Hemoglobin 13.6 g/dL      Hematocrit 44.8 %      .7 fL      MCH 30.6 pg      MCHC 30.4 g/dL      RDW 13.3 %      RDW-SD 51.0 fl      MPV 11.1 fL      Platelets 244 10*3/mm3      Neutrophil % 88.7 %      Lymphocyte % 5.9 %      Monocyte % 4.0 %      Eosinophil % 0.1 %      Basophil % 0.4 %      Immature Grans % 0.9 %      Neutrophils, Absolute 9.06 10*3/mm3      Lymphocytes, Absolute 0.60 10*3/mm3      Monocytes, Absolute 0.41 10*3/mm3      Eosinophils, Absolute 0.01 10*3/mm3      Basophils, Absolute 0.04 10*3/mm3      Immature Grans, Absolute 0.09 10*3/mm3     Procalcitonin [[id number]]  (Normal) Collected: 09/21/22 1037    Specimen: Blood Updated: 09/21/22 1221     Procalcitonin 0.06 ng/mL     Narrative:      As a Marker for Sepsis (Non-Neonates):    1. <0.5 ng/mL represents a low risk of severe sepsis and/or septic shock.  2. >2 ng/mL represents a high risk of severe sepsis and/or septic shock.    As a Marker for Lower Respiratory Tract Infections that require antibiotic therapy:    PCT on Admission    Antibiotic Therapy       6-12 Hrs later    >0.5                Strongly Recommended  >0.25 - <0.5        Recommended  "  0.1 - 0.25          Discouraged              Remeasure/reassess PCT  <0.1                Strongly Discouraged     Remeasure/reassess PCT    As 28 day mortality risk marker: \"Change in Procalcitonin Result\" (>80% or <=80%) if Day 0 (or Day 1) and Day 4 values are available. Refer to [email or web address]    Change in PCT <=80%  A decrease of PCT levels below or equal to 80% defines a positive change in PCT test result representing a higher risk for 28-day all-cause mortality of patients diagnosed with severe sepsis for septic shock.    Change in PCT >80%  A decrease of PCT levels of more than 80% defines a negative change in PCT result representing a lower risk for 28-day all-cause mortality of patients diagnosed with severe sepsis or septic shock.       Lactic Acid, Plasma [[id number]]  (Abnormal) Collected: 09/21/22 1050    Specimen: Blood Updated: 09/21/22 1142     Lactate 2.2 mmol/L     Blood Gas, Venous - [[id number]]  (Abnormal) Collected: 09/21/22 1100    Specimen: Venous Blood Updated: 09/21/22 1105     Site OTHER     pH, Venous 7.360 pH Units      pCO2, Venous 56.1 mm Hg      Comment: 83 Value above reference range        pO2, Venous 44.3 mm Hg      HCO3, Venous 31.6 mmol/L      Comment: 83 Value above reference range        Base Excess, Venous 4.7 mmol/L      Comment: 83 Value above reference range        O2 Saturation, Venous 80.8 %      Comment: 83 Value above reference range        Hemoglobin, Blood Gas 13.2 g/dL      Comment: 84 Value below reference range        Temperature 37.0 C      Barometric Pressure for Blood Gas 738 mmHg      Modality Nasal Cannula     Flow Rate 2.0 lpm      Ventilator Mode STANDBY     Collected by [id number]     Comment: Meter: [id number]     :  [id number]           XR Chest 1 View   Final Result   Mild infiltrate or atelectasis in the right lung base.       This report was finalized on 9/21/2022 11:41 AM by Dr. Jose Mcrae MD.                    "                              MDM   Patient improved after neb treatment and diuresing greater than 600 mL after Lasix.  Tachypnea improving.  Patient's oxygen saturations continued to be 90 to 91% on his home 3 L.  Evidence of infiltrate versus atelectasis.  Discussed with hospitalist regarding admission.    Final diagnoses:   COPD exacerbation (HCC)   Pedal edema   Lactic acidosis       ED Disposition  ED Disposition     ED Disposition   Decision to Admit    Condition   --    Comment   Level of Care: Telemetry [5]   Diagnosis: Acute pneumonia [[id number]]   Admitting Physician: AMIRAH BRYANT [[id number]]   Attending Physician: AMIRAH BRYANT [[id number]]               No follow-up provider specified.       Medication List      No changes were made to your prescriptions during this visit.             Fransisco Zhu MD  09/21/22 1154       Fransisco Zhu MD  09/21/22 1224

## 2022-09-21 NOTE — H&P
[facility] HOSPITALIST     Ney Kathleen MD    CHIEF COMPLAINT: SOA    HISTORY OF PRESENT ILLNESS  The patient is a 72-year-old male who presented to ER on advice from Dr. Koko Gr this morning secondary to 1 week of lower extremity edema and increasing shortness of air.  He notes chronic wheezing has worsened and using nebulizer treatments 5-6 times per day, increased his oxygen from 2 to 3 L without much effect.  He reports that he has not been able to smoke his usual 5 to 6 cigarettes daily, ran out of nicotine patches and therefore resumed smoking.  He notes that he is able to lay down to sleep has, has been diagnosed with CHIQUITA but is intolerant of CPAP.  He notes poor exercise tolerance at his baseline.  Room air saturation was 88% and the patient was placed on his baseline 2 L chronic with saturations in the low 90s.    Diagnostics in ER showed mild infiltrate or atelectasis right lower lobe, lactate 2.2, repeat 2.0.  ABG PCO2 56.1, A1c 8%, troponin negative, procalcitonin normal, proBNP normal.      He received 40 mg IV Lasix, Solu-Medrol 125 mg, albuterol neb in ER    oxygen dependent COPD, DM2 in obese with hyperglycemia, myasthenia gravis, chronic pain with chronic narcotic induced constipation, HTN, GERD, hyperlipidemia, BPH, tobacco abuse    He currently denies f/c/headache/rhinorrhea/nasal congestion/lightheadedness/syncopal sensation/n/v/d/chest pain/abdominal pain/recent illness/sick exposures/change in bowel or bladder habits/no weight change/bloody emesis or bloody stools/change in medications or any other new concerns.    Past Medical History:   Diagnosis Date   • Arthritis of back    • Arthritis of neck    • Asthma    • COPD (chronic obstructive pulmonary disease) ([facility])    • Depression    • DM (diabetes mellitus) ([facility])    • GERD (gastroesophageal reflux disease)    • Hip arthrosis    • Hyperlipidemia    • Hypertension    • Knee swelling    • Myasthenia gravis ([facility])    •  Periarthritis of shoulder    • Rotator cuff syndrome      Past Surgical History:   Procedure Laterality Date   • APPENDECTOMY     • CHOLECYSTECTOMY     • ROTATOR CUFF REPAIR Right      Family History   Problem Relation Age of Onset   • Diabetes Mother    • COPD Father      Social History     Tobacco Use   • Smoking status: Current Every Day Smoker     Packs/day: 0.50     Years: 58.00     Pack years: 29.00     Types: Cigarettes   • Smokeless tobacco: Never Used   • Tobacco comment: pt asking for nicotine patch   Vaping Use   • Vaping Use: Never used   Substance Use Topics   • Alcohol use: No   • Drug use: No     Medications Prior to Admission   Medication Sig Dispense Refill Last Dose   • albuterol (PROVENTIL) (2.5 MG/3ML) 0.083% nebulizer solution Take 2.5 mg by nebulization Every 4 (Four) Hours As Needed for Wheezing. 30 each 0    • JEWELL CONTOUR TEST test strip   1    • bisacodyl (DULCOLAX) 10 MG suppository Insert 1 suppository into the rectum Daily.      • doxycycline (MONODOX) 100 MG capsule Take 1 capsule by mouth Every 12 (Twelve) Hours. 20 capsule 0    • Fluticasone-Umeclidin-Vilant (Trelegy Ellipta) 100-62.5-25 MCG/INH inhaler Inhale 1 puff Daily.      • HYDROcodone-acetaminophen (NORCO)  MG per tablet 1 tablet Every 6 (Six) Hours As Needed for Mild Pain  or Moderate Pain .  0    • insulin glargine (LANTUS, SEMGLEE) 100 UNIT/ML injection Inject 45 Units under the skin into the appropriate area as directed Every Morning. Every morning      • insulin glargine (LANTUS, SEMGLEE) 100 UNIT/ML injection Inject 35 Units under the skin into the appropriate area as directed Every Night.      • Insulin Pen Needle (BD Pen Needle Yaritza U/F) 32G X 4 MM misc USE 1 NEEDLE SUBCUTANEOUSLY QD      • methylPREDNISolone (Medrol) 4 MG dose pack follow package directions 21 tablet 0    • mycophenolate (CELLCEPT) 500 MG tablet Take 500 mg by mouth 2 (Two) Times a Day.      • nicotine (NICODERM CQ) 14 MG/24HR patch Place 1  "patch on the skin as directed by provider Daily. 21 each 0    • pyridostigmine (MESTINON) 60 MG tablet Take 60 mg by mouth Every 4 (Four) Hours.      • SIMVASTATIN PO Take 40 mg by mouth Every Evening.        Allergies:  Patient has no known allergies.    Immunization History   Administered Date(s) Administered   • COVID-19 (PFIZER) PURPLE CAP 04/06/2021, 04/27/2021, 10/27/2021   • FLUAD TRI 65YR+ 09/25/2018   • Flu Vaccine Quad PF >36MO 08/03/2017   • Fluzone High Dose =>65 Years ([employer] ONLY) 10/24/2019   • Pneumococcal Conjugate 13-Valent (PCV13) 09/25/2018       REVIEW OF SYSTEMS:  Please see the above history of present illness for pertinent positives and negatives.  The remainder of the patient's systems have been reviewed and are negative.     Vital Signs  Temp:  [98.9 °F (37.2 °C)-99 °F (37.2 °C)] 98.9 °F (37.2 °C)  Heart Rate:  [] 105  Resp:  [20-30] 24  BP: (138-183)/() 140/75   Body mass index is 50.65 kg/m².    Flowsheet Rows    Flowsheet Row First Filed Value   Admission Height 188 cm (74\") Documented at 09/21/2022 1025   Admission Weight 126 kg (276 lb 14.4 oz) Documented at 09/21/2022 1025           Physical Exam  Vitals reviewed.   Constitutional:       General: He is not in acute distress.     Appearance: He is obese.      Comments: Appears older than stated age, chronically ill appearance   HENT:      Head: Normocephalic and atraumatic.      Mouth/Throat:      Mouth: Mucous membranes are moist.      Comments: Edentulous  Eyes:      Extraocular Movements: Extraocular movements intact.      Pupils: Pupils are equal, round, and reactive to light.   Cardiovascular:      Rate and Rhythm: Regular rhythm. Tachycardia present.   Pulmonary:      Effort: Pulmonary effort is normal.      Comments: Crackles left lower lobe, diminished  Abdominal:      General: Abdomen is flat. Bowel sounds are normal. There is no distension.      Palpations: Abdomen is soft.      Tenderness: There is no abdominal " tenderness. There is no guarding.   Musculoskeletal:         General: Swelling present.      Comments: Bilateral lower extremity mid shin to toes 2+ pitting edema   Skin:     General: Skin is warm and dry.      Capillary Refill: Capillary refill takes less than 2 seconds.      Findings: No erythema.   Neurological:      General: No focal deficit present.      Mental Status: He is alert and oriented to person, place, and time.   Psychiatric:         Mood and Affect: Mood normal.       Emotional Behavior:    Judgement and Insight: Fair   Mental Status:  Alertness alert   Memory: Good   Mood and Affect:         Depression none               Anxiety none    Debilities:   Physical Weakness chronic   Handicaps none obvious   Disabilities none obvious   Agitation none     Results Review:    I reviewed the patient's new clinical results.  Lab Results (most recent)     Procedure Component Value Units Date/Time    Hemoglobin A1c [[id number]]  (Abnormal) Collected: 09/21/22 1037    Specimen: Blood Updated: 09/21/22 1453     Hemoglobin A1C 8.00 %     Narrative:      Hemoglobin A1C Ranges:    Increased Risk for Diabetes  5.7% to 6.4%  Diabetes                     >= 6.5%  Diabetic Goal                < 7.0%    STAT Lactic Acid, Reflex [[id number]]  (Normal) Collected: 09/21/22 1346    Specimen: Blood Updated: 09/21/22 1416     Lactate 2.0 mmol/L     Respiratory Panel PCR w/COVID-19(SARS-CoV-2) ZEUS/SHAW/FRANCIA/PAD/COR/MAD/GIL In-House, NP Swab in UTM/VTM, 3-4 HR TAT - Swab, Nasopharynx [[id number]] Collected: 09/21/22 1305    Specimen: Swab from Nasopharynx Updated: 09/21/22 1308    Blood Culture - Blood, Arm, Right [[id number]] Collected: 09/21/22 1201    Specimen: Blood from Arm, Right Updated: 09/21/22 1252    Blood Culture - Blood, Arm, Left [[id number]] Collected: 09/21/22 1157    Specimen: Blood from Arm, Left Updated: 09/21/22 1252    Procalcitonin [[id number]]  (Normal) Collected: 09/21/22 1037    Specimen: Blood Updated: 09/21/22  "1221     Procalcitonin 0.06 ng/mL     Narrative:      As a Marker for Sepsis (Non-Neonates):    1. <0.5 ng/mL represents a low risk of severe sepsis and/or septic shock.  2. >2 ng/mL represents a high risk of severe sepsis and/or septic shock.    As a Marker for Lower Respiratory Tract Infections that require antibiotic therapy:    PCT on Admission    Antibiotic Therapy       6-12 Hrs later    >0.5                Strongly Recommended  >0.25 - <0.5        Recommended   0.1 - 0.25          Discouraged              Remeasure/reassess PCT  <0.1                Strongly Discouraged     Remeasure/reassess PCT    As 28 day mortality risk marker: \"Change in Procalcitonin Result\" (>80% or <=80%) if Day 0 (or Day 1) and Day 4 values are available. Refer to http://www.[facility]pct-calculator.com    Change in PCT <=80%  A decrease of PCT levels below or equal to 80% defines a positive change in PCT test result representing a higher risk for 28-day all-cause mortality of patients diagnosed with severe sepsis for septic shock.    Change in PCT >80%  A decrease of PCT levels of more than 80% defines a negative change in PCT result representing a lower risk for 28-day all-cause mortality of patients diagnosed with severe sepsis or septic shock.       Lactic Acid, Plasma [[id number]]  (Abnormal) Collected: 09/21/22 1050    Specimen: Blood Updated: 09/21/22 1142     Lactate 2.2 mmol/L     BNP [[id number]]  (Normal) Collected: 09/21/22 1037    Specimen: Blood Updated: 09/21/22 1129     proBNP 47.2 pg/mL     Narrative:      Among patients with dyspnea, NT-proBNP is highly sensitive for the detection of acute congestive heart failure. In addition NT-proBNP of <300 pg/ml effectively rules out acute congestive heart failure with 99% negative predictive value.    Results may be falsely decreased if patient taking Biotin.      Troponin [[id number]]  (Normal) Collected: 09/21/22 1037    Specimen: Blood Updated: 09/21/22 1108     Troponin T <0.010 " ng/mL     Narrative:      Troponin T Reference Range:  <= 0.03 ng/mL-   Negative for AMI  >0.03 ng/mL-     Abnormal for myocardial necrosis.  Clinicians would have to utilize clinical acumen, EKG, Troponin and serial changes to determine if it is an Acute Myocardial Infarction or myocardial injury due to an underlying chronic condition.       Results may be falsely decreased if patient taking Biotin.      Blood Gas, Venous - [[id number]]  (Abnormal) Collected: 09/21/22 1100    Specimen: Venous Blood Updated: 09/21/22 1105     Site OTHER     pH, Venous 7.360 pH Units      pCO2, Venous 56.1 mm Hg      Comment: 83 Value above reference range        pO2, Venous 44.3 mm Hg      HCO3, Venous 31.6 mmol/L      Comment: 83 Value above reference range        Base Excess, Venous 4.7 mmol/L      Comment: 83 Value above reference range        O2 Saturation, Venous 80.8 %      Comment: 83 Value above reference range        Hemoglobin, Blood Gas 13.2 g/dL      Comment: 84 Value below reference range        Temperature 37.0 C      Barometric Pressure for Blood Gas 738 mmHg      Modality Nasal Cannula     Flow Rate 2.0 lpm      Ventilator Mode STANDBY     Collected by [id number]     Comment: Meter: [id number]     :  [id number]       Comprehensive Metabolic Panel [[id number]]  (Abnormal) Collected: 09/21/22 1037    Specimen: Blood Updated: 09/21/22 1105     Glucose 95 mg/dL      BUN 12 mg/dL      Creatinine 0.73 mg/dL      Sodium 144 mmol/L      Potassium 3.8 mmol/L      Chloride 103 mmol/L      CO2 30.1 mmol/L      Calcium 9.5 mg/dL      Total Protein 7.0 g/dL      Albumin 4.50 g/dL      ALT (SGPT) 14 U/L      AST (SGOT) 13 U/L      Alkaline Phosphatase 67 U/L      Total Bilirubin 0.3 mg/dL      Globulin 2.5 gm/dL      A/G Ratio 1.8 g/dL      BUN/Creatinine Ratio 16.4     Anion Gap 10.9 mmol/L      eGFR 97.9 mL/min/1.73      Comment: National Kidney Foundation and American Society of Nephrology (ASN) Task Force recommended  calculation based on the Chronic Kidney Disease Epidemiology Collaboration (CKD-EPI) equation refit without adjustment for race.       Narrative:      GFR Normal >60  Chronic Kidney Disease <60  Kidney Failure <15      CBC & Differential [[id number]]  (Abnormal) Collected: 09/21/22 1037    Specimen: Blood Updated: 09/21/22 1044    Narrative:      The following orders were created for panel order CBC & Differential.  Procedure                               Abnormality         Status                     ---------                               -----------         ------                     CBC Auto Differential[[id number]]        Abnormal            Final result                 Please view results for these tests on the individual orders.    CBC Auto Differential [[id number]]  (Abnormal) Collected: 09/21/22 1037    Specimen: Blood Updated: 09/21/22 1044     WBC 10.21 10*3/mm3      RBC 4.45 10*6/mm3      Hemoglobin 13.6 g/dL      Hematocrit 44.8 %      .7 fL      MCH 30.6 pg      MCHC 30.4 g/dL      RDW 13.3 %      RDW-SD 51.0 fl      MPV 11.1 fL      Platelets 244 10*3/mm3      Neutrophil % 88.7 %      Lymphocyte % 5.9 %      Monocyte % 4.0 %      Eosinophil % 0.1 %      Basophil % 0.4 %      Immature Grans % 0.9 %      Neutrophils, Absolute 9.06 10*3/mm3      Lymphocytes, Absolute 0.60 10*3/mm3      Monocytes, Absolute 0.41 10*3/mm3      Eosinophils, Absolute 0.01 10*3/mm3      Basophils, Absolute 0.04 10*3/mm3      Immature Grans, Absolute 0.09 10*3/mm3           Imaging Results (Most Recent)     Procedure Component Value Units Date/Time    XR Chest 1 View [[id number]] Collected: 09/21/22 1139     Updated: 09/21/22 1143    Narrative:      CHEST X-RAY, 09/21/2022         HISTORY:  72-year-old male in the ED with shortness of air, worsening since  yesterday. Nonproductive cough. History of COPD.     TECHNIQUE:  AP portable chest x-ray.     COMPARISON:  *  Chest x-ray, 07/25/2022.     FINDINGS:  Mild infiltrate or  atelectasis the right lung base. Pre-existing mild  bibasilar scarring. Lungs otherwise clear. Heart size and pulmonary  vascularity are within normal limits. No visible pleural effusion.       Impression:      Mild infiltrate or atelectasis in the right lung base.     This report was finalized on 9/21/2022 11:41 AM by Dr. Jose Mcrae MD.           reviewed    ECG/EMG Results (most recent)     Procedure Component Value Units Date/Time    ECG 12 Lead [[id number]] Collected: 09/21/22 1030     Updated: 09/21/22 1032     QT Interval 328 ms     Narrative:      HEART RATE= 110  bpm  RR Interval= 548  ms  SC Interval= 181  ms  P Horizontal Axis= 5  deg  P Front Axis= 55  deg  QRSD Interval= 94  ms  QT Interval= 328  ms  QRS Axis= 77  deg  T Wave Axis= 50  deg  - ABNORMAL ECG -  Sinus tachycardia  Anteroseptal infarct, age indeterminate  When compared with ECG of 25-Jul-2022 16:50:52,  No significant change   Electronically Signed By: Eran Joyner ([facility]) 21-Sep-2022 10:32:30  Date and Time of Study: 2022-09-21 10:30:06        reviewed    Assessment & Plan   Acute on chronic hypoxic/ hypercarbic aspiratory failure:  Rule out RLL pneumonia:  AE COPD:  Remains on baseline home oxygen of 2 L with oxygen saturations low 90s  Add duo nebs, Symbicort, Mucinex, Acapella, I-S  Wean oxygen as tolerated  RVP/sputum cultures pending  procalcitonin negative, repeat in am  Check echo, strict I&O, daily weight  Received dose of lasix in ER, await echo to repeat    DM2 in obese with hyperglycemia: A1c 8%  Body mass index is 50.65 kg/m².   Glucose at goal  Add low-dose SSI and Accu-Cheks  Add home dose Lantus 45 units every morning, 35 units every night  Monitor     MG: Continue home CellCept, Mestinon    Hypertension: No medications noted, BP above goal, add lisinopril 10 mg daily, monitor for effect    GERD: No home medications, add Pepcid    HLD: Add home simvastatin    BPH: No current acute issues    Tobacco abuse: Counseled  "regarding cessation, nicotine patch added    Chronic pain with chronic narcotic induced constipation:  Add as needed cathartics  Efren reviewed, verified hydrocodone 10 mg 4 times daily    I discussed the patient's findings and my recommendations with patient and staff.     Archana Yang, APRN  09/21/22  16:03 EDT    As of April 2021, as required by the Federal 21st Century Cures Act, medical records (including provider notes and laboratory/imaging results) are to be made available to patients and/or their designees as soon as the documents are signed/resulted. While the intention is to ensure transparency and to engage patients in their healthcare, this immediate access may create unintended consequences because this document uses language intended for communication between medical providers for interpretation with the entirety of the patient's clinical picture in mind. It is recommended that patients and/or their designees review all available information with their primary or specialist providers for explanation and to avoid misinterpretation of this information.     \"Dictated utilizing Dragon dictation\"      "

## 2022-09-22 LAB
ANION GAP SERPL CALCULATED.3IONS-SCNC: 6.2 MMOL/L (ref 5–15)
AORTIC DIMENSIONLESS INDEX: 0.8 (DI)
BASOPHILS # BLD AUTO: 0.01 10*3/MM3 (ref 0–0.2)
BASOPHILS NFR BLD AUTO: 0.1 % (ref 0–1.5)
BH CV ECHO MEAS - AO MAX PG: 12.8 MMHG
BH CV ECHO MEAS - AO MEAN PG: 7 MMHG
BH CV ECHO MEAS - AO V2 MAX: 179 CM/SEC
BH CV ECHO MEAS - AO V2 VTI: 28.6 CM
BH CV ECHO MEAS - AVA(I,D): 3.3 CM2
BH CV ECHO MEAS - EDV(CUBED): 205.4 ML
BH CV ECHO MEAS - EDV(MOD-SP2): 107 ML
BH CV ECHO MEAS - EDV(MOD-SP4): 127 ML
BH CV ECHO MEAS - EF(MOD-BP): 72.8 %
BH CV ECHO MEAS - EF(MOD-SP2): 72.9 %
BH CV ECHO MEAS - EF(MOD-SP4): 73.2 %
BH CV ECHO MEAS - ESV(CUBED): 56.9 ML
BH CV ECHO MEAS - ESV(MOD-SP2): 29 ML
BH CV ECHO MEAS - ESV(MOD-SP4): 34 ML
BH CV ECHO MEAS - FS: 34.8 %
BH CV ECHO MEAS - IVS/LVPW: 0.83 CM
BH CV ECHO MEAS - IVSD: 1 CM
BH CV ECHO MEAS - LAT PEAK E' VEL: 12.1 CM/SEC
BH CV ECHO MEAS - LV DIASTOLIC VOL/BSA (35-75): 57.9 CM2
BH CV ECHO MEAS - LV MASS(C)D: 271.9 GRAMS
BH CV ECHO MEAS - LV MAX PG: 9.2 MMHG
BH CV ECHO MEAS - LV MEAN PG: 5 MMHG
BH CV ECHO MEAS - LV SYSTOLIC VOL/BSA (12-30): 15.5 CM2
BH CV ECHO MEAS - LV V1 MAX: 152 CM/SEC
BH CV ECHO MEAS - LV V1 VTI: 24.1 CM
BH CV ECHO MEAS - LVIDD: 5.9 CM
BH CV ECHO MEAS - LVIDS: 3.8 CM
BH CV ECHO MEAS - LVOT AREA: 4 CM2
BH CV ECHO MEAS - LVOT DIAM: 2.25 CM
BH CV ECHO MEAS - LVPWD: 1.2 CM
BH CV ECHO MEAS - MED PEAK E' VEL: 9.5 CM/SEC
BH CV ECHO MEAS - MV A MAX VEL: 104 CM/SEC
BH CV ECHO MEAS - MV DEC SLOPE: 509.1 CM/SEC2
BH CV ECHO MEAS - MV DEC TIME: 0.21 MSEC
BH CV ECHO MEAS - MV E MAX VEL: 86.8 CM/SEC
BH CV ECHO MEAS - MV E/A: 0.83
BH CV ECHO MEAS - MV MAX PG: 5.1 MMHG
BH CV ECHO MEAS - MV MEAN PG: 2.32 MMHG
BH CV ECHO MEAS - MV P1/2T: 64.4 MSEC
BH CV ECHO MEAS - MV V2 VTI: 27 CM
BH CV ECHO MEAS - MVA(P1/2T): 3.4 CM2
BH CV ECHO MEAS - MVA(VTI): 3.5 CM2
BH CV ECHO MEAS - PA ACC TIME: 0.1 SEC
BH CV ECHO MEAS - PA PR(ACCEL): 33.1 MMHG
BH CV ECHO MEAS - PA V2 MAX: 119 CM/SEC
BH CV ECHO MEAS - QP/QS: 0.65
BH CV ECHO MEAS - RV MAX PG: 3.2 MMHG
BH CV ECHO MEAS - RV V1 MAX: 90.1 CM/SEC
BH CV ECHO MEAS - RV V1 VTI: 16.2 CM
BH CV ECHO MEAS - RVOT DIAM: 2.21 CM
BH CV ECHO MEAS - SI(MOD-SP2): 35.6 ML/M2
BH CV ECHO MEAS - SI(MOD-SP4): 42.4 ML/M2
BH CV ECHO MEAS - SV(LVOT): 95.5 ML
BH CV ECHO MEAS - SV(MOD-SP2): 78 ML
BH CV ECHO MEAS - SV(MOD-SP4): 93 ML
BH CV ECHO MEAS - SV(RVOT): 61.8 ML
BH CV ECHO MEASUREMENTS AVERAGE E/E' RATIO: 8.04
BH CV XLRA - RV BASE: 3.9 CM
BH CV XLRA - RV LENGTH: 8.2 CM
BH CV XLRA - RV MID: 2.5 CM
BH CV XLRA - TDI S': 16.8 CM/SEC
BUN SERPL-MCNC: 14 MG/DL (ref 8–23)
BUN/CREAT SERPL: 20 (ref 7–25)
CALCIUM SPEC-SCNC: 9.1 MG/DL (ref 8.6–10.5)
CHLORIDE SERPL-SCNC: 99 MMOL/L (ref 98–107)
CO2 SERPL-SCNC: 33.8 MMOL/L (ref 22–29)
CREAT SERPL-MCNC: 0.7 MG/DL (ref 0.76–1.27)
DEPRECATED RDW RBC AUTO: 50.2 FL (ref 37–54)
EGFRCR SERPLBLD CKD-EPI 2021: 99.1 ML/MIN/1.73
EOSINOPHIL # BLD AUTO: 0 10*3/MM3 (ref 0–0.4)
EOSINOPHIL NFR BLD AUTO: 0 % (ref 0.3–6.2)
ERYTHROCYTE [DISTWIDTH] IN BLOOD BY AUTOMATED COUNT: 13.2 % (ref 12.3–15.4)
GLUCOSE BLDC GLUCOMTR-MCNC: 108 MG/DL (ref 70–130)
GLUCOSE BLDC GLUCOMTR-MCNC: 170 MG/DL (ref 70–130)
GLUCOSE BLDC GLUCOMTR-MCNC: 274 MG/DL (ref 70–130)
GLUCOSE SERPL-MCNC: 187 MG/DL (ref 65–99)
HCT VFR BLD AUTO: 44.1 % (ref 37.5–51)
HGB BLD-MCNC: 13.4 G/DL (ref 13–17.7)
IMM GRANULOCYTES # BLD AUTO: 0.07 10*3/MM3 (ref 0–0.05)
IMM GRANULOCYTES NFR BLD AUTO: 0.6 % (ref 0–0.5)
LEFT ATRIUM VOLUME INDEX: 14.5 ML/M2
LYMPHOCYTES # BLD AUTO: 1.23 10*3/MM3 (ref 0.7–3.1)
LYMPHOCYTES NFR BLD AUTO: 11 % (ref 19.6–45.3)
MAXIMAL PREDICTED HEART RATE: 150 BPM
MCH RBC QN AUTO: 30.2 PG (ref 26.6–33)
MCHC RBC AUTO-ENTMCNC: 30.4 G/DL (ref 31.5–35.7)
MCV RBC AUTO: 99.5 FL (ref 79–97)
MONOCYTES # BLD AUTO: 0.89 10*3/MM3 (ref 0.1–0.9)
MONOCYTES NFR BLD AUTO: 8 % (ref 5–12)
NEUTROPHILS NFR BLD AUTO: 8.94 10*3/MM3 (ref 1.7–7)
NEUTROPHILS NFR BLD AUTO: 80.3 % (ref 42.7–76)
PLATELET # BLD AUTO: 148 10*3/MM3 (ref 140–450)
PMV BLD AUTO: 13.1 FL (ref 6–12)
POTASSIUM SERPL-SCNC: 4.2 MMOL/L (ref 3.5–5.2)
PROCALCITONIN SERPL-MCNC: 0.07 NG/ML (ref 0–0.25)
RBC # BLD AUTO: 4.43 10*6/MM3 (ref 4.14–5.8)
SINUS: 3.7 CM
SODIUM SERPL-SCNC: 139 MMOL/L (ref 136–145)
STRESS TARGET HR: 128 BPM
WBC NRBC COR # BLD: 11.14 10*3/MM3 (ref 3.4–10.8)

## 2022-09-22 PROCEDURE — 85025 COMPLETE CBC W/AUTO DIFF WBC: CPT | Performed by: NURSE PRACTITIONER

## 2022-09-22 PROCEDURE — 63710000001 INSULIN DETEMIR PER 5 UNITS: Performed by: HOSPITALIST

## 2022-09-22 PROCEDURE — 99225 PR SBSQ OBSERVATION CARE/DAY 25 MINUTES: CPT | Performed by: HOSPITALIST

## 2022-09-22 PROCEDURE — 94664 DEMO&/EVAL PT USE INHALER: CPT

## 2022-09-22 PROCEDURE — 82962 GLUCOSE BLOOD TEST: CPT

## 2022-09-22 PROCEDURE — 94799 UNLISTED PULMONARY SVC/PX: CPT

## 2022-09-22 PROCEDURE — 80048 BASIC METABOLIC PNL TOTAL CA: CPT | Performed by: NURSE PRACTITIONER

## 2022-09-22 PROCEDURE — G0378 HOSPITAL OBSERVATION PER HR: HCPCS

## 2022-09-22 PROCEDURE — 63710000001 INSULIN ASPART PER 5 UNITS: Performed by: HOSPITALIST

## 2022-09-22 PROCEDURE — 94761 N-INVAS EAR/PLS OXIMETRY MLT: CPT

## 2022-09-22 PROCEDURE — 63710000001 MYCOPHENOLATE MOFETIL PER 250 MG: Performed by: HOSPITALIST

## 2022-09-22 PROCEDURE — 84145 PROCALCITONIN (PCT): CPT | Performed by: NURSE PRACTITIONER

## 2022-09-22 PROCEDURE — 63710000001 PREDNISONE PER 1 MG: Performed by: NURSE PRACTITIONER

## 2022-09-22 RX ORDER — PYRIDOSTIGMINE BROMIDE 60 MG/1
60 TABLET ORAL EVERY 4 HOURS
Status: DISCONTINUED | OUTPATIENT
Start: 2022-09-22 | End: 2022-09-23 | Stop reason: HOSPADM

## 2022-09-22 RX ORDER — HYDROCODONE BITARTRATE AND ACETAMINOPHEN 10; 325 MG/1; MG/1
1 TABLET ORAL EVERY 6 HOURS PRN
Status: DISCONTINUED | OUTPATIENT
Start: 2022-09-22 | End: 2022-09-23 | Stop reason: HOSPADM

## 2022-09-22 RX ORDER — NICOTINE 21 MG/24HR
1 PATCH, TRANSDERMAL 24 HOURS TRANSDERMAL
Status: DISCONTINUED | OUTPATIENT
Start: 2022-09-22 | End: 2022-09-23 | Stop reason: HOSPADM

## 2022-09-22 RX ORDER — MYCOPHENOLATE MOFETIL 250 MG/1
500 CAPSULE ORAL EVERY 12 HOURS SCHEDULED
Status: DISCONTINUED | OUTPATIENT
Start: 2022-09-22 | End: 2022-09-23 | Stop reason: HOSPADM

## 2022-09-22 RX ADMIN — IPRATROPIUM BROMIDE AND ALBUTEROL SULFATE 3 ML: 2.5; .5 SOLUTION RESPIRATORY (INHALATION) at 16:00

## 2022-09-22 RX ADMIN — PYRIDOSTIGMINE BROMIDE 60 MG: 60 TABLET ORAL at 10:50

## 2022-09-22 RX ADMIN — PYRIDOSTIGMINE BROMIDE 60 MG: 60 TABLET ORAL at 20:26

## 2022-09-22 RX ADMIN — MYCOPHENOLATE MOFETIL 500 MG: 250 CAPSULE ORAL at 10:50

## 2022-09-22 RX ADMIN — NICOTINE 1 PATCH: 21 PATCH, EXTENDED RELEASE TRANSDERMAL at 09:12

## 2022-09-22 RX ADMIN — IPRATROPIUM BROMIDE AND ALBUTEROL SULFATE 3 ML: 2.5; .5 SOLUTION RESPIRATORY (INHALATION) at 19:16

## 2022-09-22 RX ADMIN — IPRATROPIUM BROMIDE AND ALBUTEROL SULFATE 3 ML: 2.5; .5 SOLUTION RESPIRATORY (INHALATION) at 07:04

## 2022-09-22 RX ADMIN — INSULIN ASPART 12 UNITS: 100 INJECTION, SOLUTION INTRAVENOUS; SUBCUTANEOUS at 18:13

## 2022-09-22 RX ADMIN — INSULIN DETEMIR 45 UNITS: 100 INJECTION, SOLUTION SUBCUTANEOUS at 10:49

## 2022-09-22 RX ADMIN — IPRATROPIUM BROMIDE AND ALBUTEROL SULFATE 3 ML: 2.5; .5 SOLUTION RESPIRATORY (INHALATION) at 11:16

## 2022-09-22 RX ADMIN — PYRIDOSTIGMINE BROMIDE 60 MG: 60 TABLET ORAL at 15:16

## 2022-09-22 RX ADMIN — FAMOTIDINE 20 MG: 20 TABLET ORAL at 09:11

## 2022-09-22 RX ADMIN — INSULIN ASPART 4 UNITS: 100 INJECTION, SOLUTION INTRAVENOUS; SUBCUTANEOUS at 09:11

## 2022-09-22 RX ADMIN — LISINOPRIL 10 MG: 10 TABLET ORAL at 09:10

## 2022-09-22 RX ADMIN — HYDROCODONE BITARTRATE AND ACETAMINOPHEN 1 TABLET: 10; 325 TABLET ORAL at 20:26

## 2022-09-22 RX ADMIN — Medication 1 PATCH: at 10:56

## 2022-09-22 RX ADMIN — MYCOPHENOLATE MOFETIL 500 MG: 250 CAPSULE ORAL at 20:26

## 2022-09-22 RX ADMIN — HYDROCODONE BITARTRATE AND ACETAMINOPHEN 1 TABLET: 10; 325 TABLET ORAL at 10:50

## 2022-09-22 RX ADMIN — PREDNISONE 40 MG: 20 TABLET ORAL at 09:10

## 2022-09-22 RX ADMIN — INSULIN DETEMIR 35 UNITS: 100 INJECTION, SOLUTION SUBCUTANEOUS at 20:26

## 2022-09-22 RX ADMIN — FAMOTIDINE 20 MG: 20 TABLET ORAL at 18:13

## 2022-09-22 NOTE — PLAN OF CARE
Goal Outcome Evaluation:  Plan of Care Reviewed With: patient        Progress: improving  Outcome Evaluation: pt receptive to IS encouragment

## 2022-09-22 NOTE — CONSULTS
"Adult Nutrition  Assessment/PES    Patient Name:  Kt Armstrong  YOB: 1950  MRN: [id number]  Admit Date:  9/21/2022    Assessment Date:  9/22/2022    Comments:  Agree with diet. Good po intake. Edu as below, pt previously edu x 2 in July.  Will cont to follow and monitor.      Reason for Assessment     Row Name 09/22/22 1131          Reason for Assessment    Reason For Assessment nurse/nurse practitioner consult     Diagnosis pulmonary disease;diabetes diagnosis/complications  AHRF COPD PNA hx DM HTN                Nutrition/Diet History     Row Name 09/22/22 1132          Nutrition/Diet History    Typical Intake (Food/Fluid/EN/PN) Spoke w pt who is sitting up on side of bed. NKFA. Denies issue swallowing but did have hard time with cantaloupe today b/c his teeth not here but also doesn't wear them all the time at home. 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 reports reduced salt intake                Labs/Tests/Procedures/Meds     Row Name 09/22/22 1133          Labs/Procedures/Meds    Lab Results Reviewed reviewed     Lab Results Comments HgA1c 8 H, glu 170-187            Diagnostic Tests/Procedures    Diagnostic Test/Procedure Reviewed reviewed            Medications    Pertinent Medications Reviewed reviewed     Pertinent Medications Comments novolog, prednisone                Physical Findings     Row Name 09/22/22 1134          Physical Findings    Overall Physical Appearance obese                Estimated/Assessed Needs - Anthropometrics     Row Name 09/22/22 1134          Anthropometrics    Height 182.9 cm (72\")     Weight --  277.7#            Estimated/Assessed Needs    Additional Documentation Estimated Calorie Needs (Group);Fluid Requirements (Group);Protein Requirements (Group)            Estimated Calorie Needs    Estimated Calorie Requirement (kcal/day) 1949-2839 kcal ( mifflin 0-1.2)  231-278 gm CHO, 45% kcal     Estimated Calorie Need Method Cedar-St Jeor            Protein " Requirements    Est Protein Requirement Amount (gms/kg) 0.8 gm protein  101 gm pro            Fluid Requirements    Estimated Fluid Requirement Method RDA Method  8392-9480 ml                Nutrition Prescription Ordered     Row Name 09/22/22 1137          Nutrition Prescription PO    Common Modifiers Cardiac;Consistent Carbohydrate                Evaluation of Received Nutrient/Fluid Intake     Row Name 09/22/22 1137          Fluid Intake Evaluation    Oral Fluid (mL) 960  47%            PO Evaluation    Number of Meals 2     % PO Intake 88%                   Problem/Interventions:   Problem 1     Row Name 09/22/22 1138          Nutrition Diagnoses Problem 1    Problem 1 Overweight/Obesity     Etiology (related to) Medical Diagnosis;MNT for Treatment/Condition     Signs/Symptoms (evidenced by) Report/Observation                      Intervention Goal     Row Name 09/22/22 1138          Intervention Goal    General Meet nutritional needs for age/condition;Provide information regarding MNT for treatment/condition     PO PO intake (%)     PO Intake % 75 %  or greater                Nutrition Intervention     Row Name 09/22/22 1138          Nutrition Intervention    RD/Tech Action Interview for preference;Follow Tx progress                  Education/Evaluation     Row Name 09/22/22 1138          Education    Education Education topics;Advised regarding habits/behavior;Provided education regarding;Education offered and refused  edu given on avoiding salt & eating 3 meals per day for consistent intake     Education Topics Cardiac diabetic  Healthy Heart & Consistent CHO Nutrition Therapy provided w RD contact            Monitor/Evaluation    Monitor Per protocol;I&O;PO intake;Pertinent labs;Weight;Symptoms     Education Follow-up Other (comment)  pt denies edu needs but agrees to keep nutrition packet                 Electronically signed by:  Nicole España RD  09/22/22 11:40 EDT

## 2022-09-22 NOTE — DISCHARGE PLACEMENT REQUEST
"Javier Pemberton (72 y.o. Male)             Date of Birth   1950    Social Security Number       Address   [address] [place] [address]    Home Phone   [phone]    MRN   [id number]       Orthodox   None    Marital Status                               Admission Date   9/21/22    Admission Type   Emergency    Admitting Provider   Sanjiv Rider MD    Attending Provider   Sanjiv Rider MD    Department, Room/Bed   [facility] MED SURG, 1405/1       Discharge Date       Discharge Disposition       Discharge Destination                               Attending Provider: Sanjiv Rider MD    Allergies: No Known Allergies    Isolation: None   Infection: None   Code Status: CPR   Advance Care Planning Activity    Ht: 182.9 cm (72\")   Wt: 126 kg (277 lb 12.5 oz)    Admission Cmt: None   Principal Problem: None                Active Insurance as of 9/21/2022     Primary Coverage     Payor Plan Insurance Group Employer/Plan Group    HUMANA MEDICARE REPLACEMENT HUMANA MEDICARE REPLACEMENT [id number]     Payor Plan Address Payor Plan Phone Number Payor Plan Fax Number Effective Dates    [address] [address] [phone]  1/1/2022 - None Entered    [facility] [id number]       Subscriber Name Subscriber Birth Date Member ID       JAVIER PEMBERTON 1950 [id number]                 Emergency Contacts      (Rel.) Home Phone Work Phone Mobile Phone    Kojo Pemberton (Son) [phone] -- --              "

## 2022-09-22 NOTE — CASE MANAGEMENT/SOCIAL WORK
Continued Stay Note  MURIEL Chavez     Patient Name: Kt Armstrong  MRN: [id number]  Today's Date: 9/22/2022    Admit Date: 9/21/2022    Plan: DC to daughters home with O2   Discharge Plan     Row Name 09/22/22 1704       Plan    Plan DC to daughters home with O2    Plan Comments CCP spoke to pt, introduced self and role, reviewwed face sheeet.  Pt has been independent with ADL's/IADL's and still drives.  He denies being in Rehab before. He has had Home Health but does not remember tho.  Hw lives in 2 story home with his daughter. There are 3 steps to enter the home.  He has not had problems with the 3 stairs. His room is on 1st floor.  He does not go upstairs.  He is on O2 at home at night but uses it during the day when he feels he needs it.  He has O2 and nebulizer through [facility].  No other DME used.  He uses [facility] in [place].  He Has no POA or Living Will and does not want to initiate one at this time.  Plan is to return home to his daughters with his O2.  CCP will continue to follow. Thanks, Graciela JERONIMO               Discharge Codes    No documentation.                     Graciela Ramirez

## 2022-09-22 NOTE — PROGRESS NOTES
"Hospitalist Team      Patient Care Team:  Ney Kathleen MD as PCP - General (Family Medicine)  Chris Driver MD as Consulting Physician (Pulmonary Disease)      Chief Complaint:  Follow-up AECOPD    Subjective    Feels a little better this morning.  Appetite and PO intake are good.  Starting to complain of pain, but no chest pain.  Hasn't been up from bed yet this morning.    Objective    Vital Signs  Temp:  [97.2 °F (36.2 °C)-99 °F (37.2 °C)] 98.1 °F (36.7 °C)  Heart Rate:  [] 102  Resp:  [18-30] 24  BP: (124-183)/() 127/81  Oxygen Therapy  SpO2: (!) 87 %  Pulse Oximetry Type: Continuous  Device (Oxygen Therapy): room air  Flow (L/min): 2}    Flowsheet Rows    Flowsheet Row First Filed Value   Admission Height 188 cm (74\") Documented at 09/21/2022 1025   Admission Weight 126 kg (276 lb 14.4 oz) Documented at 09/21/2022 1025          Physical Exam:    General: Appears stated age in no acute distress.  Lungs: Breath sounds are diminished w/ faint expiratory wheeze noted at the left upper chest.  CV: Regular rate and rhythm.  No murmur appreciated.  Radial pulses are 2+ and symmetric.  Abdomen: Obese, soft, and non-tender w/ active bowel sounds.  MSK: No clubbing or cyanosis.  Neuro: CN II-XII grossly intact.  Psych: Pleasant affect.  Ox3.    Results Review:     I reviewed the patient's new clinical results.    Lab Results (last 24 hours)     Procedure Component Value Units Date/Time    POC Glucose Once [[id number]]  (Abnormal) Collected: 09/22/22 0739    Specimen: Blood Updated: 09/22/22 0748     Glucose 170 mg/dL      Comment: Meter: [id number] : [id number] Pj JONAS       Procalcitonin [[id number]]  (Normal) Collected: 09/22/22 0647    Specimen: Blood Updated: 09/22/22 0722     Procalcitonin 0.07 ng/mL     Narrative:      As a Marker for Sepsis (Non-Neonates):    1. <0.5 ng/mL represents a low risk of severe sepsis and/or septic shock.  2. >2 ng/mL represents a high risk of " "severe sepsis and/or septic shock.    As a Marker for Lower Respiratory Tract Infections that require antibiotic therapy:    PCT on Admission    Antibiotic Therapy       6-12 Hrs later    >0.5                Strongly Recommended  >0.25 - <0.5        Recommended   0.1 - 0.25          Discouraged              Remeasure/reassess PCT  <0.1                Strongly Discouraged     Remeasure/reassess PCT    As 28 day mortality risk marker: \"Change in Procalcitonin Result\" (>80% or <=80%) if Day 0 (or Day 1) and Day 4 values are available. Refer to [email or web address]    Change in PCT <=80%  A decrease of PCT levels below or equal to 80% defines a positive change in PCT test result representing a higher risk for 28-day all-cause mortality of patients diagnosed with severe sepsis for septic shock.    Change in PCT >80%  A decrease of PCT levels of more than 80% defines a negative change in PCT result representing a lower risk for 28-day all-cause mortality of patients diagnosed with severe sepsis or septic shock.       Basic Metabolic Panel [[id number]]  (Abnormal) Collected: 09/22/22 0647    Specimen: Blood Updated: 09/22/22 0717     Glucose 187 mg/dL      BUN 14 mg/dL      Creatinine 0.70 mg/dL      Sodium 139 mmol/L      Potassium 4.2 mmol/L      Chloride 99 mmol/L      CO2 33.8 mmol/L      Calcium 9.1 mg/dL      BUN/Creatinine Ratio 20.0     Anion Gap 6.2 mmol/L      eGFR 99.1 mL/min/1.73      Comment: National Kidney Foundation and American Society of Nephrology (ASN) Task Force recommended calculation based on the Chronic Kidney Disease Epidemiology Collaboration (CKD-EPI) equation refit without adjustment for race.       Narrative:      GFR Normal >60  Chronic Kidney Disease <60  Kidney Failure <15      CBC & Differential [[id number]]  (Abnormal) Collected: 09/22/22 0619    Specimen: Blood Updated: 09/22/22 0705    Narrative:      The following orders were created for panel order CBC & " Differential.  Procedure                               Abnormality         Status                     ---------                               -----------         ------                     CBC Auto Differential[[id number]]        Abnormal            Final result               Scan Slide[[id number]]                                                                    Please view results for these tests on the individual orders.    CBC Auto Differential [[id number]]  (Abnormal) Collected: 09/22/22 0619    Specimen: Blood Updated: 09/22/22 0704     WBC 11.14 10*3/mm3      RBC 4.43 10*6/mm3      Hemoglobin 13.4 g/dL      Hematocrit 44.1 %      MCV 99.5 fL      MCH 30.2 pg      MCHC 30.4 g/dL      RDW 13.2 %      RDW-SD 50.2 fl      MPV 13.1 fL      Platelets 148 10*3/mm3      Neutrophil % 80.3 %      Lymphocyte % 11.0 %      Monocyte % 8.0 %      Eosinophil % 0.0 %      Basophil % 0.1 %      Immature Grans % 0.6 %      Neutrophils, Absolute 8.94 10*3/mm3      Lymphocytes, Absolute 1.23 10*3/mm3      Monocytes, Absolute 0.89 10*3/mm3      Eosinophils, Absolute 0.00 10*3/mm3      Basophils, Absolute 0.01 10*3/mm3      Immature Grans, Absolute 0.07 10*3/mm3     Respiratory Panel PCR w/COVID-19(SARS-CoV-2) ZEUS/SHAW/FRANCIA/PAD/COR/MAD/GIL In-House, NP Swab in [facility], 3-4 HR TAT - Swab, Nasopharynx [[id number]]  (Normal) Collected: 09/21/22 1305    Specimen: Swab from Nasopharynx Updated: 09/21/22 1702     ADENOVIRUS, PCR Not Detected     Coronavirus 229E Not Detected     Coronavirus HKU1 Not Detected     Coronavirus NL63 Not Detected     Coronavirus OC43 Not Detected     COVID19 Not Detected     Human Metapneumovirus Not Detected     Human Rhinovirus/Enterovirus Not Detected     Influenza A PCR Not Detected     Influenza B PCR Not Detected     Parainfluenza Virus 1 Not Detected     Parainfluenza Virus 2 Not Detected     Parainfluenza Virus 3 Not Detected     Parainfluenza Virus 4 Not Detected     RSV, PCR Not Detected     Bordetella  pertussis pcr Not Detected     Bordetella parapertussis PCR Not Detected     Chlamydophila pneumoniae PCR Not Detected     Mycoplasma pneumo by PCR Not Detected    Narrative:      In the setting of a positive respiratory panel with a viral infection PLUS a negative procalcitonin without other underlying concern for bacterial infection, consider observing off antibiotics or discontinuation of antibiotics and continue supportive care. If the respiratory panel is positive for atypical bacterial infection (Bordetella pertussis, Chlamydophila pneumoniae, or Mycoplasma pneumoniae), consider antibiotic de-escalation to target atypical bacterial infection.    POC Glucose Once [[id number]]  (Abnormal) Collected: 09/21/22 1655    Specimen: Blood Updated: 09/21/22 1701     Glucose 266 mg/dL      Comment: Meter: [id number] : [id number] Kelly Beth NA       Troponin [[id number]]  (Normal) Collected: 09/21/22 1523    Specimen: Blood Updated: 09/21/22 1553     Troponin T <0.010 ng/mL     Narrative:      Troponin T Reference Range:  <= 0.03 ng/mL-   Negative for AMI  >0.03 ng/mL-     Abnormal for myocardial necrosis.  Clinicians would have to utilize clinical acumen, EKG, Troponin and serial changes to determine if it is an Acute Myocardial Infarction or myocardial injury due to an underlying chronic condition.       Results may be falsely decreased if patient taking Biotin.      D-dimer, Quantitative [[id number]]  (Normal) Collected: 09/21/22 1524    Specimen: Blood Updated: 09/21/22 1547     D-Dimer, Quantitative 0.41 MCGFEU/mL     Narrative:      Can be elevated in, but is not diagnostic for deep vein thrombosis (DVT) or pulmonary embolis (PE).  It is also elevated in other medical conditions.  Clinical correlation is required.  The negative cut-off value for the D-Dimer is 0.50 mcg FEU/mL for DVT and PE.      Hemoglobin A1c [[id number]]  (Abnormal) Collected: 09/21/22 1037    Specimen: Blood Updated: 09/21/22 1453      "Hemoglobin A1C 8.00 %     Narrative:      Hemoglobin A1C Ranges:    Increased Risk for Diabetes  5.7% to 6.4%  Diabetes                     >= 6.5%  Diabetic Goal                < 7.0%    STAT Lactic Acid, Reflex [[id number]]  (Normal) Collected: 09/21/22 1346    Specimen: Blood Updated: 09/21/22 1416     Lactate 2.0 mmol/L     Blood Culture - Blood, Arm, Right [[id number]] Collected: 09/21/22 1201    Specimen: Blood from Arm, Right Updated: 09/21/22 1252    Blood Culture - Blood, Arm, Left [[id number]] Collected: 09/21/22 1157    Specimen: Blood from Arm, Left Updated: 09/21/22 1252    Procalcitonin [[id number]]  (Normal) Collected: 09/21/22 1037    Specimen: Blood Updated: 09/21/22 1221     Procalcitonin 0.06 ng/mL     Narrative:      As a Marker for Sepsis (Non-Neonates):    1. <0.5 ng/mL represents a low risk of severe sepsis and/or septic shock.  2. >2 ng/mL represents a high risk of severe sepsis and/or septic shock.    As a Marker for Lower Respiratory Tract Infections that require antibiotic therapy:    PCT on Admission    Antibiotic Therapy       6-12 Hrs later    >0.5                Strongly Recommended  >0.25 - <0.5        Recommended   0.1 - 0.25          Discouraged              Remeasure/reassess PCT  <0.1                Strongly Discouraged     Remeasure/reassess PCT    As 28 day mortality risk marker: \"Change in Procalcitonin Result\" (>80% or <=80%) if Day 0 (or Day 1) and Day 4 values are available. Refer to http://[email or web address]    Change in PCT <=80%  A decrease of PCT levels below or equal to 80% defines a positive change in PCT test result representing a higher risk for 28-day all-cause mortality of patients diagnosed with severe sepsis for septic shock.    Change in PCT >80%  A decrease of PCT levels of more than 80% defines a negative change in PCT result representing a lower risk for 28-day all-cause mortality of patients diagnosed with severe sepsis or septic shock.       Lactic " Acid, Plasma [[id number]]  (Abnormal) Collected: 09/21/22 1050    Specimen: Blood Updated: 09/21/22 1142     Lactate 2.2 mmol/L     BNP [[id number]]  (Normal) Collected: 09/21/22 1037    Specimen: Blood Updated: 09/21/22 1129     proBNP 47.2 pg/mL     Narrative:      Among patients with dyspnea, NT-proBNP is highly sensitive for the detection of acute congestive heart failure. In addition NT-proBNP of <300 pg/ml effectively rules out acute congestive heart failure with 99% negative predictive value.    Results may be falsely decreased if patient taking Biotin.      Troponin [[id number]]  (Normal) Collected: 09/21/22 1037    Specimen: Blood Updated: 09/21/22 1108     Troponin T <0.010 ng/mL     Narrative:      Troponin T Reference Range:  <= 0.03 ng/mL-   Negative for AMI  >0.03 ng/mL-     Abnormal for myocardial necrosis.  Clinicians would have to utilize clinical acumen, EKG, Troponin and serial changes to determine if it is an Acute Myocardial Infarction or myocardial injury due to an underlying chronic condition.       Results may be falsely decreased if patient taking Biotin.      Blood Gas, Venous - [[id number]]  (Abnormal) Collected: 09/21/22 1100    Specimen: Venous Blood Updated: 09/21/22 1105     Site OTHER     pH, Venous 7.360 pH Units      pCO2, Venous 56.1 mm Hg      Comment: 83 Value above reference range        pO2, Venous 44.3 mm Hg      HCO3, Venous 31.6 mmol/L      Comment: 83 Value above reference range        Base Excess, Venous 4.7 mmol/L      Comment: 83 Value above reference range        O2 Saturation, Venous 80.8 %      Comment: 83 Value above reference range        Hemoglobin, Blood Gas 13.2 g/dL      Comment: 84 Value below reference range        Temperature 37.0 C      Barometric Pressure for Blood Gas 738 mmHg      Modality Nasal Cannula     Flow Rate 2.0 lpm      Ventilator Mode STANDBY     Collected by [id number]     Comment: Meter: [id number]     :  [id number]       Comprehensive  Metabolic Panel [[id number]]  (Abnormal) Collected: 09/21/22 1037    Specimen: Blood Updated: 09/21/22 1105     Glucose 95 mg/dL      BUN 12 mg/dL      Creatinine 0.73 mg/dL      Sodium 144 mmol/L      Potassium 3.8 mmol/L      Chloride 103 mmol/L      CO2 30.1 mmol/L      Calcium 9.5 mg/dL      Total Protein 7.0 g/dL      Albumin 4.50 g/dL      ALT (SGPT) 14 U/L      AST (SGOT) 13 U/L      Alkaline Phosphatase 67 U/L      Total Bilirubin 0.3 mg/dL      Globulin 2.5 gm/dL      A/G Ratio 1.8 g/dL      BUN/Creatinine Ratio 16.4     Anion Gap 10.9 mmol/L      eGFR 97.9 mL/min/1.73      Comment: National Kidney Foundation and American Society of Nephrology (ASN) Task Force recommended calculation based on the Chronic Kidney Disease Epidemiology Collaboration (CKD-EPI) equation refit without adjustment for race.       Narrative:      GFR Normal >60  Chronic Kidney Disease <60  Kidney Failure <15      CBC & Differential [[id number]]  (Abnormal) Collected: 09/21/22 1037    Specimen: Blood Updated: 09/21/22 1044    Narrative:      The following orders were created for panel order CBC & Differential.  Procedure                               Abnormality         Status                     ---------                               -----------         ------                     CBC Auto Differential[[id number]]        Abnormal            Final result                 Please view results for these tests on the individual orders.    CBC Auto Differential [[id number]]  (Abnormal) Collected: 09/21/22 1037    Specimen: Blood Updated: 09/21/22 1044     WBC 10.21 10*3/mm3      RBC 4.45 10*6/mm3      Hemoglobin 13.6 g/dL      Hematocrit 44.8 %      .7 fL      MCH 30.6 pg      MCHC 30.4 g/dL      RDW 13.3 %      RDW-SD 51.0 fl      MPV 11.1 fL      Platelets 244 10*3/mm3      Neutrophil % 88.7 %      Lymphocyte % 5.9 %      Monocyte % 4.0 %      Eosinophil % 0.1 %      Basophil % 0.4 %      Immature Grans % 0.9 %      Neutrophils,  Absolute 9.06 10*3/mm3      Lymphocytes, Absolute 0.60 10*3/mm3      Monocytes, Absolute 0.41 10*3/mm3      Eosinophils, Absolute 0.01 10*3/mm3      Basophils, Absolute 0.04 10*3/mm3      Immature Grans, Absolute 0.09 10*3/mm3           Imaging Results (Last 24 Hours)     Procedure Component Value Units Date/Time    XR Chest 1 View [[id number]] Collected: 09/21/22 1139     Updated: 09/21/22 1143    Narrative:      CHEST X-RAY, 09/21/2022         HISTORY:  72-year-old male in the ED with shortness of air, worsening since  yesterday. Nonproductive cough. History of COPD.     TECHNIQUE:  AP portable chest x-ray.     COMPARISON:  *  Chest x-ray, 07/25/2022.     FINDINGS:  Mild infiltrate or atelectasis the right lung base. Pre-existing mild  bibasilar scarring. Lungs otherwise clear. Heart size and pulmonary  vascularity are within normal limits. No visible pleural effusion.       Impression:      Mild infiltrate or atelectasis in the right lung base.     This report was finalized on 9/21/2022 11:41 AM by Dr. Jose Mcrae MD.             Medication Review:   I have reviewed the patient's current medication list    Current Facility-Administered Medications:   •  benzonatate (TESSALON) capsule 200 mg, 200 mg, Oral, TID PRN, Archana Yang APRN  •  dextrose (D50W) (25 g/50 mL) IV injection 25 g, 25 g, Intravenous, Q15 Min PRN, Sanjiv Rider MD  •  dextrose (D50W) (25 g/50 mL) IV injection 25 g, 25 g, Intravenous, Q15 Min PRN, Archana Yang APRN  •  dextrose (GLUTOSE) oral gel 15 g, 15 g, Oral, Q15 Min PRN, Sanjiv Rider MD  •  dextrose (GLUTOSE) oral gel 15 g, 15 g, Oral, Q15 Min PRN, Archana Yang APRN  •  famotidine (PEPCID) tablet 20 mg, 20 mg, Oral, BID AC, Archana Yang APRN, 20 mg at 09/21/22 7702  •  glucagon (GLUCAGEN) injection 1 mg, 1 mg, Intramuscular, Q15 Min PRN, Sanjiv Rider MD  •  glucagon (GLUCAGEN) injection 1 mg, 1 mg, Subcutaneous, Q15 Min PRN, Archana Yang  APRN  •  Insulin Aspart (novoLOG) injection 0-24 Units, 0-24 Units, Subcutaneous, TID AC, Sanjiv Rider MD, 12 Units at 09/21/22 1835  •  Insulin Aspart (novoLOG) injection 0-7 Units, 0-7 Units, Subcutaneous, TID With Meals, Archana Yang APRN  •  ipratropium-albuterol (DUO-NEB) nebulizer solution 3 mL, 3 mL, Nebulization, Q4H While Awake - RT, Archana Yang APRN, 3 mL at 09/22/22 0704  •  lisinopril (PRINIVIL,ZESTRIL) tablet 10 mg, 10 mg, Oral, Q24H, Archana Yang APRN, 10 mg at 09/21/22 1834  •  nicotine (NICODERM CQ) 21 MG/24HR patch 1 patch, 1 patch, Transdermal, Q24H, Archana Yang APRN, 1 patch at 09/21/22 1834  •  predniSONE (DELTASONE) tablet 40 mg, 40 mg, Oral, Daily With Breakfast, Archana Yang APRN, 40 mg at 09/21/22 1834      Assessment & Plan     1.  AECOPD w/ Hypoxia: Baseline is 2-3L, but Mr. Armstrong reports he uses O2 only at night.  Procalcitonin remains negative and viral panel negative.  No evidence of AMI.  Continue nebs and steroids.  I've stopped his LABA/ICS.    2.  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 at goal.  Bedsides mostly at goal.  Some exacerbation due to steroids which is a well-known side effect and not an adverse drug event.  Continue to monitor on current regimen.    3.  Myasthenia Gravis: No acute issues on current regimen.    4.  Elevated BP: No history and no medications for this noted in to recent outpatient office notes.  However, BP readings were near goal (130/86) so will continue ACEI therapy.    5.  GERD: Continue Pepcid.  No acute issues.    6.  Chronic Pain w/ Narcotic Dependence: Resumed home regimen.    Plan for disposition: Predicated on hospital course.    Sanjiv Rider MD  09/22/22  08:44 EDT

## 2022-09-23 ENCOUNTER — READMISSION MANAGEMENT (OUTPATIENT)
Dept: CALL CENTER | Facility: HOSPITAL | Age: 72
End: 2022-09-23

## 2022-09-23 VITALS
DIASTOLIC BLOOD PRESSURE: 67 MMHG | HEART RATE: 85 BPM | RESPIRATION RATE: 20 BRPM | HEIGHT: 72 IN | OXYGEN SATURATION: 90 % | TEMPERATURE: 97.5 F | SYSTOLIC BLOOD PRESSURE: 107 MMHG | WEIGHT: 277.78 LBS | BODY MASS INDEX: 37.62 KG/M2

## 2022-09-23 LAB
GLUCOSE BLDC GLUCOMTR-MCNC: 164 MG/DL (ref 70–130)
GLUCOSE BLDC GLUCOMTR-MCNC: 167 MG/DL (ref 70–130)

## 2022-09-23 PROCEDURE — 94618 PULMONARY STRESS TESTING: CPT

## 2022-09-23 PROCEDURE — G0378 HOSPITAL OBSERVATION PER HR: HCPCS

## 2022-09-23 PROCEDURE — 63710000001 PREDNISONE PER 1 MG: Performed by: HOSPITALIST

## 2022-09-23 PROCEDURE — 94664 DEMO&/EVAL PT USE INHALER: CPT

## 2022-09-23 PROCEDURE — 82962 GLUCOSE BLOOD TEST: CPT

## 2022-09-23 PROCEDURE — 94799 UNLISTED PULMONARY SVC/PX: CPT

## 2022-09-23 PROCEDURE — 94761 N-INVAS EAR/PLS OXIMETRY MLT: CPT

## 2022-09-23 PROCEDURE — 63710000001 INSULIN ASPART PER 5 UNITS: Performed by: HOSPITALIST

## 2022-09-23 PROCEDURE — 63710000001 INSULIN DETEMIR PER 5 UNITS: Performed by: HOSPITALIST

## 2022-09-23 PROCEDURE — 63710000001 MYCOPHENOLATE MOFETIL PER 250 MG: Performed by: HOSPITALIST

## 2022-09-23 PROCEDURE — 99217 PR OBSERVATION CARE DISCHARGE MANAGEMENT: CPT | Performed by: HOSPITALIST

## 2022-09-23 RX ORDER — PREDNISONE 20 MG/1
40 TABLET ORAL
Qty: 14 TABLET | Refills: 0 | Status: SHIPPED | OUTPATIENT
Start: 2022-09-24 | End: 2022-10-01

## 2022-09-23 RX ORDER — FUROSEMIDE 20 MG/1
20 TABLET ORAL DAILY
Qty: 30 TABLET | Refills: 0 | Status: SHIPPED | OUTPATIENT
Start: 2022-09-23 | End: 2022-12-02 | Stop reason: HOSPADM

## 2022-09-23 RX ORDER — FUROSEMIDE 20 MG/1
20 TABLET ORAL DAILY
Status: DISCONTINUED | OUTPATIENT
Start: 2022-09-23 | End: 2022-09-23 | Stop reason: HOSPADM

## 2022-09-23 RX ORDER — LISINOPRIL 10 MG/1
10 TABLET ORAL
Qty: 30 TABLET | Refills: 0 | Status: SHIPPED | OUTPATIENT
Start: 2022-09-24 | End: 2023-03-09 | Stop reason: HOSPADM

## 2022-09-23 RX ADMIN — PYRIDOSTIGMINE BROMIDE 60 MG: 60 TABLET ORAL at 12:18

## 2022-09-23 RX ADMIN — PYRIDOSTIGMINE BROMIDE 60 MG: 60 TABLET ORAL at 03:58

## 2022-09-23 RX ADMIN — PREDNISONE 40 MG: 20 TABLET ORAL at 08:28

## 2022-09-23 RX ADMIN — HYDROCODONE BITARTRATE AND ACETAMINOPHEN 1 TABLET: 10; 325 TABLET ORAL at 03:58

## 2022-09-23 RX ADMIN — IPRATROPIUM BROMIDE AND ALBUTEROL SULFATE 3 ML: 2.5; .5 SOLUTION RESPIRATORY (INHALATION) at 11:52

## 2022-09-23 RX ADMIN — PYRIDOSTIGMINE BROMIDE 60 MG: 60 TABLET ORAL at 07:10

## 2022-09-23 RX ADMIN — INSULIN DETEMIR 45 UNITS: 100 INJECTION, SOLUTION SUBCUTANEOUS at 08:27

## 2022-09-23 RX ADMIN — PYRIDOSTIGMINE BROMIDE 60 MG: 60 TABLET ORAL at 00:11

## 2022-09-23 RX ADMIN — INSULIN ASPART 4 UNITS: 100 INJECTION, SOLUTION INTRAVENOUS; SUBCUTANEOUS at 12:18

## 2022-09-23 RX ADMIN — IPRATROPIUM BROMIDE AND ALBUTEROL SULFATE 3 ML: 2.5; .5 SOLUTION RESPIRATORY (INHALATION) at 07:09

## 2022-09-23 RX ADMIN — IPRATROPIUM BROMIDE AND ALBUTEROL SULFATE 3 ML: 2.5; .5 SOLUTION RESPIRATORY (INHALATION) at 00:12

## 2022-09-23 RX ADMIN — HYDROCODONE BITARTRATE AND ACETAMINOPHEN 1 TABLET: 10; 325 TABLET ORAL at 10:34

## 2022-09-23 RX ADMIN — INSULIN ASPART 4 UNITS: 100 INJECTION, SOLUTION INTRAVENOUS; SUBCUTANEOUS at 08:27

## 2022-09-23 RX ADMIN — LISINOPRIL 10 MG: 10 TABLET ORAL at 08:28

## 2022-09-23 RX ADMIN — FAMOTIDINE 20 MG: 20 TABLET ORAL at 08:28

## 2022-09-23 RX ADMIN — Medication 1 PATCH: at 08:28

## 2022-09-23 RX ADMIN — MYCOPHENOLATE MOFETIL 500 MG: 250 CAPSULE ORAL at 08:28

## 2022-09-23 NOTE — PROCEDURES
Exercise Oximetry    Patient Name:Kt Armstrong   MRN: [id number]   Date: 09/23/22             ROOM AIR BASELINE   SpO2% 90   Heart Rate 82   Blood Pressure 122/69     EXERCISE ON ROOM AIR SpO2% EXERCISE ON O2 @ 3 LPM SpO2%   1 MINUTE 87 1 MINUTE     1LPM 86   2 MINUTES  2 MINUTES   2 LPM 87   3 MINUTES  3 MINUTES   3 LPM  88   4 MINUTES  4 MINUTES 89   5 MINUTES  5 MINUTES 90   6 MINUTES  6 MINUTES 89              Distance Walked   Distance Walked  432 feet   Dyspnea (Jackson Scale)   Dyspnea (Jackson Scale)  3   Fatigue (Jackson Scale)   Fatigue (Jackson Scale)  2   SpO2% Post Exercise   SpO2% Post Exercise   91   HR Post Exercise   HR Post Exercise  95   Time to Recovery   Time to Recovery  1 minute     Comments:

## 2022-09-23 NOTE — DISCHARGE INSTR - APPOINTMENTS
Follow up with Dr. Kathleen next week. [phone]. Needs chemistry panel with initiation of Lasix and Lisinopril

## 2022-09-23 NOTE — DISCHARGE SUMMARY
"Kt Armstrong  1950  [id number]    Hospitalists Discharge Summary    Date of Admission: 9/21/2022  Date of Discharge:  9/23/2022    History of Present Illness from [facility] on admit:   \"The patient is a 72-year-old male who presented to ER on advice from Dr. Kathleen this morning secondary to 1 week of lower extremity edema and increasing shortness of air.  He notes chronic wheezing has worsened and using nebulizer treatments 5-6 times per day, increased his oxygen from 2 to 3 L without much effect.  He reports that he has not been able to smoke his usual 5 to 6 cigarettes daily, ran out of nicotine patches and therefore resumed smoking.  He notes that he is able to lay down to sleep has, has been diagnosed with CHIQUITA but is intolerant of CPAP.  He notes poor exercise tolerance at his baseline.  Room air saturation was 88% and the patient was placed on his baseline 2 L chronic with saturations in the low 90s.     Diagnostics in ER showed mild infiltrate or atelectasis right lower lobe, lactate 2.2, repeat 2.0.  ABG PCO2 56.1, A1c 8%, troponin negative, procalcitonin normal, proBNP normal.       He received 40 mg IV Lasix, Solu-Medrol 125 mg, albuterol neb in ER     oxygen dependent COPD, DM2 in obese with hyperglycemia, myasthenia gravis, chronic pain with chronic narcotic induced constipation, HTN, GERD, hyperlipidemia, BPH, tobacco abuse     He currently denies f/c/headache/rhinorrhea/nasal congestion/lightheadedness/syncopal sensation/n/v/d/chest pain/abdominal pain/recent illness/sick exposures/change in bowel or bladder habits/no weight change/bloody emesis or bloody stools/change in medications or any other new concerns.\"    Primary Discharge diagnoses:  1.  A/C hypoxic hypercarbic respiratory failure due to AECOPD    Secondary Discharge Diagnoses:   1.  DM2 in obese with steroid-induced hyperglycemia-A1c 8%  2.  Hypertension  3.  Myasthenia gravis  4.  Hyperlipidemia  5.  BPH  6.  GERD  7.  Tobacco abuse  8.  " Chronic pain with chronic narcotic induced constipation and chronic narcotic dependence    Hospital Course Summary:   Patient received treatment for AE COPD with increased frequency of DuoNebs and oral prednisone with improvement in his symptoms.  Oxygen requirement never escalated above his baseline of 2 L.  Lisinopril was added this admission for blood pressure above goal and for renal protection benefit in diabetes.  F/U Ney Kathleen MD 1 week    PCP  Patient Care Team:  Ney Kathleen MD as PCP - General (Family Medicine)  Chris Driver MD as Consulting Physician (Pulmonary Disease)    Consults:   Consults     No orders found from 8/23/2022 to 9/22/2022.        Operations and Procedures Performed:     Adult Transthoracic Echo Complete w/ Color, Spectral and Contrast if Necessary Per Protocol    Result Date: 9/22/2022  Narrative: · Calculated left ventricular EF = 72.8% Estimated left ventricular EF was in agreement with the calculated left ventricular EF. Left ventricular systolic function is hyperdynamic (EF > 70%). · Left ventricular wall thickness is consistent with mild concentric hypertrophy. · Left ventricular diastolic function was normal.      Walking Oximetry    Result Date: 9/23/2022  Narrative: Joy Desai, RRT     9/23/2022 11:10 AM Exercise Oximetry Patient Name:Kt Armstrong MRN: [id number] Date: 09/23/22         ROOM AIR BASELINE SpO2% 90 Heart Rate 82 Blood Pressure 122/69 EXERCISE ON ROOM AIR SpO2% EXERCISE ON O2 @ 3 LPM SpO2% 1 MINUTE 87 1 MINUTE     1LPM 86 2 MINUTES  2 MINUTES   2 LPM 87 3 MINUTES  3 MINUTES   3 LPM  88 4 MINUTES  4 MINUTES 89 5 MINUTES  5 MINUTES 90 6 MINUTES  6 MINUTES 89          Distance Walked   Distance Walked  432 feet Dyspnea (Jackson Scale)   Dyspnea (Jackson Scale)  3 Fatigue (Jackson Scale)   Fatigue (Jackson Scale)  2 SpO2% Post Exercise   SpO2% Post Exercise   91 HR Post Exercise   HR Post Exercise  95 Time to Recovery   Time to Recovery  1 minute  Comments:     XR Chest 1 View    Result Date: 9/21/2022  Narrative: CHEST X-RAY, 09/21/2022     HISTORY: 72-year-old male in the ED with shortness of air, worsening since yesterday. Nonproductive cough. History of COPD.  TECHNIQUE: AP portable chest x-ray.  COMPARISON: *  Chest x-ray, 07/25/2022.  FINDINGS: Mild infiltrate or atelectasis the right lung base. Pre-existing mild bibasilar scarring. Lungs otherwise clear. Heart size and pulmonary vascularity are within normal limits. No visible pleural effusion.      Impression: Mild infiltrate or atelectasis in the right lung base.  This report was finalized on 9/21/2022 11:41 AM by Dr. Jose Mcrae MD.      Allergies:  has No Known Allergies.    Efren  Reviewed    Discharge Medications:     Discharge Medications      New Medications      Instructions Start Date   furosemide 20 MG tablet  Commonly known as: LASIX   20 mg, Oral, Daily      lisinopril 10 MG tablet  Commonly known as: PRINIVIL,ZESTRIL   10 mg, Oral, Every 24 Hours Scheduled         Continue These Medications      Instructions Start Date   albuterol (2.5 MG/3ML) 0.083% nebulizer solution  Commonly known as: PROVENTIL   2.5 mg, Nebulization, Every 4 Hours PRN      Kd Contour Test test strip  Generic drug: glucose blood   No dose, route, or frequency recorded.      BD Pen Needle Yaritza U/F 32G X 4 MM misc  Generic drug: Insulin Pen Needle   USE 1 NEEDLE SUBCUTANEOUSLY QD      bisacodyl 10 MG suppository  Commonly known as: DULCOLAX   10 mg, Rectal, Daily      HYDROcodone-acetaminophen  MG per tablet  Commonly known as: NORCO   1 tablet, Every 6 Hours PRN      insulin glargine 100 UNIT/ML injection  Commonly known as: LANTUS, SEMGLEE   45 Units, Subcutaneous, Every Morning, Every morning      insulin glargine 100 UNIT/ML injection  Commonly known as: LANTUS, SEMGLEE   35 Units, Subcutaneous, Nightly      mycophenolate 500 MG tablet  Commonly known as: CELLCEPT   500 mg, Oral, 2 Times Daily       nicotine 14 MG/24HR patch  Commonly known as: NICODERM CQ   1 patch, Transdermal, Every 24 Hours Scheduled      pyridostigmine 60 MG tablet  Commonly known as: MESTINON   60 mg, Oral, Every 4 Hours      SIMVASTATIN PO   40 mg, Oral, Every Evening      Trelegy Ellipta 100-62.5-25 MCG/INH inhaler  Generic drug: Fluticasone-Umeclidin-Vilant   1 puff, Inhalation, Daily - RT         Stop These Medications    doxycycline 100 MG capsule  Commonly known as: MONODOX     methylPREDNISolone 4 MG dose pack  Commonly known as: Medrol        ASK your doctor about these medications      Instructions Start Date   predniSONE 20 MG tablet  Commonly known as: DELTASONE  Ask about: Should I take this medication?   40 mg, Oral, Daily With Breakfast             Last Lab Results:   Lab Results (most recent)     Procedure Component Value Units Date/Time    POC Glucose Once [[id number]]  (Abnormal) Collected: 09/22/22 1705    Specimen: Blood Updated: 09/22/22 1712     Glucose 274 mg/dL      Comment: Meter: [id number] : [id number] Alicia Grant RN Floluis fernando       Blood Culture - Blood, Arm, Left [[id number]]  (Normal) Collected: 09/21/22 1157    Specimen: Blood from Arm, Left Updated: 09/22/22 1304     Blood Culture No growth at 24 hours    Blood Culture - Blood, Arm, Right [[id number]]  (Normal) Collected: 09/21/22 1201    Specimen: Blood from Arm, Right Updated: 09/22/22 1304     Blood Culture No growth at 24 hours    POC Glucose Once [[id number]]  (Normal) Collected: 09/22/22 1148    Specimen: Blood Updated: 09/22/22 1156     Glucose 108 mg/dL      Comment: Meter: [id number] : [id number] Pj JONAS       Procalcitonin [[id number]]  (Normal) Collected: 09/22/22 0647    Specimen: Blood Updated: 09/22/22 0722     Procalcitonin 0.07 ng/mL     Narrative:      As a Marker for Sepsis (Non-Neonates):    1. <0.5 ng/mL represents a low risk of severe sepsis and/or septic shock.  2. >2 ng/mL represents a high risk of severe sepsis and/or  "septic shock.    As a Marker for Lower Respiratory Tract Infections that require antibiotic therapy:    PCT on Admission    Antibiotic Therapy       6-12 Hrs later    >0.5                Strongly Recommended  >0.25 - <0.5        Recommended   0.1 - 0.25          Discouraged              Remeasure/reassess PCT  <0.1                Strongly Discouraged     Remeasure/reassess PCT    As 28 day mortality risk marker: \"Change in Procalcitonin Result\" (>80% or <=80%) if Day 0 (or Day 1) and Day 4 values are available. Refer to http://www.[facility] [place]-pct-calculator.com    Change in PCT <=80%  A decrease of PCT levels below or equal to 80% defines a positive change in PCT test result representing a higher risk for 28-day all-cause mortality of patients diagnosed with severe sepsis for septic shock.    Change in PCT >80%  A decrease of PCT levels of more than 80% defines a negative change in PCT result representing a lower risk for 28-day all-cause mortality of patients diagnosed with severe sepsis or septic shock.       Basic Metabolic Panel [[id number]]  (Abnormal) Collected: 09/22/22 0647    Specimen: Blood Updated: 09/22/22 0717     Glucose 187 mg/dL      BUN 14 mg/dL      Creatinine 0.70 mg/dL      Sodium 139 mmol/L      Potassium 4.2 mmol/L      Chloride 99 mmol/L      CO2 33.8 mmol/L      Calcium 9.1 mg/dL      BUN/Creatinine Ratio 20.0     Anion Gap 6.2 mmol/L      eGFR 99.1 mL/min/1.73      Comment: National Kidney Foundation and American Society of Nephrology (ASN) Task Force recommended calculation based on the Chronic Kidney Disease Epidemiology Collaboration (CKD-EPI) equation refit without adjustment for race.       Narrative:      GFR Normal >60  Chronic Kidney Disease <60  Kidney Failure <15      CBC & Differential [[id number]]  (Abnormal) Collected: 09/22/22 0619    Specimen: Blood Updated: 09/22/22 0705    Narrative:      The following orders were created for panel order CBC & Differential.  Procedure               "                 Abnormality         Status                     ---------                               -----------         ------                     CBC Auto Differential[[id number]]        Abnormal            Final result               Scan Slide[[id number]]                                                                    Please view results for these tests on the individual orders.    CBC Auto Differential [[id number]]  (Abnormal) Collected: 09/22/22 0619    Specimen: Blood Updated: 09/22/22 0704     WBC 11.14 10*3/mm3      RBC 4.43 10*6/mm3      Hemoglobin 13.4 g/dL      Hematocrit 44.1 %      MCV 99.5 fL      MCH 30.2 pg      MCHC 30.4 g/dL      RDW 13.2 %      RDW-SD 50.2 fl      MPV 13.1 fL      Platelets 148 10*3/mm3      Neutrophil % 80.3 %      Lymphocyte % 11.0 %      Monocyte % 8.0 %      Eosinophil % 0.0 %      Basophil % 0.1 %      Immature Grans % 0.6 %      Neutrophils, Absolute 8.94 10*3/mm3      Lymphocytes, Absolute 1.23 10*3/mm3      Monocytes, Absolute 0.89 10*3/mm3      Eosinophils, Absolute 0.00 10*3/mm3      Basophils, Absolute 0.01 10*3/mm3      Immature Grans, Absolute 0.07 10*3/mm3     Respiratory Panel PCR w/COVID-19(SARS-CoV-2) ZEUS/SHAW/FRANCIA/PAD/COR/MAD/GIL In-House, NP Swab in UTM/VTM, 3-4 HR TAT - Swab, Nasopharynx [[id number]]  (Normal) Collected: 09/21/22 1305    Specimen: Swab from Nasopharynx Updated: 09/21/22 1702     ADENOVIRUS, PCR Not Detected     Coronavirus 229E Not Detected     Coronavirus HKU1 Not Detected     Coronavirus NL63 Not Detected     Coronavirus OC43 Not Detected     COVID19 Not Detected     Human Metapneumovirus Not Detected     Human Rhinovirus/Enterovirus Not Detected     Influenza A PCR Not Detected     Influenza B PCR Not Detected     Parainfluenza Virus 1 Not Detected     Parainfluenza Virus 2 Not Detected     Parainfluenza Virus 3 Not Detected     Parainfluenza Virus 4 Not Detected     RSV, PCR Not Detected     Bordetella pertussis pcr Not Detected      Bordetella parapertussis PCR Not Detected     Chlamydophila pneumoniae PCR Not Detected     Mycoplasma pneumo by PCR Not Detected    Narrative:      In the setting of a positive respiratory panel with a viral infection PLUS a negative procalcitonin without other underlying concern for bacterial infection, consider observing off antibiotics or discontinuation of antibiotics and continue supportive care. If the respiratory panel is positive for atypical bacterial infection (Bordetella pertussis, Chlamydophila pneumoniae, or Mycoplasma pneumoniae), consider antibiotic de-escalation to target atypical bacterial infection.    Troponin [[id number]]  (Normal) Collected: 09/21/22 1523    Specimen: Blood Updated: 09/21/22 1553     Troponin T <0.010 ng/mL     Narrative:      Troponin T Reference Range:  <= 0.03 ng/mL-   Negative for AMI  >0.03 ng/mL-     Abnormal for myocardial necrosis.  Clinicians would have to utilize clinical acumen, EKG, Troponin and serial changes to determine if it is an Acute Myocardial Infarction or myocardial injury due to an underlying chronic condition.       Results may be falsely decreased if patient taking Biotin.      D-dimer, Quantitative [[id number]]  (Normal) Collected: 09/21/22 1524    Specimen: Blood Updated: 09/21/22 1547     D-Dimer, Quantitative 0.41 MCGFEU/mL     Narrative:      Can be elevated in, but is not diagnostic for deep vein thrombosis (DVT) or pulmonary embolis (PE).  It is also elevated in other medical conditions.  Clinical correlation is required.  The negative cut-off value for the D-Dimer is 0.50 mcg FEU/mL for DVT and PE.      Hemoglobin A1c [[id number]]  (Abnormal) Collected: 09/21/22 1037    Specimen: Blood Updated: 09/21/22 1453     Hemoglobin A1C 8.00 %     Narrative:      Hemoglobin A1C Ranges:    Increased Risk for Diabetes  5.7% to 6.4%  Diabetes                     >= 6.5%  Diabetic Goal                < 7.0%    STAT Lactic Acid, Reflex [[id number]]  (Normal)  "Collected: 09/21/22 1346    Specimen: Blood Updated: 09/21/22 1416     Lactate 2.0 mmol/L     Procalcitonin [[id number]]  (Normal) Collected: 09/21/22 1037    Specimen: Blood Updated: 09/21/22 1221     Procalcitonin 0.06 ng/mL     Narrative:      As a Marker for Sepsis (Non-Neonates):    1. <0.5 ng/mL represents a low risk of severe sepsis and/or septic shock.  2. >2 ng/mL represents a high risk of severe sepsis and/or septic shock.    As a Marker for Lower Respiratory Tract Infections that require antibiotic therapy:    PCT on Admission    Antibiotic Therapy       6-12 Hrs later    >0.5                Strongly Recommended  >0.25 - <0.5        Recommended   0.1 - 0.25          Discouraged              Remeasure/reassess PCT  <0.1                Strongly Discouraged     Remeasure/reassess PCT    As 28 day mortality risk marker: \"Change in Procalcitonin Result\" (>80% or <=80%) if Day 0 (or Day 1) and Day 4 values are available. Refer to http://www.[facility]-pct-calculator.com    Change in PCT <=80%  A decrease of PCT levels below or equal to 80% defines a positive change in PCT test result representing a higher risk for 28-day all-cause mortality of patients diagnosed with severe sepsis for septic shock.    Change in PCT >80%  A decrease of PCT levels of more than 80% defines a negative change in PCT result representing a lower risk for 28-day all-cause mortality of patients diagnosed with severe sepsis or septic shock.       Lactic Acid, Plasma [[id number]]  (Abnormal) Collected: 09/21/22 1050    Specimen: Blood Updated: 09/21/22 1142     Lactate 2.2 mmol/L     BNP [[id number]]  (Normal) Collected: 09/21/22 1037    Specimen: Blood Updated: 09/21/22 1129     proBNP 47.2 pg/mL     Narrative:      Among patients with dyspnea, NT-proBNP is highly sensitive for the detection of acute congestive heart failure. In addition NT-proBNP of <300 pg/ml effectively rules out acute congestive heart failure with 99% negative predictive " value.    Results may be falsely decreased if patient taking Biotin.      Troponin [[id number]]  (Normal) Collected: 09/21/22 1037    Specimen: Blood Updated: 09/21/22 1108     Troponin T <0.010 ng/mL     Narrative:      Troponin T Reference Range:  <= 0.03 ng/mL-   Negative for AMI  >0.03 ng/mL-     Abnormal for myocardial necrosis.  Clinicians would have to utilize clinical acumen, EKG, Troponin and serial changes to determine if it is an Acute Myocardial Infarction or myocardial injury due to an underlying chronic condition.       Results may be falsely decreased if patient taking Biotin.      Blood Gas, Venous - [[id number]]  (Abnormal) Collected: 09/21/22 1100    Specimen: Venous Blood Updated: 09/21/22 1105     Site OTHER     pH, Venous 7.360 pH Units      pCO2, Venous 56.1 mm Hg      Comment: 83 Value above reference range        pO2, Venous 44.3 mm Hg      HCO3, Venous 31.6 mmol/L      Comment: 83 Value above reference range        Base Excess, Venous 4.7 mmol/L      Comment: 83 Value above reference range        O2 Saturation, Venous 80.8 %      Comment: 83 Value above reference range        Hemoglobin, Blood Gas 13.2 g/dL      Comment: 84 Value below reference range        Temperature 37.0 C      Barometric Pressure for Blood Gas 738 mmHg      Modality Nasal Cannula     Flow Rate 2.0 lpm      Ventilator Mode STANDBY     Collected by [id number]     Comment: Meter: [id number]     :  [id number]       Comprehensive Metabolic Panel [[id number]]  (Abnormal) Collected: 09/21/22 1037    Specimen: Blood Updated: 09/21/22 1105     Glucose 95 mg/dL      BUN 12 mg/dL      Creatinine 0.73 mg/dL      Sodium 144 mmol/L      Potassium 3.8 mmol/L      Chloride 103 mmol/L      CO2 30.1 mmol/L      Calcium 9.5 mg/dL      Total Protein 7.0 g/dL      Albumin 4.50 g/dL      ALT (SGPT) 14 U/L      AST (SGOT) 13 U/L      Alkaline Phosphatase 67 U/L      Total Bilirubin 0.3 mg/dL      Globulin 2.5 gm/dL      A/G Ratio 1.8  g/dL      BUN/Creatinine Ratio 16.4     Anion Gap 10.9 mmol/L      eGFR 97.9 mL/min/1.73      Comment: National Kidney Foundation and American Society of Nephrology (ASN) Task Force recommended calculation based on the Chronic Kidney Disease Epidemiology Collaboration (CKD-EPI) equation refit without adjustment for race.       Narrative:      GFR Normal >60  Chronic Kidney Disease <60  Kidney Failure <15      CBC & Differential [[id number]]  (Abnormal) Collected: 09/21/22 1037    Specimen: Blood Updated: 09/21/22 1044    Narrative:      The following orders were created for panel order CBC & Differential.  Procedure                               Abnormality         Status                     ---------                               -----------         ------                     CBC Auto Differential[[id number]]        Abnormal            Final result                 Please view results for these tests on the individual orders.    CBC Auto Differential [[id number]]  (Abnormal) Collected: 09/21/22 1037    Specimen: Blood Updated: 09/21/22 1044     WBC 10.21 10*3/mm3      RBC 4.45 10*6/mm3      Hemoglobin 13.6 g/dL      Hematocrit 44.8 %      .7 fL      MCH 30.6 pg      MCHC 30.4 g/dL      RDW 13.3 %      RDW-SD 51.0 fl      MPV 11.1 fL      Platelets 244 10*3/mm3      Neutrophil % 88.7 %      Lymphocyte % 5.9 %      Monocyte % 4.0 %      Eosinophil % 0.1 %      Basophil % 0.4 %      Immature Grans % 0.9 %      Neutrophils, Absolute 9.06 10*3/mm3      Lymphocytes, Absolute 0.60 10*3/mm3      Monocytes, Absolute 0.41 10*3/mm3      Eosinophils, Absolute 0.01 10*3/mm3      Basophils, Absolute 0.04 10*3/mm3      Immature Grans, Absolute 0.09 10*3/mm3         Imaging Results (Most Recent)     Procedure Component Value Units Date/Time    XR Chest 1 View [[id number]] Collected: 09/21/22 1139     Updated: 09/21/22 1143    Narrative:      CHEST X-RAY, 09/21/2022         HISTORY:  72-year-old male in the ED with shortness of  air, worsening since  yesterday. Nonproductive cough. History of COPD.     TECHNIQUE:  AP portable chest x-ray.     COMPARISON:  *  Chest x-ray, 07/25/2022.     FINDINGS:  Mild infiltrate or atelectasis the right lung base. Pre-existing mild  bibasilar scarring. Lungs otherwise clear. Heart size and pulmonary  vascularity are within normal limits. No visible pleural effusion.       Impression:      Mild infiltrate or atelectasis in the right lung base.     This report was finalized on 9/21/2022 11:41 AM by Dr. Jose Mcrae MD.             PROCEDURES: None    Condition on Discharge: Stable    Physical Exam at Discharge  Vital Signs      Body mass index is 37.67 kg/m².    Physical Exam  Cardiovascular:      Rate and Rhythm: Normal rate and regular rhythm.      Pulses: Normal pulses.   Pulmonary:      Effort: Pulmonary effort is normal.      Breath sounds: Normal breath sounds.   Abdominal:      General: Bowel sounds are normal.      Palpations: Abdomen is soft.   Neurological:      General: No focal deficit present.      Mental Status: He is alert and oriented to person, place, and time.   Psychiatric:         Mood and Affect: Mood normal.         Behavior: Behavior normal.           Discharge Disposition  Home    Visiting Nurse:    No     Home PT/OT:  No     Home Safety Evaluation:  No     DME  None new    Discharge Diet:   Cardiac, Consistent Carb      Activity at Discharge:  As tolerated    Follow-up Appointments  No future appointments.  Additional Instructions for the Follow-ups that You Need to Schedule     Discharge Follow-up with PCP   As directed       Currently Documented PCP:    Ney Kathleen MD    PCP Phone Number:    [phone]     Follow Up Details: Next week.  Need chemistry panel w/ initiation of Lasix and Lisinopril.           Follow-up PCP 1 week         Sanjiv Rider MD  10/04/22  08:13 EDT

## 2022-09-23 NOTE — CASE MANAGEMENT/SOCIAL WORK
Case Management Discharge Note      Final Note: dc home         Selected Continued Care - Discharged on 9/23/2022 Admission date: 9/21/2022 - Discharge disposition: Home or Self Care    Destination    No services have been selected for the patient.              Durable Medical Equipment    No services have been selected for the patient.              Dialysis/Infusion    No services have been selected for the patient.              Home Medical Care    No services have been selected for the patient.              Therapy    No services have been selected for the patient.              Community Resources    No services have been selected for the patient.              Community & DME    No services have been selected for the patient.                       Final Discharge Disposition Code: 01 - home or self-care

## 2022-09-23 NOTE — PLAN OF CARE
Goal Outcome Evaluation:  Plan of Care Reviewed With: patient        Progress: improving  Outcome Evaluation: O2 2L NC; occas nonproductive cough; qd wt; medicated x2 for c/o chronic generalized pain; plan homw with daughters when able

## 2022-09-24 NOTE — OUTREACH NOTE
Prep Survey    Flowsheet Row Responses   [facility] facility patient discharged from? [place]   Is LACE score < 7 ? No   Emergency Room discharge w/ pulse ox? No   Eligibility Readm Mgmt   Discharge diagnosis AECOPD   Does the patient have one of the following disease processes/diagnoses(primary or secondary)? COPD   Does the patient have Home health ordered? No   Is there a DME ordered? No   Prep survey completed? Yes          NEVA GOODWIN - Registered Nurse

## 2022-09-26 LAB
BACTERIA SPEC AEROBE CULT: NORMAL
BACTERIA SPEC AEROBE CULT: NORMAL

## 2022-09-27 ENCOUNTER — READMISSION MANAGEMENT (OUTPATIENT)
Dept: CALL CENTER | Facility: HOSPITAL | Age: 72
End: 2022-09-27

## 2022-09-27 NOTE — OUTREACH NOTE
COPD/PN Week 1 Survey    Flowsheet Row Responses   [facility] facility patient discharged from? [facility]   Does the patient have one of the following disease processes/diagnoses(primary or secondary)? COPD   Week 1 attempt successful? No   Unsuccessful attempts Attempt 1          CARI Shaffer Licensed Nurse

## 2022-09-29 ENCOUNTER — READMISSION MANAGEMENT (OUTPATIENT)
Dept: CALL CENTER | Facility: HOSPITAL | Age: 72
End: 2022-09-29

## 2022-09-29 NOTE — OUTREACH NOTE
COPD/PN Week 1 Survey    Flowsheet Row Responses   [facility] facility patient discharged from? [facility]   Does the patient have one of the following disease processes/diagnoses(primary or secondary)? COPD   Week 1 attempt successful? No   Unsuccessful attempts Attempt 2          RIGO PEREZ - Registered Nurse

## 2022-10-11 ENCOUNTER — READMISSION MANAGEMENT (OUTPATIENT)
Dept: CALL CENTER | Facility: HOSPITAL | Age: 72
End: 2022-10-11

## 2022-10-11 NOTE — OUTREACH NOTE
COPD/PN Week 3 Survey    Flowsheet Row Responses   [facility] patient discharged from? [place]   Does the patient have one of the following disease processes/diagnoses(primary or secondary)? COPD   Week 3 attempt successful? Yes   Call start time 1231   Call end time 1231   Discharge diagnosis AECOPD   Meds reviewed with patient/caregiver? Yes   Is the patient having any side effects they believe may be caused by any medication additions or changes? No   Does the patient have all medications ordered at discharge? Yes   Is the patient taking all medications as directed (includes completed medication regime)? Yes   Does the patient have a primary care provider?  Yes   Does the patient have an appointment with their PCP or specialist within 7 days of discharge? Greater than 7 days   Comments regarding PCP PATIENT STATES HE HAD A SCHEDULED APPOINTMENT WITH HIS PCP, BUT THE OFFICE HAD TO CANCEL IT AND THEY RESCEDULED IT FOR A LATER DATE, BUT HE DIDN'T KNOW THE DATE DURING THIS CALL.    What is preventing the patient from scheduling follow up appointments within 7 days of discharge? Earlier appointment not available   Nursing Interventions Verified appointment date/time/provider   Has the patient kept scheduled appointments due by today? N/A   Has home health visited the patient within 72 hours of discharge? N/A   Pulse Ox monitoring Intermittent   Pulse Ox device source Patient   O2 Sat: education provided Monitoring frequency, Sat levels   Did the patient receive a copy of their discharge instructions? Yes   Nursing interventions Reviewed instructions with patient   What is the patient's perception of their health status since discharge? Improving   Nursing Interventions Nurse provided patient education   If the patient is a current smoker, are they able to teach back resources for cessation? Smoking cessation medications   Is the patient/caregiver able to teach back the hierarchy of who to call/visit for  symptoms/problems? PCP, Specialist, Home health nurse, Urgent Care, ED, 911 Yes   Is the patient able to teach back COPD zones? Yes   Nursing interventions Education provided on various zones   Patient reports what zone on this call? Green Zone   Green Zone Reports doing well, Breathing without shortness of breath, Usual activity and exercise level, Usual amounts of cough and phlegm/mucous, Slept well last night, Appetite is good   Green Zone interventions: Take daily medications, Use oxygen as prescribed, Continue regular exercise/diet plan   Week 3 call completed? Yes          CARI HOPKINS - Licensed Nurse

## 2022-10-19 ENCOUNTER — READMISSION MANAGEMENT (OUTPATIENT)
Dept: CALL CENTER | Facility: HOSPITAL | Age: 72
End: 2022-10-19

## 2022-10-19 NOTE — OUTREACH NOTE
COPD/PN Week 4 Survey    Flowsheet Row Responses   [facility] patient discharged from? [place]   Does the patient have one of the following disease processes/diagnoses(primary or secondary)? COPD   Week 4 attempt successful? No   Revoke Decline to participate          RIGO GERMAIN - Registered Nurse

## 2022-11-03 ENCOUNTER — APPOINTMENT (OUTPATIENT)
Dept: GENERAL RADIOLOGY | Facility: HOSPITAL | Age: 72
End: 2022-11-03

## 2022-11-03 ENCOUNTER — HOSPITAL ENCOUNTER (EMERGENCY)
Facility: HOSPITAL | Age: 72
Discharge: HOME | End: 2022-11-04
Attending: EMERGENCY MEDICINE | Admitting: EMERGENCY MEDICINE

## 2022-11-03 DIAGNOSIS — Z72.0 TOBACCO ABUSE: ICD-10-CM

## 2022-11-03 DIAGNOSIS — T14.8XXA MULTIPLE SKIN TEARS: ICD-10-CM

## 2022-11-03 DIAGNOSIS — J44.1 COPD WITH ACUTE EXACERBATION: Primary | ICD-10-CM

## 2022-11-03 LAB
ALBUMIN SERPL-MCNC: 4.6 G/DL (ref 3.5–5.2)
ALBUMIN/GLOB SERPL: 2.2 G/DL
ALP SERPL-CCNC: 59 U/L (ref 39–117)
ALT SERPL W P-5'-P-CCNC: 17 U/L (ref 1–41)
ANION GAP SERPL CALCULATED.3IONS-SCNC: 10.2 MMOL/L (ref 5–15)
AST SERPL-CCNC: 19 U/L (ref 1–40)
BASOPHILS # BLD AUTO: 0.05 10*3/MM3 (ref 0–0.2)
BASOPHILS NFR BLD AUTO: 0.5 % (ref 0–1.5)
BILIRUB SERPL-MCNC: <0.2 MG/DL (ref 0–1.2)
BUN SERPL-MCNC: 15 MG/DL (ref 8–23)
BUN/CREAT SERPL: 19.2 (ref 7–25)
CALCIUM SPEC-SCNC: 9.4 MG/DL (ref 8.6–10.5)
CHLORIDE SERPL-SCNC: 102 MMOL/L (ref 98–107)
CO2 SERPL-SCNC: 32.8 MMOL/L (ref 22–29)
CREAT SERPL-MCNC: 0.78 MG/DL (ref 0.76–1.27)
D-LACTATE SERPL-SCNC: 2 MMOL/L (ref 0.5–2)
DEPRECATED RDW RBC AUTO: 48.2 FL (ref 37–54)
EGFRCR SERPLBLD CKD-EPI 2021: 95.9 ML/MIN/1.73
EOSINOPHIL # BLD AUTO: 0.08 10*3/MM3 (ref 0–0.4)
EOSINOPHIL NFR BLD AUTO: 0.8 % (ref 0.3–6.2)
ERYTHROCYTE [DISTWIDTH] IN BLOOD BY AUTOMATED COUNT: 13.1 % (ref 12.3–15.4)
GLOBULIN UR ELPH-MCNC: 2.1 GM/DL
GLUCOSE SERPL-MCNC: 69 MG/DL (ref 65–99)
HCT VFR BLD AUTO: 45.8 % (ref 37.5–51)
HGB BLD-MCNC: 14.4 G/DL (ref 13–17.7)
HOLD SPECIMEN: NORMAL
IMM GRANULOCYTES # BLD AUTO: 0.02 10*3/MM3 (ref 0–0.05)
IMM GRANULOCYTES NFR BLD AUTO: 0.2 % (ref 0–0.5)
LYMPHOCYTES # BLD AUTO: 1.71 10*3/MM3 (ref 0.7–3.1)
LYMPHOCYTES NFR BLD AUTO: 17.7 % (ref 19.6–45.3)
MCH RBC QN AUTO: 31.6 PG (ref 26.6–33)
MCHC RBC AUTO-ENTMCNC: 31.4 G/DL (ref 31.5–35.7)
MCV RBC AUTO: 100.7 FL (ref 79–97)
MONOCYTES # BLD AUTO: 0.91 10*3/MM3 (ref 0.1–0.9)
MONOCYTES NFR BLD AUTO: 9.4 % (ref 5–12)
NEUTROPHILS NFR BLD AUTO: 6.87 10*3/MM3 (ref 1.7–7)
NEUTROPHILS NFR BLD AUTO: 71.4 % (ref 42.7–76)
NRBC BLD AUTO-RTO: 0 /100 WBC (ref 0–0.2)
NT-PROBNP SERPL-MCNC: 10 PG/ML (ref 0–900)
PLATELET # BLD AUTO: 152 10*3/MM3 (ref 140–450)
PMV BLD AUTO: 12.7 FL (ref 6–12)
POTASSIUM SERPL-SCNC: 4.1 MMOL/L (ref 3.5–5.2)
PROT SERPL-MCNC: 6.7 G/DL (ref 6–8.5)
QT INTERVAL: 335 MS
RBC # BLD AUTO: 4.55 10*6/MM3 (ref 4.14–5.8)
SODIUM SERPL-SCNC: 145 MMOL/L (ref 136–145)
TROPONIN T SERPL-MCNC: <0.01 NG/ML (ref 0–0.03)
WBC NRBC COR # BLD: 9.64 10*3/MM3 (ref 3.4–10.8)
WHOLE BLOOD HOLD SPECIMEN: NORMAL

## 2022-11-03 PROCEDURE — 94799 UNLISTED PULMONARY SVC/PX: CPT

## 2022-11-03 PROCEDURE — 25010000002 METHYLPREDNISOLONE PER 125 MG: Performed by: EMERGENCY MEDICINE

## 2022-11-03 PROCEDURE — 71046 X-RAY EXAM CHEST 2 VIEWS: CPT

## 2022-11-03 PROCEDURE — 93005 ELECTROCARDIOGRAM TRACING: CPT

## 2022-11-03 PROCEDURE — 83605 ASSAY OF LACTIC ACID: CPT | Performed by: EMERGENCY MEDICINE

## 2022-11-03 PROCEDURE — 84484 ASSAY OF TROPONIN QUANT: CPT

## 2022-11-03 PROCEDURE — 87147 CULTURE TYPE IMMUNOLOGIC: CPT | Performed by: EMERGENCY MEDICINE

## 2022-11-03 PROCEDURE — 36415 COLL VENOUS BLD VENIPUNCTURE: CPT

## 2022-11-03 PROCEDURE — 80053 COMPREHEN METABOLIC PANEL: CPT

## 2022-11-03 PROCEDURE — 99284 EMERGENCY DEPT VISIT MOD MDM: CPT

## 2022-11-03 PROCEDURE — 93010 ELECTROCARDIOGRAM REPORT: CPT | Performed by: INTERNAL MEDICINE

## 2022-11-03 PROCEDURE — 83880 ASSAY OF NATRIURETIC PEPTIDE: CPT

## 2022-11-03 PROCEDURE — 87150 DNA/RNA AMPLIFIED PROBE: CPT | Performed by: EMERGENCY MEDICINE

## 2022-11-03 PROCEDURE — 85025 COMPLETE CBC W/AUTO DIFF WBC: CPT

## 2022-11-03 PROCEDURE — 94640 AIRWAY INHALATION TREATMENT: CPT

## 2022-11-03 PROCEDURE — 87040 BLOOD CULTURE FOR BACTERIA: CPT | Performed by: EMERGENCY MEDICINE

## 2022-11-03 PROCEDURE — 96374 THER/PROPH/DIAG INJ IV PUSH: CPT

## 2022-11-03 RX ORDER — ALBUTEROL SULFATE 2.5 MG/3ML
2.5 SOLUTION RESPIRATORY (INHALATION) ONCE
Status: COMPLETED | OUTPATIENT
Start: 2022-11-03 | End: 2022-11-03

## 2022-11-03 RX ORDER — IPRATROPIUM BROMIDE AND ALBUTEROL SULFATE 2.5; .5 MG/3ML; MG/3ML
3 SOLUTION RESPIRATORY (INHALATION) ONCE
Status: COMPLETED | OUTPATIENT
Start: 2022-11-03 | End: 2022-11-03

## 2022-11-03 RX ORDER — METHYLPREDNISOLONE SODIUM SUCCINATE 125 MG/2ML
80 INJECTION, POWDER, LYOPHILIZED, FOR SOLUTION INTRAMUSCULAR; INTRAVENOUS ONCE
Status: COMPLETED | OUTPATIENT
Start: 2022-11-03 | End: 2022-11-03

## 2022-11-03 RX ORDER — SODIUM CHLORIDE 0.9 % (FLUSH) 0.9 %
10 SYRINGE (ML) INJECTION AS NEEDED
Status: DISCONTINUED | OUTPATIENT
Start: 2022-11-03 | End: 2022-11-04 | Stop reason: HOSPADM

## 2022-11-03 RX ADMIN — IPRATROPIUM BROMIDE AND ALBUTEROL SULFATE 3 ML: .5; 2.5 SOLUTION RESPIRATORY (INHALATION) at 22:53

## 2022-11-03 RX ADMIN — METHYLPREDNISOLONE SODIUM SUCCINATE 80 MG: 125 INJECTION, POWDER, FOR SOLUTION INTRAMUSCULAR; INTRAVENOUS at 23:11

## 2022-11-03 RX ADMIN — ALBUTEROL SULFATE 2.5 MG: 2.5 SOLUTION RESPIRATORY (INHALATION) at 23:08

## 2022-11-04 VITALS
TEMPERATURE: 98.3 F | OXYGEN SATURATION: 90 % | WEIGHT: 228 LBS | DIASTOLIC BLOOD PRESSURE: 91 MMHG | BODY MASS INDEX: 29.26 KG/M2 | HEIGHT: 74 IN | HEART RATE: 102 BPM | SYSTOLIC BLOOD PRESSURE: 102 MMHG | RESPIRATION RATE: 25 BRPM

## 2022-11-04 LAB — TROPONIN T SERPL-MCNC: <0.01 NG/ML (ref 0–0.03)

## 2022-11-04 PROCEDURE — 93010 ELECTROCARDIOGRAM REPORT: CPT | Performed by: INTERNAL MEDICINE

## 2022-11-04 PROCEDURE — 84484 ASSAY OF TROPONIN QUANT: CPT | Performed by: EMERGENCY MEDICINE

## 2022-11-04 PROCEDURE — 93005 ELECTROCARDIOGRAM TRACING: CPT | Performed by: EMERGENCY MEDICINE

## 2022-11-04 RX ORDER — GINSENG 100 MG
CAPSULE ORAL ONCE
Status: COMPLETED | OUTPATIENT
Start: 2022-11-04 | End: 2022-11-04

## 2022-11-04 RX ORDER — AZITHROMYCIN 250 MG/1
TABLET, FILM COATED ORAL
Qty: 6 TABLET | Refills: 0 | Status: ON HOLD | OUTPATIENT
Start: 2022-11-04 | End: 2022-11-29

## 2022-11-04 RX ORDER — PREDNISONE 10 MG/1
30 TABLET ORAL DAILY
Qty: 9 TABLET | Refills: 0 | Status: SHIPPED | OUTPATIENT
Start: 2022-11-04 | End: 2022-11-07

## 2022-11-04 RX ADMIN — Medication: at 02:15

## 2022-11-04 NOTE — DISCHARGE INSTRUCTIONS
Recommend you stop smoking immediately.  Each and every cigarette further damages your lungs making your COPD worse.  Medications as directed.  Use mini nebulizer or metered-dose inhaler 4 times daily for the next 4-5 days.  Albuterol can be used up to 6 times a day if needed.  Decrease frequency of inhalers after 4-5 days as your symptoms improve.  Follow-up with your PCP or pulmonologist as above.  Return to ED for worsening symptoms, medical emergencies.

## 2022-11-04 NOTE — ED PROVIDER NOTES
"Subjective   History of Present Illness  Kt Armstrong is a 72-year-old white male who present secondary to shortness of breath onset earlier today.  Patient states he also fell earlier today.  Patient denies head trauma.  No loss of consciousness.  Injuries isolated to the left arm.  Patient has several skin tears present.  No cough.  No fever.  No chest pain.  Patient is on home O2 2 to 3 L as needed.  Patient still smokes \"half a pack a day or better\".  Patient presents for evaluation.    History provided by:  Patient      Review of Systems   Constitutional: Negative for fever.   HENT: Negative for rhinorrhea.    Eyes: Negative for redness.   Respiratory: Positive for shortness of breath. Negative for cough.    Cardiovascular: Negative for chest pain.   Gastrointestinal: Negative for abdominal pain.   Genitourinary: Negative for dysuria.   Musculoskeletal: Negative for back pain.   Skin: Negative for rash.   Neurological: Negative for syncope.   All other systems reviewed and are negative.      Past Medical History:   Diagnosis Date   • Arthritis of back    • Arthritis of neck    • Asthma    • COPD (chronic obstructive pulmonary disease) ([facility])    • Depression    • DM (diabetes mellitus) ([facility])    • GERD (gastroesophageal reflux disease)    • Hip arthrosis    • Hyperlipidemia    • Hypertension    • Knee swelling    • Myasthenia gravis ([facility])    • Periarthritis of shoulder    • Rotator cuff syndrome        No Known Allergies    Past Surgical History:   Procedure Laterality Date   • APPENDECTOMY     • CHOLECYSTECTOMY     • ROTATOR CUFF REPAIR Right        Family History   Problem Relation Age of Onset   • Diabetes Mother    • COPD Father        Social History     Socioeconomic History   • Marital status:    Tobacco Use   • Smoking status: Every Day     Packs/day: 0.50     Years: 58.00     Pack years: 29.00     Types: Cigarettes   • Smokeless tobacco: Never   • Tobacco comments:     pt asking for nicotine patch "   Vaping Use   • Vaping Use: Never used   Substance and Sexual Activity   • Alcohol use: No   • Drug use: No   • Sexual activity: Defer           Objective   Physical Exam  Vitals and nursing note reviewed.   Constitutional:       General: He is in acute distress (Mild respiratory).      Appearance: Normal appearance. He is well-developed. He is not ill-appearing, toxic-appearing or diaphoretic.      Comments: 72-year-old white male lying in bed.  Patient is overweight.  He appears in fair overall health.  Vital signs notable for heart rate of 102, respiratory rate 25.  Oxygen saturation was 84% on room air on ER arrival.  Patient placed on 4 L nasal cannula.  Sats now in the low 90s.  Patient is friendly and cooperative.  He is accompanied by male family member who has a pack of cigarettes in his shirt pocket.   HENT:      Head: Normocephalic and atraumatic.      Right Ear: Tympanic membrane, ear canal and external ear normal.      Left Ear: Tympanic membrane, ear canal and external ear normal.      Nose: Nose normal.      Mouth/Throat:      Mouth: Mucous membranes are moist.      Pharynx: Oropharynx is clear.   Eyes:      Extraocular Movements: Extraocular movements intact.      Conjunctiva/sclera: Conjunctivae normal.      Pupils: Pupils are equal, round, and reactive to light.   Cardiovascular:      Rate and Rhythm: Normal rate and regular rhythm.      Heart sounds: Normal heart sounds. No murmur heard.    No friction rub. No gallop.   Pulmonary:      Effort: Pulmonary effort is normal. Tachypnea present. No respiratory distress.      Breath sounds: No stridor. Decreased breath sounds (Mildly decreased all lung fields.) and wheezing (Slight wheeze at end of expiration.) present. No rhonchi or rales.   Abdominal:      General: There is no distension.      Palpations: Abdomen is soft. There is no mass.      Tenderness: There is no abdominal tenderness. There is no guarding or rebound.      Hernia: No hernia is  present.   Genitourinary:     Rectum: Guaiac stool: .edddxdyspneasds.   Musculoskeletal:         General: Normal range of motion.        Arms:       Cervical back: Normal range of motion and neck supple.   Skin:     General: Skin is warm and dry.      Capillary Refill: Capillary refill takes less than 2 seconds.      Findings: No erythema or rash.   Neurological:      General: No focal deficit present.      Mental Status: He is alert and oriented to person, place, and time.      Cranial Nerves: No cranial nerve deficit.      Deep Tendon Reflexes: Reflexes are normal and symmetric.   Psychiatric:         Mood and Affect: Mood normal.         Behavior: Behavior normal.         Procedures       EKG 12-lead  Date 11/3/2022  Time 21: 59  Sinus tachycardia with rate 110 beats minute  Normal axis  Normal intervals  Low voltage in multiple leads  Left atrial enlargement  Q waves indicating anteroseptal infarct of indeterminate age present  No ST elevations or depressions  Nonspecific T wave abnormalities  Abnormal EKG    Unchanged from EKG dated 9/21/2022    EKG #2  Date 11/4/2022  Time 00: 09  Sinus tachycardia with rate 103  Normal axis  Normal intervals  Low voltage in multiple leads  Left atrial enlargement  Q waves V1 and V2 indicating anteroseptal infarct  No ST elevations or depressions  Nonspecific T wave changes  Abnormal EKG    Unchanged from prior EKG [facility]    ED Course  ED Course as of 11/05/22 0252   Thu Nov 03, 2022   2222 Patient has decreased air movement in all lung fields.  Expiratory wheeze present.  Giving Solu-Medrol and breathing treatments.  EKG is unchanged from prior. [SS]   Fri Nov 04, 2022   0202 Patient feeling better.  On auscultation increased air movement.  Wheezing has resolved.  Work-up is unremarkable.  EKG x2 unchanged from prior.  Troponin x2 negative.  I feel patient's symptoms secondary to COPD exacerbation.  Discussed with patient all results, need to stop smoking, treatment plan and  discharge.  All questions answered.  Will DC home.    Prescription  1-prednisone  2-Zithromax [SS]      ED Course User Index  [SS] Kwesi Santiago MD      Labs Reviewed   COMPREHENSIVE METABOLIC PANEL - Abnormal; Notable for the following components:       Result Value    CO2 32.8 (*)     All other components within normal limits    Narrative:     GFR Normal >60  Chronic Kidney Disease <60  Kidney Failure <15     CBC WITH AUTO DIFFERENTIAL - Abnormal; Notable for the following components:    .7 (*)     MCHC 31.4 (*)     MPV 12.7 (*)     Lymphocyte % 17.7 (*)     Monocytes, Absolute 0.91 (*)     All other components within normal limits   BLOOD CULTURE - Normal   BLOOD CULTURE - Normal   BNP (IN-HOUSE) - Normal    Narrative:     Among patients with dyspnea, NT-proBNP is highly sensitive for the detection of acute congestive heart failure. In addition NT-proBNP of <300 pg/ml effectively rules out acute congestive heart failure with 99% negative predictive value.    Results may be falsely decreased if patient taking Biotin.     TROPONIN (IN-HOUSE) - Normal    Narrative:     Troponin T Reference Range:  <= 0.03 ng/mL-   Negative for AMI  >0.03 ng/mL-     Abnormal for myocardial necrosis.  Clinicians would have to utilize clinical acumen, EKG, Troponin and serial changes to determine if it is an Acute Myocardial Infarction or myocardial injury due to an underlying chronic condition.       Results may be falsely decreased if patient taking Biotin.     LACTIC ACID, PLASMA - Normal   TROPONIN (IN-HOUSE) - Normal    Narrative:     Troponin T Reference Range:  <= 0.03 ng/mL-   Negative for AMI  >0.03 ng/mL-     Abnormal for myocardial necrosis.  Clinicians would have to utilize clinical acumen, EKG, Troponin and serial changes to determine if it is an Acute Myocardial Infarction or myocardial injury due to an underlying chronic condition.       Results may be falsely decreased if patient taking Biotin.     KM  DRAW    Narrative:     The following orders were created for panel order [place] Draw.  Procedure                               Abnormality         Status                     ---------                               -----------         ------                     Green Top (Gel)[[id number]]                                  Final result               Lavender Top[[id number]]                                     Final result               Light Blue Top[[id number]]                                                                Please view results for these tests on the individual orders.   CBC AND DIFFERENTIAL    Narrative:     The following orders were created for panel order CBC & Differential.  Procedure                               Abnormality         Status                     ---------                               -----------         ------                     CBC Auto Differential[[id number]]        Abnormal            Final result                 Please view results for these tests on the individual orders.   GREEN TOP   LAVENDER TOP     XR Chest 2 View    Result Date: 11/3/2022  Narrative: CR Chest 2 Vws INDICATION:  Shortness of air. COPD. COMPARISON:  9/21/2022 FINDINGS: PA and lateral views of the chest.  Cardiac and mediastinal contours are normal. Pulmonary vascularity is normal. There is infiltrate or atelectasis in the right middle lobe. Chronic interstitial scarring is noted at the left lung base. No pneumothorax is seen.     Impression: Right middle lobe atelectasis versus pneumonia with some chronic interstitial scarring at the left base. Signer Name: James Marcial MD  Signed: 11/3/2022 11:01 PM  Workstation Name: [email or web address]  [facility]    Final diagnoses:   COPD with acute exacerbation (HCC)   Tobacco abuse   Multiple skin tears       ED Disposition  ED Disposition     ED Disposition   Discharge    Condition   Stable    Comment   --              Ney Kathleen MD  [address]  [facility] [address]  [phone]    Schedule an appointment as soon as possible for a visit in 1 week  for continued or worsened symptoms, Sooner if needed         Medication List      New Prescriptions    azithromycin 250 MG tablet  Commonly known as: Zithromax Z-Franco  Take 2 tablets the first day, then 1 tablet daily for 4 days.     predniSONE 10 MG tablet  Commonly known as: DELTASONE  Take 3 tablets by mouth Daily for 3 days.           Where to Get Your Medications      These medications were sent to [id number] - [address] [place] [address] - [phone]  - [phone] [address], [facility] [address]    Phone: [phone]   · azithromycin 250 MG tablet  · predniSONE 10 MG tablet          Kwesi Santiago MD  11/05/22 0252

## 2022-11-04 NOTE — ED NOTES
Pt ambulated to and from restroom with a strong and steady gait with no signs of distress noted at this time.

## 2022-11-05 LAB
BACTERIA BLD CULT: ABNORMAL
BOTTLE TYPE: ABNORMAL
QT INTERVAL: 338 MS

## 2022-11-07 LAB
BACTERIA SPEC AEROBE CULT: ABNORMAL
BACTERIA SPEC AEROBE CULT: ABNORMAL
GRAM STN SPEC: ABNORMAL
GRAM STN SPEC: ABNORMAL
ISOLATED FROM: ABNORMAL
ISOLATED FROM: ABNORMAL

## 2022-11-27 ENCOUNTER — APPOINTMENT (OUTPATIENT)
Dept: GENERAL RADIOLOGY | Facility: HOSPITAL | Age: 72
End: 2022-11-27

## 2022-11-27 ENCOUNTER — HOSPITAL ENCOUNTER (INPATIENT)
Facility: HOSPITAL | Age: 72
LOS: 5 days | Discharge: HOME HEALTH | End: 2022-12-02
Attending: EMERGENCY MEDICINE | Admitting: STUDENT IN AN ORGANIZED HEALTH CARE EDUCATION/TRAINING PROGRAM

## 2022-11-27 DIAGNOSIS — J44.1 COPD WITH ACUTE EXACERBATION: ICD-10-CM

## 2022-11-27 DIAGNOSIS — I50.9 ACUTE ON CHRONIC CONGESTIVE HEART FAILURE, UNSPECIFIED HEART FAILURE TYPE: Primary | ICD-10-CM

## 2022-11-27 LAB
ALBUMIN SERPL-MCNC: 4.2 G/DL (ref 3.5–5.2)
ALBUMIN/GLOB SERPL: 1.6 G/DL
ALP SERPL-CCNC: 65 U/L (ref 39–117)
ALT SERPL W P-5'-P-CCNC: 11 U/L (ref 1–41)
ANION GAP SERPL CALCULATED.3IONS-SCNC: 11.6 MMOL/L (ref 5–15)
ARTERIAL PATENCY WRIST A: POSITIVE
AST SERPL-CCNC: 14 U/L (ref 1–40)
ATMOSPHERIC PRESS: 730 MMHG
BASE EXCESS BLDA CALC-SCNC: 7.8 MMOL/L (ref 0–2)
BASOPHILS # BLD AUTO: 0.07 10*3/MM3 (ref 0–0.2)
BASOPHILS NFR BLD AUTO: 0.4 % (ref 0–1.5)
BDY SITE: ABNORMAL
BILIRUB SERPL-MCNC: 0.4 MG/DL (ref 0–1.2)
BODY TEMPERATURE: 37 C
BUN SERPL-MCNC: 13 MG/DL (ref 8–23)
BUN/CREAT SERPL: 19.7 (ref 7–25)
CALCIUM SPEC-SCNC: 9.1 MG/DL (ref 8.6–10.5)
CHLORIDE SERPL-SCNC: 99 MMOL/L (ref 98–107)
CO2 SERPL-SCNC: 29.4 MMOL/L (ref 22–29)
CREAT SERPL-MCNC: 0.66 MG/DL (ref 0.76–1.27)
D-LACTATE SERPL-SCNC: 1.2 MMOL/L (ref 0.5–2)
DEPRECATED RDW RBC AUTO: 48.8 FL (ref 37–54)
EGFRCR SERPLBLD CKD-EPI 2021: 100.9 ML/MIN/1.73
EOSINOPHIL # BLD AUTO: 0 10*3/MM3 (ref 0–0.4)
EOSINOPHIL NFR BLD AUTO: 0 % (ref 0.3–6.2)
ERYTHROCYTE [DISTWIDTH] IN BLOOD BY AUTOMATED COUNT: 13.1 % (ref 12.3–15.4)
FLUAV RNA RESP QL NAA+PROBE: NOT DETECTED
FLUBV RNA RESP QL NAA+PROBE: NOT DETECTED
GAS FLOW AIRWAY: 3 LPM
GLOBULIN UR ELPH-MCNC: 2.7 GM/DL
GLUCOSE SERPL-MCNC: 120 MG/DL (ref 65–99)
HCO3 BLDA-SCNC: 34.8 MMOL/L (ref 20–26)
HCT VFR BLD AUTO: 44.3 % (ref 37.5–51)
HGB BLD-MCNC: 13.6 G/DL (ref 13–17.7)
HGB BLDA-MCNC: 13.5 G/DL (ref 14–18)
IMM GRANULOCYTES # BLD AUTO: 0.12 10*3/MM3 (ref 0–0.05)
IMM GRANULOCYTES NFR BLD AUTO: 0.7 % (ref 0–0.5)
LYMPHOCYTES # BLD AUTO: 0.86 10*3/MM3 (ref 0.7–3.1)
LYMPHOCYTES NFR BLD AUTO: 4.8 % (ref 19.6–45.3)
Lab: ABNORMAL
Lab: ABNORMAL
MCH RBC QN AUTO: 31.1 PG (ref 26.6–33)
MCHC RBC AUTO-ENTMCNC: 30.7 G/DL (ref 31.5–35.7)
MCV RBC AUTO: 101.1 FL (ref 79–97)
MODALITY: ABNORMAL
MONOCYTES # BLD AUTO: 1.41 10*3/MM3 (ref 0.1–0.9)
MONOCYTES NFR BLD AUTO: 7.8 % (ref 5–12)
NEUTROPHILS NFR BLD AUTO: 15.61 10*3/MM3 (ref 1.7–7)
NEUTROPHILS NFR BLD AUTO: 86.3 % (ref 42.7–76)
NOTIFIED BY: ABNORMAL
NOTIFIED WHO: ABNORMAL
NRBC BLD AUTO-RTO: 0 /100 WBC (ref 0–0.2)
NT-PROBNP SERPL-MCNC: 27.6 PG/ML (ref 0–900)
PCO2 BLDA: 58.3 MM HG (ref 35–45)
PCO2 TEMP ADJ BLD: 58.3 MM HG (ref 35–45)
PH BLDA: 7.38 PH UNITS (ref 7.35–7.45)
PH, TEMP CORRECTED: 7.38 PH UNITS (ref 7.35–7.45)
PLATELET # BLD AUTO: 202 10*3/MM3 (ref 140–450)
PMV BLD AUTO: 12.3 FL (ref 6–12)
PO2 BLDA: 53.6 MM HG (ref 83–108)
PO2 TEMP ADJ BLD: 53.6 MM HG (ref 83–108)
POTASSIUM SERPL-SCNC: 4 MMOL/L (ref 3.5–5.2)
PROCALCITONIN SERPL-MCNC: 0.15 NG/ML (ref 0–0.25)
PROT SERPL-MCNC: 6.9 G/DL (ref 6–8.5)
QT INTERVAL: 298 MS
RBC # BLD AUTO: 4.38 10*6/MM3 (ref 4.14–5.8)
SAO2 % BLDCOA: 87.7 % (ref 94–99)
SARS-COV-2 RNA RESP QL NAA+PROBE: NOT DETECTED
SODIUM SERPL-SCNC: 140 MMOL/L (ref 136–145)
TROPONIN T SERPL-MCNC: 0.03 NG/ML (ref 0–0.03)
WBC NRBC COR # BLD: 18.07 10*3/MM3 (ref 3.4–10.8)

## 2022-11-27 PROCEDURE — 71045 X-RAY EXAM CHEST 1 VIEW: CPT

## 2022-11-27 PROCEDURE — 94799 UNLISTED PULMONARY SVC/PX: CPT

## 2022-11-27 PROCEDURE — 99223 1ST HOSP IP/OBS HIGH 75: CPT | Performed by: STUDENT IN AN ORGANIZED HEALTH CARE EDUCATION/TRAINING PROGRAM

## 2022-11-27 PROCEDURE — 25010000002 LORAZEPAM PER 2 MG: Performed by: EMERGENCY MEDICINE

## 2022-11-27 PROCEDURE — 99285 EMERGENCY DEPT VISIT HI MDM: CPT

## 2022-11-27 PROCEDURE — 36600 WITHDRAWAL OF ARTERIAL BLOOD: CPT

## 2022-11-27 PROCEDURE — 87636 SARSCOV2 & INF A&B AMP PRB: CPT | Performed by: EMERGENCY MEDICINE

## 2022-11-27 PROCEDURE — 85025 COMPLETE CBC W/AUTO DIFF WBC: CPT | Performed by: EMERGENCY MEDICINE

## 2022-11-27 PROCEDURE — 83036 HEMOGLOBIN GLYCOSYLATED A1C: CPT | Performed by: STUDENT IN AN ORGANIZED HEALTH CARE EDUCATION/TRAINING PROGRAM

## 2022-11-27 PROCEDURE — 84100 ASSAY OF PHOSPHORUS: CPT | Performed by: STUDENT IN AN ORGANIZED HEALTH CARE EDUCATION/TRAINING PROGRAM

## 2022-11-27 PROCEDURE — 80053 COMPREHEN METABOLIC PANEL: CPT | Performed by: EMERGENCY MEDICINE

## 2022-11-27 PROCEDURE — 87040 BLOOD CULTURE FOR BACTERIA: CPT | Performed by: EMERGENCY MEDICINE

## 2022-11-27 PROCEDURE — 84145 PROCALCITONIN (PCT): CPT | Performed by: EMERGENCY MEDICINE

## 2022-11-27 PROCEDURE — 84443 ASSAY THYROID STIM HORMONE: CPT | Performed by: STUDENT IN AN ORGANIZED HEALTH CARE EDUCATION/TRAINING PROGRAM

## 2022-11-27 PROCEDURE — 93010 ELECTROCARDIOGRAM REPORT: CPT | Performed by: STUDENT IN AN ORGANIZED HEALTH CARE EDUCATION/TRAINING PROGRAM

## 2022-11-27 PROCEDURE — 94640 AIRWAY INHALATION TREATMENT: CPT

## 2022-11-27 PROCEDURE — 36415 COLL VENOUS BLD VENIPUNCTURE: CPT

## 2022-11-27 PROCEDURE — 84484 ASSAY OF TROPONIN QUANT: CPT | Performed by: EMERGENCY MEDICINE

## 2022-11-27 PROCEDURE — 94761 N-INVAS EAR/PLS OXIMETRY MLT: CPT

## 2022-11-27 PROCEDURE — 83735 ASSAY OF MAGNESIUM: CPT | Performed by: STUDENT IN AN ORGANIZED HEALTH CARE EDUCATION/TRAINING PROGRAM

## 2022-11-27 PROCEDURE — 93005 ELECTROCARDIOGRAM TRACING: CPT | Performed by: EMERGENCY MEDICINE

## 2022-11-27 PROCEDURE — 82803 BLOOD GASES ANY COMBINATION: CPT

## 2022-11-27 PROCEDURE — 25010000002 FUROSEMIDE PER 20 MG: Performed by: EMERGENCY MEDICINE

## 2022-11-27 PROCEDURE — 25010000002 METHYLPREDNISOLONE PER 125 MG: Performed by: EMERGENCY MEDICINE

## 2022-11-27 PROCEDURE — 83880 ASSAY OF NATRIURETIC PEPTIDE: CPT | Performed by: EMERGENCY MEDICINE

## 2022-11-27 PROCEDURE — 87147 CULTURE TYPE IMMUNOLOGIC: CPT | Performed by: EMERGENCY MEDICINE

## 2022-11-27 PROCEDURE — 87186 SC STD MICRODIL/AGAR DIL: CPT | Performed by: EMERGENCY MEDICINE

## 2022-11-27 PROCEDURE — 94660 CPAP INITIATION&MGMT: CPT

## 2022-11-27 PROCEDURE — 83605 ASSAY OF LACTIC ACID: CPT | Performed by: EMERGENCY MEDICINE

## 2022-11-27 PROCEDURE — 87150 DNA/RNA AMPLIFIED PROBE: CPT | Performed by: EMERGENCY MEDICINE

## 2022-11-27 RX ORDER — METHYLPREDNISOLONE SODIUM SUCCINATE 40 MG/ML
40 INJECTION, POWDER, LYOPHILIZED, FOR SOLUTION INTRAMUSCULAR; INTRAVENOUS EVERY 8 HOURS
Status: DISCONTINUED | OUTPATIENT
Start: 2022-11-28 | End: 2022-11-28

## 2022-11-27 RX ORDER — LORAZEPAM 2 MG/ML
0.5 INJECTION INTRAMUSCULAR EVERY 4 HOURS PRN
Status: DISCONTINUED | OUTPATIENT
Start: 2022-11-27 | End: 2022-11-29

## 2022-11-27 RX ORDER — ARFORMOTEROL TARTRATE 15 UG/2ML
15 SOLUTION RESPIRATORY (INHALATION)
Status: DISCONTINUED | OUTPATIENT
Start: 2022-11-28 | End: 2022-12-02 | Stop reason: HOSPADM

## 2022-11-27 RX ORDER — SODIUM CHLORIDE 9 MG/ML
40 INJECTION, SOLUTION INTRAVENOUS AS NEEDED
Status: DISCONTINUED | OUTPATIENT
Start: 2022-11-27 | End: 2022-12-02 | Stop reason: HOSPADM

## 2022-11-27 RX ORDER — IPRATROPIUM BROMIDE AND ALBUTEROL SULFATE 2.5; .5 MG/3ML; MG/3ML
3 SOLUTION RESPIRATORY (INHALATION) 4 TIMES DAILY PRN
Status: DISCONTINUED | OUTPATIENT
Start: 2022-11-27 | End: 2022-12-02 | Stop reason: HOSPADM

## 2022-11-27 RX ORDER — METHYLPREDNISOLONE SODIUM SUCCINATE 125 MG/2ML
125 INJECTION, POWDER, LYOPHILIZED, FOR SOLUTION INTRAMUSCULAR; INTRAVENOUS ONCE
Status: COMPLETED | OUTPATIENT
Start: 2022-11-27 | End: 2022-11-27

## 2022-11-27 RX ORDER — FUROSEMIDE 10 MG/ML
80 INJECTION INTRAMUSCULAR; INTRAVENOUS ONCE
Status: COMPLETED | OUTPATIENT
Start: 2022-11-27 | End: 2022-11-27

## 2022-11-27 RX ORDER — BUDESONIDE 0.5 MG/2ML
0.5 INHALANT ORAL
Status: DISCONTINUED | OUTPATIENT
Start: 2022-11-28 | End: 2022-12-02 | Stop reason: HOSPADM

## 2022-11-27 RX ORDER — INSULIN ASPART 100 [IU]/ML
0-14 INJECTION, SOLUTION INTRAVENOUS; SUBCUTANEOUS EVERY 6 HOURS
Status: DISCONTINUED | OUTPATIENT
Start: 2022-11-28 | End: 2022-11-28

## 2022-11-27 RX ORDER — LORAZEPAM 2 MG/ML
0.5 INJECTION INTRAMUSCULAR ONCE
Status: COMPLETED | OUTPATIENT
Start: 2022-11-27 | End: 2022-11-27

## 2022-11-27 RX ORDER — SODIUM CHLORIDE 0.9 % (FLUSH) 0.9 %
10 SYRINGE (ML) INJECTION AS NEEDED
Status: DISCONTINUED | OUTPATIENT
Start: 2022-11-27 | End: 2022-12-02 | Stop reason: HOSPADM

## 2022-11-27 RX ORDER — CEFEPIME HYDROCHLORIDE 2 G/50ML
2 INJECTION, SOLUTION INTRAVENOUS EVERY 12 HOURS SCHEDULED
Status: DISCONTINUED | OUTPATIENT
Start: 2022-11-28 | End: 2022-11-28

## 2022-11-27 RX ORDER — SODIUM CHLORIDE 0.9 % (FLUSH) 0.9 %
10 SYRINGE (ML) INJECTION EVERY 12 HOURS SCHEDULED
Status: DISCONTINUED | OUTPATIENT
Start: 2022-11-28 | End: 2022-12-02 | Stop reason: HOSPADM

## 2022-11-27 RX ORDER — IPRATROPIUM BROMIDE AND ALBUTEROL SULFATE 2.5; .5 MG/3ML; MG/3ML
3 SOLUTION RESPIRATORY (INHALATION) ONCE
Status: COMPLETED | OUTPATIENT
Start: 2022-11-27 | End: 2022-11-27

## 2022-11-27 RX ORDER — ENOXAPARIN SODIUM 100 MG/ML
40 INJECTION SUBCUTANEOUS NIGHTLY
Status: DISCONTINUED | OUTPATIENT
Start: 2022-11-28 | End: 2022-12-02 | Stop reason: HOSPADM

## 2022-11-27 RX ORDER — NICOTINE POLACRILEX 4 MG
15 LOZENGE BUCCAL
Status: DISCONTINUED | OUTPATIENT
Start: 2022-11-27 | End: 2022-12-02 | Stop reason: HOSPADM

## 2022-11-27 RX ORDER — INSULIN ASPART 100 [IU]/ML
0-14 INJECTION, SOLUTION INTRAVENOUS; SUBCUTANEOUS
Status: DISCONTINUED | OUTPATIENT
Start: 2022-11-28 | End: 2022-11-27

## 2022-11-27 RX ORDER — DEXTROSE MONOHYDRATE 25 G/50ML
25 INJECTION, SOLUTION INTRAVENOUS
Status: DISCONTINUED | OUTPATIENT
Start: 2022-11-27 | End: 2022-12-02 | Stop reason: HOSPADM

## 2022-11-27 RX ORDER — ALBUTEROL SULFATE 2.5 MG/3ML
2.5 SOLUTION RESPIRATORY (INHALATION)
Status: COMPLETED | OUTPATIENT
Start: 2022-11-27 | End: 2022-11-27

## 2022-11-27 RX ORDER — IPRATROPIUM BROMIDE AND ALBUTEROL SULFATE 2.5; .5 MG/3ML; MG/3ML
3 SOLUTION RESPIRATORY (INHALATION)
Status: DISCONTINUED | OUTPATIENT
Start: 2022-11-28 | End: 2022-12-02 | Stop reason: HOSPADM

## 2022-11-27 RX ADMIN — LORAZEPAM 0.5 MG: 2 INJECTION INTRAMUSCULAR; INTRAVENOUS at 21:47

## 2022-11-27 RX ADMIN — METHYLPREDNISOLONE SODIUM SUCCINATE 125 MG: 125 INJECTION, POWDER, FOR SOLUTION INTRAMUSCULAR; INTRAVENOUS at 18:12

## 2022-11-27 RX ADMIN — IPRATROPIUM BROMIDE AND ALBUTEROL SULFATE 3 ML: .5; 2.5 SOLUTION RESPIRATORY (INHALATION) at 18:17

## 2022-11-27 RX ADMIN — ALBUTEROL SULFATE 2.5 MG: 2.5 SOLUTION RESPIRATORY (INHALATION) at 18:44

## 2022-11-27 RX ADMIN — ALBUTEROL SULFATE 2.5 MG: 2.5 SOLUTION RESPIRATORY (INHALATION) at 18:27

## 2022-11-27 RX ADMIN — FUROSEMIDE 80 MG: 10 INJECTION, SOLUTION INTRAMUSCULAR; INTRAVENOUS at 20:27

## 2022-11-28 ENCOUNTER — APPOINTMENT (OUTPATIENT)
Dept: CT IMAGING | Facility: HOSPITAL | Age: 72
End: 2022-11-28

## 2022-11-28 LAB
ANION GAP SERPL CALCULATED.3IONS-SCNC: 7.7 MMOL/L (ref 5–15)
ARTERIAL PATENCY WRIST A: POSITIVE
ATMOSPHERIC PRESS: 737 MMHG
B PARAPERT DNA SPEC QL NAA+PROBE: NOT DETECTED
B PERT DNA SPEC QL NAA+PROBE: NOT DETECTED
BACTERIA UR QL AUTO: ABNORMAL /HPF
BASE EXCESS BLDA CALC-SCNC: 7.8 MMOL/L (ref 0–2)
BASOPHILS # BLD AUTO: 0.02 10*3/MM3 (ref 0–0.2)
BASOPHILS NFR BLD AUTO: 0.1 % (ref 0–1.5)
BDY SITE: ABNORMAL
BILIRUB UR QL STRIP: NEGATIVE
BODY TEMPERATURE: 37 C
BUN SERPL-MCNC: 16 MG/DL (ref 8–23)
BUN/CREAT SERPL: 23.9 (ref 7–25)
C PNEUM DNA NPH QL NAA+NON-PROBE: NOT DETECTED
CALCIUM SPEC-SCNC: 9.2 MG/DL (ref 8.6–10.5)
CHLORIDE SERPL-SCNC: 97 MMOL/L (ref 98–107)
CLARITY UR: CLEAR
CO2 SERPL-SCNC: 33.3 MMOL/L (ref 22–29)
COLOR UR: YELLOW
CREAT SERPL-MCNC: 0.67 MG/DL (ref 0.76–1.27)
DEPRECATED RDW RBC AUTO: 48.3 FL (ref 37–54)
EGFRCR SERPLBLD CKD-EPI 2021: 100.4 ML/MIN/1.73
EOSINOPHIL # BLD AUTO: 0 10*3/MM3 (ref 0–0.4)
EOSINOPHIL NFR BLD AUTO: 0 % (ref 0.3–6.2)
EPAP: 8
ERYTHROCYTE [DISTWIDTH] IN BLOOD BY AUTOMATED COUNT: 13.2 % (ref 12.3–15.4)
FLUAV SUBTYP SPEC NAA+PROBE: NOT DETECTED
FLUBV RNA ISLT QL NAA+PROBE: NOT DETECTED
GLUCOSE BLDC GLUCOMTR-MCNC: 171 MG/DL (ref 70–130)
GLUCOSE BLDC GLUCOMTR-MCNC: 205 MG/DL (ref 70–130)
GLUCOSE BLDC GLUCOMTR-MCNC: 216 MG/DL (ref 70–130)
GLUCOSE BLDC GLUCOMTR-MCNC: 232 MG/DL (ref 70–130)
GLUCOSE BLDC GLUCOMTR-MCNC: 308 MG/DL (ref 70–130)
GLUCOSE SERPL-MCNC: 216 MG/DL (ref 65–99)
GLUCOSE UR STRIP-MCNC: NEGATIVE MG/DL
HADV DNA SPEC NAA+PROBE: NOT DETECTED
HBA1C MFR BLD: 7 % (ref 4.8–5.6)
HCO3 BLDA-SCNC: 34.8 MMOL/L (ref 20–26)
HCOV 229E RNA SPEC QL NAA+PROBE: NOT DETECTED
HCOV HKU1 RNA SPEC QL NAA+PROBE: NOT DETECTED
HCOV NL63 RNA SPEC QL NAA+PROBE: NOT DETECTED
HCOV OC43 RNA SPEC QL NAA+PROBE: NOT DETECTED
HCT VFR BLD AUTO: 42.9 % (ref 37.5–51)
HGB BLD-MCNC: 13.2 G/DL (ref 13–17.7)
HGB BLDA-MCNC: 13.3 G/DL (ref 14–18)
HGB UR QL STRIP.AUTO: ABNORMAL
HMPV RNA NPH QL NAA+NON-PROBE: NOT DETECTED
HPIV1 RNA ISLT QL NAA+PROBE: NOT DETECTED
HPIV2 RNA SPEC QL NAA+PROBE: NOT DETECTED
HPIV3 RNA NPH QL NAA+PROBE: NOT DETECTED
HPIV4 P GENE NPH QL NAA+PROBE: NOT DETECTED
HYALINE CASTS UR QL AUTO: ABNORMAL /LPF
IMM GRANULOCYTES # BLD AUTO: 0.05 10*3/MM3 (ref 0–0.05)
IMM GRANULOCYTES NFR BLD AUTO: 0.3 % (ref 0–0.5)
INHALED O2 CONCENTRATION: 40 %
IPAP: 22
KETONES UR QL STRIP: ABNORMAL
LEUKOCYTE ESTERASE UR QL STRIP.AUTO: NEGATIVE
LYMPHOCYTES # BLD AUTO: 0.38 10*3/MM3 (ref 0.7–3.1)
LYMPHOCYTES NFR BLD AUTO: 2.6 % (ref 19.6–45.3)
Lab: ABNORMAL
M PNEUMO IGG SER IA-ACNC: NOT DETECTED
MAGNESIUM SERPL-MCNC: 1.8 MG/DL (ref 1.6–2.4)
MCH RBC QN AUTO: 30.7 PG (ref 26.6–33)
MCHC RBC AUTO-ENTMCNC: 30.8 G/DL (ref 31.5–35.7)
MCV RBC AUTO: 99.8 FL (ref 79–97)
MODALITY: ABNORMAL
MONOCYTES # BLD AUTO: 0.12 10*3/MM3 (ref 0.1–0.9)
MONOCYTES NFR BLD AUTO: 0.8 % (ref 5–12)
MRSA DNA SPEC QL NAA+PROBE: NORMAL
NEUTROPHILS NFR BLD AUTO: 13.89 10*3/MM3 (ref 1.7–7)
NEUTROPHILS NFR BLD AUTO: 96.2 % (ref 42.7–76)
NITRITE UR QL STRIP: NEGATIVE
NRBC BLD AUTO-RTO: 0 /100 WBC (ref 0–0.2)
PCO2 BLDA: 58.1 MM HG (ref 35–45)
PCO2 TEMP ADJ BLD: 58.1 MM HG (ref 35–45)
PH BLDA: 7.38 PH UNITS (ref 7.35–7.45)
PH UR STRIP.AUTO: 5.5 [PH] (ref 4.5–8)
PH, TEMP CORRECTED: 7.38 PH UNITS (ref 7.35–7.45)
PHOSPHATE SERPL-MCNC: 2.9 MG/DL (ref 2.5–4.5)
PLATELET # BLD AUTO: 209 10*3/MM3 (ref 140–450)
PMV BLD AUTO: 11.3 FL (ref 6–12)
PO2 BLDA: 62.9 MM HG (ref 83–108)
PO2 TEMP ADJ BLD: 62.9 MM HG (ref 83–108)
POTASSIUM SERPL-SCNC: 4.4 MMOL/L (ref 3.5–5.2)
PROCALCITONIN SERPL-MCNC: 0.14 NG/ML (ref 0–0.25)
PROT UR QL STRIP: NEGATIVE
RBC # BLD AUTO: 4.3 10*6/MM3 (ref 4.14–5.8)
RBC # UR STRIP: ABNORMAL /HPF
REF LAB TEST METHOD: ABNORMAL
RHINOVIRUS RNA SPEC NAA+PROBE: NOT DETECTED
RSV RNA NPH QL NAA+NON-PROBE: NOT DETECTED
SAO2 % BLDCOA: 91.7 % (ref 94–99)
SARS-COV-2 RNA NPH QL NAA+NON-PROBE: NOT DETECTED
SET MECH RESP RATE: 16
SODIUM SERPL-SCNC: 138 MMOL/L (ref 136–145)
SP GR UR STRIP: 1.02 (ref 1–1.03)
SQUAMOUS #/AREA URNS HPF: ABNORMAL /HPF
TSH SERPL DL<=0.05 MIU/L-ACNC: 0.83 UIU/ML (ref 0.27–4.2)
UROBILINOGEN UR QL STRIP: ABNORMAL
VENTILATOR MODE: ABNORMAL
VT ON VENT VENT: 500 ML
WBC # UR STRIP: ABNORMAL /HPF
WBC NRBC COR # BLD: 14.46 10*3/MM3 (ref 3.4–10.8)

## 2022-11-28 PROCEDURE — 94799 UNLISTED PULMONARY SVC/PX: CPT

## 2022-11-28 PROCEDURE — 25010000002 METHYLPREDNISOLONE PER 125 MG: Performed by: HOSPITALIST

## 2022-11-28 PROCEDURE — 82962 GLUCOSE BLOOD TEST: CPT

## 2022-11-28 PROCEDURE — 0 IOPAMIDOL PER 1 ML: Performed by: STUDENT IN AN ORGANIZED HEALTH CARE EDUCATION/TRAINING PROGRAM

## 2022-11-28 PROCEDURE — 25010000002 VANCOMYCIN HCL 1.25 G RECONSTITUTED SOLUTION 1 EACH VIAL: Performed by: STUDENT IN AN ORGANIZED HEALTH CARE EDUCATION/TRAINING PROGRAM

## 2022-11-28 PROCEDURE — 94761 N-INVAS EAR/PLS OXIMETRY MLT: CPT

## 2022-11-28 PROCEDURE — 63710000001 INSULIN DETEMIR PER 5 UNITS: Performed by: HOSPITALIST

## 2022-11-28 PROCEDURE — 82803 BLOOD GASES ANY COMBINATION: CPT

## 2022-11-28 PROCEDURE — 81001 URINALYSIS AUTO W/SCOPE: CPT | Performed by: STUDENT IN AN ORGANIZED HEALTH CARE EDUCATION/TRAINING PROGRAM

## 2022-11-28 PROCEDURE — 25010000002 VANCOMYCIN 1 G RECONSTITUTED SOLUTION 1 EACH VIAL: Performed by: STUDENT IN AN ORGANIZED HEALTH CARE EDUCATION/TRAINING PROGRAM

## 2022-11-28 PROCEDURE — 63710000001 INSULIN ASPART PER 5 UNITS: Performed by: STUDENT IN AN ORGANIZED HEALTH CARE EDUCATION/TRAINING PROGRAM

## 2022-11-28 PROCEDURE — 80048 BASIC METABOLIC PNL TOTAL CA: CPT | Performed by: STUDENT IN AN ORGANIZED HEALTH CARE EDUCATION/TRAINING PROGRAM

## 2022-11-28 PROCEDURE — 25010000002 METHYLPREDNISOLONE PER 40 MG: Performed by: STUDENT IN AN ORGANIZED HEALTH CARE EDUCATION/TRAINING PROGRAM

## 2022-11-28 PROCEDURE — 36600 WITHDRAWAL OF ARTERIAL BLOOD: CPT

## 2022-11-28 PROCEDURE — 87641 MR-STAPH DNA AMP PROBE: CPT | Performed by: STUDENT IN AN ORGANIZED HEALTH CARE EDUCATION/TRAINING PROGRAM

## 2022-11-28 PROCEDURE — 94660 CPAP INITIATION&MGMT: CPT

## 2022-11-28 PROCEDURE — 71275 CT ANGIOGRAPHY CHEST: CPT

## 2022-11-28 PROCEDURE — 85025 COMPLETE CBC W/AUTO DIFF WBC: CPT | Performed by: STUDENT IN AN ORGANIZED HEALTH CARE EDUCATION/TRAINING PROGRAM

## 2022-11-28 PROCEDURE — 25010000002 VANCOMYCIN 1 G RECONSTITUTED SOLUTION

## 2022-11-28 PROCEDURE — 99233 SBSQ HOSP IP/OBS HIGH 50: CPT | Performed by: HOSPITALIST

## 2022-11-28 PROCEDURE — 0202U NFCT DS 22 TRGT SARS-COV-2: CPT | Performed by: STUDENT IN AN ORGANIZED HEALTH CARE EDUCATION/TRAINING PROGRAM

## 2022-11-28 PROCEDURE — 25010000002 CEFEPIME-DEXTROSE 2-5 GM-%(50ML) RECONSTITUTED SOLUTION: Performed by: STUDENT IN AN ORGANIZED HEALTH CARE EDUCATION/TRAINING PROGRAM

## 2022-11-28 PROCEDURE — 25010000002 ENOXAPARIN PER 10 MG: Performed by: STUDENT IN AN ORGANIZED HEALTH CARE EDUCATION/TRAINING PROGRAM

## 2022-11-28 PROCEDURE — 87205 SMEAR GRAM STAIN: CPT | Performed by: STUDENT IN AN ORGANIZED HEALTH CARE EDUCATION/TRAINING PROGRAM

## 2022-11-28 PROCEDURE — 87070 CULTURE OTHR SPECIMN AEROBIC: CPT | Performed by: STUDENT IN AN ORGANIZED HEALTH CARE EDUCATION/TRAINING PROGRAM

## 2022-11-28 PROCEDURE — 84145 PROCALCITONIN (PCT): CPT | Performed by: HOSPITALIST

## 2022-11-28 RX ORDER — SODIUM CHLORIDE 9 MG/ML
INJECTION, SOLUTION INTRAVENOUS
Status: COMPLETED
Start: 2022-11-28 | End: 2022-11-28

## 2022-11-28 RX ORDER — PYRIDOSTIGMINE BROMIDE 60 MG/1
60 TABLET ORAL EVERY 4 HOURS
Status: DISCONTINUED | OUTPATIENT
Start: 2022-11-28 | End: 2022-12-02 | Stop reason: HOSPADM

## 2022-11-28 RX ORDER — INSULIN ASPART 100 [IU]/ML
0-14 INJECTION, SOLUTION INTRAVENOUS; SUBCUTANEOUS
Status: DISCONTINUED | OUTPATIENT
Start: 2022-11-29 | End: 2022-12-02 | Stop reason: HOSPADM

## 2022-11-28 RX ORDER — METRONIDAZOLE 500 MG/100ML
500 INJECTION, SOLUTION INTRAVENOUS EVERY 8 HOURS
Status: DISCONTINUED | OUTPATIENT
Start: 2022-11-28 | End: 2022-11-29

## 2022-11-28 RX ORDER — CEFEPIME HYDROCHLORIDE 2 G/50ML
2 INJECTION, SOLUTION INTRAVENOUS EVERY 8 HOURS
Status: DISCONTINUED | OUTPATIENT
Start: 2022-11-28 | End: 2022-11-29

## 2022-11-28 RX ORDER — METHYLPREDNISOLONE SODIUM SUCCINATE 125 MG/2ML
60 INJECTION, POWDER, LYOPHILIZED, FOR SOLUTION INTRAMUSCULAR; INTRAVENOUS EVERY 8 HOURS
Status: DISCONTINUED | OUTPATIENT
Start: 2022-11-28 | End: 2022-11-30

## 2022-11-28 RX ORDER — VANCOMYCIN HYDROCHLORIDE 1 G/20ML
INJECTION, POWDER, LYOPHILIZED, FOR SOLUTION INTRAVENOUS
Status: DISPENSED
Start: 2022-11-28 | End: 2022-11-28

## 2022-11-28 RX ORDER — DEXMEDETOMIDINE HYDROCHLORIDE 4 UG/ML
.2-1.5 INJECTION, SOLUTION INTRAVENOUS
Status: DISCONTINUED | OUTPATIENT
Start: 2022-11-28 | End: 2022-11-29

## 2022-11-28 RX ADMIN — VANCOMYCIN HYDROCHLORIDE 1250 MG: 1.25 INJECTION, POWDER, LYOPHILIZED, FOR SOLUTION INTRAVENOUS at 12:25

## 2022-11-28 RX ADMIN — INSULIN ASPART 5 UNITS: 100 INJECTION, SOLUTION INTRAVENOUS; SUBCUTANEOUS at 12:24

## 2022-11-28 RX ADMIN — SODIUM CHLORIDE 40 ML: 9 INJECTION, SOLUTION INTRAVENOUS at 00:34

## 2022-11-28 RX ADMIN — Medication 10 ML: at 20:06

## 2022-11-28 RX ADMIN — IOPAMIDOL 100 ML: 755 INJECTION, SOLUTION INTRAVENOUS at 01:35

## 2022-11-28 RX ADMIN — IPRATROPIUM BROMIDE AND ALBUTEROL SULFATE 3 ML: 2.5; .5 SOLUTION RESPIRATORY (INHALATION) at 07:39

## 2022-11-28 RX ADMIN — INSULIN ASPART 5 UNITS: 100 INJECTION, SOLUTION INTRAVENOUS; SUBCUTANEOUS at 18:41

## 2022-11-28 RX ADMIN — CEFEPIME HYDROCHLORIDE 2 G: 2 INJECTION, SOLUTION INTRAVENOUS at 08:52

## 2022-11-28 RX ADMIN — METRONIDAZOLE 500 MG: 500 INJECTION, SOLUTION INTRAVENOUS at 18:45

## 2022-11-28 RX ADMIN — DEXMEDETOMIDINE HYDROCHLORIDE 0.2 MCG/KG/HR: 400 INJECTION, SOLUTION INTRAVENOUS at 02:59

## 2022-11-28 RX ADMIN — METRONIDAZOLE 500 MG: 500 INJECTION, SOLUTION INTRAVENOUS at 12:07

## 2022-11-28 RX ADMIN — Medication 10 ML: at 08:52

## 2022-11-28 RX ADMIN — ENOXAPARIN SODIUM 40 MG: 40 INJECTION SUBCUTANEOUS at 20:06

## 2022-11-28 RX ADMIN — VANCOMYCIN HYDROCHLORIDE 1250 MG: 1.25 INJECTION, POWDER, LYOPHILIZED, FOR SOLUTION INTRAVENOUS at 23:31

## 2022-11-28 RX ADMIN — CEFEPIME HYDROCHLORIDE 2 G: 2 INJECTION, SOLUTION INTRAVENOUS at 17:11

## 2022-11-28 RX ADMIN — INSULIN ASPART 3 UNITS: 100 INJECTION, SOLUTION INTRAVENOUS; SUBCUTANEOUS at 00:28

## 2022-11-28 RX ADMIN — ARFORMOTEROL TARTRATE 15 MCG: 15 SOLUTION RESPIRATORY (INHALATION) at 19:42

## 2022-11-28 RX ADMIN — INSULIN DETEMIR 20 UNITS: 100 INJECTION, SOLUTION SUBCUTANEOUS at 20:06

## 2022-11-28 RX ADMIN — Medication 10 ML: at 08:49

## 2022-11-28 RX ADMIN — PYRIDOSTIGMINE BROMIDE 60 MG: 60 TABLET ORAL at 12:07

## 2022-11-28 RX ADMIN — BUDESONIDE 0.5 MG: 0.5 SUSPENSION RESPIRATORY (INHALATION) at 07:39

## 2022-11-28 RX ADMIN — Medication 10 ML: at 17:11

## 2022-11-28 RX ADMIN — METHYLPREDNISOLONE SODIUM SUCCINATE 60 MG: 125 INJECTION, POWDER, FOR SOLUTION INTRAMUSCULAR; INTRAVENOUS at 17:10

## 2022-11-28 RX ADMIN — DEXMEDETOMIDINE HYDROCHLORIDE 0.8 MCG/KG/HR: 400 INJECTION, SOLUTION INTRAVENOUS at 08:31

## 2022-11-28 RX ADMIN — ARFORMOTEROL TARTRATE 15 MCG: 15 SOLUTION RESPIRATORY (INHALATION) at 07:54

## 2022-11-28 RX ADMIN — PYRIDOSTIGMINE BROMIDE 60 MG: 60 TABLET ORAL at 17:11

## 2022-11-28 RX ADMIN — INSULIN ASPART 5 UNITS: 100 INJECTION, SOLUTION INTRAVENOUS; SUBCUTANEOUS at 06:08

## 2022-11-28 RX ADMIN — PYRIDOSTIGMINE BROMIDE 60 MG: 60 TABLET ORAL at 20:06

## 2022-11-28 RX ADMIN — METHYLPREDNISOLONE SODIUM SUCCINATE 40 MG: 40 INJECTION, POWDER, FOR SOLUTION INTRAMUSCULAR; INTRAVENOUS at 01:03

## 2022-11-28 RX ADMIN — PYRIDOSTIGMINE BROMIDE 60 MG: 60 TABLET ORAL at 08:44

## 2022-11-28 RX ADMIN — Medication 10 ML: at 00:12

## 2022-11-28 RX ADMIN — INSULIN DETEMIR 20 UNITS: 100 INJECTION, SOLUTION SUBCUTANEOUS at 08:53

## 2022-11-28 RX ADMIN — ENOXAPARIN SODIUM 40 MG: 40 INJECTION SUBCUTANEOUS at 00:06

## 2022-11-28 RX ADMIN — CEFEPIME HYDROCHLORIDE 2 G: 2 INJECTION, SOLUTION INTRAVENOUS at 00:06

## 2022-11-28 RX ADMIN — METHYLPREDNISOLONE SODIUM SUCCINATE 20 MG: 125 INJECTION, POWDER, FOR SOLUTION INTRAMUSCULAR; INTRAVENOUS at 08:52

## 2022-11-28 RX ADMIN — BUDESONIDE 0.5 MG: 0.5 SUSPENSION RESPIRATORY (INHALATION) at 19:34

## 2022-11-28 RX ADMIN — IPRATROPIUM BROMIDE AND ALBUTEROL SULFATE 3 ML: 2.5; .5 SOLUTION RESPIRATORY (INHALATION) at 19:34

## 2022-11-28 RX ADMIN — VANCOMYCIN HYDROCHLORIDE 2000 MG: 1 INJECTION, POWDER, LYOPHILIZED, FOR SOLUTION INTRAVENOUS at 00:34

## 2022-11-29 LAB
ALBUMIN SERPL-MCNC: 3.7 G/DL (ref 3.5–5.2)
ALBUMIN/GLOB SERPL: 1.2 G/DL
ALP SERPL-CCNC: 55 U/L (ref 39–117)
ALT SERPL W P-5'-P-CCNC: 9 U/L (ref 1–41)
ANION GAP SERPL CALCULATED.3IONS-SCNC: 7 MMOL/L (ref 5–15)
AST SERPL-CCNC: 9 U/L (ref 1–40)
BACTERIA BLD CULT: ABNORMAL
BASOPHILS # BLD AUTO: 0.02 10*3/MM3 (ref 0–0.2)
BASOPHILS NFR BLD AUTO: 0.1 % (ref 0–1.5)
BILIRUB SERPL-MCNC: 0.2 MG/DL (ref 0–1.2)
BOTTLE TYPE: ABNORMAL
BUN SERPL-MCNC: 29 MG/DL (ref 8–23)
BUN/CREAT SERPL: 41.4 (ref 7–25)
CALCIUM SPEC-SCNC: 9.7 MG/DL (ref 8.6–10.5)
CHLORIDE SERPL-SCNC: 96 MMOL/L (ref 98–107)
CO2 SERPL-SCNC: 33 MMOL/L (ref 22–29)
CREAT SERPL-MCNC: 0.7 MG/DL (ref 0.76–1.27)
DEPRECATED RDW RBC AUTO: 47.1 FL (ref 37–54)
EGFRCR SERPLBLD CKD-EPI 2021: 99.1 ML/MIN/1.73
EOSINOPHIL # BLD AUTO: 0 10*3/MM3 (ref 0–0.4)
EOSINOPHIL NFR BLD AUTO: 0 % (ref 0.3–6.2)
ERYTHROCYTE [DISTWIDTH] IN BLOOD BY AUTOMATED COUNT: 13 % (ref 12.3–15.4)
GLOBULIN UR ELPH-MCNC: 3 GM/DL
GLUCOSE BLDC GLUCOMTR-MCNC: 304 MG/DL (ref 70–130)
GLUCOSE BLDC GLUCOMTR-MCNC: 311 MG/DL (ref 70–130)
GLUCOSE BLDC GLUCOMTR-MCNC: 372 MG/DL (ref 70–130)
GLUCOSE BLDC GLUCOMTR-MCNC: 383 MG/DL (ref 70–130)
GLUCOSE SERPL-MCNC: 303 MG/DL (ref 65–99)
HCT VFR BLD AUTO: 41 % (ref 37.5–51)
HGB BLD-MCNC: 12.6 G/DL (ref 13–17.7)
IMM GRANULOCYTES # BLD AUTO: 0.03 10*3/MM3 (ref 0–0.05)
IMM GRANULOCYTES NFR BLD AUTO: 0.2 % (ref 0–0.5)
LYMPHOCYTES # BLD AUTO: 0.44 10*3/MM3 (ref 0.7–3.1)
LYMPHOCYTES NFR BLD AUTO: 3.3 % (ref 19.6–45.3)
MCH RBC QN AUTO: 30.2 PG (ref 26.6–33)
MCHC RBC AUTO-ENTMCNC: 30.7 G/DL (ref 31.5–35.7)
MCV RBC AUTO: 98.3 FL (ref 79–97)
MONOCYTES # BLD AUTO: 0.65 10*3/MM3 (ref 0.1–0.9)
MONOCYTES NFR BLD AUTO: 4.8 % (ref 5–12)
NEUTROPHILS NFR BLD AUTO: 12.35 10*3/MM3 (ref 1.7–7)
NEUTROPHILS NFR BLD AUTO: 91.6 % (ref 42.7–76)
PLATELET # BLD AUTO: 230 10*3/MM3 (ref 140–450)
PMV BLD AUTO: 11.5 FL (ref 6–12)
POTASSIUM SERPL-SCNC: 4.3 MMOL/L (ref 3.5–5.2)
PROCALCITONIN SERPL-MCNC: 0.08 NG/ML (ref 0–0.25)
PROT SERPL-MCNC: 6.7 G/DL (ref 6–8.5)
RBC # BLD AUTO: 4.17 10*6/MM3 (ref 4.14–5.8)
SODIUM SERPL-SCNC: 136 MMOL/L (ref 136–145)
VANCOMYCIN SERPL-MCNC: 14.1 MCG/ML (ref 5–40)
WBC NRBC COR # BLD: 13.49 10*3/MM3 (ref 3.4–10.8)

## 2022-11-29 PROCEDURE — 85025 COMPLETE CBC W/AUTO DIFF WBC: CPT | Performed by: HOSPITALIST

## 2022-11-29 PROCEDURE — 25010000002 METHYLPREDNISOLONE PER 125 MG: Performed by: HOSPITALIST

## 2022-11-29 PROCEDURE — 84145 PROCALCITONIN (PCT): CPT | Performed by: HOSPITALIST

## 2022-11-29 PROCEDURE — 82962 GLUCOSE BLOOD TEST: CPT

## 2022-11-29 PROCEDURE — 25010000002 ENOXAPARIN PER 10 MG: Performed by: STUDENT IN AN ORGANIZED HEALTH CARE EDUCATION/TRAINING PROGRAM

## 2022-11-29 PROCEDURE — 94761 N-INVAS EAR/PLS OXIMETRY MLT: CPT

## 2022-11-29 PROCEDURE — 94664 DEMO&/EVAL PT USE INHALER: CPT

## 2022-11-29 PROCEDURE — 94799 UNLISTED PULMONARY SVC/PX: CPT

## 2022-11-29 PROCEDURE — 25010000002 CEFTRIAXONE SODIUM-DEXTROSE 2-2.22 GM-%(50ML) RECONSTITUTED SOLUTION: Performed by: HOSPITALIST

## 2022-11-29 PROCEDURE — 63710000001 INSULIN ASPART PER 5 UNITS: Performed by: STUDENT IN AN ORGANIZED HEALTH CARE EDUCATION/TRAINING PROGRAM

## 2022-11-29 PROCEDURE — 63710000001 INSULIN DETEMIR PER 5 UNITS: Performed by: HOSPITALIST

## 2022-11-29 PROCEDURE — 80202 ASSAY OF VANCOMYCIN: CPT | Performed by: STUDENT IN AN ORGANIZED HEALTH CARE EDUCATION/TRAINING PROGRAM

## 2022-11-29 PROCEDURE — 25010000002 CEFEPIME-DEXTROSE 2-5 GM-%(50ML) RECONSTITUTED SOLUTION: Performed by: STUDENT IN AN ORGANIZED HEALTH CARE EDUCATION/TRAINING PROGRAM

## 2022-11-29 PROCEDURE — 99233 SBSQ HOSP IP/OBS HIGH 50: CPT | Performed by: HOSPITALIST

## 2022-11-29 PROCEDURE — 80053 COMPREHEN METABOLIC PANEL: CPT | Performed by: HOSPITALIST

## 2022-11-29 RX ORDER — NICOTINE 21 MG/24HR
1 PATCH, TRANSDERMAL 24 HOURS TRANSDERMAL
Status: DISCONTINUED | OUTPATIENT
Start: 2022-11-29 | End: 2022-12-02 | Stop reason: HOSPADM

## 2022-11-29 RX ORDER — CHOLECALCIFEROL (VITAMIN D3) 125 MCG
5 CAPSULE ORAL NIGHTLY PRN
Status: DISCONTINUED | OUTPATIENT
Start: 2022-11-29 | End: 2022-12-02 | Stop reason: HOSPADM

## 2022-11-29 RX ORDER — TETRAHYDROZOLINE HCL 0.05 %
2 DROPS OPHTHALMIC (EYE) 3 TIMES DAILY PRN
Status: DISCONTINUED | OUTPATIENT
Start: 2022-11-29 | End: 2022-12-02 | Stop reason: HOSPADM

## 2022-11-29 RX ORDER — ATORVASTATIN CALCIUM 10 MG/1
10 TABLET, FILM COATED ORAL DAILY
Status: DISCONTINUED | OUTPATIENT
Start: 2022-11-29 | End: 2022-12-02 | Stop reason: HOSPADM

## 2022-11-29 RX ORDER — CEFTRIAXONE 2 G/50ML
2 INJECTION, SOLUTION INTRAVENOUS EVERY 24 HOURS
Status: DISCONTINUED | OUTPATIENT
Start: 2022-11-29 | End: 2022-12-01

## 2022-11-29 RX ORDER — HYDROCODONE BITARTRATE AND ACETAMINOPHEN 10; 325 MG/1; MG/1
1 TABLET ORAL EVERY 6 HOURS PRN
Status: DISCONTINUED | OUTPATIENT
Start: 2022-11-29 | End: 2022-12-02 | Stop reason: HOSPADM

## 2022-11-29 RX ORDER — CALCIUM CARBONATE 200(500)MG
2 TABLET,CHEWABLE ORAL 3 TIMES DAILY PRN
Status: DISCONTINUED | OUTPATIENT
Start: 2022-11-29 | End: 2022-12-02 | Stop reason: HOSPADM

## 2022-11-29 RX ADMIN — HYDROCODONE BITARTRATE AND ACETAMINOPHEN 1 TABLET: 10; 325 TABLET ORAL at 04:22

## 2022-11-29 RX ADMIN — METHYLPREDNISOLONE SODIUM SUCCINATE 60 MG: 125 INJECTION, POWDER, FOR SOLUTION INTRAMUSCULAR; INTRAVENOUS at 00:30

## 2022-11-29 RX ADMIN — Medication 10 ML: at 08:20

## 2022-11-29 RX ADMIN — IPRATROPIUM BROMIDE AND ALBUTEROL SULFATE 3 ML: 2.5; .5 SOLUTION RESPIRATORY (INHALATION) at 18:49

## 2022-11-29 RX ADMIN — METHYLPREDNISOLONE SODIUM SUCCINATE 60 MG: 125 INJECTION, POWDER, FOR SOLUTION INTRAMUSCULAR; INTRAVENOUS at 17:29

## 2022-11-29 RX ADMIN — INSULIN DETEMIR 20 UNITS: 100 INJECTION, SOLUTION SUBCUTANEOUS at 08:18

## 2022-11-29 RX ADMIN — ARFORMOTEROL TARTRATE 15 MCG: 15 SOLUTION RESPIRATORY (INHALATION) at 07:59

## 2022-11-29 RX ADMIN — CEFEPIME HYDROCHLORIDE 2 G: 2 INJECTION, SOLUTION INTRAVENOUS at 00:13

## 2022-11-29 RX ADMIN — PYRIDOSTIGMINE BROMIDE 60 MG: 60 TABLET ORAL at 17:29

## 2022-11-29 RX ADMIN — INSULIN ASPART 12 UNITS: 100 INJECTION, SOLUTION INTRAVENOUS; SUBCUTANEOUS at 12:38

## 2022-11-29 RX ADMIN — HYDROCODONE BITARTRATE AND ACETAMINOPHEN 1 TABLET: 10; 325 TABLET ORAL at 10:44

## 2022-11-29 RX ADMIN — PYRIDOSTIGMINE BROMIDE 60 MG: 60 TABLET ORAL at 08:18

## 2022-11-29 RX ADMIN — CEFTRIAXONE 2 G: 2 INJECTION, SOLUTION INTRAVENOUS at 20:27

## 2022-11-29 RX ADMIN — ANTACID TABLETS 2 TABLET: 500 TABLET, CHEWABLE ORAL at 00:13

## 2022-11-29 RX ADMIN — PYRIDOSTIGMINE BROMIDE 60 MG: 60 TABLET ORAL at 00:13

## 2022-11-29 RX ADMIN — ENOXAPARIN SODIUM 40 MG: 40 INJECTION SUBCUTANEOUS at 20:26

## 2022-11-29 RX ADMIN — HYDROCODONE BITARTRATE AND ACETAMINOPHEN 1 TABLET: 10; 325 TABLET ORAL at 23:31

## 2022-11-29 RX ADMIN — INSULIN DETEMIR 25 UNITS: 100 INJECTION, SOLUTION SUBCUTANEOUS at 20:26

## 2022-11-29 RX ADMIN — INSULIN ASPART 10 UNITS: 100 INJECTION, SOLUTION INTRAVENOUS; SUBCUTANEOUS at 17:29

## 2022-11-29 RX ADMIN — CEFEPIME HYDROCHLORIDE 2 G: 2 INJECTION, SOLUTION INTRAVENOUS at 08:18

## 2022-11-29 RX ADMIN — INSULIN ASPART 12 UNITS: 100 INJECTION, SOLUTION INTRAVENOUS; SUBCUTANEOUS at 08:18

## 2022-11-29 RX ADMIN — IPRATROPIUM BROMIDE AND ALBUTEROL SULFATE 3 ML: 2.5; .5 SOLUTION RESPIRATORY (INHALATION) at 13:39

## 2022-11-29 RX ADMIN — Medication 1 PATCH: at 10:44

## 2022-11-29 RX ADMIN — ATORVASTATIN CALCIUM 10 MG: 10 TABLET, FILM COATED ORAL at 10:49

## 2022-11-29 RX ADMIN — HYDROCODONE BITARTRATE AND ACETAMINOPHEN 1 TABLET: 10; 325 TABLET ORAL at 16:51

## 2022-11-29 RX ADMIN — PYRIDOSTIGMINE BROMIDE 60 MG: 60 TABLET ORAL at 04:08

## 2022-11-29 RX ADMIN — IPRATROPIUM BROMIDE AND ALBUTEROL SULFATE 3 ML: 2.5; .5 SOLUTION RESPIRATORY (INHALATION) at 07:53

## 2022-11-29 RX ADMIN — BUDESONIDE 0.5 MG: 0.5 SUSPENSION RESPIRATORY (INHALATION) at 07:54

## 2022-11-29 RX ADMIN — METHYLPREDNISOLONE SODIUM SUCCINATE 60 MG: 125 INJECTION, POWDER, FOR SOLUTION INTRAMUSCULAR; INTRAVENOUS at 08:31

## 2022-11-29 RX ADMIN — PYRIDOSTIGMINE BROMIDE 60 MG: 60 TABLET ORAL at 12:39

## 2022-11-29 RX ADMIN — PYRIDOSTIGMINE BROMIDE 60 MG: 60 TABLET ORAL at 20:27

## 2022-11-29 RX ADMIN — Medication 10 ML: at 20:27

## 2022-11-29 RX ADMIN — METRONIDAZOLE 500 MG: 500 INJECTION, SOLUTION INTRAVENOUS at 04:08

## 2022-11-30 ENCOUNTER — APPOINTMENT (OUTPATIENT)
Dept: CARDIOLOGY | Facility: HOSPITAL | Age: 72
End: 2022-11-30

## 2022-11-30 LAB
ERYTHROCYTE [SEDIMENTATION RATE] IN BLOOD: 33 MM/HR (ref 0–20)
GLUCOSE BLDC GLUCOMTR-MCNC: 289 MG/DL (ref 70–130)
GLUCOSE BLDC GLUCOMTR-MCNC: 302 MG/DL (ref 70–130)
GLUCOSE BLDC GLUCOMTR-MCNC: 355 MG/DL (ref 70–130)

## 2022-11-30 PROCEDURE — 25010000002 METHYLPREDNISOLONE PER 125 MG: Performed by: HOSPITALIST

## 2022-11-30 PROCEDURE — 63710000001 INSULIN ASPART PER 5 UNITS: Performed by: HOSPITALIST

## 2022-11-30 PROCEDURE — 94761 N-INVAS EAR/PLS OXIMETRY MLT: CPT

## 2022-11-30 PROCEDURE — 63710000001 INSULIN DETEMIR PER 5 UNITS: Performed by: HOSPITALIST

## 2022-11-30 PROCEDURE — 94664 DEMO&/EVAL PT USE INHALER: CPT

## 2022-11-30 PROCEDURE — 82962 GLUCOSE BLOOD TEST: CPT

## 2022-11-30 PROCEDURE — 85652 RBC SED RATE AUTOMATED: CPT | Performed by: HOSPITALIST

## 2022-11-30 PROCEDURE — 99233 SBSQ HOSP IP/OBS HIGH 50: CPT | Performed by: HOSPITALIST

## 2022-11-30 PROCEDURE — 93306 TTE W/DOPPLER COMPLETE: CPT | Performed by: INTERNAL MEDICINE

## 2022-11-30 PROCEDURE — 25010000002 ENOXAPARIN PER 10 MG: Performed by: HOSPITALIST

## 2022-11-30 PROCEDURE — 25010000002 CEFTRIAXONE SODIUM-DEXTROSE 2-2.22 GM-%(50ML) RECONSTITUTED SOLUTION: Performed by: HOSPITALIST

## 2022-11-30 PROCEDURE — 94799 UNLISTED PULMONARY SVC/PX: CPT

## 2022-11-30 PROCEDURE — 93306 TTE W/DOPPLER COMPLETE: CPT

## 2022-11-30 PROCEDURE — 63710000001 PREDNISONE PER 1 MG: Performed by: HOSPITALIST

## 2022-11-30 RX ORDER — PREDNISONE 20 MG/1
40 TABLET ORAL 2 TIMES DAILY WITH MEALS
Status: COMPLETED | OUTPATIENT
Start: 2022-11-30 | End: 2022-12-01

## 2022-11-30 RX ADMIN — ARFORMOTEROL TARTRATE 15 MCG: 15 SOLUTION RESPIRATORY (INHALATION) at 20:56

## 2022-11-30 RX ADMIN — INSULIN DETEMIR 25 UNITS: 100 INJECTION, SOLUTION SUBCUTANEOUS at 21:17

## 2022-11-30 RX ADMIN — Medication 5 MG: at 00:01

## 2022-11-30 RX ADMIN — PYRIDOSTIGMINE BROMIDE 60 MG: 60 TABLET ORAL at 08:25

## 2022-11-30 RX ADMIN — BUDESONIDE 0.5 MG: 0.5 SUSPENSION RESPIRATORY (INHALATION) at 20:56

## 2022-11-30 RX ADMIN — HYDROCODONE BITARTRATE AND ACETAMINOPHEN 1 TABLET: 10; 325 TABLET ORAL at 23:52

## 2022-11-30 RX ADMIN — PYRIDOSTIGMINE BROMIDE 60 MG: 60 TABLET ORAL at 18:04

## 2022-11-30 RX ADMIN — METHYLPREDNISOLONE SODIUM SUCCINATE 60 MG: 125 INJECTION, POWDER, FOR SOLUTION INTRAMUSCULAR; INTRAVENOUS at 00:30

## 2022-11-30 RX ADMIN — BUDESONIDE 0.5 MG: 0.5 SUSPENSION RESPIRATORY (INHALATION) at 07:35

## 2022-11-30 RX ADMIN — HYDROCODONE BITARTRATE AND ACETAMINOPHEN 1 TABLET: 10; 325 TABLET ORAL at 18:04

## 2022-11-30 RX ADMIN — CEFTRIAXONE 2 G: 2 INJECTION, SOLUTION INTRAVENOUS at 21:16

## 2022-11-30 RX ADMIN — INSULIN ASPART 10 UNITS: 100 INJECTION, SOLUTION INTRAVENOUS; SUBCUTANEOUS at 18:03

## 2022-11-30 RX ADMIN — Medication 10 ML: at 21:17

## 2022-11-30 RX ADMIN — INSULIN ASPART 12 UNITS: 100 INJECTION, SOLUTION INTRAVENOUS; SUBCUTANEOUS at 08:25

## 2022-11-30 RX ADMIN — ENOXAPARIN SODIUM 40 MG: 40 INJECTION SUBCUTANEOUS at 21:16

## 2022-11-30 RX ADMIN — IPRATROPIUM BROMIDE AND ALBUTEROL SULFATE 3 ML: 2.5; .5 SOLUTION RESPIRATORY (INHALATION) at 20:56

## 2022-11-30 RX ADMIN — PYRIDOSTIGMINE BROMIDE 60 MG: 60 TABLET ORAL at 00:01

## 2022-11-30 RX ADMIN — ATORVASTATIN CALCIUM 10 MG: 10 TABLET, FILM COATED ORAL at 08:25

## 2022-11-30 RX ADMIN — IPRATROPIUM BROMIDE AND ALBUTEROL SULFATE 3 ML: 2.5; .5 SOLUTION RESPIRATORY (INHALATION) at 07:35

## 2022-11-30 RX ADMIN — PYRIDOSTIGMINE BROMIDE 60 MG: 60 TABLET ORAL at 04:13

## 2022-11-30 RX ADMIN — IPRATROPIUM BROMIDE AND ALBUTEROL SULFATE 3 ML: 2.5; .5 SOLUTION RESPIRATORY (INHALATION) at 13:21

## 2022-11-30 RX ADMIN — ARFORMOTEROL TARTRATE 15 MCG: 15 SOLUTION RESPIRATORY (INHALATION) at 07:41

## 2022-11-30 RX ADMIN — HYDROCODONE BITARTRATE AND ACETAMINOPHEN 1 TABLET: 10; 325 TABLET ORAL at 12:09

## 2022-11-30 RX ADMIN — METHYLPREDNISOLONE SODIUM SUCCINATE 60 MG: 125 INJECTION, POWDER, FOR SOLUTION INTRAMUSCULAR; INTRAVENOUS at 08:30

## 2022-11-30 RX ADMIN — INSULIN DETEMIR 25 UNITS: 100 INJECTION, SOLUTION SUBCUTANEOUS at 08:25

## 2022-11-30 RX ADMIN — PYRIDOSTIGMINE BROMIDE 60 MG: 60 TABLET ORAL at 12:06

## 2022-11-30 RX ADMIN — Medication 10 ML: at 08:26

## 2022-11-30 RX ADMIN — Medication 1 PATCH: at 08:30

## 2022-11-30 RX ADMIN — PREDNISONE 40 MG: 20 TABLET ORAL at 18:04

## 2022-11-30 RX ADMIN — PYRIDOSTIGMINE BROMIDE 60 MG: 60 TABLET ORAL at 21:16

## 2022-11-30 RX ADMIN — Medication 5 MG: at 21:16

## 2022-11-30 RX ADMIN — INSULIN ASPART 8 UNITS: 100 INJECTION, SOLUTION INTRAVENOUS; SUBCUTANEOUS at 12:07

## 2022-11-30 RX ADMIN — HYDROCODONE BITARTRATE AND ACETAMINOPHEN 1 TABLET: 10; 325 TABLET ORAL at 05:18

## 2022-12-01 ENCOUNTER — TRANSCRIBE ORDERS (OUTPATIENT)
Dept: HOME HEALTH SERVICES | Facility: HOME HEALTHCARE | Age: 72
End: 2022-12-01

## 2022-12-01 ENCOUNTER — APPOINTMENT (OUTPATIENT)
Dept: MRI IMAGING | Facility: HOSPITAL | Age: 72
End: 2022-12-01

## 2022-12-01 DIAGNOSIS — B95.61 MSSA BACTEREMIA: Primary | ICD-10-CM

## 2022-12-01 DIAGNOSIS — R78.81 MSSA BACTEREMIA: Primary | ICD-10-CM

## 2022-12-01 LAB
ALBUMIN SERPL-MCNC: 3.9 G/DL (ref 3.5–5.2)
ANION GAP SERPL CALCULATED.3IONS-SCNC: 8.5 MMOL/L (ref 5–15)
AORTIC DIMENSIONLESS INDEX: 0.8 (DI)
BACTERIA SPEC AEROBE CULT: ABNORMAL
BACTERIA SPEC RESP CULT: NORMAL
BASOPHILS # BLD AUTO: 0.01 10*3/MM3 (ref 0–0.2)
BASOPHILS NFR BLD AUTO: 0.1 % (ref 0–1.5)
BH CV ECHO MEAS - AO MAX PG: 6.5 MMHG
BH CV ECHO MEAS - AO MEAN PG: 4 MMHG
BH CV ECHO MEAS - AO ROOT DIAM: 3.5 CM
BH CV ECHO MEAS - AO V2 MAX: 127 CM/SEC
BH CV ECHO MEAS - AO V2 VTI: 26.7 CM
BH CV ECHO MEAS - AVA(I,D): 2.7 CM2
BH CV ECHO MEAS - EDV(CUBED): 117.6 ML
BH CV ECHO MEAS - EDV(MOD-SP2): 114 ML
BH CV ECHO MEAS - EDV(MOD-SP4): 122 ML
BH CV ECHO MEAS - EF(MOD-BP): 53 %
BH CV ECHO MEAS - EF(MOD-SP2): 53.5 %
BH CV ECHO MEAS - EF(MOD-SP4): 54.1 %
BH CV ECHO MEAS - ESV(CUBED): 35.1 ML
BH CV ECHO MEAS - ESV(MOD-SP2): 53 ML
BH CV ECHO MEAS - ESV(MOD-SP4): 56 ML
BH CV ECHO MEAS - FS: 33.2 %
BH CV ECHO MEAS - IVS/LVPW: 1.18 CM
BH CV ECHO MEAS - IVSD: 1.3 CM
BH CV ECHO MEAS - LAT PEAK E' VEL: 11.5 CM/SEC
BH CV ECHO MEAS - LV DIASTOLIC VOL/BSA (35-75): 50.6 CM2
BH CV ECHO MEAS - LV MASS(C)D: 226.4 GRAMS
BH CV ECHO MEAS - LV MAX PG: 4.5 MMHG
BH CV ECHO MEAS - LV MEAN PG: 2 MMHG
BH CV ECHO MEAS - LV SYSTOLIC VOL/BSA (12-30): 23.2 CM2
BH CV ECHO MEAS - LV V1 MAX: 106 CM/SEC
BH CV ECHO MEAS - LV V1 VTI: 19.7 CM
BH CV ECHO MEAS - LVIDD: 4.9 CM
BH CV ECHO MEAS - LVIDS: 3.3 CM
BH CV ECHO MEAS - LVOT AREA: 3.6 CM2
BH CV ECHO MEAS - LVOT DIAM: 2.15 CM
BH CV ECHO MEAS - LVPWD: 1.1 CM
BH CV ECHO MEAS - MED PEAK E' VEL: 9.4 CM/SEC
BH CV ECHO MEAS - MV A MAX VEL: 91.2 CM/SEC
BH CV ECHO MEAS - MV DEC SLOPE: 169.9 CM/SEC2
BH CV ECHO MEAS - MV DEC TIME: 0.25 MSEC
BH CV ECHO MEAS - MV E MAX VEL: 77.6 CM/SEC
BH CV ECHO MEAS - MV E/A: 0.85
BH CV ECHO MEAS - MV MAX PG: 3.6 MMHG
BH CV ECHO MEAS - MV MEAN PG: 1.32 MMHG
BH CV ECHO MEAS - MV P1/2T: 127.2 MSEC
BH CV ECHO MEAS - MV V2 VTI: 26.1 CM
BH CV ECHO MEAS - MVA(P1/2T): 1.73 CM2
BH CV ECHO MEAS - MVA(VTI): 2.7 CM2
BH CV ECHO MEAS - PA V2 MAX: 75.4 CM/SEC
BH CV ECHO MEAS - QP/QS: 0.81
BH CV ECHO MEAS - RAP SYSTOLE: 3 MMHG
BH CV ECHO MEAS - RV MAX PG: 1.26 MMHG
BH CV ECHO MEAS - RV V1 MAX: 56.1 CM/SEC
BH CV ECHO MEAS - RV V1 VTI: 13.1 CM
BH CV ECHO MEAS - RVOT DIAM: 2.37 CM
BH CV ECHO MEAS - SI(MOD-SP2): 25.3 ML/M2
BH CV ECHO MEAS - SI(MOD-SP4): 27.4 ML/M2
BH CV ECHO MEAS - SV(LVOT): 71.6 ML
BH CV ECHO MEAS - SV(MOD-SP2): 61 ML
BH CV ECHO MEAS - SV(MOD-SP4): 66 ML
BH CV ECHO MEAS - SV(RVOT): 57.8 ML
BH CV ECHO MEASUREMENTS AVERAGE E/E' RATIO: 7.43
BH CV XLRA - TDI S': 11.3 CM/SEC
BUN SERPL-MCNC: 24 MG/DL (ref 8–23)
BUN/CREAT SERPL: 36.4 (ref 7–25)
CALCIUM SPEC-SCNC: 9.5 MG/DL (ref 8.6–10.5)
CHLORIDE SERPL-SCNC: 95 MMOL/L (ref 98–107)
CO2 SERPL-SCNC: 29.5 MMOL/L (ref 22–29)
CREAT SERPL-MCNC: 0.66 MG/DL (ref 0.76–1.27)
DEPRECATED RDW RBC AUTO: 47.4 FL (ref 37–54)
EGFRCR SERPLBLD CKD-EPI 2021: 100.9 ML/MIN/1.73
EOSINOPHIL # BLD AUTO: 0 10*3/MM3 (ref 0–0.4)
EOSINOPHIL NFR BLD AUTO: 0 % (ref 0.3–6.2)
ERYTHROCYTE [DISTWIDTH] IN BLOOD BY AUTOMATED COUNT: 12.9 % (ref 12.3–15.4)
ERYTHROCYTE [SEDIMENTATION RATE] IN BLOOD: 35 MM/HR (ref 0–20)
GLUCOSE BLDC GLUCOMTR-MCNC: 273 MG/DL (ref 70–130)
GLUCOSE BLDC GLUCOMTR-MCNC: 294 MG/DL (ref 70–130)
GLUCOSE BLDC GLUCOMTR-MCNC: 314 MG/DL (ref 70–130)
GLUCOSE SERPL-MCNC: 280 MG/DL (ref 65–99)
GRAM STN SPEC: ABNORMAL
GRAM STN SPEC: NORMAL
HCT VFR BLD AUTO: 44.1 % (ref 37.5–51)
HGB BLD-MCNC: 13.5 G/DL (ref 13–17.7)
IMM GRANULOCYTES # BLD AUTO: 0.15 10*3/MM3 (ref 0–0.05)
IMM GRANULOCYTES NFR BLD AUTO: 1.7 % (ref 0–0.5)
ISOLATED FROM: ABNORMAL
LEFT ATRIUM VOLUME INDEX: 15.1 ML/M2
LYMPHOCYTES # BLD AUTO: 1.67 10*3/MM3 (ref 0.7–3.1)
LYMPHOCYTES NFR BLD AUTO: 18.9 % (ref 19.6–45.3)
MAXIMAL PREDICTED HEART RATE: 150 BPM
MCH RBC QN AUTO: 30.1 PG (ref 26.6–33)
MCHC RBC AUTO-ENTMCNC: 30.6 G/DL (ref 31.5–35.7)
MCV RBC AUTO: 98.4 FL (ref 79–97)
MONOCYTES # BLD AUTO: 0.89 10*3/MM3 (ref 0.1–0.9)
MONOCYTES NFR BLD AUTO: 10.1 % (ref 5–12)
NEUTROPHILS NFR BLD AUTO: 6.1 10*3/MM3 (ref 1.7–7)
NEUTROPHILS NFR BLD AUTO: 69.2 % (ref 42.7–76)
PHOSPHATE SERPL-MCNC: 2.9 MG/DL (ref 2.5–4.5)
PLATELET # BLD AUTO: 226 10*3/MM3 (ref 140–450)
PMV BLD AUTO: 11.2 FL (ref 6–12)
POTASSIUM SERPL-SCNC: 4.6 MMOL/L (ref 3.5–5.2)
RBC # BLD AUTO: 4.48 10*6/MM3 (ref 4.14–5.8)
SINUS: 3.1 CM
SODIUM SERPL-SCNC: 133 MMOL/L (ref 136–145)
STRESS TARGET HR: 128 BPM
WBC NRBC COR # BLD: 8.82 10*3/MM3 (ref 3.4–10.8)

## 2022-12-01 PROCEDURE — 99232 SBSQ HOSP IP/OBS MODERATE 35: CPT | Performed by: HOSPITALIST

## 2022-12-01 PROCEDURE — 87040 BLOOD CULTURE FOR BACTERIA: CPT | Performed by: HOSPITALIST

## 2022-12-01 PROCEDURE — 94664 DEMO&/EVAL PT USE INHALER: CPT

## 2022-12-01 PROCEDURE — 72158 MRI LUMBAR SPINE W/O & W/DYE: CPT

## 2022-12-01 PROCEDURE — 80069 RENAL FUNCTION PANEL: CPT | Performed by: HOSPITALIST

## 2022-12-01 PROCEDURE — 94799 UNLISTED PULMONARY SVC/PX: CPT

## 2022-12-01 PROCEDURE — 25010000002 ENOXAPARIN PER 10 MG: Performed by: HOSPITALIST

## 2022-12-01 PROCEDURE — 63710000001 PREDNISONE PER 1 MG: Performed by: HOSPITALIST

## 2022-12-01 PROCEDURE — 0 GADOBENATE DIMEGLUMINE 529 MG/ML SOLUTION: Performed by: STUDENT IN AN ORGANIZED HEALTH CARE EDUCATION/TRAINING PROGRAM

## 2022-12-01 PROCEDURE — 63710000001 INSULIN DETEMIR PER 5 UNITS: Performed by: HOSPITALIST

## 2022-12-01 PROCEDURE — A9577 INJ MULTIHANCE: HCPCS | Performed by: STUDENT IN AN ORGANIZED HEALTH CARE EDUCATION/TRAINING PROGRAM

## 2022-12-01 PROCEDURE — 82962 GLUCOSE BLOOD TEST: CPT

## 2022-12-01 PROCEDURE — 85652 RBC SED RATE AUTOMATED: CPT | Performed by: HOSPITALIST

## 2022-12-01 PROCEDURE — 85025 COMPLETE CBC W/AUTO DIFF WBC: CPT | Performed by: HOSPITALIST

## 2022-12-01 PROCEDURE — 63710000001 INSULIN ASPART PER 5 UNITS: Performed by: HOSPITALIST

## 2022-12-01 PROCEDURE — 0 CEFAZOLIN SODIUM-DEXTROSE 2-3 GM-%(50ML) RECONSTITUTED SOLUTION: Performed by: INTERNAL MEDICINE

## 2022-12-01 RX ORDER — CEFAZOLIN SODIUM 2 G/50ML
2 SOLUTION INTRAVENOUS EVERY 8 HOURS
Status: DISCONTINUED | OUTPATIENT
Start: 2022-12-01 | End: 2022-12-02

## 2022-12-01 RX ORDER — SODIUM CHLORIDE 0.9 % (FLUSH) 0.9 %
10 SYRINGE (ML) INJECTION EVERY 12 HOURS SCHEDULED
Status: CANCELLED | OUTPATIENT
Start: 2022-12-01

## 2022-12-01 RX ORDER — SODIUM CHLORIDE 0.9 % (FLUSH) 0.9 %
20 SYRINGE (ML) INJECTION AS NEEDED
Status: CANCELLED | OUTPATIENT
Start: 2022-12-01

## 2022-12-01 RX ORDER — SODIUM CHLORIDE 0.9 % (FLUSH) 0.9 %
10 SYRINGE (ML) INJECTION AS NEEDED
Status: CANCELLED | OUTPATIENT
Start: 2022-12-01

## 2022-12-01 RX ORDER — FUROSEMIDE 40 MG/1
40 TABLET ORAL DAILY
Status: DISCONTINUED | OUTPATIENT
Start: 2022-12-02 | End: 2022-12-01

## 2022-12-01 RX ADMIN — CEFAZOLIN SODIUM 2 G: 2 SOLUTION INTRAVENOUS at 10:57

## 2022-12-01 RX ADMIN — GADOBENATE DIMEGLUMINE 20 ML: 529 INJECTION, SOLUTION INTRAVENOUS at 14:14

## 2022-12-01 RX ADMIN — HYDROCODONE BITARTRATE AND ACETAMINOPHEN 1 TABLET: 10; 325 TABLET ORAL at 12:38

## 2022-12-01 RX ADMIN — Medication 10 ML: at 20:29

## 2022-12-01 RX ADMIN — ENOXAPARIN SODIUM 40 MG: 40 INJECTION SUBCUTANEOUS at 20:29

## 2022-12-01 RX ADMIN — HYDROCODONE BITARTRATE AND ACETAMINOPHEN 1 TABLET: 10; 325 TABLET ORAL at 18:33

## 2022-12-01 RX ADMIN — PYRIDOSTIGMINE BROMIDE 60 MG: 60 TABLET ORAL at 08:45

## 2022-12-01 RX ADMIN — ARFORMOTEROL TARTRATE 15 MCG: 15 SOLUTION RESPIRATORY (INHALATION) at 07:36

## 2022-12-01 RX ADMIN — HYDROCODONE BITARTRATE AND ACETAMINOPHEN 1 TABLET: 10; 325 TABLET ORAL at 06:51

## 2022-12-01 RX ADMIN — Medication 1 PATCH: at 08:45

## 2022-12-01 RX ADMIN — PYRIDOSTIGMINE BROMIDE 60 MG: 60 TABLET ORAL at 17:29

## 2022-12-01 RX ADMIN — IPRATROPIUM BROMIDE AND ALBUTEROL SULFATE 3 ML: 2.5; .5 SOLUTION RESPIRATORY (INHALATION) at 13:08

## 2022-12-01 RX ADMIN — PREDNISONE 40 MG: 20 TABLET ORAL at 17:29

## 2022-12-01 RX ADMIN — IPRATROPIUM BROMIDE AND ALBUTEROL SULFATE 3 ML: 2.5; .5 SOLUTION RESPIRATORY (INHALATION) at 07:42

## 2022-12-01 RX ADMIN — INSULIN ASPART 8 UNITS: 100 INJECTION, SOLUTION INTRAVENOUS; SUBCUTANEOUS at 08:44

## 2022-12-01 RX ADMIN — BUDESONIDE 0.5 MG: 0.5 SUSPENSION RESPIRATORY (INHALATION) at 07:42

## 2022-12-01 RX ADMIN — INSULIN ASPART 10 UNITS: 100 INJECTION, SOLUTION INTRAVENOUS; SUBCUTANEOUS at 17:29

## 2022-12-01 RX ADMIN — PYRIDOSTIGMINE BROMIDE 60 MG: 60 TABLET ORAL at 12:34

## 2022-12-01 RX ADMIN — PYRIDOSTIGMINE BROMIDE 60 MG: 60 TABLET ORAL at 06:51

## 2022-12-01 RX ADMIN — INSULIN DETEMIR 35 UNITS: 100 INJECTION, SOLUTION SUBCUTANEOUS at 20:29

## 2022-12-01 RX ADMIN — INSULIN ASPART 8 UNITS: 100 INJECTION, SOLUTION INTRAVENOUS; SUBCUTANEOUS at 12:34

## 2022-12-01 RX ADMIN — ATORVASTATIN CALCIUM 10 MG: 10 TABLET, FILM COATED ORAL at 08:45

## 2022-12-01 RX ADMIN — Medication 10 ML: at 08:45

## 2022-12-01 RX ADMIN — IPRATROPIUM BROMIDE AND ALBUTEROL SULFATE 3 ML: 2.5; .5 SOLUTION RESPIRATORY (INHALATION) at 20:36

## 2022-12-01 RX ADMIN — PYRIDOSTIGMINE BROMIDE 60 MG: 60 TABLET ORAL at 20:29

## 2022-12-01 RX ADMIN — INSULIN DETEMIR 25 UNITS: 100 INJECTION, SOLUTION SUBCUTANEOUS at 08:44

## 2022-12-01 RX ADMIN — PYRIDOSTIGMINE BROMIDE 60 MG: 60 TABLET ORAL at 00:00

## 2022-12-01 RX ADMIN — ARFORMOTEROL TARTRATE 15 MCG: 15 SOLUTION RESPIRATORY (INHALATION) at 20:46

## 2022-12-01 RX ADMIN — CEFAZOLIN SODIUM 2 G: 2 SOLUTION INTRAVENOUS at 17:29

## 2022-12-01 RX ADMIN — BUDESONIDE 0.5 MG: 0.5 SUSPENSION RESPIRATORY (INHALATION) at 20:36

## 2022-12-01 RX ADMIN — PREDNISONE 40 MG: 20 TABLET ORAL at 08:45

## 2022-12-01 NOTE — DISCHARGE PLACEMENT REQUEST
"Javier Pemberton (72 y.o. Male)     Date of Birth   1950    Social Security Number       Address   [address] [place], [place] [address]    Home Phone   [phone]    MRN   [id number]       Buddhism   None    Marital Status                               Admission Date   11/27/22    Admission Type   Emergency    Admitting Provider   Liane Cerda DO    Attending Provider   Liane Cerda DO    Department, Room/Bed   [facility] SURG, 1417/1       Discharge Date       Discharge Disposition       Discharge Destination                               Attending Provider: Liane Cerda DO    Allergies: No Known Allergies    Isolation: None   Infection: None   Code Status: CPR    Ht: 188 cm (74.02\")   Wt: 116 kg (255 lb)    Admission Cmt: None   Principal Problem: None                Active Insurance as of 11/27/2022     Primary Coverage     Payor Plan Insurance Group Employer/Plan Group    HUMANA MEDICARE REPLACEMENT HUMANA MEDICARE REPLACEMENT [id number]     Payor Plan Address Payor Plan Phone Number Payor Plan Fax Number Effective Dates    [address] [address] [phone]  1/1/2022 - None Entered    [facility] [id number]       Subscriber Name Subscriber Birth Date Member ID       JAVIER PEMBERTON 1950 [id number]                 Emergency Contacts      (Rel.) Home Phone Work Phone Mobile Phone    Kojo Pemberton (Son) [phone] -- --              "

## 2022-12-01 NOTE — PLAN OF CARE
Goal Outcome Evaluation:  Plan of Care Reviewed With: patient        Progress: improving  Outcome Evaluation: Pt VSS, am labs pending. Pt continues tele, SR, HR 70's-80's, pt continues on o2@ 3L, RT titrating, see RT notes. Pt reports chronic pain improved with current regimen, see emar, flowsheets. Pt reports desire to d/c home soon. Pt resting at this time.

## 2022-12-01 NOTE — CASE MANAGEMENT/SOCIAL WORK
"Continued Stay Note   Isatu Severino     Patient Name: Kt Armstrong  MRN: [id number]  Today's Date: 12/1/2022    Admit Date: 11/27/2022    Plan: Home with daughter and home health for IV ABX   Discharge Plan     Row Name 12/01/22 1551       Plan    Plan Home with daughter and home health for IV ABX    Patient/Family in Agreement with Plan yes    Plan Comments Asked by Dr. Rider to arrange home health for IV ABX at discharge for 4 weeks. CM called and spoke with Tasia at [facility] with referral and she states she will work up a price for patient antibiotics and call me back with a price. CM called patient in room and informed him he would need home health for IV ABX at home and he verbalized understanding. CM asked if he has ever used home health and he states he thinks he has but has no preference on home health at this time stating \"just get one that takes my insurance\". CM made referrals to [facility]  and [facility] they can not accept due to staffing. Referral made to [facility]  and [facility] they can not accept due to staffing. Jonna with [facility] can accept and asks that patient get his dose tomorrow prior to discharge and they can see him on Saturday. Patient will discharge home with his daughter and [facility] and [facility] to follow for IV ABX. CM will follow.               Discharge Codes    No documentation.                     Glenys Marley RN    "

## 2022-12-01 NOTE — CONSULTS
LOS: 4 days   Patient Care Team:  Ney Kathleen MD as PCP - General (Family Medicine)  Chris Driver MD as Consulting Physician (Pulmonary Disease)        Subjective       Attending MD : Liane Cerda,*    Patient Complaints: weak         History of Present Illness  :72-year-old male with multiple comorbidities including severe COPD on 2-3 L as needed at home, myasthenia gravis on CellCept, obesity, tobacco use disorder, type 2 diabetes, multiple other comorbidities listed below who presented to the ED tonight due to feelings of generalized malaise.     Upon arrival to the ED the patient was significantly tachycardic.  An ABG in the ED demonstrated on 3 L O2 both significant hypercarbia (although with the patient's pH suspect th this part is chronic) and severe hypoxemia with a PaO2 of 53.6 despite supplemental O2 administration.     Unfortunately, at time of interview, patient is unable to give ROS or any history.  He is on BiPAP and received some medication to help tolerate this.  In the ED physician's note the patient did report generalized body aches and cough as well as shortness of breath.  The patient was last seen here at the beginning of this month and at that time was diagnosed with a COPD exacerbation and given both a prescription for p.o. prednisone as well as Z-Franco.     In the ED the patient is noted to have chest x-ray compatible with pulmonary edema.  Troponin negative x3.  EKG sinus tachycardia with possible right ventricular hypertrophy.  CBC with white blood cell count of 18, hemoglobin 13.6, hematocrit 44.3, platelet count 202, sodium 140, potassium 4, bicarb 29.4, chloride 99, creatinine 0.66, BUN 13, ALT 11, AST 14.         Patient Denies:  NV    PMH :   Acute on chronic congestive heart failure, unspecified heart failure type (HCC)      Review of Systems:    jessica    Objective     Vital Signs  Temp:  [96.8 °F (36 °C)-97.7 °F (36.5 °C)] 97.7 °F (36.5 °C)  Heart Rate:   [] 69  Resp:  [16-28] 28  BP: (120-145)/(65-86) 120/81    Physical Exam:     General Appearance:    Alert, cooperative, in no acute distress   Head:    Normocephalic, without obvious abnormality, atraumatic   Eyes:            Lids and lashes normal, conjunctivae and sclerae normal, no   icterus, no pallor, corneas clear, PERRLA   Ears:    Ears appear intact with no abnormalities noted   Throat:   No oral lesions, no thrush, oral mucosa moist   Neck:   No adenopathy, supple, trachea midline, no thyromegaly, no   carotid bruit, no JVD   Back:     No kyphosis present, no scoliosis present, no skin lesions,      erythema or scars, no tenderness to percussion or                   palpation,   range of motion normal   Lungs:     Clear to auscultation,respirations regular, even and                  unlabored    Heart:    Regular rhythm and normal rate, normal S1 and S2, no            murmur, no gallop, no rub, no click   Chest Wall:    No abnormalities observed   Abdomen:     Normal bowel sounds, no masses, no organomegaly, soft        non-tender, non-distended, no guarding, no rebound                tenderness   Rectal:     Deferred   Extremities:   Moves all extremities well, no edema, no cyanosis, no             redness   Pulses:   Pulses palpable and equal bilaterally   Skin:   No bleeding, bruising or rash   Lymph nodes:   No palpable adenopathy   Neurologic:   Cranial nerves 2 - 12 grossly intact, sensation intact, DTR       present and equal bilaterally          Results Review:    Lab Results (last 72 hours)     Procedure Component Value Units Date/Time    Respiratory Culture - Sputum, Cough [[id number]] Collected: 11/28/22 1707    Specimen: Sputum from Cough Updated: 12/01/22 0905     Respiratory Culture Moderate growth (3+) Normal respiratory karely. No S. aureus or Pseudomonas aeruginosa detected. Final report.     Gram Stain Moderate (3+) WBCs seen      Rare (1+) Epithelial cells seen      Few (2+) Mixed  bacterial morphotypes seen on Gram Stain    Sedimentation Rate [[id number]]  (Abnormal) Collected: 12/01/22 0840    Specimen: Blood Updated: 12/01/22 0856     Sed Rate 35 mm/hr     CBC & Differential [[id number]]  (Abnormal) Collected: 12/01/22 0840    Specimen: Blood Updated: 12/01/22 0849    Narrative:      The following orders were created for panel order CBC & Differential.  Procedure                               Abnormality         Status                     ---------                               -----------         ------                     CBC Auto Differential[[id number]]        Abnormal            Final result                 Please view results for these tests on the individual orders.    CBC Auto Differential [[id number]]  (Abnormal) Collected: 12/01/22 0840    Specimen: Blood Updated: 12/01/22 0849     WBC 8.82 10*3/mm3      RBC 4.48 10*6/mm3      Hemoglobin 13.5 g/dL      Hematocrit 44.1 %      MCV 98.4 fL      MCH 30.1 pg      MCHC 30.6 g/dL      RDW 12.9 %      RDW-SD 47.4 fl      MPV 11.2 fL      Platelets 226 10*3/mm3      Neutrophil % 69.2 %      Lymphocyte % 18.9 %      Monocyte % 10.1 %      Eosinophil % 0.0 %      Basophil % 0.1 %      Immature Grans % 1.7 %      Neutrophils, Absolute 6.10 10*3/mm3      Lymphocytes, Absolute 1.67 10*3/mm3      Monocytes, Absolute 0.89 10*3/mm3      Eosinophils, Absolute 0.00 10*3/mm3      Basophils, Absolute 0.01 10*3/mm3      Immature Grans, Absolute 0.15 10*3/mm3     Renal Function Panel [[id number]] Collected: 12/01/22 0840    Specimen: Blood Updated: 12/01/22 0844    POC Glucose Once [[id number]]  (Abnormal) Collected: 12/01/22 0741    Specimen: Blood Updated: 12/01/22 0749     Glucose 273 mg/dL      Comment: Meter: [id number] : [id number] Adriano Tilley CNA       Blood Culture - Blood, Arm, Left [[id number]]  (Abnormal)  (Susceptibility) Collected: 11/27/22 1809    Specimen: Blood from Arm, Left Updated: 12/01/22 0638     Blood Culture Staphylococcus aureus      Comment:   Infectious disease consultation is highly recommended to rule out distant foci of infection.        Isolated from Anaerobic Bottle     Gram Stain Anaerobic Bottle Gram positive cocci in clusters    Susceptibility      Staphylococcus aureus      JORDAN      Gentamicin Susceptible      Oxacillin Susceptible      Rifampin Susceptible      Vancomycin Susceptible                       Susceptibility Comments     Staphylococcus aureus    This isolate does not demonstrate inducible clindamycin resistance in vitro.               Blood Culture - Blood, Arm, Right [[id number]]  (Normal) Collected: 11/27/22 1809    Specimen: Blood from Arm, Right Updated: 11/30/22 1831     Blood Culture No growth at 3 days    POC Glucose Once [[id number]]  (Abnormal) Collected: 11/30/22 1629    Specimen: Blood Updated: 11/30/22 1636     Glucose 302 mg/dL      Comment: Meter: [id number] : [id number] Vinay Laguerre CNA       POC Glucose Once [[id number]]  (Abnormal) Collected: 11/30/22 1149    Specimen: Blood Updated: 11/30/22 1156     Glucose 289 mg/dL      Comment: Meter: [id number] : [id number] Marcos JONAS       Sedimentation Rate [[id number]]  (Abnormal) Collected: 11/30/22 1047    Specimen: Blood Updated: 11/30/22 1053     Sed Rate 33 mm/hr     POC Glucose Once [[id number]]  (Abnormal) Collected: 11/30/22 0730    Specimen: Blood Updated: 11/30/22 0736     Glucose 355 mg/dL      Comment: Meter: [id number] : [id number] Marcos JONAS       POC Glucose Once [[id number]]  (Abnormal) Collected: 11/29/22 2025    Specimen: Blood Updated: 11/29/22 2042     Glucose 311 mg/dL      Comment: Meter: [id number] : [id number] Rich Park RN       POC Glucose Once [[id number]]  (Abnormal) Collected: 11/29/22 1655    Specimen: Blood Updated: 11/29/22 1701     Glucose 304 mg/dL      Comment: Meter: [id number] : [id number] Bernabe Martinez RN       POC Glucose Once [[id number]]  (Abnormal) Collected: 11/29/22 1235    Specimen:  Blood Updated: 11/29/22 1241     Glucose 383 mg/dL      Comment: Meter: [id number] : [id number] Bernabe Martinez RN       POC Glucose Once [[id number]]  (Abnormal) Collected: 11/29/22 0735    Specimen: Blood Updated: 11/29/22 0743     Glucose 372 mg/dL      Comment: Meter: [id number] : [id number] Bernabe Martinez RN       Comprehensive Metabolic Panel [[id number]]  (Abnormal) Collected: 11/29/22 0416    Specimen: Blood Updated: 11/29/22 0453     Glucose 303 mg/dL      BUN 29 mg/dL      Creatinine 0.70 mg/dL      Sodium 136 mmol/L      Potassium 4.3 mmol/L      Chloride 96 mmol/L      CO2 33.0 mmol/L      Calcium 9.7 mg/dL      Total Protein 6.7 g/dL      Albumin 3.70 g/dL      ALT (SGPT) 9 U/L      AST (SGOT) 9 U/L      Alkaline Phosphatase 55 U/L      Total Bilirubin 0.2 mg/dL      Globulin 3.0 gm/dL      A/G Ratio 1.2 g/dL      BUN/Creatinine Ratio 41.4     Anion Gap 7.0 mmol/L      eGFR 99.1 mL/min/1.73      Comment: National Kidney Foundation and American Society of Nephrology (ASN) Task Force recommended calculation based on the Chronic Kidney Disease Epidemiology Collaboration (CKD-EPI) equation refit without adjustment for race.       Narrative:      GFR Normal >60  Chronic Kidney Disease <60  Kidney Failure <15      Vancomycin, Random [[id number]]  (Normal) Collected: 11/29/22 0416    Specimen: Blood Updated: 11/29/22 0449     Vancomycin Random 14.10 mcg/mL     Narrative:      Therapeutic Ranges for Vancomycin    Vancomycin Random   5.0-40.0 mcg/mL  Vancomycin Trough   5.0-20.0 mcg/mL  Vancomycin Peak     20.0-40.0 mcg/mL    Procalcitonin [[id number]]  (Normal) Collected: 11/29/22 0416    Specimen: Blood Updated: 11/29/22 0448     Procalcitonin 0.08 ng/mL     Narrative:      As a Marker for Sepsis (Non-Neonates):    1. <0.5 ng/mL represents a low risk of severe sepsis and/or septic shock.  2. >2 ng/mL represents a high risk of severe sepsis and/or septic shock.    As a Marker for Lower Respiratory Tract  "Infections that require antibiotic therapy:    PCT on Admission    Antibiotic Therapy       6-12 Hrs later    >0.5                Strongly Recommended  >0.25 - <0.5        Recommended   0.1 - 0.25          Discouraged              Remeasure/reassess PCT  <0.1                Strongly Discouraged     Remeasure/reassess PCT    As 28 day mortality risk marker: \"Change in Procalcitonin Result\" (>80% or <=80%) if Day 0 (or Day 1) and Day 4 values are available. Refer to http://www.[facility]-pct-calculator.com    Change in PCT <=80%  A decrease of PCT levels below or equal to 80% defines a positive change in PCT test result representing a higher risk for 28-day all-cause mortality of patients diagnosed with severe sepsis for septic shock.    Change in PCT >80%  A decrease of PCT levels of more than 80% defines a negative change in PCT result representing a lower risk for 28-day all-cause mortality of patients diagnosed with severe sepsis or septic shock.       CBC & Differential [[id number]]  (Abnormal) Collected: 11/29/22 0416    Specimen: Blood Updated: 11/29/22 0423    Narrative:      The following orders were created for panel order CBC & Differential.  Procedure                               Abnormality         Status                     ---------                               -----------         ------                     CBC Auto Differential[[id number]]        Abnormal            Final result                 Please view results for these tests on the individual orders.    CBC Auto Differential [[id number]]  (Abnormal) Collected: 11/29/22 0416    Specimen: Blood Updated: 11/29/22 0423     WBC 13.49 10*3/mm3      RBC 4.17 10*6/mm3      Hemoglobin 12.6 g/dL      Hematocrit 41.0 %      MCV 98.3 fL      MCH 30.2 pg      MCHC 30.7 g/dL      RDW 13.0 %      RDW-SD 47.1 fl      MPV 11.5 fL      Platelets 230 10*3/mm3      Neutrophil % 91.6 %      Lymphocyte % 3.3 %      Monocyte % 4.8 %      Eosinophil % 0.0 %      Basophil % " 0.1 %      Immature Grans % 0.2 %      Neutrophils, Absolute 12.35 10*3/mm3      Lymphocytes, Absolute 0.44 10*3/mm3      Monocytes, Absolute 0.65 10*3/mm3      Eosinophils, Absolute 0.00 10*3/mm3      Basophils, Absolute 0.02 10*3/mm3      Immature Grans, Absolute 0.03 10*3/mm3     Blood Culture ID, PCR - Blood, Arm, Left [[id number]]  (Abnormal) Collected: 11/27/22 1809    Specimen: Blood from Arm, Left Updated: 11/29/22 0048     BCID, PCR Staph aureus. mecA/C and MREJ (methicillin resistance gene) NOT detected. Identification by BCID2 PCR     BOTTLE TYPE Anaerobic Bottle    Narrative:      Infectious disease consultation is highly recommended to rule out distant foci of infection.    POC Glucose Once [[id number]]  (Abnormal) Collected: 11/28/22 1938    Specimen: Blood Updated: 11/28/22 1945     Glucose 308 mg/dL      Comment: Meter: [id number] : [id number] Rich Park RN       POC Glucose Once [[id number]]  (Abnormal) Collected: 11/28/22 1705    Specimen: Blood Updated: 11/28/22 1713     Glucose 232 mg/dL      Comment: Meter: [id number] : [id number] Michi Ravi RN (Validator)       POC Glucose Once [[id number]]  (Abnormal) Collected: 11/28/22 1212    Specimen: Blood Updated: 11/28/22 1224     Glucose 216 mg/dL      Comment: Meter: [id number] : [id number] Michi Ravi RN (Validator)       MRSA Screen, PCR (Inpatient) - Swab, Nares [[id number]]  (Normal) Collected: 11/28/22 0016    Specimen: Swab from Nares Updated: 11/28/22 1201     MRSA PCR No MRSA Detected    Narrative:      The negative predictive value of this diagnostic test is high and should only be used to consider de-escalating anti-MRSA therapy. A positive result may indicate colonization with MRSA and must be correlated clinically.    Respiratory Panel PCR w/COVID-19(SARS-CoV-2) ZEUS/SHAW/FRANCIA/PAD/COR/MAD/GIL In-House, NP Swab in UTM/VTM, 3-4 HR TAT - Swab, Nasopharynx [[id number]]  (Normal) Collected: 11/28/22 0016    Specimen: Swab from  Nasopharynx Updated: 11/28/22 1138     ADENOVIRUS, PCR Not Detected     Coronavirus 229E Not Detected     Coronavirus HKU1 Not Detected     Coronavirus NL63 Not Detected     Coronavirus OC43 Not Detected     COVID19 Not Detected     Human Metapneumovirus Not Detected     Human Rhinovirus/Enterovirus Not Detected     Influenza A PCR Not Detected     Influenza B PCR Not Detected     Parainfluenza Virus 1 Not Detected     Parainfluenza Virus 2 Not Detected     Parainfluenza Virus 3 Not Detected     Parainfluenza Virus 4 Not Detected     RSV, PCR Not Detected     Bordetella pertussis pcr Not Detected     Bordetella parapertussis PCR Not Detected     Chlamydophila pneumoniae PCR Not Detected     Mycoplasma pneumo by PCR Not Detected    Narrative:      In the setting of a positive respiratory panel with a viral infection PLUS a negative procalcitonin without other underlying concern for bacterial infection, consider observing off antibiotics or discontinuation of antibiotics and continue supportive care. If the respiratory panel is positive for atypical bacterial infection (Bordetella pertussis, Chlamydophila pneumoniae, or Mycoplasma pneumoniae), consider antibiotic de-escalation to target atypical bacterial infection.    Blood Gas, Arterial - [[id number]]  (Abnormal) Collected: 11/28/22 0930    Specimen: Arterial Blood Updated: 11/28/22 0953     Site Left Radial     Rich's Test Positive     pH, Arterial 7.385 pH units      pCO2, Arterial 58.1 mm Hg      Comment: 83 Value above reference range        pO2, Arterial 62.9 mm Hg      Comment: 84 Value below reference range        HCO3, Arterial 34.8 mmol/L      Comment: 83 Value above reference range        Base Excess, Arterial 7.8 mmol/L      Comment: 83 Value above reference range        O2 Saturation, Arterial 91.7 %      Comment: 84 Value below reference range        Hemoglobin, Blood Gas 13.3 g/dL      Comment: 84 Value below reference range        Temperature 37.0 C   "    Barometric Pressure for Blood Gas 737 mmHg      Modality NIV     FIO2 40 %      Ventilator Mode BIPAP-AVAPS     Set Tidal Volume 500     Set Mech Resp Rate 16.0     IPAP 22     Comment: Meter: [id number]     :  [id number]        EPAP 8     Comment: Corrected result. Previous result was 6 on 11/28/2022 at 0941 EST.        Collected by [id number]     pCO2, Temperature Corrected 58.1 mm Hg      pH, Temp Corrected 7.385 pH Units      pO2, Temperature Corrected 62.9 mm Hg     Procalcitonin [[id number]]  (Normal) Collected: 11/28/22 0410    Specimen: Blood Updated: 11/28/22 0920     Procalcitonin 0.14 ng/mL     Narrative:      As a Marker for Sepsis (Non-Neonates):    1. <0.5 ng/mL represents a low risk of severe sepsis and/or septic shock.  2. >2 ng/mL represents a high risk of severe sepsis and/or septic shock.    As a Marker for Lower Respiratory Tract Infections that require antibiotic therapy:    PCT on Admission    Antibiotic Therapy       6-12 Hrs later    >0.5                Strongly Recommended  >0.25 - <0.5        Recommended   0.1 - 0.25          Discouraged              Remeasure/reassess PCT  <0.1                Strongly Discouraged     Remeasure/reassess PCT    As 28 day mortality risk marker: \"Change in Procalcitonin Result\" (>80% or <=80%) if Day 0 (or Day 1) and Day 4 values are available. Refer to http://www.[facility]pct-cal[facility].com    Change in PCT <=80%  A decrease of PCT levels below or equal to 80% defines a positive change in PCT test result representing a higher risk for 28-day all-cause mortality of patients diagnosed with severe sepsis for septic shock.    Change in PCT >80%  A decrease of PCT levels of more than 80% defines a negative change in PCT result representing a lower risk for 28-day all-cause mortality of patients diagnosed with severe sepsis or septic shock.                 Imaging Results (Last 72 Hours)     ** No results found for the last 72 hours. **    "         Medication Review:      Hospital Medications (active)       Dose Frequency Start End    arformoterol (BROVANA) nebulizer solution 15 mcg 15 mcg 2 Times Daily - RT 11/28/2022     Admin Instructions: Include Respiratory Treatment Education  Keep refrigerated.    Route: Nebulization    atorvastatin (LIPITOR) tablet 10 mg 10 mg Daily 11/29/2022     Admin Instructions: Avoid grapefruit juice.    Route: Oral    bismuth subsalicylate (PEPTO BISMOL) 262 MG/15ML suspension 30 mL 30 mL Every 6 Hours PRN 11/29/2022     Admin Instructions: Shake well.    Route: Oral    budesonide (PULMICORT) nebulizer solution 0.5 mg 0.5 mg 2 Times Daily - RT 11/28/2022     Admin Instructions: Include Respiratory Treatment Education  Do not shake.  Protect from light.    Route: Nebulization    calcium carbonate (TUMS) chewable tablet 500 mg (200 mg elemental) 2 tablet 3 Times Daily PRN 11/29/2022     Admin Instructions: One tablet contains 200 mg elemental calcium.  Take with food.    Route: Oral    cefTRIAXone (ROCEPHIN) IVPB 2 g/50ml dextrose (premix) 2 g Every 24 Hours 11/29/2022 12/6/2022    Admin Instructions: LR should be paused and flushing of the line with NS is recommended prior to and after completion of ceftriaxone infusion due to incompatibility. Do not co-adminster with calcium-containing solutions.  Caution: Look alike/sound alike drug alert    Notes to Pharmacy: PLEASE CONFIRM DOSING    Route: Intravenous    dextrose (D50W) (25 g/50 mL) IV injection 25 g 25 g Every 15 Minutes PRN 11/27/2022     Admin Instructions: Blood sugar less than 70; patient has IV access - Unresponsive, NPO or Unable To Safely Swallow    Route: Intravenous    dextrose (GLUTOSE) oral gel 15 g 15 g Every 15 Minutes PRN 11/27/2022     Admin Instructions: BS<70, Patient Alert, Is not NPO, Can safely swallow.    Route: Oral    Enoxaparin Sodium (LOVENOX) syringe 40 mg 40 mg Nightly 11/28/2022     Admin Instructions: Give subcutaneous in abdomen only. Do  not massage site after injection.    Route: Subcutaneous    glucagon (GLUCAGEN) injection 1 mg 1 mg Every 15 Minutes PRN 11/27/2022     Admin Instructions: Blood Glucose Less Than 70 - Patient Without IV Access - Unresponsive, NPO or Unable To Safely Swallow    Route: Intramuscular    HYDROcodone-acetaminophen (NORCO)  MG per tablet 1 tablet 1 tablet Every 6 Hours PRN 11/29/2022     Admin Instructions: Based on patient request - if ordered for moderate or severe pain, provider allows for administration of a medication prescribed for a lower pain scale.  [AMBER]    Do not exceed 4 grams of acetaminophen in a 24 hr period. Max dose of 2gm for AST/ALT greater than 120 units/L        If given for pain, use the following pain scale:   Mild Pain = Pain Score of 1-3, CPOT 1-2  Moderate Pain = Pain Score of 4-6, CPOT 3-4  Severe Pain = Pain Score of 7-10, CPOT 5-8    Route: Oral    Insulin Aspart (novoLOG) injection 0-14 Units 0-14 Units 3 Times Daily With Meals 11/29/2022     Admin Instructions: Correction - Moderate-High Dose.  60-80 units/day total insulin dose or uses insulin at home    Blood glucose 150-199 mg/dL - 3 units  Blood glucose 200-249 mg/dL - 5 units  Blood glucose 250-299 mg/dL - 8 units  Blood glucose 300-349 mg/dL - 10 units  Blood glucose 350-400 mg/dL - 12 units  Blood glucose greater than 400 mg/dL - 14 units and call provider      Route: Subcutaneous    insulin detemir (LEVEMIR) injection 25 Units 25 Units Every 12 Hours Scheduled 11/29/2022     Admin Instructions:     Route: Subcutaneous    ipratropium-albuterol (DUO-NEB) nebulizer solution 3 mL 3 mL Every 6 Hours While Awake - RT 11/28/2022     Admin Instructions: Include Respiratory Treatment Education    Route: Nebulization    ipratropium-albuterol (DUO-NEB) nebulizer solution 3 mL 3 mL 4 Times Daily PRN 11/27/2022     Admin Instructions: Include Respiratory Treatment Education    Route: Nebulization    melatonin tablet 5 mg 5 mg Nightly PRN  11/29/2022     Route: Oral    nicotine (NICODERM CQ) 14 MG/24HR patch 1 patch 1 patch Every 24 Hours Scheduled 11/29/2022     Admin Instructions: Apply to clean, dry, nonhairy area of skin (typically upper arm or shoulder)   Acutely Hazardous. Waste BOTH Residual Medication and/or Empty Package.    Route: Transdermal    predniSONE (DELTASONE) tablet 40 mg 40 mg 2 Times Daily With Meals 11/30/2022     Admin Instructions: Take with food.    Route: Oral    pyridostigmine (MESTINON) tablet 60 mg 60 mg Every 4 Hours 11/28/2022     Route: Oral    sodium chloride 0.9 % flush 10 mL 10 mL As Needed 11/27/2022     Route: Intravenous    Cosign for Ordering: Accepted by Kwesi Santiago MD on 11/27/2022  6:02 PM    sodium chloride 0.9 % flush 10 mL 10 mL As Needed 11/27/2022     Route: Intravenous    sodium chloride 0.9 % flush 10 mL 10 mL Every 12 Hours Scheduled 11/28/2022     Route: Intravenous    sodium chloride 0.9 % infusion 40 mL 40 mL As Needed 11/27/2022     Admin Instructions: Following administration of an IV intermittent medication, flush line with 40mL NS at 100mL/hr.    Route: Intravenous    tetrahydrozoline 0.05 % ophthalmic solution 2 drop 2 drop 3 Times Daily PRN 11/29/2022     Route: Both Eyes          Assessment & Plan         Acute on chronic congestive heart failure, unspecified heart failure type (HCC)    Myasthenia Gravis  MSSA bacteremia  R/o SBE  Back pain    Plan :    IV cefazolin      Need 4 weeks of IV rocephin 2 gm Daily   Repeat BC  MRI L spine     Echo -  May need SOSA  Will follow  Thank you      Corona Elaine MD  12/01/22  09:12 EST

## 2022-12-01 NOTE — PROGRESS NOTES
"Adult Nutrition  Assessment/PES    Patient Name:  Kt Armstrong  YOB: 1950  MRN: [id number]  Admit Date:  11/27/2022    Assessment Date:  12/1/2022    Comments:  Agree with diet. Excellent po intake, 100% of meals.   Pt declines other edu needs, reviewed condiments, frozen meals etc.  Will cont to follow and monitor.      Reason for Assessment     Row Name 12/01/22 1218          Reason for Assessment    Reason For Assessment follow-up protocol     Diagnosis infection/sepsis;pulmonary disease;diabetes diagnosis/complications;cardiac disease  AHRF MSSA Bacteremia DM HF                Nutrition/Diet History     Row Name 12/01/22 1219          Nutrition/Diet History    Typical Intake (Food/Fluid/EN/PN) Pt in room w CNA getting vitals. Reports eating well and loves everyone here feels like he gets great care. Pt reports daughter reports \"healthy, diet\" frozen meals for him he eats. Does reports pot pie frozen sometimes and hot sauce use as well. pt declines further edu needs                Labs/Tests/Procedures/Meds     Row Name 12/01/22 1220          Labs/Procedures/Meds    Lab Results Reviewed reviewed     Lab Results Comments Na 133 L, glu 280, 302, BUn 24 H        Diagnostic Tests/Procedures    Diagnostic Test/Procedure Reviewed reviewed        Medications    Pertinent Medications Reviewed reviewed     Pertinent Medications Comments novolog, levemir, prednsione                    Nutrition Prescription Ordered     Row Name 12/01/22 1220          Nutrition Prescription PO    Common Modifiers Cardiac;Consistent Carbohydrate                Evaluation of Received Nutrient/Fluid Intake     Row Name 12/01/22 1220          Fluid Intake Evaluation    Oral Fluid (mL) 1980  ave x 3, > 100%        PO Evaluation    Number of Meals 7     % PO Intake 96                   Problem/Interventions:   Problem 1     Row Name 12/01/22 1221          Nutrition Diagnoses Problem 1    Problem 1 Inadequate Nutrient Intake     " Etiology (related to) Medical Diagnosis;Factors Affecting Nutrition     Signs/Symptoms (evidenced by) Report/Observation                      Intervention Goal     Row Name 12/01/22 1221          Intervention Goal    General Meet nutritional needs for age/condition     PO Maintain intake;PO intake (%)     PO Intake % 80 %  or greater     Weight Appropriate weight loss                Nutrition Intervention     Row Name 12/01/22 1221          Nutrition Intervention    RD/Tech Action Interview for preference;Follow Tx progress                  Education/Evaluation     Row Name 12/01/22 1221          Education    Education Education topics;Education offered and refused;Advised regarding habits/behavior  Edu monitor condiements: hot sauce, soy etc. Edu looking for 140 mg/serving of Na+ is considered low. Encouraged to avoid reg pot pies b/c high salt likely. Edu avoid convience foods, cured meats, bologna, hotdog etc.     Education Topics Na+     Advised Regarding Habits/Behavior Label reading;Seasoning food;Food choices        Monitor/Evaluation    Monitor Per protocol;I&O;PO intake;Pertinent labs;Weight;Symptoms     Education Follow-up Other (comment)  pt denies using salt but lots of pepper, declines further edu needs                 Electronically signed by:  Nicole España RD  12/01/22 12:23 EST

## 2022-12-01 NOTE — PROGRESS NOTES
"Hospitalist Team      Patient Care Team:  Ney Kathleen MD as PCP - General (Family Medicine)  Chris Driver MD as Consulting Physician (Pulmonary Disease)      Chief Complaint:  Follow-up MSSA Bacteremia    Subjective    Feeling well this morning.  He has been up and ambulating w/o difficulty.  He denies chest pain and dyspnea.  He is tolerating PO.      Objective    Vital Signs  Temp:  [96.8 °F (36 °C)-97.7 °F (36.5 °C)] 97.1 °F (36.2 °C)  Heart Rate:  [] 79  Resp:  [16-28] 19  BP: (117-145)/(65-81) 117/69  Oxygen Therapy  SpO2: 90 %  Pulse Oximetry Type: Intermittent  Device (Oxygen Therapy): nasal cannula  Flow (L/min): 3  Oxygen Concentration (%): 40  Probe Placed On (Pulse Ox): Left:, finger  Probe Removed From (Pulse Ox): Left:, finger}    Flowsheet Rows    Flowsheet Row First Filed Value   Admission Height 188 cm (74\") Documented at 11/27/2022 2057   Admission Weight 118 kg (260 lb) Documented at 11/27/2022 2057        Physical Exam:    General: Appears stated age in no acute distress.  Lungs: Breath sounds are slightly diminished at the bases but otherwise clear.  Respirations are non-labored.  CV: Heart sounds are distant but regular.  No murmurs appreciated.  Radial and pedal pulses are 2+ and symmetric.  Abdomen: Obese, soft, and non-tender w/ active bowel sounds.  MSK: No C/C/E.  Neuro: CN II-XII grossly intact.  Psych: Pleasant affect.  Ox3.    Results Review:     I reviewed the patient's new clinical results.    Lab Results (last 24 hours)     Procedure Component Value Units Date/Time    POC Glucose Once [[id number]]  (Abnormal) Collected: 12/01/22 1125    Specimen: Blood Updated: 12/01/22 1136     Glucose 294 mg/dL      Comment: Meter: [id number] : [id number] Adriano Tilley SOPHIE       Renal Function Panel [[id number]]  (Abnormal) Collected: 12/01/22 0840    Specimen: Blood Updated: 12/01/22 0912     Glucose 280 mg/dL      BUN 24 mg/dL      Creatinine 0.66 mg/dL      Sodium 133 " mmol/L      Potassium 4.6 mmol/L      Chloride 95 mmol/L      CO2 29.5 mmol/L      Calcium 9.5 mg/dL      Albumin 3.90 g/dL      Phosphorus 2.9 mg/dL      Anion Gap 8.5 mmol/L      BUN/Creatinine Ratio 36.4     eGFR 100.9 mL/min/1.73      Comment: National Kidney Foundation and American Society of Nephrology (ASN) Task Force recommended calculation based on the Chronic Kidney Disease Epidemiology Collaboration (CKD-EPI) equation refit without adjustment for race.       Narrative:      GFR Normal >60  Chronic Kidney Disease <60  Kidney Failure <15      Respiratory Culture - Sputum, Cough [[id number]] Collected: 11/28/22 1707    Specimen: Sputum from Cough Updated: 12/01/22 0905     Respiratory Culture Moderate growth (3+) Normal respiratory karely. No S. aureus or Pseudomonas aeruginosa detected. Final report.     Gram Stain Moderate (3+) WBCs seen      Rare (1+) Epithelial cells seen      Few (2+) Mixed bacterial morphotypes seen on Gram Stain    Sedimentation Rate [[id number]]  (Abnormal) Collected: 12/01/22 0840    Specimen: Blood Updated: 12/01/22 0856     Sed Rate 35 mm/hr     CBC & Differential [[id number]]  (Abnormal) Collected: 12/01/22 0840    Specimen: Blood Updated: 12/01/22 0849    Narrative:      The following orders were created for panel order CBC & Differential.  Procedure                               Abnormality         Status                     ---------                               -----------         ------                     CBC Auto Differential[[id number]]        Abnormal            Final result                 Please view results for these tests on the individual orders.    CBC Auto Differential [[id number]]  (Abnormal) Collected: 12/01/22 0840    Specimen: Blood Updated: 12/01/22 0849     WBC 8.82 10*3/mm3      RBC 4.48 10*6/mm3      Hemoglobin 13.5 g/dL      Hematocrit 44.1 %      MCV 98.4 fL      MCH 30.1 pg      MCHC 30.6 g/dL      RDW 12.9 %      RDW-SD 47.4 fl      MPV 11.2 fL       Platelets 226 10*3/mm3      Neutrophil % 69.2 %      Lymphocyte % 18.9 %      Monocyte % 10.1 %      Eosinophil % 0.0 %      Basophil % 0.1 %      Immature Grans % 1.7 %      Neutrophils, Absolute 6.10 10*3/mm3      Lymphocytes, Absolute 1.67 10*3/mm3      Monocytes, Absolute 0.89 10*3/mm3      Eosinophils, Absolute 0.00 10*3/mm3      Basophils, Absolute 0.01 10*3/mm3      Immature Grans, Absolute 0.15 10*3/mm3     POC Glucose Once [[id number]]  (Abnormal) Collected: 12/01/22 0741    Specimen: Blood Updated: 12/01/22 0749     Glucose 273 mg/dL      Comment: Meter: [id number] : [id number] Adriano Tilley CNA       Blood Culture - Blood, Arm, Left [[id number]]  (Abnormal)  (Susceptibility) Collected: 11/27/22 1809    Specimen: Blood from Arm, Left Updated: 12/01/22 0638     Blood Culture Staphylococcus aureus     Comment:   Infectious disease consultation is highly recommended to rule out distant foci of infection.        Isolated from Anaerobic Bottle     Gram Stain Anaerobic Bottle Gram positive cocci in clusters    Susceptibility      Staphylococcus aureus      JORDAN      Gentamicin Susceptible      Oxacillin Susceptible      Rifampin Susceptible      Vancomycin Susceptible                       Susceptibility Comments     Staphylococcus aureus    This isolate does not demonstrate inducible clindamycin resistance in vitro.               Blood Culture - Blood, Arm, Right [[id number]]  (Normal) Collected: 11/27/22 1809    Specimen: Blood from Arm, Right Updated: 11/30/22 1831     Blood Culture No growth at 3 days    POC Glucose Once [[id number]]  (Abnormal) Collected: 11/30/22 1629    Specimen: Blood Updated: 11/30/22 1636     Glucose 302 mg/dL      Comment: Meter: [id number] : [id number] Vinay Laguerre CNA             Imaging Results (Last 24 Hours)     ** No results found for the last 24 hours. **          Medication Review:   I have reviewed the patient's current medication list    Current Facility-Administered  Medications:   •  arformoterol (BROVANA) nebulizer solution 15 mcg, 15 mcg, Nebulization, BID - RT, Sanjiv Rider MD, 15 mcg at 12/01/22 0736  •  atorvastatin (LIPITOR) tablet 10 mg, 10 mg, Oral, Daily, Sanjiv Rider MD, 10 mg at 12/01/22 0845  •  bismuth subsalicylate (PEPTO BISMOL) 262 MG/15ML suspension 30 mL, 30 mL, Oral, Q6H PRN, Sanjiv Rider MD  •  budesonide (PULMICORT) nebulizer solution 0.5 mg, 0.5 mg, Nebulization, BID - RT, Sanjiv Rider MD, 0.5 mg at 12/01/22 0742  •  calcium carbonate (TUMS) chewable tablet 500 mg (200 mg elemental), 2 tablet, Oral, TID PRN, Sanjiv Rider MD, 2 tablet at 11/29/22 0013  •  ceFAZolin Sodium-Dextrose (ANCEF) IVPB (duplex) 2 g, 2 g, Intravenous, Q8H, Corona Elaine MD, 2 g at 12/01/22 1057  •  dextrose (D50W) (25 g/50 mL) IV injection 25 g, 25 g, Intravenous, Q15 Min PRN, Sanjiv Rider MD  •  dextrose (GLUTOSE) oral gel 15 g, 15 g, Oral, Q15 Min PRN, Sanjiv Rider MD  •  Enoxaparin Sodium (LOVENOX) syringe 40 mg, 40 mg, Subcutaneous, Nightly, Sanjiv Rider MD, 40 mg at 11/30/22 2116  •  glucagon (GLUCAGEN) injection 1 mg, 1 mg, Intramuscular, Q15 Min PRN, Sanjiv Rider MD  •  HYDROcodone-acetaminophen (NORCO)  MG per tablet 1 tablet, 1 tablet, Oral, Q6H PRN, Sanjiv Rider MD, 1 tablet at 12/01/22 1238  •  Insulin Aspart (novoLOG) injection 0-14 Units, 0-14 Units, Subcutaneous, TID With Meals, Sanjiv Rider MD, 8 Units at 12/01/22 1234  •  insulin detemir (LEVEMIR) injection 25 Units, 25 Units, Subcutaneous, Q12H, Sanjiv Rider MD, 25 Units at 12/01/22 0844  •  ipratropium-albuterol (DUO-NEB) nebulizer solution 3 mL, 3 mL, Nebulization, Q6H While Awake - RT, Sanjiv Rider MD, 3 mL at 12/01/22 0742  •  ipratropium-albuterol (DUO-NEB) nebulizer solution 3 mL, 3 mL, Nebulization, 4x Daily PRN, Sanjiv Rider MD  •  melatonin tablet 5 mg, 5 mg, Oral, Nightly PRN, Liane Cerda DO, 5 mg at 11/30/22 2115  •  nicotine (NICODERM CQ) 14  MG/24HR patch 1 patch, 1 patch, Transdermal, Q24H, Sanjiv Rider MD, 1 patch at 12/01/22 0845  •  predniSONE (DELTASONE) tablet 40 mg, 40 mg, Oral, BID With Meals, Sanjiv Rider MD, 40 mg at 12/01/22 0845  •  pyridostigmine (MESTINON) tablet 60 mg, 60 mg, Oral, Q4H, Sanjiv Rider MD, 60 mg at 12/01/22 1234  •  sodium chloride 0.9 % flush 10 mL, 10 mL, Intravenous, PRN, Sanjiv Rider MD, 10 mL at 11/28/22 1711  •  sodium chloride 0.9 % flush 10 mL, 10 mL, Intravenous, PRN, Sanjiv Rider MD, 10 mL at 11/28/22 0852  •  sodium chloride 0.9 % flush 10 mL, 10 mL, Intravenous, Q12H, Sanjiv Rider MD, 10 mL at 12/01/22 0845  •  sodium chloride 0.9 % infusion 40 mL, 40 mL, Intravenous, PRN, Sanjiv Rider MD, 40 mL at 11/28/22 0034  •  tetrahydrozoline 0.05 % ophthalmic solution 2 drop, 2 drop, Both Eyes, TID PRN, Sanjiv Rider MD      Assessment & Plan     1.  MSSA Bacteremia: Echo appears clear.  Sed Rate remains mildly elevated.  Appreciate Dr. Elaine's help.  Discussed plan w/ Mr. Armstrong.    2.  Diabetes Mellitus, Type 2 in Obese: Bedsides a little better since stopping IV steroids, but not to goal.  Given his PO intake, and overall clinical improvement, I'm going to restart his home doses.    3.  AECOPD: Resolved.  MSSA bacteremia causing exacerbation?  Change to daily prednisone tomorrow.    4.  Hx/O Myasthenia Gravis: No acute issues.  Plan on resuming CellCept at discharge.    Plan for disposition: Anticipate home tomorrow w/ home health.    Sanjiv Rider MD  12/01/22  12:50 EST

## 2022-12-02 ENCOUNTER — HOME HEALTH ADMISSION (OUTPATIENT)
Dept: HOME HEALTH SERVICES | Facility: HOME HEALTHCARE | Age: 72
End: 2022-12-02
Payer: MEDICARE

## 2022-12-02 ENCOUNTER — READMISSION MANAGEMENT (OUTPATIENT)
Dept: CALL CENTER | Facility: HOSPITAL | Age: 72
End: 2022-12-02

## 2022-12-02 VITALS
HEIGHT: 74 IN | RESPIRATION RATE: 20 BRPM | TEMPERATURE: 98 F | OXYGEN SATURATION: 92 % | WEIGHT: 255 LBS | DIASTOLIC BLOOD PRESSURE: 70 MMHG | SYSTOLIC BLOOD PRESSURE: 125 MMHG | BODY MASS INDEX: 32.73 KG/M2 | HEART RATE: 88 BPM

## 2022-12-02 LAB
BACTERIA SPEC AEROBE CULT: NORMAL
GLUCOSE BLDC GLUCOMTR-MCNC: 217 MG/DL (ref 70–130)
GLUCOSE BLDC GLUCOMTR-MCNC: 266 MG/DL (ref 70–130)

## 2022-12-02 PROCEDURE — 94760 N-INVAS EAR/PLS OXIMETRY 1: CPT

## 2022-12-02 PROCEDURE — 99239 HOSP IP/OBS DSCHRG MGMT >30: CPT | Performed by: HOSPITALIST

## 2022-12-02 PROCEDURE — 63710000001 INSULIN ASPART PER 5 UNITS: Performed by: HOSPITALIST

## 2022-12-02 PROCEDURE — 02HV33Z INSERTION OF INFUSION DEVICE INTO SUPERIOR VENA CAVA, PERCUTANEOUS APPROACH: ICD-10-PCS | Performed by: HOSPITALIST

## 2022-12-02 PROCEDURE — 94799 UNLISTED PULMONARY SVC/PX: CPT

## 2022-12-02 PROCEDURE — 25010000002 CEFTRIAXONE SODIUM-DEXTROSE 2-2.22 GM-%(50ML) RECONSTITUTED SOLUTION: Performed by: INTERNAL MEDICINE

## 2022-12-02 PROCEDURE — 63710000001 INSULIN DETEMIR PER 5 UNITS: Performed by: HOSPITALIST

## 2022-12-02 PROCEDURE — 94761 N-INVAS EAR/PLS OXIMETRY MLT: CPT

## 2022-12-02 PROCEDURE — C1751 CATH, INF, PER/CENT/MIDLINE: HCPCS

## 2022-12-02 PROCEDURE — 0 CEFAZOLIN SODIUM-DEXTROSE 2-3 GM-%(50ML) RECONSTITUTED SOLUTION: Performed by: INTERNAL MEDICINE

## 2022-12-02 PROCEDURE — 82962 GLUCOSE BLOOD TEST: CPT

## 2022-12-02 PROCEDURE — 94664 DEMO&/EVAL PT USE INHALER: CPT

## 2022-12-02 RX ORDER — CEFTRIAXONE 2 G/50ML
2 INJECTION, SOLUTION INTRAVENOUS EVERY 24 HOURS
Qty: 1150 ML | Refills: 0 | Status: SHIPPED | OUTPATIENT
Start: 2022-12-03 | End: 2022-12-26

## 2022-12-02 RX ORDER — CEFTRIAXONE 2 G/50ML
2 INJECTION, SOLUTION INTRAVENOUS EVERY 24 HOURS
Status: DISCONTINUED | OUTPATIENT
Start: 2022-12-02 | End: 2022-12-02 | Stop reason: HOSPADM

## 2022-12-02 RX ADMIN — HYDROCODONE BITARTRATE AND ACETAMINOPHEN 1 TABLET: 10; 325 TABLET ORAL at 00:42

## 2022-12-02 RX ADMIN — BUDESONIDE 0.5 MG: 0.5 SUSPENSION RESPIRATORY (INHALATION) at 07:22

## 2022-12-02 RX ADMIN — INSULIN ASPART 5 UNITS: 100 INJECTION, SOLUTION INTRAVENOUS; SUBCUTANEOUS at 08:29

## 2022-12-02 RX ADMIN — IPRATROPIUM BROMIDE AND ALBUTEROL SULFATE 3 ML: 2.5; .5 SOLUTION RESPIRATORY (INHALATION) at 07:22

## 2022-12-02 RX ADMIN — IPRATROPIUM BROMIDE AND ALBUTEROL SULFATE 3 ML: 2.5; .5 SOLUTION RESPIRATORY (INHALATION) at 13:30

## 2022-12-02 RX ADMIN — CEFAZOLIN SODIUM 2 G: 2 SOLUTION INTRAVENOUS at 02:16

## 2022-12-02 RX ADMIN — INSULIN ASPART 8 UNITS: 100 INJECTION, SOLUTION INTRAVENOUS; SUBCUTANEOUS at 12:51

## 2022-12-02 RX ADMIN — CEFTRIAXONE 2 G: 2 INJECTION, SOLUTION INTRAVENOUS at 12:52

## 2022-12-02 RX ADMIN — ARFORMOTEROL TARTRATE 15 MCG: 15 SOLUTION RESPIRATORY (INHALATION) at 09:53

## 2022-12-02 RX ADMIN — HYDROCODONE BITARTRATE AND ACETAMINOPHEN 1 TABLET: 10; 325 TABLET ORAL at 12:52

## 2022-12-02 RX ADMIN — PYRIDOSTIGMINE BROMIDE 60 MG: 60 TABLET ORAL at 08:28

## 2022-12-02 RX ADMIN — PYRIDOSTIGMINE BROMIDE 60 MG: 60 TABLET ORAL at 00:42

## 2022-12-02 RX ADMIN — HYDROCODONE BITARTRATE AND ACETAMINOPHEN 1 TABLET: 10; 325 TABLET ORAL at 06:41

## 2022-12-02 RX ADMIN — ATORVASTATIN CALCIUM 10 MG: 10 TABLET, FILM COATED ORAL at 08:28

## 2022-12-02 RX ADMIN — INSULIN DETEMIR 45 UNITS: 100 INJECTION, SOLUTION SUBCUTANEOUS at 08:28

## 2022-12-02 RX ADMIN — Medication 1 PATCH: at 08:31

## 2022-12-02 RX ADMIN — PYRIDOSTIGMINE BROMIDE 60 MG: 60 TABLET ORAL at 04:34

## 2022-12-02 RX ADMIN — CEFAZOLIN SODIUM 2 G: 2 SOLUTION INTRAVENOUS at 10:54

## 2022-12-02 RX ADMIN — Medication 10 ML: at 08:32

## 2022-12-02 NOTE — NURSING NOTE
Call [facility] and spk with Marielos about PICC line. She is aware and getting consent, she will come over this morning to get it done.

## 2022-12-02 NOTE — DISCHARGE SUMMARY
Kt Armstrong  1950  [id number]    Hospitalists Discharge Summary    Date of Admission: 11/27/2022  Date of Discharge:  12/2/2022    Primary Discharge Diagnoses:  1.  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  AECOPD due to Pneumonia  3.  MSSA Bacteremia  4.  Obesity Body mass index is 32.72 kg/m².    Secondary Discharge Diagnoses:  1.  Diabetes Mellitus, Type 2 in Obese A1c of 7.0%  2.  Hx/O Myasthenia Gravis  3.  Tobacco Abuse Disorder    History of Present Illness (taken from H&P):  Patient is a 72-year-old male with multiple comorbidities including severe COPD on 2-3 L as needed at home, myasthenia gravis on CellCept, obesity, tobacco use disorder, type 2 diabetes, multiple other comorbidities listed below who presented to the ED tonight due to feelings of generalized malaise.     Upon arrival to the ED the patient was significantly tachycardic.  An ABG in the ED demonstrated on 3 L O2 both significant hypercarbia (although with the patient's pH suspect th this part is chronic) and severe hypoxemia with a PaO2 of 53.6 despite supplemental O2 administration.     Unfortunately, at time of interview, patient is unable to give ROS or any history.  He is on BiPAP and received some medication to help tolerate this.  In the ED physician's note the patient did report generalized body aches and cough as well as shortness of breath.  The patient was last seen here at the beginning of this month and at that time was diagnosed with a COPD exacerbation and given both a prescription for p.o. prednisone as well as Z-Franco.     In the ED the patient is noted to have chest x-ray compatible with pulmonary edema.  Troponin negative x3.  EKG sinus tachycardia with possible right ventricular hypertrophy.  CBC with white blood cell count of 18, hemoglobin 13.6, hematocrit 44.3, platelet count 202, sodium 140, potassium 4, bicarb 29.4, chloride 99, creatinine 0.66, BUN 13, ALT 11, AST 14.      Hospital Course:  Mr. Armstrong was admitted to the ICU on BiPAP therapy and continued on broad-spectrum abx as well as nebs and IV steroids.  ABG improved w/ therapy, and he was liberated from NIPPV.  Respiratory Viral Panel was negative, but blood cultures returned MSSA in 1/2 bottles.  ABX therapy was de-escalated.  Sed Rate was only mildly elevated, but I did procure and echo and MRI of the Lumbar spine.  Source could not be found, and pneumonia seemed unlikely as none was procured from sputum culture.  I did ask ID to see him, and he will be discharged on long-term abx.     PCP  Patient Care Team:  Ney Kathleen MD as PCP - General (Family Medicine)  Chris Driver MD as Consulting Physician (Pulmonary Disease)    Consults:   Consults     Date and Time Order Name Status Description    12/1/2022  9:03 AM Inpatient Infectious Diseases Consult Completed           Operations and Procedures Performed:       Adult Transthoracic Echo Complete W/ Cont if Necessary Per Protocol    Result Date: 12/1/2022  Narrative: •  Calculated left ventricular EF = 53% •  Left ventricular wall thickness is consistent with mild concentric hypertrophy. •  Left ventricular diastolic function was normal. •  Estimated right ventricular systolic pressure from tricuspid regurgitation is normal (<35 mmHg).     XR Chest 2 View    Result Date: 11/3/2022  Narrative: CR Chest 2 Vws INDICATION:  Shortness of air. COPD. COMPARISON:  9/21/2022 FINDINGS: PA and lateral views of the chest.  Cardiac and mediastinal contours are normal. Pulmonary vascularity is normal. There is infiltrate or atelectasis in the right middle lobe. Chronic interstitial scarring is noted at the left lung base. No pneumothorax is seen.     Impression: Right middle lobe atelectasis versus pneumonia with some chronic interstitial scarring at the left base. Signer Name: James Marcial MD  Signed: 11/3/2022 11:01 PM  Workstation Name: [facility]  [facility] [place]    CT Angiogram Chest    Result Date: 11/28/2022  Narrative: CT ANGIOGRAM CHEST W CONTRAST INDICATION: Heart failure. COPD exacerbation. Shortness of air. TECHNIQUE: CT angiogram of the chest with IV contrast. 3-D reconstructions were obtained and reviewed.   Radiation dose reduction techniques included automated exposure control or exposure modulation based on body size. Count of known CT and cardiac nuc med studies performed in previous 12 months: 0. COMPARISON: 12/8/2019 FINDINGS: There is motion degradation. However, no pulmonary embolism or aortic dissection is identified. There is no pleural or pericardial effusion. There is atherosclerotic disease and coronary artery disease. No adenopathy is seen. Upper abdominal images show cholecystectomy. Lung windows demonstrate patchy multifocal infiltrates in both lungs compatible with pneumonia. Findings are most pronounced in the right lower lobe. Imaging features are atypical or uncommonly reported for COVID-19 pneumonia. Alternative diagnoses should be considered. There is mild pulmonary emphysema. Bronchial wall thickening is concerning for associated bronchitis. No pneumothorax.     Impression: 1. Motion degraded exam, but no pulmonary embolism or aortic dissection is seen. 2. COPD with patchy bilateral multifocal pneumonia, most pronounced in the right lower lobe. 3. Atherosclerotic disease and coronary artery disease. Signer Name: James Marcial MD  Signed: 11/28/2022 1:46 AM  Workstation Name: [email or web address]  [facility]    XR Chest 1 View    Result Date: 11/27/2022  Narrative: CR Chest 1 Vw INDICATION: Short of breath wheezing for a week. History of COPD. COMPARISON:  Chest x-ray 11/3/2022. FINDINGS: Portable AP view(s) of the chest.  Cardiac silhouette is mildly enlarged. There is vascular congestion and interstitial edema that is new/worsening on comparison to prior. There is patchy perihilar to bibasilar  airspace disease as well. Findings are most likely due to mild to moderate congestive heart failure. Clinical correlation and follow-up to resolution is recommended to exclude underlying aspiration or pneumonia. No obvious pleural effusion. No pneumothorax. There is some background distortion of parenchymal architecture consistent with chronic obstructive lung disease.     Impression: Interval worsening in the appearance the chest. Findings are most likely due to mild to moderate congestive heart failure superimposed upon underlying chronic lung disease. Follow-up to complete clearing is recommended to exclude underlying aspiration or pneumonia. Signer Name: Deepthi Burks MD  Signed: 11/27/2022 6:43 PM  Workstation Name: [email or web address]  [facility] [place]    MRI Lumbar Spine With & Without Contrast    Result Date: 12/1/2022  Narrative: MRI Spine Lumbar WO W HISTORY: Severe lower back pain, question infectious etiology/abscess, abnormal lab work TECHNIQUE: Multiplanar multisequence imaging of the lumbar spine acquired without and with IV contrast utilizing a standard protocol. A total of 20 cc MultiHance IV contrast was administered. COMPARISON: None FINDINGS: For the purposes of this dictation is presumed to be 5 lumbar-type vertebra with the last fully formed disc space representing L5-S1. Allowing for this, there is likely transitional anatomy with partial sacralization of L5. Confirmation of lumbar level is recommended prior to any intervention. Sagittal alignment is satisfactory. There is disc desiccation spanning L1-L2 through L4-L5 with mild loss of disc height predominantly at L1-L2. The conus terminates at an expected level and the visualized distal cord signal is within normal limits. There is no evidence of discitis/osteomyelitis. No evidence of epidural abscess. No suspicious enhancing lesion identified in the lumbar spine. Level by level: L1-2: Diffuse disc bulge, slightly eccentric to the  right. Mild facet degeneration. Thecal sac mildly narrowed. Favor moderate right and mild left foraminal narrowing.. L2-3: Diffuse disc bulge and mild to moderate facet degeneration. Prominent posterior epidural fat. Mild thecal sac narrowing. Mild foraminal narrowing. L3-4: Disc bulge and mild to moderate facet degeneration. Thecal sac remains adequate. Mild to moderate right greater than left foraminal narrowing L4-5: Disc bulge, eccentric to the right. Severe left and mild-to-moderate right foraminal narrowing. Midline thecal sac is adequate but there is subarticular narrowing on both sides with at least crowding of the descending L5 nerve roots. Right foramen is moderately to severely narrowed and the left foramen is moderately narrowed. L5-S1: Likely a transitional level but the thecal sac and foramina are favored adequate.     Impression: 1.  No evidence of discitis/osteomyelitis. No evidence of epidural abscess. No suspicious enhancing lesion. 2.  Degenerative changes. Signer Name: DEONDRE VALLADARES MD  Signed: 12/1/2022 2:52 PM  Workstation Name: [id number]  [facility] [place]      Allergies:  has No Known Allergies.    Efren  reviewed    Discharge Medications:     Discharge Medications      New Medications      Instructions Start Date   cefTRIAXone Sodium-Dextrose 2-2.22 GM-%(50ML) IVPB   2 g, Intravenous, Every 24 Hours   Start Date: December 3, 2022        Continue These Medications      Instructions Start Date   albuterol (2.5 MG/3ML) 0.083% nebulizer solution  Commonly known as: PROVENTIL   2.5 mg, Nebulization, Every 4 Hours PRN      Kd Contour Test test strip  Generic drug: glucose blood   No dose, route, or frequency recorded.      BD Pen Needle Yaritza U/F 32G X 4 MM misc  Generic drug: Insulin Pen Needle   USE 1 NEEDLE SUBCUTANEOUSLY QD      bisacodyl 10 MG suppository  Commonly known as: DULCOLAX   10 mg, Rectal, Daily      HYDROcodone-acetaminophen  MG per tablet  Commonly known  as: NORCO   1 tablet, Every 6 Hours PRN      insulin glargine 100 UNIT/ML injection  Commonly known as: LANTUS, SEMGLEE   45 Units, Subcutaneous, Every Morning, Every morning      insulin glargine 100 UNIT/ML injection  Commonly known as: LANTUS, SEMGLEE   35 Units, Subcutaneous, Nightly      lisinopril 10 MG tablet  Commonly known as: PRINIVIL,ZESTRIL   10 mg, Oral, Every 24 Hours Scheduled      mycophenolate 500 MG tablet  Commonly known as: CELLCEPT   500 mg, Oral, 2 Times Daily      nicotine 14 MG/24HR patch  Commonly known as: NICODERM CQ   1 patch, Transdermal, Every 24 Hours Scheduled      O2  Commonly known as: OXYGEN   2 L/min, Inhalation, Once, Oxygen 2-3 L      pyridostigmine 60 MG tablet  Commonly known as: MESTINON   60 mg, Oral, Every 4 Hours      SIMVASTATIN PO   40 mg, Oral, Every Evening      Trelegy Ellipta 100-62.5-25 MCG/ACT inhaler  Generic drug: Fluticasone-Umeclidin-Vilant   1 puff, Inhalation, Daily - RT         Stop These Medications    furosemide 20 MG tablet  Commonly known as: LASIX            Last Lab Results:   Lab Results (most recent)     Procedure Component Value Units Date/Time    POC Glucose Once [[id number]]  (Abnormal) Collected: 12/02/22 1135    Specimen: Blood Updated: 12/02/22 1143     Glucose 266 mg/dL      Comment: Meter: [id number] : [id number] Richi Duncan CNA       POC Glucose Once [[id number]]  (Abnormal) Collected: 12/02/22 0726    Specimen: Blood Updated: 12/02/22 0732     Glucose 217 mg/dL      Comment: Meter: [id number] : [id number] Jacoob JONAS       Blood Culture - Blood, Arm, Right [[id number]]  (Normal) Collected: 11/27/22 1809    Specimen: Blood from Arm, Right Updated: 12/01/22 1831     Blood Culture No growth at 4 days    Blood Culture - Blood, Arm, Left [[id number]] Collected: 12/01/22 1551    Specimen: Blood from Arm, Left Updated: 12/01/22 1551    Renal Function Panel [[id number]]  (Abnormal) Collected: 12/01/22 0840    Specimen: Blood Updated:  12/01/22 0912     Glucose 280 mg/dL      BUN 24 mg/dL      Creatinine 0.66 mg/dL      Sodium 133 mmol/L      Potassium 4.6 mmol/L      Chloride 95 mmol/L      CO2 29.5 mmol/L      Calcium 9.5 mg/dL      Albumin 3.90 g/dL      Phosphorus 2.9 mg/dL      Anion Gap 8.5 mmol/L      BUN/Creatinine Ratio 36.4     eGFR 100.9 mL/min/1.73      Comment: National Kidney Foundation and American Society of Nephrology (ASN) Task Force recommended calculation based on the Chronic Kidney Disease Epidemiology Collaboration (CKD-EPI) equation refit without adjustment for race.       Narrative:      GFR Normal >60  Chronic Kidney Disease <60  Kidney Failure <15      Respiratory Culture - Sputum, Cough [[id number]] Collected: 11/28/22 1707    Specimen: Sputum from Cough Updated: 12/01/22 0905     Respiratory Culture Moderate growth (3+) Normal respiratory karely. No S. aureus or Pseudomonas aeruginosa detected. Final report.     Gram Stain Moderate (3+) WBCs seen      Rare (1+) Epithelial cells seen      Few (2+) Mixed bacterial morphotypes seen on Gram Stain    Sedimentation Rate [[id number]]  (Abnormal) Collected: 12/01/22 0840    Specimen: Blood Updated: 12/01/22 0856     Sed Rate 35 mm/hr     CBC & Differential [[id number]]  (Abnormal) Collected: 12/01/22 0840    Specimen: Blood Updated: 12/01/22 0849    Narrative:      The following orders were created for panel order CBC & Differential.  Procedure                               Abnormality         Status                     ---------                               -----------         ------                     CBC Auto Differential[[id number]]        Abnormal            Final result                 Please view results for these tests on the individual orders.    CBC Auto Differential [[id number]]  (Abnormal) Collected: 12/01/22 0840    Specimen: Blood Updated: 12/01/22 0849     WBC 8.82 10*3/mm3      RBC 4.48 10*6/mm3      Hemoglobin 13.5 g/dL      Hematocrit 44.1 %      MCV 98.4 fL       MCH 30.1 pg      MCHC 30.6 g/dL      RDW 12.9 %      RDW-SD 47.4 fl      MPV 11.2 fL      Platelets 226 10*3/mm3      Neutrophil % 69.2 %      Lymphocyte % 18.9 %      Monocyte % 10.1 %      Eosinophil % 0.0 %      Basophil % 0.1 %      Immature Grans % 1.7 %      Neutrophils, Absolute 6.10 10*3/mm3      Lymphocytes, Absolute 1.67 10*3/mm3      Monocytes, Absolute 0.89 10*3/mm3      Eosinophils, Absolute 0.00 10*3/mm3      Basophils, Absolute 0.01 10*3/mm3      Immature Grans, Absolute 0.15 10*3/mm3     Sedimentation Rate [[id number]]  (Abnormal) Collected: 11/30/22 1047    Specimen: Blood Updated: 11/30/22 1053     Sed Rate 33 mm/hr     Comprehensive Metabolic Panel [[id number]]  (Abnormal) Collected: 11/29/22 0416    Specimen: Blood Updated: 11/29/22 0453     Glucose 303 mg/dL      BUN 29 mg/dL      Creatinine 0.70 mg/dL      Sodium 136 mmol/L      Potassium 4.3 mmol/L      Chloride 96 mmol/L      CO2 33.0 mmol/L      Calcium 9.7 mg/dL      Total Protein 6.7 g/dL      Albumin 3.70 g/dL      ALT (SGPT) 9 U/L      AST (SGOT) 9 U/L      Alkaline Phosphatase 55 U/L      Total Bilirubin 0.2 mg/dL      Globulin 3.0 gm/dL      A/G Ratio 1.2 g/dL      BUN/Creatinine Ratio 41.4     Anion Gap 7.0 mmol/L      eGFR 99.1 mL/min/1.73      Comment: National Kidney Foundation and American Society of Nephrology (ASN) Task Force recommended calculation based on the Chronic Kidney Disease Epidemiology Collaboration (CKD-EPI) equation refit without adjustment for race.       Narrative:      GFR Normal >60  Chronic Kidney Disease <60  Kidney Failure <15      Vancomycin, Random [[id number]]  (Normal) Collected: 11/29/22 0416    Specimen: Blood Updated: 11/29/22 0449     Vancomycin Random 14.10 mcg/mL     Narrative:      Therapeutic Ranges for Vancomycin    Vancomycin Random   5.0-40.0 mcg/mL  Vancomycin Trough   5.0-20.0 mcg/mL  Vancomycin Peak     20.0-40.0 mcg/mL    Procalcitonin [[id number]]  (Normal) Collected: 11/29/22 0416     "Specimen: Blood Updated: 11/29/22 0448     Procalcitonin 0.08 ng/mL     Narrative:      As a Marker for Sepsis (Non-Neonates):    1. <0.5 ng/mL represents a low risk of severe sepsis and/or septic shock.  2. >2 ng/mL represents a high risk of severe sepsis and/or septic shock.    As a Marker for Lower Respiratory Tract Infections that require antibiotic therapy:    PCT on Admission    Antibiotic Therapy       6-12 Hrs later    >0.5                Strongly Recommended  >0.25 - <0.5        Recommended   0.1 - 0.25          Discouraged              Remeasure/reassess PCT  <0.1                Strongly Discouraged     Remeasure/reassess PCT    As 28 day mortality risk marker: \"Change in Procalcitonin Result\" (>80% or <=80%) if Day 0 (or Day 1) and Day 4 values are available. Refer to http://www.Y[employer]ct-calculator.com    Change in PCT <=80%  A decrease of PCT levels below or equal to 80% defines a positive change in PCT test result representing a higher risk for 28-day all-cause mortality of patients diagnosed with severe sepsis for septic shock.    Change in PCT >80%  A decrease of PCT levels of more than 80% defines a negative change in PCT result representing a lower risk for 28-day all-cause mortality of patients diagnosed with severe sepsis or septic shock.       CBC & Differential [[id number]]  (Abnormal) Collected: 11/29/22 0416    Specimen: Blood Updated: 11/29/22 0423    Narrative:      The following orders were created for panel order CBC & Differential.  Procedure                               Abnormality         Status                     ---------                               -----------         ------                     CBC Auto Differential[[id number]]        Abnormal            Final result                 Please view results for these tests on the individual orders.    CBC Auto Differential [[id number]]  (Abnormal) Collected: 11/29/22 0416    Specimen: Blood Updated: 11/29/22 0423     WBC 13.49 10*3/mm3 "      RBC 4.17 10*6/mm3      Hemoglobin 12.6 g/dL      Hematocrit 41.0 %      MCV 98.3 fL      MCH 30.2 pg      MCHC 30.7 g/dL      RDW 13.0 %      RDW-SD 47.1 fl      MPV 11.5 fL      Platelets 230 10*3/mm3      Neutrophil % 91.6 %      Lymphocyte % 3.3 %      Monocyte % 4.8 %      Eosinophil % 0.0 %      Basophil % 0.1 %      Immature Grans % 0.2 %      Neutrophils, Absolute 12.35 10*3/mm3      Lymphocytes, Absolute 0.44 10*3/mm3      Monocytes, Absolute 0.65 10*3/mm3      Eosinophils, Absolute 0.00 10*3/mm3      Basophils, Absolute 0.02 10*3/mm3      Immature Grans, Absolute 0.03 10*3/mm3     Blood Culture ID, PCR - Blood, Arm, Left [[id number]]  (Abnormal) Collected: 11/27/22 1809    Specimen: Blood from Arm, Left Updated: 11/29/22 0048     BCID, PCR Staph aureus. mecA/C and MREJ (methicillin resistance gene) NOT detected. Identification by BCID2 PCR     BOTTLE TYPE Anaerobic Bottle    Narrative:      Infectious disease consultation is highly recommended to rule out distant foci of infection.    MRSA Screen, PCR (Inpatient) - Swab, Nares [[id number]]  (Normal) Collected: 11/28/22 0016    Specimen: Swab from Nares Updated: 11/28/22 1201     MRSA PCR No MRSA Detected    Narrative:      The negative predictive value of this diagnostic test is high and should only be used to consider de-escalating anti-MRSA therapy. A positive result may indicate colonization with MRSA and must be correlated clinically.    Respiratory Panel PCR w/COVID-19(SARS-CoV-2) ZEUS/SHAW/FRANCIA/PAD/COR/MAD/GIL In-House, NP Swab in UTM/VTM, 3-4 HR TAT - Swab, Nasopharynx [[id number]]  (Normal) Collected: 11/28/22 0016    Specimen: Swab from Nasopharynx Updated: 11/28/22 1138     ADENOVIRUS, PCR Not Detected     Coronavirus 229E Not Detected     Coronavirus HKU1 Not Detected     Coronavirus NL63 Not Detected     Coronavirus OC43 Not Detected     COVID19 Not Detected     Human Metapneumovirus Not Detected     Human Rhinovirus/Enterovirus Not Detected      Influenza A PCR Not Detected     Influenza B PCR Not Detected     Parainfluenza Virus 1 Not Detected     Parainfluenza Virus 2 Not Detected     Parainfluenza Virus 3 Not Detected     Parainfluenza Virus 4 Not Detected     RSV, PCR Not Detected     Bordetella pertussis pcr Not Detected     Bordetella parapertussis PCR Not Detected     Chlamydophila pneumoniae PCR Not Detected     Mycoplasma pneumo by PCR Not Detected    Narrative:      In the setting of a positive respiratory panel with a viral infection PLUS a negative procalcitonin without other underlying concern for bacterial infection, consider observing off antibiotics or discontinuation of antibiotics and continue supportive care. If the respiratory panel is positive for atypical bacterial infection (Bordetella pertussis, Chlamydophila pneumoniae, or Mycoplasma pneumoniae), consider antibiotic de-escalation to target atypical bacterial infection.    Blood Gas, Arterial - [[id number]]  (Abnormal) Collected: 11/28/22 0930    Specimen: Arterial Blood Updated: 11/28/22 0953     Site Left Radial     Rich's Test Positive     pH, Arterial 7.385 pH units      pCO2, Arterial 58.1 mm Hg      Comment: 83 Value above reference range        pO2, Arterial 62.9 mm Hg      Comment: 84 Value below reference range        HCO3, Arterial 34.8 mmol/L      Comment: 83 Value above reference range        Base Excess, Arterial 7.8 mmol/L      Comment: 83 Value above reference range        O2 Saturation, Arterial 91.7 %      Comment: 84 Value below reference range        Hemoglobin, Blood Gas 13.3 g/dL      Comment: 84 Value below reference range        Temperature 37.0 C      Barometric Pressure for Blood Gas 737 mmHg      Modality NIV     FIO2 40 %      Ventilator Mode BIPAP-AVAPS     Set Tidal Volume 500     Set Mech Resp Rate 16.0     IPAP 22     Comment: Meter: [id number]     :  [id number]        EPA 8     Comment: Corrected result. Previous result was 6 on 11/28/2022  "at 0941 EST.        Collected by [id number]     pCO2, Temperature Corrected 58.1 mm Hg      pH, Temp Corrected 7.385 pH Units      pO2, Temperature Corrected 62.9 mm Hg     Procalcitonin [[id number]]  (Normal) Collected: 11/28/22 0410    Specimen: Blood Updated: 11/28/22 0920     Procalcitonin 0.14 ng/mL     Narrative:      As a Marker for Sepsis (Non-Neonates):    1. <0.5 ng/mL represents a low risk of severe sepsis and/or septic shock.  2. >2 ng/mL represents a high risk of severe sepsis and/or septic shock.    As a Marker for Lower Respiratory Tract Infections that require antibiotic therapy:    PCT on Admission    Antibiotic Therapy       6-12 Hrs later    >0.5                Strongly Recommended  >0.25 - <0.5        Recommended   0.1 - 0.25          Discouraged              Remeasure/reassess PCT  <0.1                Strongly Discouraged     Remeasure/reassess PCT    As 28 day mortality risk marker: \"Change in Procalcitonin Result\" (>80% or <=80%) if Day 0 (or Day 1) and Day 4 values are available. Refer to http://www."[employer]-pct-calculator.com    Change in PCT <=80%  A decrease of PCT levels below or equal to 80% defines a positive change in PCT test result representing a higher risk for 28-day all-cause mortality of patients diagnosed with severe sepsis for septic shock.    Change in PCT >80%  A decrease of PCT levels of more than 80% defines a negative change in PCT result representing a lower risk for 28-day all-cause mortality of patients diagnosed with severe sepsis or septic shock.       Basic Metabolic Panel [[id number]]  (Abnormal) Collected: 11/28/22 0410    Specimen: Blood Updated: 11/28/22 0447     Glucose 216 mg/dL      BUN 16 mg/dL      Creatinine 0.67 mg/dL      Sodium 138 mmol/L      Potassium 4.4 mmol/L      Chloride 97 mmol/L      CO2 33.3 mmol/L      Calcium 9.2 mg/dL      BUN/Creatinine Ratio 23.9     Anion Gap 7.7 mmol/L      eGFR 100.4 mL/min/1.73      Comment: National Kidney Foundation and " American Society of Nephrology (ASN) Task Force recommended calculation based on the Chronic Kidney Disease Epidemiology Collaboration (CKD-EPI) equation refit without adjustment for race.       Narrative:      GFR Normal >60  Chronic Kidney Disease <60  Kidney Failure <15      TSH [[id number]]  (Normal) Collected: 11/27/22 1756    Specimen: Blood Updated: 11/28/22 0209     TSH 0.831 uIU/mL     Hemoglobin A1c [[id number]]  (Abnormal) Collected: 11/27/22 1756    Specimen: Blood Updated: 11/28/22 0140     Hemoglobin A1C 7.00 %     Narrative:      Hemoglobin A1C Ranges:    Increased Risk for Diabetes  5.7% to 6.4%  Diabetes                     >= 6.5%  Diabetic Goal                < 7.0%    Phosphorus [[id number]]  (Normal) Collected: 11/27/22 1756    Specimen: Blood Updated: 11/28/22 0137     Phosphorus 2.9 mg/dL     Magnesium [[id number]]  (Normal) Collected: 11/27/22 1756    Specimen: Blood Updated: 11/28/22 0137     Magnesium 1.8 mg/dL     Urinalysis, Microscopic Only - Urine, Clean Catch [[id number]]  (Abnormal) Collected: 11/28/22 0051    Specimen: Urine, Clean Catch Updated: 11/28/22 0120     RBC, UA 6-12 /HPF      WBC, UA None Seen /HPF      Comment: Urine culture not indicated.        Bacteria, UA None Seen /HPF      Squamous Epithelial Cells, UA None Seen /HPF      Hyaline Casts, UA None Seen /LPF      Methodology Manual Light Microscopy    Urinalysis With Culture If Indicated - Urine, Clean Catch [[id number]]  (Abnormal) Collected: 11/28/22 0051    Specimen: Urine, Clean Catch Updated: 11/28/22 0119     Color, UA Yellow     Appearance, UA Clear     pH, UA 5.5     Specific Gravity, UA 1.025     Glucose, UA Negative     Ketones, UA 15 mg/dL (1+)     Bilirubin, UA Negative     Blood, UA Trace     Protein, UA Negative     Leuk Esterase, UA Negative     Nitrite, UA Negative     Urobilinogen, UA 0.2 E.U./dL    Narrative:      In absence of clinical symptoms, the presence of pyuria, bacteria, and/or nitrites on the  urinalysis result does not correlate with infection.    BNP [[id number]]  (Normal) Collected: 11/27/22 1756    Specimen: Blood Updated: 11/27/22 2049     proBNP 27.6 pg/mL     Narrative:      Among patients with dyspnea, NT-proBNP is highly sensitive for the detection of acute congestive heart failure. In addition NT-proBNP of <300 pg/ml effectively rules out acute congestive heart failure with 99% negative predictive value.    Results may be falsely decreased if patient taking Biotin.      COVID-19 and FLU A/B PCR - Swab, Nasopharynx [[id number]]  (Normal) Collected: 11/27/22 1812    Specimen: Swab from Nasopharynx Updated: 11/27/22 1840     COVID19 Not Detected     Influenza A PCR Not Detected     Influenza B PCR Not Detected    Narrative:      Fact sheet for providers: https://www.fda.gov/media/866414/download    Fact sheet for patients: https://www.fda.gov/media/490622/download    Test performed by PCR.    Comprehensive Metabolic Panel [[id number]]  (Abnormal) Collected: 11/27/22 1756    Specimen: Blood Updated: 11/27/22 1837     Glucose 120 mg/dL      BUN 13 mg/dL      Creatinine 0.66 mg/dL      Sodium 140 mmol/L      Potassium 4.0 mmol/L      Chloride 99 mmol/L      CO2 29.4 mmol/L      Calcium 9.1 mg/dL      Total Protein 6.9 g/dL      Albumin 4.20 g/dL      ALT (SGPT) 11 U/L      AST (SGOT) 14 U/L      Alkaline Phosphatase 65 U/L      Total Bilirubin 0.4 mg/dL      Globulin 2.7 gm/dL      A/G Ratio 1.6 g/dL      BUN/Creatinine Ratio 19.7     Anion Gap 11.6 mmol/L      eGFR 100.9 mL/min/1.73      Comment: National Kidney Foundation and American Society of Nephrology (ASN) Task Force recommended calculation based on the Chronic Kidney Disease Epidemiology Collaboration (CKD-EPI) equation refit without adjustment for race.       Narrative:      GFR Normal >60  Chronic Kidney Disease <60  Kidney Failure <15      Troponin [[id number]]  (Normal) Collected: 11/27/22 1756    Specimen: Blood Updated: 11/27/22 1836      Troponin T 0.025 ng/mL     Narrative:      Troponin T Reference Range:  <= 0.03 ng/mL-   Negative for AMI  >0.03 ng/mL-     Abnormal for myocardial necrosis.  Clinicians would have to utilize clinical acumen, EKG, Troponin and serial changes to determine if it is an Acute Myocardial Infarction or myocardial injury due to an underlying chronic condition.       Results may be falsely decreased if patient taking Biotin.      Blood Gas, Arterial - [[id number]]  (Abnormal) Collected: 11/27/22 1830    Specimen: Arterial Blood Updated: 11/27/22 1833     Site Right Radial     Rich's Test Positive     pH, Arterial 7.384 pH units      pCO2, Arterial 58.3 mm Hg      Comment: 83 Value above reference range        pO2, Arterial 53.6 mm Hg      Comment: 85 Value below critical limit        HCO3, Arterial 34.8 mmol/L      Comment: 83 Value above reference range        Base Excess, Arterial 7.8 mmol/L      Comment: 83 Value above reference range        O2 Saturation, Arterial 87.7 %      Comment: 84 Value below reference range        Hemoglobin, Blood Gas 13.5 g/dL      Comment: 84 Value below reference range        Temperature 37.0 C      Barometric Pressure for Blood Gas 730 mmHg      Modality Nasal Cannula     Flow Rate 3.0 lpm      Notified Who RB AND V DR RODARTE     Notified By [id number]     Notified Time 11/27/2022 18:39     Collected by [id number]     Comment: Meter: [id number]     :  [id number]        pCO2, Temperature Corrected 58.3 mm Hg      pH, Temp Corrected 7.384 pH Units      pO2, Temperature Corrected 53.6 mm Hg     Lactic Acid, Plasma [[id number]]  (Normal) Collected: 11/27/22 1809    Specimen: Blood Updated: 11/27/22 1832     Lactate 1.2 mmol/L         Imaging Results (Most Recent)     Procedure Component Value Units Date/Time    MRI Lumbar Spine With & Without Contrast [[id number]] Collected: 12/01/22 1452     Updated: 12/01/22 1454    Narrative:      MRI Spine Lumbar WO W    HISTORY:    Severe lower back  pain, question infectious etiology/abscess, abnormal lab work    TECHNIQUE:  Multiplanar multisequence imaging of the lumbar spine acquired without and with IV contrast utilizing a standard protocol. A total of 20 cc MultiHance IV contrast was administered.     COMPARISON: None    FINDINGS:    For the purposes of this dictation is presumed to be 5 lumbar-type vertebra with the last fully formed disc space representing L5-S1. Allowing for this, there is likely transitional anatomy with partial sacralization of L5. Confirmation of lumbar level  is recommended prior to any intervention.    Sagittal alignment is satisfactory. There is disc desiccation spanning L1-L2 through L4-L5 with mild loss of disc height predominantly at L1-L2. The conus terminates at an expected level and the visualized distal cord signal is within normal limits.    There is no evidence of discitis/osteomyelitis. No evidence of epidural abscess. No suspicious enhancing lesion identified in the lumbar spine.    Level by level:    L1-2: Diffuse disc bulge, slightly eccentric to the right. Mild facet degeneration. Thecal sac mildly narrowed. Favor moderate right and mild left foraminal narrowing..    L2-3: Diffuse disc bulge and mild to moderate facet degeneration. Prominent posterior epidural fat. Mild thecal sac narrowing. Mild foraminal narrowing.    L3-4: Disc bulge and mild to moderate facet degeneration. Thecal sac remains adequate. Mild to moderate right greater than left foraminal narrowing    L4-5: Disc bulge, eccentric to the right. Severe left and mild-to-moderate right foraminal narrowing. Midline thecal sac is adequate but there is subarticular narrowing on both sides with at least crowding of the descending L5 nerve roots. Right foramen  is moderately to severely narrowed and the left foramen is moderately narrowed.    L5-S1: Likely a transitional level but the thecal sac and foramina are favored adequate.          Impression:      1.   No evidence of discitis/osteomyelitis. No evidence of epidural abscess. No suspicious enhancing lesion.  2.  Degenerative changes.    Signer Name: DEONDRE VALLADARES MD   Signed: 12/1/2022 2:52 PM   Workstation Name: [id number]    [facility]    CT Angiogram Chest [[id number]] Collected: 11/28/22 0146     Updated: 11/28/22 0148    Narrative:      CT ANGIOGRAM CHEST W CONTRAST    INDICATION:   Heart failure. COPD exacerbation. Shortness of air.    TECHNIQUE:   CT angiogram of the chest with IV contrast. 3-D reconstructions were obtained and reviewed.   Radiation dose reduction techniques included automated exposure control or exposure modulation based on body size. Count of known CT and cardiac nuc med studies  performed in previous 12 months: 0.     COMPARISON:   12/8/2019    FINDINGS:   There is motion degradation. However, no pulmonary embolism or aortic dissection is identified. There is no pleural or pericardial effusion. There is atherosclerotic disease and coronary artery disease. No adenopathy is seen. Upper abdominal images show  cholecystectomy.    Lung windows demonstrate patchy multifocal infiltrates in both lungs compatible with pneumonia. Findings are most pronounced in the right lower lobe. Imaging features are atypical or uncommonly reported for COVID-19 pneumonia. Alternative diagnoses  should be considered. There is mild pulmonary emphysema. Bronchial wall thickening is concerning for associated bronchitis. No pneumothorax.      Impression:        1. Motion degraded exam, but no pulmonary embolism or aortic dissection is seen.  2. COPD with patchy bilateral multifocal pneumonia, most pronounced in the right lower lobe.  3. Atherosclerotic disease and coronary artery disease.    Signer Name: James Marcial MD   Signed: 11/28/2022 1:46 AM   Workstation Name: [email or web address]    [facility]    XR Chest 1 View [[id number]] Collected: 11/27/22 1843     Updated: 11/27/22  1845    Narrative:      CR Chest 1 Vw    INDICATION:   Short of breath wheezing for a week. History of COPD.     COMPARISON:    Chest x-ray 11/3/2022.    FINDINGS:  Portable AP view(s) of the chest.  Cardiac silhouette is mildly enlarged. There is vascular congestion and interstitial edema that is new/worsening on comparison to prior. There is patchy perihilar to bibasilar airspace disease as well. Findings are most  likely due to mild to moderate congestive heart failure. Clinical correlation and follow-up to resolution is recommended to exclude underlying aspiration or pneumonia. No obvious pleural effusion. No pneumothorax. There is some background distortion of  parenchymal architecture consistent with chronic obstructive lung disease.      Impression:      Interval worsening in the appearance the chest. Findings are most likely due to mild to moderate congestive heart failure superimposed upon underlying chronic lung disease. Follow-up to complete clearing is recommended to exclude underlying aspiration or  pneumonia.    Signer Name: Deepthi Burks MD   Signed: 11/27/2022 6:43 PM   [facility]: [email or web address]    [facility]          PROCEDURES      Condition on Discharge:  Stable    Physical Exam at Discharge  Vital Signs  Temp:  [96.2 °F (35.7 °C)-97.5 °F (36.4 °C)] 97.5 °F (36.4 °C)  Heart Rate:  [66-96] 96  Resp:  [17-22] 20  BP: (125-142)/(69-78) 128/69    Physical Exam:  Physical Exam   Constitutional: Patient appears well-developed and well-nourished and in no acute distress   exudate.     Neck: Neck supple. No JVD present. No thyromegaly present. No lymphadenopathy present.  Cardiovascular: Regular rate, regular rhythm, S1 normal and S2 normal.  Exam reveals no gallop and no friction rub.  No murmur heard.  Radial and pedal pulses are 2+ and symmetric.  Pulmonary/Chest: Lungs are clear to auscultation bilaterally. No respiratory distress. No wheezes. No rhonchi. No rales.    Abdominal: Obese. Soft. Bowel sounds are normal. There is no tenderness.   Musculoskeletal: Normal Muscle tone  Extremities: No edema.   Neurological: Cranial nerves II-XII are grossly intact with no focal deficits.  Skin: Skin is warm. No rash noted. Nails show no clubbing.  No cyanosis or erythema.    Discharge Disposition  Home    Visiting Nurse:    Yes     Home PT/OT:  Yes     Home Safety Evaluation:  Yes     DME  None new    Discharge Diet:      Dietary Orders (From admission, onward)     Start     Ordered    11/28/22 1159  Diet: Cardiac Diets, Diabetic Diets; Healthy Heart (2-3 Na+); Consistent Carbohydrate; Texture: Regular Texture (IDDSI 7); Fluid Consistency: Thin (IDDSI 0)  Diet Effective Now        References:    Diet Order [place]   Question Answer Comment   Diets: Cardiac Diets    Diets: Diabetic Diets    Cardiac Diet: Healthy Heart (2-3 Na+)    Diabetic Diet: Consistent Carbohydrate    Texture: Regular Texture (IDDSI 7)    Fluid Consistency: Thin (IDDSI 0)        11/28/22 1159                Activity at Discharge:  As tolerated    Follow-up Appointments  No future appointments.  Additional Instructions for the Follow-ups that You Need to Schedule     Discharge Follow-up with PCP   As directed       Currently Documented PCP:    Ney Kathleen MD    PCP Phone Number:    [phone]     Follow Up Details: 1-2 weeks               Test Results Pending at Discharge  Pending Labs     Order Current Status    Blood Culture - Blood, Arm, Left In process    Blood Culture - Blood, Arm, Right In process    Blood Culture - Blood, Arm, Right Preliminary result           Sanjiv Rider MD  12/02/22  15:55 EST    Time: This discharge took greater than 30 minutes due to preparing the home abx infusiuon prescription as well as discussing plan w/ ID and Mr. Armstrong and coordinating  w/ Discharge Planner.

## 2022-12-02 NOTE — CASE MANAGEMENT/SOCIAL WORK
Continued Stay Note  MURIEL Chavez     Patient Name: Kt Armstrong  MRN: [id number]  Today's Date: 12/2/2022    Admit Date: 11/27/2022    Plan[facility]   Discharge Plan     Row Name 12/02/22 1054       Plan    Plan [facility] and [facility]    Patient/Family in Agreement with Plan yes    Plan Comments Followed up with patient today regarding cost of his IV antibiotics and informed him that it would cost $85.00 for the first week and $50.00 a week after that and he states he is able to afford this. CM informed him that [facility] will follow and will call him to schedule a time to be at his home tomorrow and he verbalized understanding. Patient states he plan on returning home with his daughter to help and has no other questions or concerns regarding discharge plans. CM will follow.               Discharge Codes    No documentation.                     Glenys Marley RN

## 2022-12-02 NOTE — CONSULTS
LOS: 5 days   Patient Care Team:  Ney Kathleen MD as PCP - General (Family Medicine)  Chris Driver MD as Consulting Physician (Pulmonary Disease)        Subjective     Interval History:     Patient Complaints:   Patient Denies:  better       Review of Systems:    weak    Objective     Vital Signs  Temp:  [96.2 °F (35.7 °C)-97.5 °F (36.4 °C)] 97.5 °F (36.4 °C)  Heart Rate:  [66-94] 85  Resp:  [17-22] 22  BP: (125-142)/(67-78) 128/69    Physical Exam:     General Appearance:    Alert, cooperative, in no acute distress   Head:    Normocephalic, without obvious abnormality, atraumatic   Eyes:            Lids and lashes normal, conjunctivae and sclerae normal, no   icterus, no pallor, corneas clear, PERRLA   Ears:    Ears appear intact with no abnormalities noted   Throat:   No oral lesions, no thrush, oral mucosa moist   Neck:   No adenopathy, supple, trachea midline, no thyromegaly, no   carotid bruit, no JVD   Back:     No kyphosis present, no scoliosis present, no skin lesions,      erythema or scars, no tenderness to percussion or                   palpation,   range of motion normal   Lungs:     Clear to auscultation,respirations regular, even and                  unlabored    Heart:    Regular rhythm and normal rate, normal S1 and S2, no            murmur, no gallop, no rub, no click   Chest Wall:    No abnormalities observed   Abdomen:     Normal bowel sounds, no masses, no organomegaly, soft        non-tender, non-distended, no guarding, no rebound                tenderness   Rectal:     Deferred   Extremities:   Moves all extremities well, no edema, no cyanosis, no             redness   Pulses:   Pulses palpable and equal bilaterally   Skin:   No bleeding, bruising or rash   Lymph nodes:   No palpable adenopathy   Neurologic:   Cranial nerves 2 - 12 grossly intact, sensation intact, DTR       present and equal bilaterally          Results Review:      Lab Results (last 72 hours)      Procedure Component Value Units Date/Time    POC Glucose Once [[id number]]  (Abnormal) Collected: 12/02/22 1135    Specimen: Blood Updated: 12/02/22 1143     Glucose 266 mg/dL      Comment: Meter: [id number] : [id number] Richi Duncan CNA       POC Glucose Once [[id number]]  (Abnormal) Collected: 12/02/22 0726    Specimen: Blood Updated: 12/02/22 0732     Glucose 217 mg/dL      Comment: Meter: [id number] : [id number] Jacobo JONAS       Blood Culture - Blood, Arm, Right [[id number]]  (Normal) Collected: 11/27/22 1809    Specimen: Blood from Arm, Right Updated: 12/01/22 1831     Blood Culture No growth at 4 days    POC Glucose Once [[id number]]  (Abnormal) Collected: 12/01/22 1711    Specimen: Blood Updated: 12/01/22 1717     Glucose 314 mg/dL      Comment: Meter: [id number] : [id number] Adriano Tilley CNA       Blood Culture - Blood, Arm, Left [[id number]] Collected: 12/01/22 1551    Specimen: Blood from Arm, Left Updated: 12/01/22 1551    Blood Culture - Blood, Arm, Right [[id number]] Collected: 12/01/22 1547    Specimen: Blood from Arm, Right Updated: 12/01/22 1550    POC Glucose Once [[id number]]  (Abnormal) Collected: 12/01/22 1125    Specimen: Blood Updated: 12/01/22 1136     Glucose 294 mg/dL      Comment: Meter: [id number] : [id number] Adriano Tilley CNA       Renal Function Panel [[id number]]  (Abnormal) Collected: 12/01/22 0840    Specimen: Blood Updated: 12/01/22 0912     Glucose 280 mg/dL      BUN 24 mg/dL      Creatinine 0.66 mg/dL      Sodium 133 mmol/L      Potassium 4.6 mmol/L      Chloride 95 mmol/L      CO2 29.5 mmol/L      Calcium 9.5 mg/dL      Albumin 3.90 g/dL      Phosphorus 2.9 mg/dL      Anion Gap 8.5 mmol/L      BUN/Creatinine Ratio 36.4     eGFR 100.9 mL/min/1.73      Comment: National Kidney Foundation and American Society of Nephrology (ASN) Task Force recommended calculation based on the Chronic Kidney Disease Epidemiology Collaboration (CKD-EPI) equation refit without  adjustment for race.       Narrative:      GFR Normal >60  Chronic Kidney Disease <60  Kidney Failure <15      Respiratory Culture - Sputum, Cough [[id number]] Collected: 11/28/22 1707    Specimen: Sputum from Cough Updated: 12/01/22 0905     Respiratory Culture Moderate growth (3+) Normal respiratory karely. No S. aureus or Pseudomonas aeruginosa detected. Final report.     Gram Stain Moderate (3+) WBCs seen      Rare (1+) Epithelial cells seen      Few (2+) Mixed bacterial morphotypes seen on Gram Stain    Sedimentation Rate [[id number]]  (Abnormal) Collected: 12/01/22 0840    Specimen: Blood Updated: 12/01/22 0856     Sed Rate 35 mm/hr     CBC & Differential [[id number]]  (Abnormal) Collected: 12/01/22 0840    Specimen: Blood Updated: 12/01/22 0849    Narrative:      The following orders were created for panel order CBC & Differential.  Procedure                               Abnormality         Status                     ---------                               -----------         ------                     CBC Auto Differential[[id number]]        Abnormal            Final result                 Please view results for these tests on the individual orders.    CBC Auto Differential [[id number]]  (Abnormal) Collected: 12/01/22 0840    Specimen: Blood Updated: 12/01/22 0849     WBC 8.82 10*3/mm3      RBC 4.48 10*6/mm3      Hemoglobin 13.5 g/dL      Hematocrit 44.1 %      MCV 98.4 fL      MCH 30.1 pg      MCHC 30.6 g/dL      RDW 12.9 %      RDW-SD 47.4 fl      MPV 11.2 fL      Platelets 226 10*3/mm3      Neutrophil % 69.2 %      Lymphocyte % 18.9 %      Monocyte % 10.1 %      Eosinophil % 0.0 %      Basophil % 0.1 %      Immature Grans % 1.7 %      Neutrophils, Absolute 6.10 10*3/mm3      Lymphocytes, Absolute 1.67 10*3/mm3      Monocytes, Absolute 0.89 10*3/mm3      Eosinophils, Absolute 0.00 10*3/mm3      Basophils, Absolute 0.01 10*3/mm3      Immature Grans, Absolute 0.15 10*3/mm3     POC Glucose Once [[id number]]   (Abnormal) Collected: 12/01/22 0741    Specimen: Blood Updated: 12/01/22 0749     Glucose 273 mg/dL      Comment: Meter: [id number] : [id number] Adriano Tilley CNA       Blood Culture - Blood, Arm, Left [[id number]]  (Abnormal)  (Susceptibility) Collected: 11/27/22 1809    Specimen: Blood from Arm, Left Updated: 12/01/22 0638     Blood Culture Staphylococcus aureus     Comment:   Infectious disease consultation is highly recommended to rule out distant foci of infection.        Isolated from Anaerobic Bottle     Gram Stain Anaerobic Bottle Gram positive cocci in clusters    Susceptibility      Staphylococcus aureus      JORDAN      Gentamicin Susceptible      Oxacillin Susceptible      Rifampin Susceptible      Vancomycin Susceptible                       Susceptibility Comments     Staphylococcus aureus    This isolate does not demonstrate inducible clindamycin resistance in vitro.               POC Glucose Once [[id number]]  (Abnormal) Collected: 11/30/22 1629    Specimen: Blood Updated: 11/30/22 1636     Glucose 302 mg/dL      Comment: Meter: [id number] : [id number] Vinay Laguerre CNA       POC Glucose Once [[id number]]  (Abnormal) Collected: 11/30/22 1149    Specimen: Blood Updated: 11/30/22 1156     Glucose 289 mg/dL      Comment: Meter: [id number] : [id number] Marcos JONAS       Sedimentation Rate [[id number]]  (Abnormal) Collected: 11/30/22 1047    Specimen: Blood Updated: 11/30/22 1053     Sed Rate 33 mm/hr     POC Glucose Once [[id number]]  (Abnormal) Collected: 11/30/22 0730    Specimen: Blood Updated: 11/30/22 0736     Glucose 355 mg/dL      Comment: Meter: [id number] : [id number] Marcos JONAS       POC Glucose Once [[id number]]  (Abnormal) Collected: 11/29/22 2025    Specimen: Blood Updated: 11/29/22 2042     Glucose 311 mg/dL      Comment: Meter: [id number] : [id number] Rich Park RN       POC Glucose Once [[id number]]  (Abnormal) Collected: 11/29/22 1655    Specimen: Blood Updated:  11/29/22 1701     Glucose 304 mg/dL      Comment: Meter: [id number] : [id number] Bernabe Michelle RN       POC Glucose Once [[id number]]  (Abnormal) Collected: 11/29/22 1235    Specimen: Blood Updated: 11/29/22 1241     Glucose 383 mg/dL      Comment: Meter: [id number] : [id number] Bernabe Michelle RN             Imaging Results (Last 72 Hours)     Procedure Component Value Units Date/Time    MRI Lumbar Spine With & Without Contrast [[id number]] Collected: 12/01/22 1452     Updated: 12/01/22 1454    Narrative:      MRI Spine Lumbar WO W    HISTORY:    Severe lower back pain, question infectious etiology/abscess, abnormal lab work    TECHNIQUE:  Multiplanar multisequence imaging of the lumbar spine acquired without and with IV contrast utilizing a standard protocol. A total of 20 cc MultiHance IV contrast was administered.     COMPARISON: None    FINDINGS:    For the purposes of this dictation is presumed to be 5 lumbar-type vertebra with the last fully formed disc space representing L5-S1. Allowing for this, there is likely transitional anatomy with partial sacralization of L5. Confirmation of lumbar level  is recommended prior to any intervention.    Sagittal alignment is satisfactory. There is disc desiccation spanning L1-L2 through L4-L5 with mild loss of disc height predominantly at L1-L2. The conus terminates at an expected level and the visualized distal cord signal is within normal limits.    There is no evidence of discitis/osteomyelitis. No evidence of epidural abscess. No suspicious enhancing lesion identified in the lumbar spine.    Level by level:    L1-2: Diffuse disc bulge, slightly eccentric to the right. Mild facet degeneration. Thecal sac mildly narrowed. Favor moderate right and mild left foraminal narrowing..    L2-3: Diffuse disc bulge and mild to moderate facet degeneration. Prominent posterior epidural fat. Mild thecal sac narrowing. Mild foraminal narrowing.    L3-4: Disc bulge and mild to  moderate facet degeneration. Thecal sac remains adequate. Mild to moderate right greater than left foraminal narrowing    L4-5: Disc bulge, eccentric to the right. Severe left and mild-to-moderate right foraminal narrowing. Midline thecal sac is adequate but there is subarticular narrowing on both sides with at least crowding of the descending L5 nerve roots. Right foramen  is moderately to severely narrowed and the left foramen is moderately narrowed.    L5-S1: Likely a transitional level but the thecal sac and foramina are favored adequate.          Impression:      1.  No evidence of discitis/osteomyelitis. No evidence of epidural abscess. No suspicious enhancing lesion.  2.  Degenerative changes.    Signer Name: DEONDRE VALLADARES MD   Signed: 12/1/2022 2:52 PM   Workstation Name: [id number]    [facility]            Medication Review:     Hospital Medications (active)       Dose Frequency Start End    arformoterol (BROVANA) nebulizer solution 15 mcg 15 mcg 2 Times Daily - RT 11/28/2022     Admin Instructions: Include Respiratory Treatment Education  Keep refrigerated.    Route: Nebulization    atorvastatin (LIPITOR) tablet 10 mg 10 mg Daily 11/29/2022     Admin Instructions: Avoid grapefruit juice.    Route: Oral    bismuth subsalicylate (PEPTO BISMOL) 262 MG/15ML suspension 30 mL 30 mL Every 6 Hours PRN 11/29/2022     Admin Instructions: Shake well.    Route: Oral    budesonide (PULMICORT) nebulizer solution 0.5 mg 0.5 mg 2 Times Daily - RT 11/28/2022     Admin Instructions: Include Respiratory Treatment Education  Do not shake.  Protect from light.    Route: Nebulization    calcium carbonate (TUMS) chewable tablet 500 mg (200 mg elemental) 2 tablet 3 Times Daily PRN 11/29/2022     Admin Instructions: One tablet contains 200 mg elemental calcium.  Take with food.    Route: Oral    ceFAZolin Sodium-Dextrose (ANCEF) IVPB (duplex) 2 g 2 g Every 8 Hours 12/1/2022 12/31/2022    Admin Instructions:  Caution: Look alike/sound alike drug alert    Route: Intravenous    dextrose (D50W) (25 g/50 mL) IV injection 25 g 25 g Every 15 Minutes PRN 11/27/2022     Admin Instructions: Blood sugar less than 70; patient has IV access - Unresponsive, NPO or Unable To Safely Swallow    Route: Intravenous    dextrose (GLUTOSE) oral gel 15 g 15 g Every 15 Minutes PRN 11/27/2022     Admin Instructions: BS<70, Patient Alert, Is not NPO, Can safely swallow.    Route: Oral    Enoxaparin Sodium (LOVENOX) syringe 40 mg 40 mg Nightly 11/28/2022     Admin Instructions: Give subcutaneous in abdomen only. Do not massage site after injection.    Route: Subcutaneous    glucagon (GLUCAGEN) injection 1 mg 1 mg Every 15 Minutes PRN 11/27/2022     Admin Instructions: Blood Glucose Less Than 70 - Patient Without IV Access - Unresponsive, NPO or Unable To Safely Swallow    Route: Intramuscular    HYDROcodone-acetaminophen (NORCO)  MG per tablet 1 tablet 1 tablet Every 6 Hours PRN 11/29/2022     Admin Instructions: Based on patient request - if ordered for moderate or severe pain, provider allows for administration of a medication prescribed for a lower pain scale.  [AMBER]    Do not exceed 4 grams of acetaminophen in a 24 hr period. Max dose of 2gm for AST/ALT greater than 120 units/L        If given for pain, use the following pain scale:   Mild Pain = Pain Score of 1-3, CPOT 1-2  Moderate Pain = Pain Score of 4-6, CPOT 3-4  Severe Pain = Pain Score of 7-10, CPOT 5-8    Route: Oral    Insulin Aspart (novoLOG) injection 0-14 Units 0-14 Units 3 Times Daily With Meals 11/29/2022     Admin Instructions: Correction - Moderate-High Dose.  60-80 units/day total insulin dose or uses insulin at home    Blood glucose 150-199 mg/dL - 3 units  Blood glucose 200-249 mg/dL - 5 units  Blood glucose 250-299 mg/dL - 8 units  Blood glucose 300-349 mg/dL - 10 units  Blood glucose 350-400 mg/dL - 12 units  Blood glucose greater than 400 mg/dL - 14 units and call  provider      Route: Subcutaneous    insulin detemir (LEVEMIR) injection 35 Units 35 Units Nightly 12/1/2022     Admin Instructions:     Route: Subcutaneous    insulin detemir (LEVEMIR) injection 45 Units 45 Units Daily 12/2/2022     Admin Instructions: Do not hold basal insulin without an order. Consider requesting a dose edit, if needed.       Route: Subcutaneous    ipratropium-albuterol (DUO-NEB) nebulizer solution 3 mL 3 mL Every 6 Hours While Awake - RT 11/28/2022     Admin Instructions: Include Respiratory Treatment Education    Route: Nebulization    ipratropium-albuterol (DUO-NEB) nebulizer solution 3 mL 3 mL 4 Times Daily PRN 11/27/2022     Admin Instructions: Include Respiratory Treatment Education    Route: Nebulization    melatonin tablet 5 mg 5 mg Nightly PRN 11/29/2022     Route: Oral    nicotine (NICODERM CQ) 14 MG/24HR patch 1 patch 1 patch Every 24 Hours Scheduled 11/29/2022     Admin Instructions: Apply to clean, dry, nonhairy area of skin (typically upper arm or shoulder)  cutely Hazardous. Waste BOTH Residual Medication and/or Empty Package.    Route: Transdermal    pyridostigmine (MESTINON) tablet 60 mg 60 mg Every 4 Hours 11/28/2022     Route: Oral    sodium chloride 0.9 % flush 10 mL 10 mL As Needed 11/27/2022     Route: Intravenous    Cosign for Ordering: Accepted by Kwesi Santiago MD on 11/27/2022  6:02 PM    sodium chloride 0.9 % flush 10 mL 10 mL As Needed 11/27/2022     Route: Intravenous    sodium chloride 0.9 % flush 10 mL 10 mL Every 12 Hours Scheduled 11/28/2022     Route: Intravenous    sodium chloride 0.9 % infusion 40 mL 40 mL As Needed 11/27/2022     Admin Instructions: Following administration of an IV intermittent medication, flush line with 40mL NS at 100mL/hr.    Route: Intravenous    tetrahydrozoline 0.05 % ophthalmic solution 2 drop 2 drop 3 Times Daily PRN 11/29/2022     Route: Both Eyes        arformoterol, 15 mcg, Nebulization, BID - RT  atorvastatin, 10 mg, Oral,  Daily  budesonide, 0.5 mg, Nebulization, BID - RT  ceFAZolin, 2 g, Intravenous, Q8H  enoxaparin, 40 mg, Subcutaneous, Nightly  Insulin Aspart, 0-14 Units, Subcutaneous, TID With Meals  insulin detemir, 35 Units, Subcutaneous, Nightly  insulin detemir, 45 Units, Subcutaneous, Daily  ipratropium-albuterol, 3 mL, Nebulization, Q6H While Awake - RT  nicotine, 1 patch, Transdermal, Q24H  pyridostigmine, 60 mg, Oral, Q4H  sodium chloride, 10 mL, Intravenous, Q12H        Assessment & Plan         Acute on chronic respiratory failure with hypoxia (HCC)    Myasthenia Gravis  MSSA bacteremia  R/o SBE  Back pain  MRI noted    Plan :    IV cefazolin      Need 4 weeks of IV rocephin 2 gm Daily   Repeat BC -  MRI L spine  Noted     Echo -    Will follow  Thank you                Corona Elaine MD  12/02/22  12:20 EST

## 2022-12-02 NOTE — PLAN OF CARE
Goal Outcome Evaluation:  Plan of Care Reviewed With: patient        Progress: improving  Outcome Evaluation: Pt VSS, am labs pending. Pt continues on o2@ 3L, RT titrating, see RT notes. Pt reports chronic pain improved with current regimen, see emar, flowsheets. Pt reports desire to d/c home soon. Per pt report, plan is to have PICC line placed and d/c home in am with home iv antibiotics x4 weeks. Pt resting at this time.

## 2022-12-03 ENCOUNTER — HOME CARE VISIT (OUTPATIENT)
Dept: HOME HEALTH SERVICES | Facility: HOME HEALTHCARE | Age: 72
End: 2022-12-03
Payer: MEDICARE

## 2022-12-03 PROCEDURE — G0299 HHS/HOSPICE OF RN EA 15 MIN: HCPCS

## 2022-12-03 NOTE — CASE MANAGEMENT/SOCIAL WORK
Case Management Discharge Note      Final Note: dc home w [facility]         Selected Continued Care - Discharged on 12/2/2022 Admission date: 11/27/2022 - Discharge disposition: Home-Health Care Svc    Destination    No services have been selected for the patient.              Durable Medical Equipment    No services have been selected for the patient.              Dialysis/Infusion     Service Provider Selected Services Address Phone Fax Patient Preferred    [facility] [address], [facility] [address] [phone] --          Home Medical Care     Service Provider Selected Services Address Phone Fax Patient Preferred     [facility] ,  [facility] [address] [address] [address] [address] [phone] --          Therapy    No services have been selected for the patient.              Community Resources    No services have been selected for the patient.              Community & DME    No services have been selected for the patient.                       Final Discharge Disposition Code: 06 - home with home health care

## 2022-12-04 ENCOUNTER — HOME CARE VISIT (OUTPATIENT)
Dept: HOME HEALTH SERVICES | Facility: HOME HEALTHCARE | Age: 72
End: 2022-12-04
Payer: MEDICARE

## 2022-12-04 PROCEDURE — G0299 HHS/HOSPICE OF RN EA 15 MIN: HCPCS

## 2022-12-05 VITALS
RESPIRATION RATE: 18 BRPM | DIASTOLIC BLOOD PRESSURE: 70 MMHG | BODY MASS INDEX: 32.73 KG/M2 | SYSTOLIC BLOOD PRESSURE: 126 MMHG | TEMPERATURE: 96.1 F | HEIGHT: 74 IN | HEART RATE: 98 BPM | WEIGHT: 255 LBS | OXYGEN SATURATION: 94 %

## 2022-12-06 ENCOUNTER — HOME CARE VISIT (OUTPATIENT)
Dept: HOME HEALTH SERVICES | Facility: HOME HEALTHCARE | Age: 72
End: 2022-12-06
Payer: MEDICARE

## 2022-12-06 ENCOUNTER — LAB REQUISITION (OUTPATIENT)
Dept: LAB | Facility: HOSPITAL | Age: 72
End: 2022-12-06

## 2022-12-06 ENCOUNTER — READMISSION MANAGEMENT (OUTPATIENT)
Dept: CALL CENTER | Facility: HOSPITAL | Age: 72
End: 2022-12-06

## 2022-12-06 DIAGNOSIS — R78.81 BACTEREMIA: ICD-10-CM

## 2022-12-06 LAB
ALBUMIN SERPL-MCNC: 3.8 G/DL (ref 3.5–5.2)
ALBUMIN/GLOB SERPL: 1.5 G/DL
ALP SERPL-CCNC: 63 U/L (ref 39–117)
ALT SERPL W P-5'-P-CCNC: 19 U/L (ref 1–41)
ANION GAP SERPL CALCULATED.3IONS-SCNC: 7.8 MMOL/L (ref 5–15)
AST SERPL-CCNC: 16 U/L (ref 1–40)
BACTERIA SPEC AEROBE CULT: NORMAL
BACTERIA SPEC AEROBE CULT: NORMAL
BASOPHILS # BLD AUTO: 0.06 10*3/MM3 (ref 0–0.2)
BASOPHILS NFR BLD AUTO: 0.6 % (ref 0–1.5)
BILIRUB SERPL-MCNC: 0.2 MG/DL (ref 0–1.2)
BUN SERPL-MCNC: 12 MG/DL (ref 8–23)
BUN/CREAT SERPL: 18.8 (ref 7–25)
CALCIUM SPEC-SCNC: 9.5 MG/DL (ref 8.6–10.5)
CHLORIDE SERPL-SCNC: 99 MMOL/L (ref 98–107)
CO2 SERPL-SCNC: 33.2 MMOL/L (ref 22–29)
CREAT SERPL-MCNC: 0.64 MG/DL (ref 0.76–1.27)
DEPRECATED RDW RBC AUTO: 48.5 FL (ref 37–54)
EGFRCR SERPLBLD CKD-EPI 2021: 101.8 ML/MIN/1.73
EOSINOPHIL # BLD AUTO: 0.28 10*3/MM3 (ref 0–0.4)
EOSINOPHIL NFR BLD AUTO: 2.8 % (ref 0.3–6.2)
ERYTHROCYTE [DISTWIDTH] IN BLOOD BY AUTOMATED COUNT: 13.2 % (ref 12.3–15.4)
GLOBULIN UR ELPH-MCNC: 2.6 GM/DL
GLUCOSE SERPL-MCNC: 62 MG/DL (ref 65–99)
HCT VFR BLD AUTO: 46.2 % (ref 37.5–51)
HGB BLD-MCNC: 14 G/DL (ref 13–17.7)
IMM GRANULOCYTES # BLD AUTO: 0.47 10*3/MM3 (ref 0–0.05)
IMM GRANULOCYTES NFR BLD AUTO: 4.7 % (ref 0–0.5)
LYMPHOCYTES # BLD AUTO: 2.11 10*3/MM3 (ref 0.7–3.1)
LYMPHOCYTES NFR BLD AUTO: 21 % (ref 19.6–45.3)
MCH RBC QN AUTO: 30.3 PG (ref 26.6–33)
MCHC RBC AUTO-ENTMCNC: 30.3 G/DL (ref 31.5–35.7)
MCV RBC AUTO: 100 FL (ref 79–97)
MONOCYTES # BLD AUTO: 0.76 10*3/MM3 (ref 0.1–0.9)
MONOCYTES NFR BLD AUTO: 7.6 % (ref 5–12)
NEUTROPHILS NFR BLD AUTO: 6.38 10*3/MM3 (ref 1.7–7)
NEUTROPHILS NFR BLD AUTO: 63.3 % (ref 42.7–76)
NRBC BLD AUTO-RTO: 0 /100 WBC (ref 0–0.2)
PLATELET # BLD AUTO: 237 10*3/MM3 (ref 140–450)
PMV BLD AUTO: 12.6 FL (ref 6–12)
POTASSIUM SERPL-SCNC: 4.4 MMOL/L (ref 3.5–5.2)
PROT SERPL-MCNC: 6.4 G/DL (ref 6–8.5)
RBC # BLD AUTO: 4.62 10*6/MM3 (ref 4.14–5.8)
SODIUM SERPL-SCNC: 140 MMOL/L (ref 136–145)
WBC NRBC COR # BLD: 10.06 10*3/MM3 (ref 3.4–10.8)

## 2022-12-06 PROCEDURE — 80053 COMPREHEN METABOLIC PANEL: CPT | Performed by: INTERNAL MEDICINE

## 2022-12-06 PROCEDURE — G0299 HHS/HOSPICE OF RN EA 15 MIN: HCPCS

## 2022-12-06 PROCEDURE — 85025 COMPLETE CBC W/AUTO DIFF WBC: CPT | Performed by: INTERNAL MEDICINE

## 2022-12-06 NOTE — HOME HEALTH
SOC Note: Patient is a&o x4, lungs noted to have wheeze in left side, coughing up white mucus, SOA with moderate exertion, vitals are stable, +1 in BLE. Reports went into hospital with respiratory failure but resolved after treatment of infection. Patient reporting infection believed from possible opening in skin. Nothing significant seen though.   Educated friend Nicholas on how to perform infusion. He missed education in hospital by infusion nurse. He seemed to understand process fairly well after home education but decided to have nurse one more visit the for teach back.   Called Dr. Elaine's office to check on if labs are needed for this patient. No return call. Called [facility] and spoke with pharmacist Patricia who stated MD wants CBC and CMP weekly. Also confirmed that patient will be on infusion until 12/26.   Only new medication is IV abt Cefazolin and he is to return to normal medication routine.   Home Health ordered for: disciplines SN   Reason for Hosp/Primary Dx/Co-morbidities: Recent hospitalization at [facility] from 11/27-12/2 for MSSA bacteremia, COPD, DM2   Focus of Care: maintaining PICC   Skin Integrity/wound status: PICC LUE   Plan for next visit: teach back on IV administeration and then weekly for PICC care and labs

## 2022-12-06 NOTE — OUTREACH NOTE
COPD/PN Week 1 Survey    Flowsheet Row Responses   [facility] patient discharged from? [place]   Does the patient have one of the following disease processes/diagnoses(primary or secondary)? COPD   Week 1 attempt successful? No   Unsuccessful attempts Attempt 1          RANDY RAMIRES - Registered Nurse

## 2022-12-09 ENCOUNTER — READMISSION MANAGEMENT (OUTPATIENT)
Dept: CALL CENTER | Facility: HOSPITAL | Age: 72
End: 2022-12-09

## 2022-12-09 VITALS
RESPIRATION RATE: 18 BRPM | SYSTOLIC BLOOD PRESSURE: 126 MMHG | SYSTOLIC BLOOD PRESSURE: 122 MMHG | RESPIRATION RATE: 18 BRPM | HEART RATE: 88 BPM | OXYGEN SATURATION: 98 % | DIASTOLIC BLOOD PRESSURE: 72 MMHG | OXYGEN SATURATION: 97 % | DIASTOLIC BLOOD PRESSURE: 70 MMHG | TEMPERATURE: 97.8 F | HEART RATE: 88 BPM | TEMPERATURE: 98.3 F

## 2022-12-09 NOTE — OUTREACH NOTE
COPD/PN Week 1 Survey    Flowsheet Row Responses   [facility] patient discharged from? [place]   Does the patient have one of the following disease processes/diagnoses(primary or secondary)? COPD   Week 1 attempt successful? Yes   Call start time 1623   Call end time 1630   List who call center can speak with pt   Medication alerts for this patient antibiotics   Meds reviewed with patient/caregiver? Yes   Is the patient having any side effects they believe may be caused by any medication additions or changes? No   Does the patient have all medications ordered at discharge? Yes   Is the patient taking all medications as directed (includes completed medication regime)? Yes   Comments regarding appointments Pt to see pcp December 15, 2022.   Does the patient have a primary care provider?  Yes   Has the patient kept scheduled appointments due by today? N/A   Has home health visited the patient within 72 hours of discharge? Yes   Pulse Ox monitoring Intermittent   Pulse Ox device source Patient   Psychosocial issues? Yes   Did the patient receive a copy of their discharge instructions? Yes   Nursing interventions Reviewed instructions with patient   What is the patient's perception of their health status since discharge? Improving   Nursing Interventions Nurse provided patient education   Is the patient/caregiver able to teach back the hierarchy of who to call/visit for symptoms/problems? PCP, Specialist, Home health nurse, Urgent Care, ED, 911 Yes   Is the patient able to teach back COPD zones? Yes   Patient reports what zone on this call? Green Zone   Green Zone Reports doing well, Breathing without shortness of breath   Week 1 call completed? Yes   Is the patient interested in additional calls from an ambulatory ?  NOTE:  applies to high risk patients requiring additional follow-up. No   Wrap up additional comments Pt reports feeling well on this day. Pt to see pcp next week. Pt has PICC line and  receiving IV antibiotics for the next month. HH is visiting. Pt uses 02 @ night and prn during the day.          RIGO PEREZ - Registered Nurse

## 2022-12-12 ENCOUNTER — HOME CARE VISIT (OUTPATIENT)
Dept: HOME HEALTH SERVICES | Facility: HOME HEALTHCARE | Age: 72
End: 2022-12-12
Payer: MEDICARE

## 2022-12-12 ENCOUNTER — LAB REQUISITION (OUTPATIENT)
Dept: LAB | Facility: HOSPITAL | Age: 72
End: 2022-12-12

## 2022-12-12 DIAGNOSIS — R78.81 BACTEREMIA: ICD-10-CM

## 2022-12-12 LAB
ALBUMIN SERPL-MCNC: 4.1 G/DL (ref 3.5–5.2)
ALBUMIN/GLOB SERPL: 1.6 G/DL
ALP SERPL-CCNC: 61 U/L (ref 39–117)
ALT SERPL W P-5'-P-CCNC: 13 U/L (ref 1–41)
ANION GAP SERPL CALCULATED.3IONS-SCNC: 8.7 MMOL/L (ref 5–15)
AST SERPL-CCNC: 13 U/L (ref 1–40)
BILIRUB SERPL-MCNC: 0.2 MG/DL (ref 0–1.2)
BUN SERPL-MCNC: 13 MG/DL (ref 8–23)
BUN/CREAT SERPL: 19.7 (ref 7–25)
CALCIUM SPEC-SCNC: 9.7 MG/DL (ref 8.6–10.5)
CHLORIDE SERPL-SCNC: 100 MMOL/L (ref 98–107)
CO2 SERPL-SCNC: 31.3 MMOL/L (ref 22–29)
CREAT SERPL-MCNC: 0.66 MG/DL (ref 0.76–1.27)
DEPRECATED RDW RBC AUTO: 50.1 FL (ref 37–54)
EGFRCR SERPLBLD CKD-EPI 2021: 100.9 ML/MIN/1.73
EOSINOPHIL # BLD MANUAL: 0.15 10*3/MM3 (ref 0–0.4)
EOSINOPHIL NFR BLD MANUAL: 2 % (ref 0.3–6.2)
ERYTHROCYTE [DISTWIDTH] IN BLOOD BY AUTOMATED COUNT: 13.5 % (ref 12.3–15.4)
GLOBULIN UR ELPH-MCNC: 2.6 GM/DL
GLUCOSE SERPL-MCNC: 93 MG/DL (ref 65–99)
HCT VFR BLD AUTO: 45.7 % (ref 37.5–51)
HGB BLD-MCNC: 13.7 G/DL (ref 13–17.7)
LYMPHOCYTES # BLD MANUAL: 1.53 10*3/MM3 (ref 0.7–3.1)
LYMPHOCYTES NFR BLD MANUAL: 8 % (ref 5–12)
MCH RBC QN AUTO: 30.2 PG (ref 26.6–33)
MCHC RBC AUTO-ENTMCNC: 30 G/DL (ref 31.5–35.7)
MCV RBC AUTO: 100.7 FL (ref 79–97)
MONOCYTES # BLD: 0.61 10*3/MM3 (ref 0.1–0.9)
NEUTROPHILS # BLD AUTO: 5.37 10*3/MM3 (ref 1.7–7)
NEUTROPHILS NFR BLD MANUAL: 70 % (ref 42.7–76)
PLAT MORPH BLD: NORMAL
PLATELET # BLD AUTO: 237 10*3/MM3 (ref 140–450)
PMV BLD AUTO: 12.7 FL (ref 6–12)
POTASSIUM SERPL-SCNC: 4.6 MMOL/L (ref 3.5–5.2)
PROT SERPL-MCNC: 6.7 G/DL (ref 6–8.5)
RBC # BLD AUTO: 4.54 10*6/MM3 (ref 4.14–5.8)
RBC MORPH BLD: NORMAL
SODIUM SERPL-SCNC: 140 MMOL/L (ref 136–145)
VARIANT LYMPHS NFR BLD MANUAL: 20 % (ref 19.6–45.3)
WBC MORPH BLD: NORMAL
WBC NRBC COR # BLD: 7.67 10*3/MM3 (ref 3.4–10.8)

## 2022-12-12 PROCEDURE — 80053 COMPREHEN METABOLIC PANEL: CPT | Performed by: INTERNAL MEDICINE

## 2022-12-12 PROCEDURE — 85027 COMPLETE CBC AUTOMATED: CPT | Performed by: INTERNAL MEDICINE

## 2022-12-12 PROCEDURE — G0299 HHS/HOSPICE OF RN EA 15 MIN: HCPCS

## 2022-12-12 PROCEDURE — 85007 BL SMEAR W/DIFF WBC COUNT: CPT | Performed by: INTERNAL MEDICINE

## 2022-12-15 VITALS
RESPIRATION RATE: 18 BRPM | TEMPERATURE: 97.9 F | HEART RATE: 87 BPM | SYSTOLIC BLOOD PRESSURE: 130 MMHG | OXYGEN SATURATION: 95 % | DIASTOLIC BLOOD PRESSURE: 66 MMHG

## 2022-12-19 ENCOUNTER — LAB REQUISITION (OUTPATIENT)
Dept: LAB | Facility: HOSPITAL | Age: 72
End: 2022-12-19

## 2022-12-19 ENCOUNTER — HOME CARE VISIT (OUTPATIENT)
Dept: HOME HEALTH SERVICES | Facility: HOME HEALTHCARE | Age: 72
End: 2022-12-19
Payer: MEDICARE

## 2022-12-19 DIAGNOSIS — R78.81 BACTEREMIA: ICD-10-CM

## 2022-12-19 LAB
ALBUMIN SERPL-MCNC: 4.1 G/DL (ref 3.5–5.2)
ALBUMIN/GLOB SERPL: 1.5 G/DL
ALP SERPL-CCNC: 64 U/L (ref 39–117)
ALT SERPL W P-5'-P-CCNC: 12 U/L (ref 1–41)
ANION GAP SERPL CALCULATED.3IONS-SCNC: 10.2 MMOL/L (ref 5–15)
AST SERPL-CCNC: 13 U/L (ref 1–40)
BASOPHILS # BLD AUTO: 0.07 10*3/MM3 (ref 0–0.2)
BASOPHILS NFR BLD AUTO: 0.9 % (ref 0–1.5)
BILIRUB SERPL-MCNC: 0.2 MG/DL (ref 0–1.2)
BUN SERPL-MCNC: 12 MG/DL (ref 8–23)
BUN/CREAT SERPL: 21.4 (ref 7–25)
CALCIUM SPEC-SCNC: 10 MG/DL (ref 8.6–10.5)
CHLORIDE SERPL-SCNC: 98 MMOL/L (ref 98–107)
CO2 SERPL-SCNC: 30.8 MMOL/L (ref 22–29)
CREAT SERPL-MCNC: 0.56 MG/DL (ref 0.76–1.27)
DEPRECATED RDW RBC AUTO: 49.4 FL (ref 37–54)
EGFRCR SERPLBLD CKD-EPI 2021: 106 ML/MIN/1.73
EOSINOPHIL # BLD AUTO: 0.14 10*3/MM3 (ref 0–0.4)
EOSINOPHIL NFR BLD AUTO: 1.8 % (ref 0.3–6.2)
ERYTHROCYTE [DISTWIDTH] IN BLOOD BY AUTOMATED COUNT: 13.4 % (ref 12.3–15.4)
GLOBULIN UR ELPH-MCNC: 2.8 GM/DL
GLUCOSE SERPL-MCNC: 187 MG/DL (ref 65–99)
HCT VFR BLD AUTO: 46.3 % (ref 37.5–51)
HGB BLD-MCNC: 13.9 G/DL (ref 13–17.7)
IMM GRANULOCYTES # BLD AUTO: 0.03 10*3/MM3 (ref 0–0.05)
IMM GRANULOCYTES NFR BLD AUTO: 0.4 % (ref 0–0.5)
LYMPHOCYTES # BLD AUTO: 1.9 10*3/MM3 (ref 0.7–3.1)
LYMPHOCYTES NFR BLD AUTO: 23.8 % (ref 19.6–45.3)
MCH RBC QN AUTO: 29.9 PG (ref 26.6–33)
MCHC RBC AUTO-ENTMCNC: 30 G/DL (ref 31.5–35.7)
MCV RBC AUTO: 99.6 FL (ref 79–97)
MONOCYTES # BLD AUTO: 0.6 10*3/MM3 (ref 0.1–0.9)
MONOCYTES NFR BLD AUTO: 7.5 % (ref 5–12)
NEUTROPHILS NFR BLD AUTO: 5.26 10*3/MM3 (ref 1.7–7)
NEUTROPHILS NFR BLD AUTO: 65.6 % (ref 42.7–76)
NRBC BLD AUTO-RTO: 0 /100 WBC (ref 0–0.2)
PLATELET # BLD AUTO: 232 10*3/MM3 (ref 140–450)
PMV BLD AUTO: 12.2 FL (ref 6–12)
POTASSIUM SERPL-SCNC: 4.7 MMOL/L (ref 3.5–5.2)
PROT SERPL-MCNC: 6.9 G/DL (ref 6–8.5)
RBC # BLD AUTO: 4.65 10*6/MM3 (ref 4.14–5.8)
SODIUM SERPL-SCNC: 139 MMOL/L (ref 136–145)
WBC NRBC COR # BLD: 8 10*3/MM3 (ref 3.4–10.8)

## 2022-12-19 PROCEDURE — 85025 COMPLETE CBC W/AUTO DIFF WBC: CPT | Performed by: INTERNAL MEDICINE

## 2022-12-19 PROCEDURE — G0299 HHS/HOSPICE OF RN EA 15 MIN: HCPCS

## 2022-12-19 PROCEDURE — 80053 COMPREHEN METABOLIC PANEL: CPT | Performed by: INTERNAL MEDICINE

## 2022-12-21 ENCOUNTER — READMISSION MANAGEMENT (OUTPATIENT)
Dept: CALL CENTER | Facility: HOSPITAL | Age: 72
End: 2022-12-21

## 2022-12-21 NOTE — OUTREACH NOTE
COPD/PN Week 3 Survey    Flowsheet Row Responses   [facility] patient discharged from? [place]   Does the patient have one of the following disease processes/diagnoses(primary or secondary)? COPD   Week 3 attempt successful? Yes   Call start time 1614   Call end time 1617   Discharge diagnosis Malaise,  Acute on chronic hypercapnic hypoxemic respiratory failure/COPD/suspected pneumonia   Meds reviewed with patient/caregiver? Yes   Is the patient taking all medications as directed (includes completed medication regime)? Yes   Does the patient have a primary care provider?  Yes   Comments regarding PCP Pt has followed up with PCP since d/c   Has the patient kept scheduled appointments due by today? Yes   Has all DME been delivered? Yes   Pulse Ox monitoring Intermittent   O2 Sat comments Not checked today   What is the patient's perception of their health status since discharge? Improving   Are the patient's immunizations up to date?  Yes   Is the patient/caregiver able to teach back signs and symptoms of worsening condition: Fever/chills, Shortness of breath, Chest pain   Week 3 call completed? Yes   Is the patient interested in additional calls from an ambulatory ?  NOTE:  applies to high risk patients requiring additional follow-up. No          LOTUS BRYAN - Registered Nurse

## 2022-12-22 VITALS
OXYGEN SATURATION: 97 % | DIASTOLIC BLOOD PRESSURE: 72 MMHG | HEART RATE: 88 BPM | SYSTOLIC BLOOD PRESSURE: 128 MMHG | TEMPERATURE: 97.8 F | RESPIRATION RATE: 18 BRPM

## 2022-12-27 ENCOUNTER — HOME CARE VISIT (OUTPATIENT)
Dept: HOME HEALTH SERVICES | Facility: HOME HEALTHCARE | Age: 72
End: 2022-12-27
Payer: MEDICARE

## 2022-12-27 ENCOUNTER — LAB REQUISITION (OUTPATIENT)
Dept: LAB | Facility: HOSPITAL | Age: 72
End: 2022-12-27

## 2022-12-27 DIAGNOSIS — R78.81 BACTEREMIA: ICD-10-CM

## 2022-12-27 LAB
ALBUMIN SERPL-MCNC: 4.1 G/DL (ref 3.5–5.2)
ALBUMIN/GLOB SERPL: 1.5 G/DL
ALP SERPL-CCNC: 59 U/L (ref 39–117)
ALT SERPL W P-5'-P-CCNC: 11 U/L (ref 1–41)
ANION GAP SERPL CALCULATED.3IONS-SCNC: 8.7 MMOL/L (ref 5–15)
AST SERPL-CCNC: 15 U/L (ref 1–40)
BASOPHILS # BLD AUTO: 0.07 10*3/MM3 (ref 0–0.2)
BASOPHILS NFR BLD AUTO: 1 % (ref 0–1.5)
BILIRUB SERPL-MCNC: 0.2 MG/DL (ref 0–1.2)
BUN SERPL-MCNC: 10 MG/DL (ref 8–23)
BUN/CREAT SERPL: 17.2 (ref 7–25)
CALCIUM SPEC-SCNC: 9.7 MG/DL (ref 8.6–10.5)
CHLORIDE SERPL-SCNC: 100 MMOL/L (ref 98–107)
CO2 SERPL-SCNC: 31.3 MMOL/L (ref 22–29)
CREAT SERPL-MCNC: 0.58 MG/DL (ref 0.76–1.27)
DEPRECATED RDW RBC AUTO: 51.9 FL (ref 37–54)
EGFRCR SERPLBLD CKD-EPI 2021: 104.9 ML/MIN/1.73
EOSINOPHIL # BLD AUTO: 0.14 10*3/MM3 (ref 0–0.4)
EOSINOPHIL NFR BLD AUTO: 2 % (ref 0.3–6.2)
ERYTHROCYTE [DISTWIDTH] IN BLOOD BY AUTOMATED COUNT: 13.6 % (ref 12.3–15.4)
GLOBULIN UR ELPH-MCNC: 2.8 GM/DL
GLUCOSE SERPL-MCNC: 120 MG/DL (ref 65–99)
HCT VFR BLD AUTO: 45.6 % (ref 37.5–51)
HGB BLD-MCNC: 13.4 G/DL (ref 13–17.7)
IMM GRANULOCYTES # BLD AUTO: 0.03 10*3/MM3 (ref 0–0.05)
IMM GRANULOCYTES NFR BLD AUTO: 0.4 % (ref 0–0.5)
LYMPHOCYTES # BLD AUTO: 1.45 10*3/MM3 (ref 0.7–3.1)
LYMPHOCYTES NFR BLD AUTO: 20.5 % (ref 19.6–45.3)
MCH RBC QN AUTO: 30 PG (ref 26.6–33)
MCHC RBC AUTO-ENTMCNC: 29.4 G/DL (ref 31.5–35.7)
MCV RBC AUTO: 102 FL (ref 79–97)
MONOCYTES # BLD AUTO: 0.67 10*3/MM3 (ref 0.1–0.9)
MONOCYTES NFR BLD AUTO: 9.4 % (ref 5–12)
NEUTROPHILS NFR BLD AUTO: 4.73 10*3/MM3 (ref 1.7–7)
NEUTROPHILS NFR BLD AUTO: 66.7 % (ref 42.7–76)
NRBC BLD AUTO-RTO: 0 /100 WBC (ref 0–0.2)
PLATELET # BLD AUTO: 255 10*3/MM3 (ref 140–450)
PMV BLD AUTO: 12.8 FL (ref 6–12)
POTASSIUM SERPL-SCNC: 4.9 MMOL/L (ref 3.5–5.2)
PROT SERPL-MCNC: 6.9 G/DL (ref 6–8.5)
RBC # BLD AUTO: 4.47 10*6/MM3 (ref 4.14–5.8)
SODIUM SERPL-SCNC: 140 MMOL/L (ref 136–145)
WBC NRBC COR # BLD: 7.09 10*3/MM3 (ref 3.4–10.8)

## 2022-12-27 PROCEDURE — 80053 COMPREHEN METABOLIC PANEL: CPT | Performed by: INTERNAL MEDICINE

## 2022-12-27 PROCEDURE — 85025 COMPLETE CBC W/AUTO DIFF WBC: CPT | Performed by: INTERNAL MEDICINE

## 2022-12-27 PROCEDURE — G0299 HHS/HOSPICE OF RN EA 15 MIN: HCPCS

## 2022-12-28 VITALS
OXYGEN SATURATION: 98 % | TEMPERATURE: 97.8 F | RESPIRATION RATE: 18 BRPM | HEART RATE: 88 BPM | SYSTOLIC BLOOD PRESSURE: 132 MMHG | DIASTOLIC BLOOD PRESSURE: 68 MMHG

## 2022-12-31 ENCOUNTER — HOME CARE VISIT (OUTPATIENT)
Dept: HOME HEALTH SERVICES | Facility: HOME HEALTHCARE | Age: 72
End: 2022-12-31
Payer: MEDICARE

## 2022-12-31 PROCEDURE — G0299 HHS/HOSPICE OF RN EA 15 MIN: HCPCS

## 2023-01-02 VITALS
TEMPERATURE: 98.3 F | RESPIRATION RATE: 18 BRPM | OXYGEN SATURATION: 94 % | DIASTOLIC BLOOD PRESSURE: 66 MMHG | SYSTOLIC BLOOD PRESSURE: 128 MMHG | HEART RATE: 89 BPM

## 2023-03-04 ENCOUNTER — HOSPITAL ENCOUNTER (INPATIENT)
Facility: HOSPITAL | Age: 73
LOS: 4 days | Discharge: HOME | DRG: 177 | End: 2023-03-09
Attending: EMERGENCY MEDICINE | Admitting: INTERNAL MEDICINE
Payer: MEDICARE

## 2023-03-04 ENCOUNTER — APPOINTMENT (OUTPATIENT)
Dept: GENERAL RADIOLOGY | Facility: HOSPITAL | Age: 73
DRG: 177 | End: 2023-03-04
Payer: MEDICARE

## 2023-03-04 DIAGNOSIS — R09.02 HYPOXIA: ICD-10-CM

## 2023-03-04 DIAGNOSIS — J44.1 COPD EXACERBATION: Primary | ICD-10-CM

## 2023-03-04 DIAGNOSIS — G89.4 CHRONIC PAIN SYNDROME: ICD-10-CM

## 2023-03-04 LAB
ALBUMIN SERPL-MCNC: 4 G/DL (ref 3.5–5.2)
ALBUMIN/GLOB SERPL: 1.3 G/DL
ALP SERPL-CCNC: 60 U/L (ref 39–117)
ALT SERPL W P-5'-P-CCNC: 13 U/L (ref 1–41)
ANION GAP SERPL CALCULATED.3IONS-SCNC: 9.6 MMOL/L (ref 5–15)
ARTERIAL PATENCY WRIST A: POSITIVE
AST SERPL-CCNC: 15 U/L (ref 1–40)
ATMOSPHERIC PRESS: 740 MMHG
BASE EXCESS BLDA CALC-SCNC: 9.5 MMOL/L (ref 0–2)
BASOPHILS # BLD AUTO: 0.02 10*3/MM3 (ref 0–0.2)
BASOPHILS NFR BLD AUTO: 0.4 % (ref 0–1.5)
BDY SITE: ABNORMAL
BILIRUB SERPL-MCNC: 0.2 MG/DL (ref 0–1.2)
BODY TEMPERATURE: 37 C
BUN SERPL-MCNC: 11 MG/DL (ref 8–23)
BUN/CREAT SERPL: 15.7 (ref 7–25)
CALCIUM SPEC-SCNC: 8.8 MG/DL (ref 8.6–10.5)
CHLORIDE SERPL-SCNC: 94 MMOL/L (ref 98–107)
CO2 SERPL-SCNC: 31.4 MMOL/L (ref 22–29)
CREAT SERPL-MCNC: 0.7 MG/DL (ref 0.76–1.27)
D-LACTATE SERPL-SCNC: 1 MMOL/L (ref 0.5–2)
DEPRECATED RDW RBC AUTO: 53.9 FL (ref 37–54)
EGFRCR SERPLBLD CKD-EPI 2021: 99.1 ML/MIN/1.73
EOSINOPHIL # BLD AUTO: 0 10*3/MM3 (ref 0–0.4)
EOSINOPHIL NFR BLD AUTO: 0 % (ref 0.3–6.2)
ERYTHROCYTE [DISTWIDTH] IN BLOOD BY AUTOMATED COUNT: 14.4 % (ref 12.3–15.4)
GAS FLOW AIRWAY: 3 LPM
GLOBULIN UR ELPH-MCNC: 3 GM/DL
GLUCOSE BLDC GLUCOMTR-MCNC: 226 MG/DL (ref 70–130)
GLUCOSE SERPL-MCNC: 194 MG/DL (ref 65–99)
HCO3 BLDA-SCNC: 36.1 MMOL/L (ref 20–26)
HCT VFR BLD AUTO: 40.3 % (ref 37.5–51)
HGB BLD-MCNC: 12.4 G/DL (ref 13–17.7)
HGB BLDA-MCNC: 12.7 G/DL (ref 14–18)
HOLD SPECIMEN: NORMAL
HOLD SPECIMEN: NORMAL
IMM GRANULOCYTES # BLD AUTO: 0.02 10*3/MM3 (ref 0–0.05)
IMM GRANULOCYTES NFR BLD AUTO: 0.4 % (ref 0–0.5)
LYMPHOCYTES # BLD AUTO: 0.54 10*3/MM3 (ref 0.7–3.1)
LYMPHOCYTES NFR BLD AUTO: 11.5 % (ref 19.6–45.3)
Lab: ABNORMAL
Lab: ABNORMAL
MCH RBC QN AUTO: 30.8 PG (ref 26.6–33)
MCHC RBC AUTO-ENTMCNC: 30.8 G/DL (ref 31.5–35.7)
MCV RBC AUTO: 100.2 FL (ref 79–97)
MODALITY: ABNORMAL
MONOCYTES # BLD AUTO: 0.52 10*3/MM3 (ref 0.1–0.9)
MONOCYTES NFR BLD AUTO: 11.1 % (ref 5–12)
NEUTROPHILS NFR BLD AUTO: 3.58 10*3/MM3 (ref 1.7–7)
NEUTROPHILS NFR BLD AUTO: 76.6 % (ref 42.7–76)
NOTIFIED BY: ABNORMAL
NOTIFIED WHO: ABNORMAL
NRBC BLD AUTO-RTO: 0 /100 WBC (ref 0–0.2)
NT-PROBNP SERPL-MCNC: <36 PG/ML (ref 0–900)
PCO2 BLDA: 57.3 MM HG (ref 35–45)
PCO2 TEMP ADJ BLD: 57.3 MM HG (ref 35–45)
PH BLDA: 7.41 PH UNITS (ref 7.35–7.45)
PH, TEMP CORRECTED: 7.41 PH UNITS (ref 7.35–7.45)
PLATELET # BLD AUTO: 195 10*3/MM3 (ref 140–450)
PMV BLD AUTO: 11.7 FL (ref 6–12)
PO2 BLDA: 48.3 MM HG (ref 83–108)
PO2 TEMP ADJ BLD: 48.3 MM HG (ref 83–108)
POTASSIUM SERPL-SCNC: 4.2 MMOL/L (ref 3.5–5.2)
PROT SERPL-MCNC: 7 G/DL (ref 6–8.5)
QT INTERVAL: 320 MS
RBC # BLD AUTO: 4.02 10*6/MM3 (ref 4.14–5.8)
SAO2 % BLDCOA: 85 % (ref 94–99)
SODIUM SERPL-SCNC: 135 MMOL/L (ref 136–145)
TROPONIN T SERPL HS-MCNC: 42 NG/L
VENTILATOR MODE: ABNORMAL
WBC NRBC COR # BLD: 4.68 10*3/MM3 (ref 3.4–10.8)
WHOLE BLOOD HOLD COAG: NORMAL
WHOLE BLOOD HOLD SPECIMEN: NORMAL

## 2023-03-04 PROCEDURE — 93005 ELECTROCARDIOGRAM TRACING: CPT | Performed by: EMERGENCY MEDICINE

## 2023-03-04 PROCEDURE — 99285 EMERGENCY DEPT VISIT HI MDM: CPT

## 2023-03-04 PROCEDURE — 94761 N-INVAS EAR/PLS OXIMETRY MLT: CPT

## 2023-03-04 PROCEDURE — G0378 HOSPITAL OBSERVATION PER HR: HCPCS

## 2023-03-04 PROCEDURE — 94799 UNLISTED PULMONARY SVC/PX: CPT

## 2023-03-04 PROCEDURE — 87040 BLOOD CULTURE FOR BACTERIA: CPT | Performed by: EMERGENCY MEDICINE

## 2023-03-04 PROCEDURE — 93010 ELECTROCARDIOGRAM REPORT: CPT | Performed by: INTERNAL MEDICINE

## 2023-03-04 PROCEDURE — 82962 GLUCOSE BLOOD TEST: CPT

## 2023-03-04 PROCEDURE — 25010000002 METHYLPREDNISOLONE PER 125 MG: Performed by: EMERGENCY MEDICINE

## 2023-03-04 PROCEDURE — 36600 WITHDRAWAL OF ARTERIAL BLOOD: CPT

## 2023-03-04 PROCEDURE — 85025 COMPLETE CBC W/AUTO DIFF WBC: CPT | Performed by: EMERGENCY MEDICINE

## 2023-03-04 PROCEDURE — 82803 BLOOD GASES ANY COMBINATION: CPT

## 2023-03-04 PROCEDURE — 63710000001 INSULIN DETEMIR PER 5 UNITS: Performed by: INTERNAL MEDICINE

## 2023-03-04 PROCEDURE — 83880 ASSAY OF NATRIURETIC PEPTIDE: CPT | Performed by: EMERGENCY MEDICINE

## 2023-03-04 PROCEDURE — 84484 ASSAY OF TROPONIN QUANT: CPT | Performed by: EMERGENCY MEDICINE

## 2023-03-04 PROCEDURE — 94640 AIRWAY INHALATION TREATMENT: CPT

## 2023-03-04 PROCEDURE — 36415 COLL VENOUS BLD VENIPUNCTURE: CPT

## 2023-03-04 PROCEDURE — 63710000001 MYCOPHENOLATE MOFETIL PER 250 MG: Performed by: INTERNAL MEDICINE

## 2023-03-04 PROCEDURE — 84145 PROCALCITONIN (PCT): CPT | Performed by: INTERNAL MEDICINE

## 2023-03-04 PROCEDURE — 71046 X-RAY EXAM CHEST 2 VIEWS: CPT

## 2023-03-04 PROCEDURE — 83605 ASSAY OF LACTIC ACID: CPT | Performed by: EMERGENCY MEDICINE

## 2023-03-04 PROCEDURE — 80053 COMPREHEN METABOLIC PANEL: CPT | Performed by: EMERGENCY MEDICINE

## 2023-03-04 RX ORDER — DOXYCYCLINE 100 MG/1
100 CAPSULE ORAL ONCE
Status: COMPLETED | OUTPATIENT
Start: 2023-03-04 | End: 2023-03-04

## 2023-03-04 RX ORDER — DOXYCYCLINE 100 MG/1
CAPSULE ORAL
Status: COMPLETED
Start: 2023-03-04 | End: 2023-03-04

## 2023-03-04 RX ORDER — NICOTINE POLACRILEX 4 MG
15 LOZENGE BUCCAL
Status: DISCONTINUED | OUTPATIENT
Start: 2023-03-04 | End: 2023-03-09 | Stop reason: HOSPADM

## 2023-03-04 RX ORDER — SODIUM CHLORIDE 9 MG/ML
40 INJECTION, SOLUTION INTRAVENOUS AS NEEDED
Status: DISCONTINUED | OUTPATIENT
Start: 2023-03-04 | End: 2023-03-09 | Stop reason: HOSPADM

## 2023-03-04 RX ORDER — IPRATROPIUM BROMIDE AND ALBUTEROL SULFATE 2.5; .5 MG/3ML; MG/3ML
3 SOLUTION RESPIRATORY (INHALATION) ONCE
Status: COMPLETED | OUTPATIENT
Start: 2023-03-04 | End: 2023-03-04

## 2023-03-04 RX ORDER — DOXYCYCLINE 100 MG/1
100 CAPSULE ORAL EVERY 12 HOURS SCHEDULED
Status: DISCONTINUED | OUTPATIENT
Start: 2023-03-05 | End: 2023-03-06

## 2023-03-04 RX ORDER — ACETAMINOPHEN 325 MG/1
650 TABLET ORAL EVERY 4 HOURS PRN
Status: DISCONTINUED | OUTPATIENT
Start: 2023-03-04 | End: 2023-03-09 | Stop reason: HOSPADM

## 2023-03-04 RX ORDER — METHYLPREDNISOLONE SODIUM SUCCINATE 40 MG/ML
40 INJECTION, POWDER, LYOPHILIZED, FOR SOLUTION INTRAMUSCULAR; INTRAVENOUS EVERY 8 HOURS
Status: DISCONTINUED | OUTPATIENT
Start: 2023-03-05 | End: 2023-03-06

## 2023-03-04 RX ORDER — CHOLECALCIFEROL (VITAMIN D3) 125 MCG
5 CAPSULE ORAL NIGHTLY PRN
Status: DISCONTINUED | OUTPATIENT
Start: 2023-03-04 | End: 2023-03-05

## 2023-03-04 RX ORDER — NICOTINE 21 MG/24HR
1 PATCH, TRANSDERMAL 24 HOURS TRANSDERMAL
Status: DISCONTINUED | OUTPATIENT
Start: 2023-03-05 | End: 2023-03-04

## 2023-03-04 RX ORDER — DEXTROSE MONOHYDRATE 25 G/50ML
25 INJECTION, SOLUTION INTRAVENOUS
Status: DISCONTINUED | OUTPATIENT
Start: 2023-03-04 | End: 2023-03-09 | Stop reason: HOSPADM

## 2023-03-04 RX ORDER — SODIUM CHLORIDE 0.9 % (FLUSH) 0.9 %
10 SYRINGE (ML) INJECTION EVERY 12 HOURS SCHEDULED
Status: DISCONTINUED | OUTPATIENT
Start: 2023-03-05 | End: 2023-03-09 | Stop reason: HOSPADM

## 2023-03-04 RX ORDER — SODIUM CHLORIDE 0.9 % (FLUSH) 0.9 %
10 SYRINGE (ML) INJECTION AS NEEDED
Status: DISCONTINUED | OUTPATIENT
Start: 2023-03-04 | End: 2023-03-09 | Stop reason: HOSPADM

## 2023-03-04 RX ORDER — INSULIN ASPART 100 [IU]/ML
0-9 INJECTION, SOLUTION INTRAVENOUS; SUBCUTANEOUS
Status: DISCONTINUED | OUTPATIENT
Start: 2023-03-05 | End: 2023-03-09 | Stop reason: HOSPADM

## 2023-03-04 RX ORDER — MYCOPHENOLATE MOFETIL 250 MG/1
500 CAPSULE ORAL EVERY 12 HOURS SCHEDULED
Status: DISCONTINUED | OUTPATIENT
Start: 2023-03-05 | End: 2023-03-09 | Stop reason: HOSPADM

## 2023-03-04 RX ORDER — NICOTINE 21 MG/24HR
PATCH, TRANSDERMAL 24 HOURS TRANSDERMAL
Status: COMPLETED
Start: 2023-03-04 | End: 2023-03-05

## 2023-03-04 RX ORDER — PYRIDOSTIGMINE BROMIDE 60 MG/1
60 TABLET ORAL 4 TIMES DAILY
Status: DISCONTINUED | OUTPATIENT
Start: 2023-03-05 | End: 2023-03-09 | Stop reason: HOSPADM

## 2023-03-04 RX ORDER — HYDROCODONE BITARTRATE AND ACETAMINOPHEN 5; 325 MG/1; MG/1
2 TABLET ORAL ONCE
Status: COMPLETED | OUTPATIENT
Start: 2023-03-04 | End: 2023-03-04

## 2023-03-04 RX ORDER — ACETAMINOPHEN 160 MG/5ML
650 SOLUTION ORAL EVERY 4 HOURS PRN
Status: DISCONTINUED | OUTPATIENT
Start: 2023-03-04 | End: 2023-03-09 | Stop reason: HOSPADM

## 2023-03-04 RX ORDER — ONDANSETRON 2 MG/ML
4 INJECTION INTRAMUSCULAR; INTRAVENOUS EVERY 6 HOURS PRN
Status: DISCONTINUED | OUTPATIENT
Start: 2023-03-04 | End: 2023-03-09 | Stop reason: HOSPADM

## 2023-03-04 RX ORDER — ONDANSETRON 4 MG/1
4 TABLET, FILM COATED ORAL EVERY 6 HOURS PRN
Status: DISCONTINUED | OUTPATIENT
Start: 2023-03-04 | End: 2023-03-09 | Stop reason: HOSPADM

## 2023-03-04 RX ORDER — IPRATROPIUM BROMIDE AND ALBUTEROL SULFATE 2.5; .5 MG/3ML; MG/3ML
3 SOLUTION RESPIRATORY (INHALATION) EVERY 4 HOURS PRN
Status: DISCONTINUED | OUTPATIENT
Start: 2023-03-04 | End: 2023-03-09 | Stop reason: HOSPADM

## 2023-03-04 RX ORDER — ACETAMINOPHEN 650 MG/1
650 SUPPOSITORY RECTAL EVERY 4 HOURS PRN
Status: DISCONTINUED | OUTPATIENT
Start: 2023-03-04 | End: 2023-03-09 | Stop reason: HOSPADM

## 2023-03-04 RX ORDER — ALBUTEROL SULFATE 2.5 MG/3ML
2.5 SOLUTION RESPIRATORY (INHALATION) ONCE
Status: COMPLETED | OUTPATIENT
Start: 2023-03-04 | End: 2023-03-04

## 2023-03-04 RX ORDER — NICOTINE 21 MG/24HR
1 PATCH, TRANSDERMAL 24 HOURS TRANSDERMAL
Status: DISCONTINUED | OUTPATIENT
Start: 2023-03-05 | End: 2023-03-05

## 2023-03-04 RX ORDER — IPRATROPIUM BROMIDE AND ALBUTEROL SULFATE 2.5; .5 MG/3ML; MG/3ML
3 SOLUTION RESPIRATORY (INHALATION)
Status: DISCONTINUED | OUTPATIENT
Start: 2023-03-05 | End: 2023-03-09 | Stop reason: HOSPADM

## 2023-03-04 RX ORDER — METHYLPREDNISOLONE SODIUM SUCCINATE 125 MG/2ML
125 INJECTION, POWDER, LYOPHILIZED, FOR SOLUTION INTRAMUSCULAR; INTRAVENOUS ONCE
Status: COMPLETED | OUTPATIENT
Start: 2023-03-04 | End: 2023-03-04

## 2023-03-04 RX ORDER — HYDROCODONE BITARTRATE AND ACETAMINOPHEN 10; 325 MG/1; MG/1
1 TABLET ORAL EVERY 6 HOURS PRN
Status: DISCONTINUED | OUTPATIENT
Start: 2023-03-04 | End: 2023-03-09 | Stop reason: HOSPADM

## 2023-03-04 RX ADMIN — ALBUTEROL SULFATE 2.5 MG: 2.5 SOLUTION RESPIRATORY (INHALATION) at 19:00

## 2023-03-04 RX ADMIN — IPRATROPIUM BROMIDE AND ALBUTEROL SULFATE 3 ML: .5; 3 SOLUTION RESPIRATORY (INHALATION) at 18:52

## 2023-03-04 RX ADMIN — Medication 10 ML: at 23:44

## 2023-03-04 RX ADMIN — INSULIN DETEMIR 20 UNITS: 100 INJECTION, SOLUTION SUBCUTANEOUS at 23:42

## 2023-03-04 RX ADMIN — METHYLPREDNISOLONE SODIUM SUCCINATE 125 MG: 125 INJECTION, POWDER, FOR SOLUTION INTRAMUSCULAR; INTRAVENOUS at 20:15

## 2023-03-04 RX ADMIN — Medication 1 PATCH: at 23:51

## 2023-03-04 RX ADMIN — MYCOPHENOLATE MOFETIL 500 MG: 250 CAPSULE ORAL at 23:42

## 2023-03-04 RX ADMIN — HYDROCODONE BITARTRATE AND ACETAMINOPHEN 2 TABLET: 5; 325 TABLET ORAL at 20:54

## 2023-03-04 RX ADMIN — PYRIDOSTIGMINE BROMIDE 60 MG: 60 TABLET ORAL at 23:42

## 2023-03-04 RX ADMIN — DOXYCYCLINE 100 MG: 100 CAPSULE ORAL at 20:54

## 2023-03-05 LAB
ANION GAP SERPL CALCULATED.3IONS-SCNC: 12.4 MMOL/L (ref 5–15)
B PARAPERT DNA SPEC QL NAA+PROBE: NOT DETECTED
B PERT DNA SPEC QL NAA+PROBE: NOT DETECTED
BASOPHILS # BLD AUTO: 0.01 10*3/MM3 (ref 0–0.2)
BASOPHILS NFR BLD AUTO: 0.3 % (ref 0–1.5)
BUN SERPL-MCNC: 18 MG/DL (ref 8–23)
BUN/CREAT SERPL: 23.4 (ref 7–25)
C PNEUM DNA NPH QL NAA+NON-PROBE: NOT DETECTED
CALCIUM SPEC-SCNC: 9.3 MG/DL (ref 8.6–10.5)
CHLORIDE SERPL-SCNC: 93 MMOL/L (ref 98–107)
CO2 SERPL-SCNC: 29.6 MMOL/L (ref 22–29)
CREAT SERPL-MCNC: 0.77 MG/DL (ref 0.76–1.27)
DEPRECATED RDW RBC AUTO: 53.3 FL (ref 37–54)
EGFRCR SERPLBLD CKD-EPI 2021: 96.3 ML/MIN/1.73
EOSINOPHIL # BLD AUTO: 0 10*3/MM3 (ref 0–0.4)
EOSINOPHIL NFR BLD AUTO: 0 % (ref 0.3–6.2)
ERYTHROCYTE [DISTWIDTH] IN BLOOD BY AUTOMATED COUNT: 14.2 % (ref 12.3–15.4)
FLUAV SUBTYP SPEC NAA+PROBE: NOT DETECTED
FLUBV RNA ISLT QL NAA+PROBE: NOT DETECTED
GLUCOSE BLDC GLUCOMTR-MCNC: 305 MG/DL (ref 70–130)
GLUCOSE BLDC GLUCOMTR-MCNC: 339 MG/DL (ref 70–130)
GLUCOSE BLDC GLUCOMTR-MCNC: 363 MG/DL (ref 70–130)
GLUCOSE SERPL-MCNC: 363 MG/DL (ref 65–99)
HADV DNA SPEC NAA+PROBE: NOT DETECTED
HCOV 229E RNA SPEC QL NAA+PROBE: NOT DETECTED
HCOV HKU1 RNA SPEC QL NAA+PROBE: NOT DETECTED
HCOV NL63 RNA SPEC QL NAA+PROBE: NOT DETECTED
HCOV OC43 RNA SPEC QL NAA+PROBE: NOT DETECTED
HCT VFR BLD AUTO: 43.7 % (ref 37.5–51)
HGB BLD-MCNC: 13.2 G/DL (ref 13–17.7)
HMPV RNA NPH QL NAA+NON-PROBE: NOT DETECTED
HPIV1 RNA ISLT QL NAA+PROBE: NOT DETECTED
HPIV2 RNA SPEC QL NAA+PROBE: NOT DETECTED
HPIV3 RNA NPH QL NAA+PROBE: NOT DETECTED
HPIV4 P GENE NPH QL NAA+PROBE: NOT DETECTED
IMM GRANULOCYTES # BLD AUTO: 0.02 10*3/MM3 (ref 0–0.05)
IMM GRANULOCYTES NFR BLD AUTO: 0.6 % (ref 0–0.5)
LYMPHOCYTES # BLD AUTO: 0.32 10*3/MM3 (ref 0.7–3.1)
LYMPHOCYTES NFR BLD AUTO: 10 % (ref 19.6–45.3)
M PNEUMO IGG SER IA-ACNC: NOT DETECTED
MAGNESIUM SERPL-MCNC: 2 MG/DL (ref 1.6–2.4)
MCH RBC QN AUTO: 30.6 PG (ref 26.6–33)
MCHC RBC AUTO-ENTMCNC: 30.2 G/DL (ref 31.5–35.7)
MCV RBC AUTO: 101.2 FL (ref 79–97)
MONOCYTES # BLD AUTO: 0.07 10*3/MM3 (ref 0.1–0.9)
MONOCYTES NFR BLD AUTO: 2.2 % (ref 5–12)
NEUTROPHILS NFR BLD AUTO: 2.77 10*3/MM3 (ref 1.7–7)
NEUTROPHILS NFR BLD AUTO: 86.9 % (ref 42.7–76)
NRBC BLD AUTO-RTO: 0 /100 WBC (ref 0–0.2)
PLATELET # BLD AUTO: 213 10*3/MM3 (ref 140–450)
PMV BLD AUTO: 11.3 FL (ref 6–12)
POTASSIUM SERPL-SCNC: 4.5 MMOL/L (ref 3.5–5.2)
PROCALCITONIN SERPL-MCNC: 0.08 NG/ML (ref 0–0.25)
RBC # BLD AUTO: 4.32 10*6/MM3 (ref 4.14–5.8)
RHINOVIRUS RNA SPEC NAA+PROBE: NOT DETECTED
RSV RNA NPH QL NAA+NON-PROBE: NOT DETECTED
SARS-COV-2 RNA NPH QL NAA+NON-PROBE: DETECTED
SODIUM SERPL-SCNC: 135 MMOL/L (ref 136–145)
WBC NRBC COR # BLD: 3.19 10*3/MM3 (ref 3.4–10.8)

## 2023-03-05 PROCEDURE — 82962 GLUCOSE BLOOD TEST: CPT

## 2023-03-05 PROCEDURE — 63710000001 INSULIN DETEMIR PER 5 UNITS: Performed by: INTERNAL MEDICINE

## 2023-03-05 PROCEDURE — 80048 BASIC METABOLIC PNL TOTAL CA: CPT | Performed by: INTERNAL MEDICINE

## 2023-03-05 PROCEDURE — 94664 DEMO&/EVAL PT USE INHALER: CPT

## 2023-03-05 PROCEDURE — 94799 UNLISTED PULMONARY SVC/PX: CPT

## 2023-03-05 PROCEDURE — 63710000001 INSULIN ASPART PER 5 UNITS: Performed by: INTERNAL MEDICINE

## 2023-03-05 PROCEDURE — 25010000002 METHYLPREDNISOLONE PER 40 MG: Performed by: INTERNAL MEDICINE

## 2023-03-05 PROCEDURE — 85025 COMPLETE CBC W/AUTO DIFF WBC: CPT | Performed by: INTERNAL MEDICINE

## 2023-03-05 PROCEDURE — 63710000001 MYCOPHENOLATE MOFETIL PER 250 MG: Performed by: INTERNAL MEDICINE

## 2023-03-05 PROCEDURE — 83735 ASSAY OF MAGNESIUM: CPT | Performed by: INTERNAL MEDICINE

## 2023-03-05 PROCEDURE — 94761 N-INVAS EAR/PLS OXIMETRY MLT: CPT

## 2023-03-05 PROCEDURE — 0202U NFCT DS 22 TRGT SARS-COV-2: CPT | Performed by: INTERNAL MEDICINE

## 2023-03-05 RX ORDER — CHOLECALCIFEROL (VITAMIN D3) 125 MCG
10 CAPSULE ORAL NIGHTLY PRN
Status: DISCONTINUED | OUTPATIENT
Start: 2023-03-05 | End: 2023-03-09 | Stop reason: HOSPADM

## 2023-03-05 RX ORDER — NICOTINE 21 MG/24HR
1 PATCH, TRANSDERMAL 24 HOURS TRANSDERMAL
Status: DISCONTINUED | OUTPATIENT
Start: 2023-03-05 | End: 2023-03-09 | Stop reason: HOSPADM

## 2023-03-05 RX ORDER — TERAZOSIN 1 MG/1
2 CAPSULE ORAL NIGHTLY
Status: DISCONTINUED | OUTPATIENT
Start: 2023-03-05 | End: 2023-03-09 | Stop reason: HOSPADM

## 2023-03-05 RX ORDER — ATORVASTATIN CALCIUM 10 MG/1
10 TABLET, FILM COATED ORAL DAILY
Status: DISCONTINUED | OUTPATIENT
Start: 2023-03-05 | End: 2023-03-09 | Stop reason: HOSPADM

## 2023-03-05 RX ORDER — CETIRIZINE HYDROCHLORIDE 10 MG/1
10 TABLET ORAL DAILY
Status: DISCONTINUED | OUTPATIENT
Start: 2023-03-05 | End: 2023-03-09 | Stop reason: HOSPADM

## 2023-03-05 RX ORDER — CHOLECALCIFEROL (VITAMIN D3) 125 MCG
10 CAPSULE ORAL NIGHTLY PRN
COMMUNITY

## 2023-03-05 RX ORDER — ASPIRIN 81 MG/1
81 TABLET, CHEWABLE ORAL DAILY
Status: DISCONTINUED | OUTPATIENT
Start: 2023-03-05 | End: 2023-03-09 | Stop reason: HOSPADM

## 2023-03-05 RX ORDER — DOCUSATE SODIUM 100 MG/1
200 CAPSULE, LIQUID FILLED ORAL DAILY PRN
Status: DISCONTINUED | OUTPATIENT
Start: 2023-03-05 | End: 2023-03-09 | Stop reason: HOSPADM

## 2023-03-05 RX ORDER — MONTELUKAST SODIUM 10 MG/1
10 TABLET ORAL NIGHTLY
Status: DISCONTINUED | OUTPATIENT
Start: 2023-03-05 | End: 2023-03-09 | Stop reason: HOSPADM

## 2023-03-05 RX ADMIN — MONTELUKAST SODIUM 10 MG: 10 TABLET, COATED ORAL at 20:05

## 2023-03-05 RX ADMIN — MYCOPHENOLATE MOFETIL 500 MG: 250 CAPSULE ORAL at 08:55

## 2023-03-05 RX ADMIN — INSULIN ASPART 7 UNITS: 100 INJECTION, SOLUTION INTRAVENOUS; SUBCUTANEOUS at 17:08

## 2023-03-05 RX ADMIN — DOXYCYCLINE 100 MG: 100 CAPSULE ORAL at 20:06

## 2023-03-05 RX ADMIN — IPRATROPIUM BROMIDE AND ALBUTEROL SULFATE 3 ML: .5; 3 SOLUTION RESPIRATORY (INHALATION) at 11:44

## 2023-03-05 RX ADMIN — INSULIN DETEMIR 10 UNITS: 100 INJECTION, SOLUTION SUBCUTANEOUS at 13:27

## 2023-03-05 RX ADMIN — METHYLPREDNISOLONE SODIUM SUCCINATE 40 MG: 40 INJECTION, POWDER, FOR SOLUTION INTRAMUSCULAR; INTRAVENOUS at 03:26

## 2023-03-05 RX ADMIN — IPRATROPIUM BROMIDE AND ALBUTEROL SULFATE 3 ML: .5; 3 SOLUTION RESPIRATORY (INHALATION) at 00:12

## 2023-03-05 RX ADMIN — Medication 10 ML: at 17:10

## 2023-03-05 RX ADMIN — METHYLPREDNISOLONE SODIUM SUCCINATE 40 MG: 40 INJECTION, POWDER, FOR SOLUTION INTRAMUSCULAR; INTRAVENOUS at 12:36

## 2023-03-05 RX ADMIN — ASPIRIN 81 MG 81 MG: 81 TABLET ORAL at 13:26

## 2023-03-05 RX ADMIN — INSULIN ASPART 8 UNITS: 100 INJECTION, SOLUTION INTRAVENOUS; SUBCUTANEOUS at 12:36

## 2023-03-05 RX ADMIN — INSULIN ASPART 7 UNITS: 100 INJECTION, SOLUTION INTRAVENOUS; SUBCUTANEOUS at 08:55

## 2023-03-05 RX ADMIN — ATORVASTATIN CALCIUM 10 MG: 10 TABLET, FILM COATED ORAL at 13:27

## 2023-03-05 RX ADMIN — IPRATROPIUM BROMIDE AND ALBUTEROL SULFATE 3 ML: .5; 3 SOLUTION RESPIRATORY (INHALATION) at 15:44

## 2023-03-05 RX ADMIN — Medication 10 ML: at 20:07

## 2023-03-05 RX ADMIN — PYRIDOSTIGMINE BROMIDE 60 MG: 60 TABLET ORAL at 12:36

## 2023-03-05 RX ADMIN — PYRIDOSTIGMINE BROMIDE 60 MG: 60 TABLET ORAL at 17:08

## 2023-03-05 RX ADMIN — IPRATROPIUM BROMIDE AND ALBUTEROL SULFATE 3 ML: .5; 3 SOLUTION RESPIRATORY (INHALATION) at 19:28

## 2023-03-05 RX ADMIN — PYRIDOSTIGMINE BROMIDE 60 MG: 60 TABLET ORAL at 06:01

## 2023-03-05 RX ADMIN — HYDROCODONE BITARTRATE AND ACETAMINOPHEN 1 TABLET: 10; 325 TABLET ORAL at 03:29

## 2023-03-05 RX ADMIN — MYCOPHENOLATE MOFETIL 500 MG: 250 CAPSULE ORAL at 20:05

## 2023-03-05 RX ADMIN — IPRATROPIUM BROMIDE AND ALBUTEROL SULFATE 3 ML: .5; 3 SOLUTION RESPIRATORY (INHALATION) at 07:35

## 2023-03-05 RX ADMIN — TERAZOSIN 2 MG: 1 CAPSULE ORAL at 20:05

## 2023-03-05 RX ADMIN — INSULIN DETEMIR 25 UNITS: 100 INJECTION, SOLUTION SUBCUTANEOUS at 20:05

## 2023-03-05 RX ADMIN — METHYLPREDNISOLONE SODIUM SUCCINATE 40 MG: 40 INJECTION, POWDER, FOR SOLUTION INTRAMUSCULAR; INTRAVENOUS at 20:05

## 2023-03-05 RX ADMIN — PYRIDOSTIGMINE BROMIDE 60 MG: 60 TABLET ORAL at 23:29

## 2023-03-05 RX ADMIN — DOXYCYCLINE 100 MG: 100 CAPSULE ORAL at 08:55

## 2023-03-05 RX ADMIN — HYDROCODONE BITARTRATE AND ACETAMINOPHEN 1 TABLET: 10; 325 TABLET ORAL at 12:42

## 2023-03-05 RX ADMIN — Medication 1 PATCH: at 13:27

## 2023-03-05 RX ADMIN — CETIRIZINE HYDROCHLORIDE 10 MG: 10 TABLET ORAL at 13:27

## 2023-03-05 RX ADMIN — INSULIN DETEMIR 20 UNITS: 100 INJECTION, SOLUTION SUBCUTANEOUS at 08:55

## 2023-03-05 RX ADMIN — HYDROCODONE BITARTRATE AND ACETAMINOPHEN 1 TABLET: 10; 325 TABLET ORAL at 20:11

## 2023-03-05 NOTE — PROGRESS NOTES
Adult Nutrition  Assessment/PES    Patient Name:  Kt Armstrong  YOB: 1950  MRN: [id number]  Admit Date:  3/4/2023    Assessment Date:  3/5/2023    Comments:  Agree with diet. Encourage po and voice food prefs.   Will cont to follow and monitor. Reports tolerating lunch well today.      Reason for Assessment     Row Name 03/05/23 1322          Reason for Assessment    Reason For Assessment identified at risk by screening criteria     Diagnosis pulmonary disease  AE COPD hx DM HTN     Identified At Risk by Screening Criteria MST SCORE 2+                Nutrition/Diet History     Row Name 03/05/23 1323          Nutrition/Diet History    Typical Intake (Food/Fluid/EN/PN) Pt with male visitor at bedside, permission given. NKFA. Reports some days with no appetite at home. Denies any curernt needs ate lunch well                Labs/Tests/Procedures/Meds     Row Name 03/05/23 1323          Labs/Procedures/Meds    Lab Results Reviewed reviewed     Lab Results Comments glu 363        Diagnostic Tests/Procedures    Diagnostic Test/Procedure Reviewed reviewed        Medications    Pertinent Medications Reviewed reviewed     Pertinent Medications Comments novolog, levemir, solumedrol                  Estimated/Assessed Needs - Anthropometrics     Row Name 03/05/23 1323          Anthropometrics    Weight for Calculation 119 kg (263 lb 3.2 oz)        Estimated/Assessed Needs    Additional Documentation Estimated Calorie Needs (Group);Fluid Requirements (Group);Protein Requirements (Group)        Estimated Calorie Needs    Estimated Calorie Requirement (kcal/day) 2431 kcal ( mifflin 1.2 ) (273 gm CHO, 45% kcal )     Estimated Calorie Need Method [place]- Jeor        Protein Requirements    Est Protein Requirement Amount (gms/kg) 0.8 gm protein  95 gm pro        Fluid Requirements    Estimated Fluid Requirement Method RDA Method  2431 ml                Nutrition Prescription Ordered     Row Name 03/05/23 1327           Nutrition Prescription PO    Common Modifiers Cardiac;Consistent Carbohydrate                Evaluation of Received Nutrient/Fluid Intake     Row Name 03/05/23 1324          Fluid Intake Evaluation    Oral Fluid (mL) --  insufficient data        PO Evaluation    Number of Days PO Intake Evaluated Insufficient Data                   Problem/Interventions:   Problem 1     Row Name 03/05/23 1325          Nutrition Diagnoses Problem 1    Problem 1 Inadequate Nutrient Intake     Etiology (related to) Medical Diagnosis;Factors Affecting Nutrition     Signs/Symptoms (evidenced by) Report/Observation                      Intervention Goal     Row Name 03/05/23 1325          Intervention Goal    General Meet nutritional needs for age/condition     PO Establish PO;PO intake (%)     PO Intake % 50 %  or greater                Nutrition Intervention     Row Name 03/05/23 1325          Nutrition Intervention    RD/Tech Action Encourage intake;Follow Tx progress                  Education/Evaluation     Row Name 03/05/23 1325          Education    Education Education offered and refused        Monitor/Evaluation    Monitor Per protocol;I&O;PO intake;Pertinent labs;Weight;Symptoms                 Electronically signed by:  Nicole España RD  03/05/23 13:25 EST

## 2023-03-05 NOTE — H&P
[facility] HOSPITALIST     Ney Kathleen MD    CHIEF COMPLAINT:     Shortness of breath    HISTORY OF PRESENT ILLNESS:    Patient is a 72 year old male with a past medical history of COPD, chronic respiratory failure on 2L NC, diabetes mellitus type 2, HTN, HLP, and myasthenia gravis who presented to the ED complaining of worsening shortness of breath. Patient states he has had increasing shortness of breath over the past week, he has increased his oxygen from 2 to 4L NC. He has been using his inhaler 7 times a day. Patient reports he was treated with steroids and antibiotics a few weeks ago. He denies any productive cough, fever, chills, sob, or sick contacts.       Past Medical History:   Diagnosis Date   • Arthritis of back    • Arthritis of neck    • Asthma    • COPD (chronic obstructive pulmonary disease) ([facility])    • Depression    • DM (diabetes mellitus) ([facility])    • GERD (gastroesophageal reflux disease)    • Hip arthrosis    • Hyperlipidemia    • Hypertension    • Knee swelling    • Myasthenia gravis ([facility])    • Periarthritis of shoulder    • Rotator cuff syndrome      Past Surgical History:   Procedure Laterality Date   • APPENDECTOMY     • CHOLECYSTECTOMY     • ROTATOR CUFF REPAIR Right      Family History   Problem Relation Age of Onset   • Diabetes Mother    • COPD Father      Social History     Tobacco Use   • Smoking status: Every Day     Packs/day: 0.50     Years: 58.00     Pack years: 29.00     Types: Cigarettes   • Smokeless tobacco: Never   • Tobacco comments:     pt asking for nicotine patch   Vaping Use   • Vaping Use: Never used   Substance Use Topics   • Alcohol use: No   • Drug use: No     Medications Prior to Admission   Medication Sig Dispense Refill Last Dose   • albuterol (PROVENTIL) (2.5 MG/3ML) 0.083% nebulizer solution Take 2.5 mg by nebulization Every 4 (Four) Hours As Needed for Wheezing. 30 each 0    • albuterol sulfate  (90 Base) MCG/ACT inhaler Inhale 2  puffs Every 4 (Four) Hours As Needed for Shortness of Air or Wheezing.      • aspirin 81 MG chewable tablet Chew 1 tablet Daily.      • JEWELL CONTOUR TEST test strip   1    • bisacodyl (DULCOLAX) 10 MG suppository Insert 1 suppository into the rectum Daily.      • cetirizine (zyrTEC) 10 MG tablet Take 1 tablet by mouth Daily.      • diphenhydrAMINE (BENADRYL) 25 MG tablet Take 1 tablet by mouth 1 (One) Time As Needed for Allergies or Itching.      • docusate sodium (COLACE) 100 MG capsule Take 2 capsules by mouth Daily As Needed for Constipation.      • EPINEPHrine, Anaphylaxis, (ADRENALIN) 1 MG/ML injection Inject 0.3 mg into the appropriate muscle as directed by prescriber 1 (One) Time As Needed for Anaphylaxis.      • HYDROcodone-acetaminophen (NORCO)  MG per tablet 1 tablet Every 6 (Six) Hours As Needed for Mild Pain or Moderate Pain.  0    • insulin glargine (LANTUS, SEMGLEE) 100 UNIT/ML injection Inject 45 Units under the skin into the appropriate area as directed Every Morning. Every morning      • insulin glargine (LANTUS, SEMGLEE) 100 UNIT/ML injection Inject 35 Units under the skin into the appropriate area as directed Every Night.      • Insulin Pen Needle (BD Pen Needle Yaritza U/F) 32G X 4 MM misc USE 1 NEEDLE SUBCUTANEOUSLY QD      • mycophenolate (CELLCEPT) 500 MG tablet Take 2 tablets by mouth 2 (Two) Times a Day.      • nicotine (NICODERM CQ) 14 MG/24HR patch Place 1 patch on the skin as directed by provider Daily. 21 each 0    • O2 (OXYGEN) Inhale 2 L/min 1 (One) Time. Oxygen 2-3 L      • pyridostigmine (MESTINON) 60 MG tablet Take 1 tablet by mouth Every 4 (Four) Hours.      • dicyclomine (BENTYL) 20 MG tablet Take 20 mg by mouth 4 (Four) Times a Day.      • doxazosin (CARDURA) 2 MG tablet Take 2 mg by mouth Daily.      • Fluticasone-Umeclidin-Vilant (Trelegy Ellipta) 100-62.5-25 MCG/INH inhaler Inhale 1 puff Daily.      • glipiZIDE-metFORMIN (METAGLIP) 5-500 MG per tablet Take 2 tablets by  "mouth 2 (Two) Times a Day Before Meals.      • lisinopril (PRINIVIL,ZESTRIL) 10 MG tablet Take 1 tablet by mouth Daily for 30 days. 30 tablet 0    • montelukast (SINGULAIR) 10 MG tablet Take 10 mg by mouth Every Night.      • pioglitazone (Actos) 45 MG tablet Take 45 mg by mouth Daily.      • SIMVASTATIN PO Take 40 mg by mouth Every Evening.        Allergies:  Patient has no known allergies.    Immunization History   Administered Date(s) Administered   • COVID-19 (PFIZER) PURPLE CAP 04/06/2021, 04/27/2021, 10/27/2021   • FLUAD TRI 65YR+ 09/25/2018   • Flu Vaccine Quad PF >36MO 08/03/2017   • Fluzone High Dose =>65 Years ([employer] ONLY) 10/24/2019   • Pneumococcal Conjugate 13-Valent (PCV13) 09/25/2018           REVIEW OF SYSTEMS:    Please see the above history of present illness for pertinent positives and negatives.  The remainder of the patient's systems have been reviewed and are negative.     Vital Signs  Temp:  [98.4 °F (36.9 °C)] 98.4 °F (36.9 °C)  Heart Rate:  [103-111] 105  Resp:  [24-26] 25  BP: (122-137)/(73-85) 123/85    Oxygen Therapy  SpO2: (!) 85 %  Pulse Oximetry Type: Continuous  Device (Oxygen Therapy): nasal cannula  Flow (L/min): 4}    Body mass index is 33.83 kg/m².    Flowsheet Rows    Flowsheet Row First Filed Value   Admission Height 188 cm (74\") Documented at 03/04/2023 1821   Admission Weight 120 kg (263 lb 8 oz) Documented at 03/04/2023 1821               Physical Exam:    Physical Exam  Vitals reviewed.   Constitutional:       General: He is not in acute distress.     Appearance: He is obese.   HENT:      Head: Normocephalic and atraumatic.   Cardiovascular:      Rate and Rhythm: Normal rate and regular rhythm.   Pulmonary:      Comments: Mild expiratory wheezing, diminished in the bases, nonlabored  Musculoskeletal:      Comments: Trace b/l le edema    Skin:     General: Skin is warm and dry.   Neurological:      General: No focal deficit present.      Mental Status: He is alert. "   Psychiatric:         Mood and Affect: Mood normal.           Emotional Behavior: wnl    Judgement and Insight:    Mental Status:    Alertness:   Memory:     Mood and Affect:         Depression                 Anxiety      Debilities: home oxygen    Physical Weakness:   Handicaps:     Disabilities:     Agitation:        Results Review:    I reviewed the patient's new clinical results.  Lab Results (most recent)     Procedure Component Value Units Date/Time    POC Glucose Once [[id number]]  (Abnormal) Collected: 03/04/23 2251    Specimen: Blood Updated: 03/04/23 2258     Glucose 226 mg/dL      Comment: Meter: [id number] : [id number] Mario Quinteros PCA       Blood Gas, Arterial - [[id number]]  (Abnormal) Collected: 03/04/23 2028    Specimen: Arterial Blood Updated: 03/04/23 2042     Site Left Radial     Rich's Test Positive     pH, Arterial 7.408 pH units      pCO2, Arterial 57.3 mm Hg      Comment: 83 Value above reference range        pO2, Arterial 48.3 mm Hg      Comment: 85 Value below critical limit        HCO3, Arterial 36.1 mmol/L      Comment: 83 Value above reference range        Base Excess, Arterial 9.5 mmol/L      Comment: 83 Value above reference range        O2 Saturation, Arterial 85.0 %      Comment: 84 Value below reference range        Hemoglobin, Blood Gas 12.7 g/dL      Comment: 84 Value below reference range        Temperature 37.0 C      Barometric Pressure for Blood Gas 740 mmHg      Comment:  The parameter value has a question [id number] Calibration error(s) present        Modality Nasal Cannula     Flow Rate 3.0 lpm      Ventilator Mode --     Notified Who rb and v dr [email or web address]     Notified By [id number]     Notified Time 03/04/2023 20:39     Collected by [id number]     Comment: Meter: [id number]     :  [id number]        pCO2, Temperature Corrected 57.3 mm Hg      pH, Temp Corrected 7.408 pH Units      pO2, Temperature Corrected 48.3 mm Hg     [place] Draw [[id number]] Collected: 03/04/23 2135     Specimen: Blood Updated: 03/04/23 2000    Narrative:      The following orders were created for panel order [place] Draw.  Procedure                               Abnormality         Status                     ---------                               -----------         ------                     Green Top (Gel)[[id number]]                                  Final result               Lavender Top[[id number]]                                     Final result               Gold Top - SST[[id number]]                                   Final result               Light Blue Top[[id number]]                                   Final result                 Please view results for these tests on the individual orders.    Lavender Top [[id number]] Collected: 03/04/23 1835    Specimen: Blood Updated: 03/04/23 2000     Extra Tube hold for add-on     Comment: Auto resulted       CBC & Differential [[id number]]  (Abnormal) Collected: 03/04/23 1835    Specimen: Blood Updated: 03/04/23 1954    Narrative:      The following orders were created for panel order CBC & Differential.  Procedure                               Abnormality         Status                     ---------                               -----------         ------                     CBC Auto Differential[[id number]]        Abnormal            Final result                 Please view results for these tests on the individual orders.    CBC Auto Differential [[id number]]  (Abnormal) Collected: 03/04/23 1835    Specimen: Blood Updated: 03/04/23 1954     WBC 4.68 10*3/mm3      RBC 4.02 10*6/mm3      Hemoglobin 12.4 g/dL      Hematocrit 40.3 %      .2 fL      MCH 30.8 pg      MCHC 30.8 g/dL      RDW 14.4 %      RDW-SD 53.9 fl      MPV 11.7 fL      Platelets 195 10*3/mm3      Neutrophil % 76.6 %      Lymphocyte % 11.5 %      Monocyte % 11.1 %      Eosinophil % 0.0 %      Basophil % 0.4 %      Immature Grans % 0.4 %      Neutrophils, Absolute 3.58 10*3/mm3      Lymphocytes,  Absolute 0.54 10*3/mm3      Monocytes, Absolute 0.52 10*3/mm3      Eosinophils, Absolute 0.00 10*3/mm3      Basophils, Absolute 0.02 10*3/mm3      Immature Grans, Absolute 0.02 10*3/mm3      nRBC 0.0 /100 WBC     Light Blue Top [[id number]] Collected: 03/04/23 1835    Specimen: Blood Updated: 03/04/23 1946     Extra Tube Hold for add-ons.     Comment: Auto resulted       Gold Top - SST [[id number]] Collected: 03/04/23 1835    Specimen: Blood Updated: 03/04/23 1946     Extra Tube Hold for add-ons.     Comment: Auto resulted.       Green Top (Gel) [[id number]] Collected: 03/04/23 1835    Specimen: Blood Updated: 03/04/23 1946     Extra Tube Hold for add-ons.     Comment: Auto resulted.       BNP [[id number]]  (Normal) Collected: 03/04/23 1835    Specimen: Blood Updated: 03/04/23 1943     proBNP <36.0 pg/mL     Narrative:      Among patients with dyspnea, NT-proBNP is highly sensitive for the detection of acute congestive heart failure. In addition NT-proBNP of <300 pg/ml effectively rules out acute congestive heart failure with 99% negative predictive value.    Results may be falsely decreased if patient taking Biotin.      Single High Sensitivity Troponin T [[id number]]  (Abnormal) Collected: 03/04/23 1835    Specimen: Blood Updated: 03/04/23 1915     HS Troponin T 42 ng/L     Narrative:      High Sensitive Troponin T Reference Range:  <10.0 ng/L- Negative Female for AMI  <15.0 ng/L- Negative Male for AMI  >=10 - Abnormal Female indicating possible myocardial injury.  >=15 - Abnormal Male indicating possible myocardial injury.   Clinicians would have to utilize clinical acumen, EKG, Troponin, and serial changes to determine if it is an Acute Myocardial Infarction or myocardial injury due to an underlying chronic condition.         Lactic Acid, Plasma [[id number]]  (Normal) Collected: 03/04/23 1835    Specimen: Blood Updated: 03/04/23 1914     Lactate 1.0 mmol/L     Comprehensive Metabolic Panel [[id number]]  (Abnormal)  Collected: 03/04/23 1835    Specimen: Blood Updated: 03/04/23 1910     Glucose 194 mg/dL      BUN 11 mg/dL      Creatinine 0.70 mg/dL      Sodium 135 mmol/L      Potassium 4.2 mmol/L      Comment: Slight hemolysis detected by analyzer. Results may be affected.        Chloride 94 mmol/L      CO2 31.4 mmol/L      Calcium 8.8 mg/dL      Total Protein 7.0 g/dL      Albumin 4.0 g/dL      ALT (SGPT) 13 U/L      AST (SGOT) 15 U/L      Alkaline Phosphatase 60 U/L      Total Bilirubin 0.2 mg/dL      Globulin 3.0 gm/dL      A/G Ratio 1.3 g/dL      BUN/Creatinine Ratio 15.7     Anion Gap 9.6 mmol/L      eGFR 99.1 mL/min/1.73     Narrative:      GFR Normal >60  Chronic Kidney Disease <60  Kidney Failure <15      Blood Culture - Blood, Arm, Right [[id number]] Collected: 03/04/23 1835    Specimen: Blood from Arm, Right Updated: 03/04/23 1839    Blood Culture - Blood, Arm, Left [[id number]] Collected: 03/04/23 1836    Specimen: Blood from Arm, Left Updated: 03/04/23 1839          Imaging Results (Most Recent)     Procedure Component Value Units Date/Time    XR Chest 2 View [[id number]] Collected: 03/04/23 1943     Updated: 03/04/23 1945    Narrative:      CR Chest 2 Vws    INDICATION:    Short of air for weeks. Severe symptoms today. Chest pain. Asthma and COPD    COMPARISON:    11/27/2022    FINDINGS:   PA and lateral views of the chest.  Cardiac silhouette is within normal limits and stable. The vascularity is unremarkable.  Probable bibasilar atelectasis/scarring, right greater than left. There is no effusion or dense consolidation. Thoracic  spondylosis.      Impression:        1. Probable bibasilar atelectasis/scarring. No effusion or dense consolidation.    Signer Name: Jaden Danielle MD   Signed: 3/4/2023 7:43 PM   Workstation Name: [id number]    [facility]        CXR personally reviewed and shows no focal consolidation or effusions     ECG/EMG Results (most recent)     Procedure Component Value Units  Date/Time    ECG 12 Lead ED Triage Standing Order; KALEB [[id number]] Collected: 03/04/23 1851     Updated: 03/04/23 1901     QT Interval 320 ms     Narrative:      HEART RATE= 106  bpm  RR Interval= 568  ms  IN Interval= 156  ms  P Horizontal Axis= -2  deg  P Front Axis= 56  deg  QRSD Interval= 95  ms  QT Interval= 320  ms  QRS Axis= 97  deg  T Wave Axis= 49  deg  - BORDERLINE ECG -  Sinus tachycardia  Consider right ventricular hypertrophy  No change from prior tracing  Electronically Signed By: Magaly Greenwood ([facility]) 04-Mar-2023 19:01:05  Date and Time of Study: 2023-03-04 18:51:48    ECG 12 Lead [[id number]] Resulted: 03/04/23 2245     Updated: 03/04/23 2245        reviewed    Assessment & Plan   Active Hospital Problems    Diagnosis  POA   • **COPD exacerbation (HCC) [J44.1]  Yes   • Acute on chronic respiratory failure with hypoxia (HCC) [J96.21]  Yes   • DM2 (diabetes mellitus, type 2) (HCC) [E11.9]  Yes   • Obesity (BMI 30-39.9) [E66.9]  Yes   • Tobacco abuse [Z72.0]  Yes      Resolved Hospital Problems   No resolved problems to display.         Acute on Chronic hypoxic/ hypercapnic respiratory failure d/t AECOPD  - baseline oxygen 2L NC  - oxygen 75% on room air, requiring 4L NC  - check procal   - continue doxycycline for now  - duo-nebs scheduled and prn   - Solu-medrol 40mg IV q8h  - CXR reviewed as above    Diabetes Mellitus type 2 with hyperglycemia  - continue long acting insulin at lower dose   - add SSI  - monitor with routine accu-checks and make adjustments as needed  - awaiting home med rec for po meds    Myasthenia Gravis  - continue home meds    Hypertension  - awaiting home med rec  - monitor with routine vital signs and make adjustments as needed    Chronic Pain  - continue hydrocodone (BRIAN verified)     Tobacco abuse  - patient counseled on need for smoking cessation and he reports he isn't interested in quitting  - nicotine patch    DVT Prophylaxis  - SCDs        I discussed the patient's  findings and my recommendations with patient, family member at bedside, and nursing staff.    FAMILY TO BRING IN HOME MEDS IN AM      Lisa S DO [place]  03/04/23  23:27 EST        Note disclaimer: At [facility], we believe that sharing information builds trust and better relationships. You are receiving this note because you recently visited [facility]. It is possible you will see health information before a provider has talked with you about it. This kind of information can be easy to misunderstand. To help you fully understand what it means for your health, we urge you to discuss this note with your provider.

## 2023-03-05 NOTE — ED NOTES
Nursing report ED to floor  Kt Armstrong  72 y.o.  male    HPI :   Chief Complaint   Patient presents with    Shortness of Breath     Reports being SOA x 1 week.        Admitting doctor:   Lisa Hagan DO    Admitting diagnosis:   The primary encounter diagnosis was COPD exacerbation (HCC). A diagnosis of Hypoxia was also pertinent to this visit.    Code status:   Current Code Status       Date Active Code Status Order ID Comments User Context       Prior            Allergies:   Patient has no known allergies.    Isolation:   No active isolations    Intake and Output  No intake or output data in the 24 hours ending 03/04/23 2132    Weight:       03/04/23 1821   Weight: 120 kg (263 lb 8 oz)       Most recent vitals:   Vitals:    03/04/23 1910 03/04/23 2018 03/04/23 2057 03/04/23 2115   BP:  122/73 123/84 123/85   BP Location:   Right arm Right arm   Patient Position:   Sitting Lying   Pulse: 107 103 105 105   Resp: 24 24 24 25   Temp:       TempSrc:       SpO2: 90% 90% (!) 86% (!) 85%   Weight:       Height:           Active LDAs/IV Access:   Lines, Drains & Airways       Active LDAs       Name Placement date Placement time Site Days    Peripheral IV 03/04/23 1840 Left Antecubital 03/04/23  1840  Antecubital  less than 1                    Labs (abnormal labs have a star):   Labs Reviewed   COMPREHENSIVE METABOLIC PANEL - Abnormal; Notable for the following components:       Result Value    Glucose 194 (*)     Creatinine 0.70 (*)     Sodium 135 (*)     Chloride 94 (*)     CO2 31.4 (*)     All other components within normal limits    Narrative:     GFR Normal >60  Chronic Kidney Disease <60  Kidney Failure <15     SINGLE HSTROPONIN T - Abnormal; Notable for the following components:    HS Troponin T 42 (*)     All other components within normal limits    Narrative:     High Sensitive Troponin T Reference Range:  <10.0 ng/L- Negative Female for AMI  <15.0 ng/L- Negative Male for AMI  >=10 - Abnormal Female  indicating possible myocardial injury.  >=15 - Abnormal Male indicating possible myocardial injury.   Clinicians would have to utilize clinical acumen, EKG, Troponin, and serial changes to determine if it is an Acute Myocardial Infarction or myocardial injury due to an underlying chronic condition.        CBC WITH AUTO DIFFERENTIAL - Abnormal; Notable for the following components:    RBC 4.02 (*)     Hemoglobin 12.4 (*)     .2 (*)     MCHC 30.8 (*)     Neutrophil % 76.6 (*)     Lymphocyte % 11.5 (*)     Eosinophil % 0.0 (*)     Lymphocytes, Absolute 0.54 (*)     All other components within normal limits   BLOOD GAS, ARTERIAL - Abnormal; Notable for the following components:    pCO2, Arterial 57.3 (*)     pO2, Arterial 48.3 (*)     HCO3, Arterial 36.1 (*)     Base Excess, Arterial 9.5 (*)     O2 Saturation, Arterial 85.0 (*)     Hemoglobin, Blood Gas 12.7 (*)     pCO2, Temperature Corrected 57.3 (*)     pO2, Temperature Corrected 48.3 (*)     All other components within normal limits   BNP (IN-HOUSE) - Normal    Narrative:     Among patients with dyspnea, NT-proBNP is highly sensitive for the detection of acute congestive heart failure. In addition NT-proBNP of <300 pg/ml effectively rules out acute congestive heart failure with 99% negative predictive value.    Results may be falsely decreased if patient taking Biotin.     LACTIC ACID, PLASMA - Normal   BLOOD CULTURE   BLOOD CULTURE   RAINBOW DRAW    Narrative:     The following orders were created for panel order [place] Draw.  Procedure                               Abnormality         Status                     ---------                               -----------         ------                     Green Top (Gel)[[id number]]                                  Final result               Lavender Top[[id number]]                                     Final result               Gold Top - SST[[id number]]                                   Final result                Light Blue Top[[id number]]                                   Final result                 Please view results for these tests on the individual orders.   BLOOD GAS, ARTERIAL   CBC AND DIFFERENTIAL    Narrative:     The following orders were created for panel order CBC & Differential.  Procedure                               Abnormality         Status                     ---------                               -----------         ------                     CBC Auto Differential[[id number]]        Abnormal            Final result                 Please view results for these tests on the individual orders.   GREEN TOP   LAVENDER TOP   GOLD TOP - SST   LIGHT BLUE TOP       EKG:   ECG 12 Lead ED Triage Standing Order; SOA   Final Result   HEART RATE= 106  bpm   RR Interval= 568  ms   RI Interval= 156  ms   P Horizontal Axis= -2  deg   P Front Axis= 56  deg   QRSD Interval= 95  ms   QT Interval= 320  ms   QRS Axis= 97  deg   T Wave Axis= 49  deg   - BORDERLINE ECG -   Sinus tachycardia   Consider right ventricular hypertrophy   No change from prior tracing   Electronically Signed By: Magaly Greenwood ([facility]) 04-Mar-2023 19:01:05   Date and Time of Study: 2023-03-04 18:51:48          Meds given in ED:   Medications   sodium chloride 0.9 % flush 10 mL (has no administration in time range)   ipratropium-albuterol (DUO-NEB) nebulizer solution 3 mL (3 mL Nebulization Given 3/4/23 1852)   albuterol (PROVENTIL) nebulizer solution 0.083% 2.5 mg/3mL (2.5 mg Nebulization Given 3/4/23 1900)   methylPREDNISolone sodium succinate (SOLU-Medrol) injection 125 mg (125 mg Intravenous Given 3/4/23 2015)   HYDROcodone-acetaminophen (NORCO) 5-325 MG per tablet 2 tablet (2 tablets Oral Given 3/4/23 2054)   doxycycline (MONODOX) capsule 100 mg (100 mg Oral Given 3/4/23 2054)       Imaging results:  XR Chest 2 View    Result Date: 3/4/2023  1. Probable bibasilar atelectasis/scarring. No effusion or dense consolidation. Signer Name: Jaden Danielle,  MD  Signed: 3/4/2023 7:43 PM  Workstation Name: [email or web address]  [facility]     Ambulatory status:   At vale    Social issues:   Social History     Socioeconomic History    Marital status:    Tobacco Use    Smoking status: Every Day     Packs/day: 0.50     Years: 58.00     Pack years: 29.00     Types: Cigarettes    Smokeless tobacco: Never    Tobacco comments:     pt asking for nicotine patch   Vaping Use    Vaping Use: Never used   Substance and Sexual Activity    Alcohol use: No    Drug use: No    Sexual activity: Defer       NIH Stroke Scale:         Trini Palmer RN  03/04/23 21:32 EST  2

## 2023-03-05 NOTE — PLAN OF CARE
Goal Outcome Evaluation:           Progress: improving  Outcome Evaluation: vss, solumedrol continued. meds verified from home pill bottles. O2 needs monitored.  blood glucose monitored/ treated this shift

## 2023-03-05 NOTE — PLAN OF CARE
Goal Outcome Evaluation:  Plan of Care Reviewed With: patient        Progress: improving  Outcome Evaluation: Admitted from ER with COPD exacerbation. Tolerating 3-4L of oxygen. No complaints of shortness of breath or difficulty breathing. Rested well during the night. Tolerating Solumedrol and Doxycycline. VSS.

## 2023-03-05 NOTE — PROGRESS NOTES
"Hospital Medicine Team    LOS 0 days      Patient Care Team:  Ney Kathleen MD as PCP - General (Family Medicine)  Chris Driver MD as Consulting Physician (Pulmonary Disease)          Chief Complaint: Follow-up shortness of breath    Subjective    Reports improvement however still with dyspnea on exertion and nonproductive cough    Objective    Vital Signs  Temp:  [97 °F (36.1 °C)-98.4 °F (36.9 °C)] 97 °F (36.1 °C)  Heart Rate:  [] 91  Resp:  [20-26] 20  BP: (117-137)/(73-94) 123/78  Oxygen Therapy  SpO2: 90 %  Pulse Oximetry Type: Continuous  Device (Oxygen Therapy): nasal cannula  Flow (L/min): 5  Flowsheet Rows    Flowsheet Row First Filed Value   Admission Height 188 cm (74\") Documented at 03/04/2023 1821   Admission Weight 120 kg (263 lb 8 oz) Documented at 03/04/2023 1821              Physical Exam:  Physical Exam  Vitals reviewed.   Cardiovascular:      Rate and Rhythm: Normal rate.   Pulmonary:      Breath sounds: Wheezing present.      Comments: Mild respiratory distress at rest  Abdominal:      Palpations: Abdomen is soft.   Neurological:      Mental Status: He is alert.   Psychiatric:         Mood and Affect: Mood normal.           Results Review:    I reviewed the patient's new clinical results.    Results from last 7 days   Lab Units 03/05/23 0453 03/04/23  1835   WBC 10*3/mm3 3.19* 4.68   HEMOGLOBIN g/dL 13.2 12.4*   HEMATOCRIT % 43.7 40.3   PLATELETS 10*3/mm3 213 195     Results from last 7 days   Lab Units 03/05/23  0453 03/04/23  1835   SODIUM mmol/L 135* 135*   POTASSIUM mmol/L 4.5 4.2   CHLORIDE mmol/L 93* 94*   CO2 mmol/L 29.6* 31.4*   BUN mg/dL 18 11   CREATININE mg/dL 0.77 0.70*   CALCIUM mg/dL 9.3 8.8   BILIRUBIN mg/dL  --  0.2   ALK PHOS U/L  --  60   ALT (SGPT) U/L  --  13   AST (SGOT) U/L  --  15   GLUCOSE mg/dL 363* 194*     Results from last 7 days   Lab Units 03/05/23  0453   MAGNESIUM mg/dL 2.0       Microbiology Results (last 10 days)     ** No results found for " the last 240 hours. **              Results for orders placed during the hospital encounter of 11/27/22    Adult Transthoracic Echo Complete W/ Cont if Necessary Per Protocol    Interpretation Summary  •  Calculated left ventricular EF = 53%  •  Left ventricular wall thickness is consistent with mild concentric hypertrophy.  •  Left ventricular diastolic function was normal.  •  Estimated right ventricular systolic pressure from tricuspid regurgitation is normal (<35 mmHg).      XR Chest 2 View    Result Date: 3/4/2023  1. Probable bibasilar atelectasis/scarring. No effusion or dense consolidation. Signer Name: Jaden Danielle MD  Signed: 3/4/2023 7:43 PM  Workstation Name: [email or web address]  [facility]      ECG/EMG Results (most recent)     Procedure Component Value Units Date/Time    ECG 12 Lead ED Triage Standing Order; SOA [[id number]] Collected: 03/04/23 1851     Updated: 03/04/23 1901     QT Interval 320 ms     Narrative:      HEART RATE= 106  bpm  RR Interval= 568  ms  PA Interval= 156  ms  P Horizontal Axis= -2  deg  P Front Axis= 56  deg  QRSD Interval= 95  ms  QT Interval= 320  ms  QRS Axis= 97  deg  T Wave Axis= 49  deg  - BORDERLINE ECG -  Sinus tachycardia  Consider right ventricular hypertrophy  No change from prior tracing  Electronically Signed By: Magaly Greenwood ([facility]) 04-Mar-2023 19:01:05  Date and Time of Study: 2023-03-04 18:51:48    ECG 12 Lead [[id number]] Resulted: 03/04/23 2245     Updated: 03/04/23 2245            X-rays, labs reviewed personally by physician.    Medication Review:   I have reviewed the patient's current medication list    Scheduled Meds  doxycycline, 100 mg, Oral, Q12H  Insulin Aspart, 0-9 Units, Subcutaneous, TID AC  insulin detemir, 10 Units, Subcutaneous, Once  insulin detemir, 25 Units, Subcutaneous, Q12H  ipratropium-albuterol, 3 mL, Nebulization, 4x Daily - RT  methylPREDNISolone sodium succinate, 40 mg, Intravenous, Q8H  mycophenolate, 500 mg, Oral,  Q12H  nicotine, 1 patch, Transdermal, Q24H  pyridostigmine, 60 mg, Oral, 4x Daily  sodium chloride, 10 mL, Intravenous, Q12H        Meds Infusions       Meds PRN  •  acetaminophen **OR** acetaminophen **OR** acetaminophen  •  dextrose  •  dextrose  •  glucagon (human recombinant)  •  HYDROcodone-acetaminophen  •  ipratropium-albuterol  •  melatonin  •  ondansetron **OR** ondansetron  •  sodium chloride  •  sodium chloride  •  sodium chloride            Assessment & Plan  (MDM)    Active Hospital Problems:  Active Hospital Problems    Diagnosis  POA   • **COPD exacerbation ([facility]) [J44.1]  Yes   • Acute on chronic respiratory failure with hypoxia ([facility]) [J96.21]  Yes   • DM2 (diabetes mellitus, type 2) ([facility]) [E11.9]  Yes   • Obesity (BMI 30-39.9) [E66.9]  Yes   • Tobacco abuse [Z72.0]  Yes      Resolved Hospital Problems   No resolved problems to display.       Acute on Chronic hypoxic/ hypercapnic respiratory failure d/t AECOPD  - baseline oxygen 2L NC  - Still on 4 L NC today  - Pro-Jed normal  - continue doxycycline for now  - duo-nebs scheduled and prn   - Solu-medrol 40mg IV q8h  - Check RVP today     Diabetes Mellitus type 2 with hyperglycemia  -BG uncontrolled  - increase Levemir 25 units twice daily  -Continue SSI  - monitor with routine accu-checks and make adjustments as needed     Myasthenia Gravis  - continue Mestinon and CellCept     Hypertension  -No home antihypertensive listed  - monitor with routine vital signs and make adjustments as needed     Chronic Pain  - continue hydrocodone (BRIAN verified)      Tobacco abuse  - patient counseled on need for smoking cessation and he reports he isn't interested in quitting  - nicotine patch      DVT ppx-SCDs    This patient has been examined wearing appropriate Personal Protective Equipment . 03/05/23      Plan for disposition:if improves possibly 1-2 more days    Electronically signed by Cj Villanueva DO, 03/05/23, 12:26 EST.

## 2023-03-05 NOTE — ED PROVIDER NOTES
Subjective   History of Present Illness  Patient presents complaining of shortness of breath x1 week.  Patient says is progressively gotten worse and is having to increase his oxygen requirements at home.  Patient is normally on 2 L and has been having go up to 3 and 4 L.  No treatment prior to arrival.  Patient says he did take some antibiotics and some steroids a week or 2 ago and now is gotten worse again.  Patient cannot member the name of either of the medicines.  Patient [place] any recent travel, sick contacts, bad food exposure, or trauma.        Review of Systems   All other systems reviewed and are negative.      Past Medical History:   Diagnosis Date   • Arthritis of back    • Arthritis of neck    • Asthma    • COPD (chronic obstructive pulmonary disease) ([facility])    • Depression    • DM (diabetes mellitus) ([facility])    • GERD (gastroesophageal reflux disease)    • Hip arthrosis    • Hyperlipidemia    • Hypertension    • Knee swelling    • Myasthenia gravis ([facility])    • Periarthritis of shoulder    • Rotator cuff syndrome        No Known Allergies    Past Surgical History:   Procedure Laterality Date   • APPENDECTOMY     • CHOLECYSTECTOMY     • ROTATOR CUFF REPAIR Right        Family History   Problem Relation Age of Onset   • Diabetes Mother    • COPD Father        Social History     Socioeconomic History   • Marital status:    Tobacco Use   • Smoking status: Every Day     Packs/day: 0.50     Years: 58.00     Pack years: 29.00     Types: Cigarettes   • Smokeless tobacco: Never   • Tobacco comments:     pt asking for nicotine patch   Vaping Use   • Vaping Use: Never used   Substance and Sexual Activity   • Alcohol use: No   • Drug use: No   • Sexual activity: Defer           Objective   Physical Exam  Vitals and nursing note reviewed.   Constitutional:       Appearance: He is well-developed.   HENT:      Head: Normocephalic and atraumatic.   Neck:      Vascular: No JVD.   Cardiovascular:      Rate and Rhythm:  Normal rate and regular rhythm.      Pulses:           Radial pulses are 2+ on the right side and 2+ on the left side.   Pulmonary:      Effort: Tachypnea present.      Breath sounds: Examination of the right-upper field reveals wheezing. Examination of the left-upper field reveals wheezing. Examination of the right-middle field reveals decreased breath sounds and wheezing. Examination of the left-middle field reveals decreased breath sounds and wheezing. Examination of the right-lower field reveals decreased breath sounds and wheezing. Examination of the left-lower field reveals decreased breath sounds and wheezing. Decreased breath sounds and wheezing present. No rhonchi or rales.   Chest:      Chest wall: No deformity or tenderness.   Abdominal:      Palpations: Abdomen is soft.   Musculoskeletal:      Cervical back: Neck supple.   Lymphadenopathy:      Cervical: No cervical adenopathy.   Skin:     General: Skin is warm and dry.      Capillary Refill: Capillary refill takes 2 to 3 seconds.   Neurological:      Mental Status: He is alert and oriented to person, place, and time.   Psychiatric:         Mood and Affect: Mood normal.         Behavior: Behavior normal.         ECG 12 Lead      Date/Time: 3/4/2023 6:51 PM  Performed by: Braxton Torres MD  Authorized by: Lisa Hagan DO   Interpreted by physician  Comments: Rate of 106, sinus tachycardia, normal axis, no acute ST elevation or depression.                 ED Course                                           Medical Decision Making  ddx pneumonia, bronchitis, pneumothorax, pulmonary edema, pleural effusion, viral syndrome    Labs Reviewed  COMPREHENSIVE METABOLIC PANEL - Abnormal; Notable for the following components:     Glucose                       194 (*)                Creatinine                    0.70 (*)               Sodium                        135 (*)                Chloride                      94 (*)                 CO2                            31.4 (*)            All other components within normal limits         Narrative: GFR Normal >60                  Chronic Kidney Disease <60                  Kidney Failure <15                    SINGLE HSTROPONIN T - Abnormal; Notable for the following components:     HS Troponin T                 42 (*)              All other components within normal limits         Narrative: High Sensitive Troponin T Reference Range:                  <10.0 ng/L- Negative Female for AMI                  <15.0 ng/L- Negative Male for AMI                  >=10 - Abnormal Female indicating possible myocardial injury.                  >=15 - Abnormal Male indicating possible myocardial injury.                   Clinicians would have to utilize clinical acumen, EKG, Troponin, and serial changes to determine if it is an Acute Myocardial Infarction or myocardial injury due to an underlying chronic condition.                                       CBC WITH AUTO DIFFERENTIAL - Abnormal; Notable for the following components:     RBC                           4.02 (*)               Hemoglobin                    12.4 (*)               MCV                           100.2 (*)               MCHC                          30.8 (*)               Neutrophil %                  76.6 (*)               Lymphocyte %                  11.5 (*)               Eosinophil %                  0.0 (*)                Lymphocytes, Absolute         0.54 (*)            All other components within normal limits  BLOOD GAS, ARTERIAL - Abnormal; Notable for the following components:     pCO2, Arterial                57.3 (*)               pO2, Arterial                 48.3 (*)               HCO3, Arterial                36.1 (*)               Base Excess, Arterial         9.5 (*)                O2 Saturation, Arterial       85.0 (*)               Hemoglobin, Blood Gas         12.7 (*)               pCO2, Temperature Corrected   57.3 (*)               pO2,  Temperature Corrected    48.3 (*)            All other components within normal limits  BNP (IN-HOUSE) - Normal         Narrative: Among patients with dyspnea, NT-proBNP is highly sensitive for the detection of acute congestive heart failure. In addition NT-proBNP of <300 pg/ml effectively rules out acute congestive heart failure with 99% negative predictive value.                                    Results may be falsely decreased if patient taking Biotin.                    LACTIC ACID, PLASMA - Normal  BLOOD CULTURE  BLOOD CULTURE  RAINBOW DRAW         Narrative: The following orders were created for panel order [place] Draw.                  Procedure                               Abnormality         Status                                     ---------                               -----------         ------                                     Green Top (Gel)([id number])                                  Final result                               Lavender Top([id number])                                     Final result                               Gold Top - SST([id number])                                   Final result                               Light Blue Top([id number])                                   Final result                                                 Please view results for these tests on the individual orders.  BLOOD GAS, ARTERIAL  CBC AND DIFFERENTIAL         Narrative: The following orders were created for panel order CBC & Differential.                  Procedure                               Abnormality         Status                                     ---------                               -----------         ------                                     CBC Auto Differential([id number])        Abnormal            Final result                                                 Please view results for these tests on the individual orders.  GREEN TOP  LAVENDER TOP  GOLD TOP - SST  LIGHT BLUE  TOP    XR Chest 2 View    Result Date: 3/4/2023  1. Probable bibasilar atelectasis/scarring. No effusion or dense consolidation. Signer Name: Jaden Danielle MD  Signed: 3/4/2023 7:43 PM  Workstation Name: [id number]  [facility]        COPD exacerbation (HCC): chronic illness or injury  Hypoxia: complicated acute illness or injury  Amount and/or Complexity of Data Reviewed  Labs: ordered.  Radiology: ordered.  ECG/medicine tests: ordered.      Risk  Prescription drug management.  Decision regarding hospitalization.          Final diagnoses:   COPD exacerbation (HCC)   Hypoxia       ED Disposition  ED Disposition     ED Disposition   Decision to Admit    Condition   --    Comment   Level of Care: Med/Surg [1]   Diagnosis: COPD exacerbation (HCC) [479025]   Admitting Physician: OLAMIDE THOMPSON [[id number]]   Attending Physician: OLAMIDE THOMPSON [[id number]]               No follow-up provider specified.       Medication List      No changes were made to your prescriptions during this visit.          Braxton Torres MD  03/04/23 9993

## 2023-03-06 LAB
ALBUMIN SERPL-MCNC: 4.2 G/DL (ref 3.5–5.2)
ALBUMIN/GLOB SERPL: 1.4 G/DL
ALP SERPL-CCNC: 59 U/L (ref 39–117)
ALT SERPL W P-5'-P-CCNC: 15 U/L (ref 1–41)
ANION GAP SERPL CALCULATED.3IONS-SCNC: 10.2 MMOL/L (ref 5–15)
AST SERPL-CCNC: 15 U/L (ref 1–40)
BASOPHILS # BLD AUTO: 0.01 10*3/MM3 (ref 0–0.2)
BASOPHILS NFR BLD AUTO: 0.1 % (ref 0–1.5)
BILIRUB SERPL-MCNC: 0.3 MG/DL (ref 0–1.2)
BUN SERPL-MCNC: 20 MG/DL (ref 8–23)
BUN/CREAT SERPL: 27 (ref 7–25)
CALCIUM SPEC-SCNC: 9.6 MG/DL (ref 8.6–10.5)
CHLORIDE SERPL-SCNC: 89 MMOL/L (ref 98–107)
CO2 SERPL-SCNC: 30.8 MMOL/L (ref 22–29)
CREAT SERPL-MCNC: 0.74 MG/DL (ref 0.76–1.27)
CRP SERPL-MCNC: 4.05 MG/DL (ref 0–0.5)
DEPRECATED RDW RBC AUTO: 51.7 FL (ref 37–54)
EGFRCR SERPLBLD CKD-EPI 2021: 97.5 ML/MIN/1.73
EOSINOPHIL # BLD AUTO: 0 10*3/MM3 (ref 0–0.4)
EOSINOPHIL NFR BLD AUTO: 0 % (ref 0.3–6.2)
ERYTHROCYTE [DISTWIDTH] IN BLOOD BY AUTOMATED COUNT: 14 % (ref 12.3–15.4)
GLOBULIN UR ELPH-MCNC: 2.9 GM/DL
GLUCOSE BLDC GLUCOMTR-MCNC: 308 MG/DL (ref 70–130)
GLUCOSE BLDC GLUCOMTR-MCNC: 365 MG/DL (ref 70–130)
GLUCOSE BLDC GLUCOMTR-MCNC: 448 MG/DL (ref 70–130)
GLUCOSE SERPL-MCNC: 321 MG/DL (ref 65–99)
HCT VFR BLD AUTO: 42.9 % (ref 37.5–51)
HGB BLD-MCNC: 13.2 G/DL (ref 13–17.7)
IMM GRANULOCYTES # BLD AUTO: 0.03 10*3/MM3 (ref 0–0.05)
IMM GRANULOCYTES NFR BLD AUTO: 0.4 % (ref 0–0.5)
LYMPHOCYTES # BLD AUTO: 0.51 10*3/MM3 (ref 0.7–3.1)
LYMPHOCYTES NFR BLD AUTO: 6.2 % (ref 19.6–45.3)
MCH RBC QN AUTO: 30.6 PG (ref 26.6–33)
MCHC RBC AUTO-ENTMCNC: 30.8 G/DL (ref 31.5–35.7)
MCV RBC AUTO: 99.5 FL (ref 79–97)
MONOCYTES # BLD AUTO: 0.59 10*3/MM3 (ref 0.1–0.9)
MONOCYTES NFR BLD AUTO: 7.1 % (ref 5–12)
NEUTROPHILS NFR BLD AUTO: 7.12 10*3/MM3 (ref 1.7–7)
NEUTROPHILS NFR BLD AUTO: 86.2 % (ref 42.7–76)
NRBC BLD AUTO-RTO: 0 /100 WBC (ref 0–0.2)
PLATELET # BLD AUTO: 215 10*3/MM3 (ref 140–450)
PMV BLD AUTO: 11.4 FL (ref 6–12)
POTASSIUM SERPL-SCNC: 4.9 MMOL/L (ref 3.5–5.2)
PROCALCITONIN SERPL-MCNC: 0.06 NG/ML (ref 0–0.25)
PROT SERPL-MCNC: 7.1 G/DL (ref 6–8.5)
RBC # BLD AUTO: 4.31 10*6/MM3 (ref 4.14–5.8)
SODIUM SERPL-SCNC: 130 MMOL/L (ref 136–145)
WBC NRBC COR # BLD: 8.26 10*3/MM3 (ref 3.4–10.8)

## 2023-03-06 PROCEDURE — 85025 COMPLETE CBC W/AUTO DIFF WBC: CPT | Performed by: HOSPITALIST

## 2023-03-06 PROCEDURE — 25010000002 REMDESIVIR 100 MG/20ML SOLUTION 1 EACH VIAL: Performed by: HOSPITALIST

## 2023-03-06 PROCEDURE — 63710000001 INSULIN ASPART PER 5 UNITS: Performed by: INTERNAL MEDICINE

## 2023-03-06 PROCEDURE — 63710000001 MYCOPHENOLATE MOFETIL PER 250 MG: Performed by: INTERNAL MEDICINE

## 2023-03-06 PROCEDURE — 84145 PROCALCITONIN (PCT): CPT | Performed by: HOSPITALIST

## 2023-03-06 PROCEDURE — 86140 C-REACTIVE PROTEIN: CPT | Performed by: HOSPITALIST

## 2023-03-06 PROCEDURE — 63710000001 DEXAMETHASONE PER 0.25 MG: Performed by: HOSPITALIST

## 2023-03-06 PROCEDURE — 94799 UNLISTED PULMONARY SVC/PX: CPT

## 2023-03-06 PROCEDURE — 82962 GLUCOSE BLOOD TEST: CPT

## 2023-03-06 PROCEDURE — 94664 DEMO&/EVAL PT USE INHALER: CPT

## 2023-03-06 PROCEDURE — 25010000002 METHYLPREDNISOLONE PER 40 MG: Performed by: INTERNAL MEDICINE

## 2023-03-06 PROCEDURE — 80053 COMPREHEN METABOLIC PANEL: CPT | Performed by: HOSPITALIST

## 2023-03-06 PROCEDURE — 63710000001 INSULIN DETEMIR PER 5 UNITS: Performed by: HOSPITALIST

## 2023-03-06 PROCEDURE — 94761 N-INVAS EAR/PLS OXIMETRY MLT: CPT

## 2023-03-06 RX ORDER — DEXAMETHASONE 4 MG/1
6 TABLET ORAL
Status: DISCONTINUED | OUTPATIENT
Start: 2023-03-06 | End: 2023-03-09 | Stop reason: HOSPADM

## 2023-03-06 RX ADMIN — Medication 10 ML: at 21:21

## 2023-03-06 RX ADMIN — METHYLPREDNISOLONE SODIUM SUCCINATE 40 MG: 40 INJECTION, POWDER, FOR SOLUTION INTRAMUSCULAR; INTRAVENOUS at 04:09

## 2023-03-06 RX ADMIN — HYDROCODONE BITARTRATE AND ACETAMINOPHEN 1 TABLET: 10; 325 TABLET ORAL at 08:40

## 2023-03-06 RX ADMIN — MONTELUKAST SODIUM 10 MG: 10 TABLET, COATED ORAL at 21:21

## 2023-03-06 RX ADMIN — INSULIN DETEMIR 30 UNITS: 100 INJECTION, SOLUTION SUBCUTANEOUS at 08:35

## 2023-03-06 RX ADMIN — PYRIDOSTIGMINE BROMIDE 60 MG: 60 TABLET ORAL at 06:10

## 2023-03-06 RX ADMIN — INSULIN ASPART 7 UNITS: 100 INJECTION, SOLUTION INTRAVENOUS; SUBCUTANEOUS at 08:35

## 2023-03-06 RX ADMIN — REMDESIVIR 200 MG: 100 INJECTION, POWDER, LYOPHILIZED, FOR SOLUTION INTRAVENOUS at 14:12

## 2023-03-06 RX ADMIN — ATORVASTATIN CALCIUM 10 MG: 10 TABLET, FILM COATED ORAL at 08:35

## 2023-03-06 RX ADMIN — IPRATROPIUM BROMIDE AND ALBUTEROL SULFATE 3 ML: .5; 3 SOLUTION RESPIRATORY (INHALATION) at 11:09

## 2023-03-06 RX ADMIN — PYRIDOSTIGMINE BROMIDE 60 MG: 60 TABLET ORAL at 12:29

## 2023-03-06 RX ADMIN — INSULIN ASPART 8 UNITS: 100 INJECTION, SOLUTION INTRAVENOUS; SUBCUTANEOUS at 12:30

## 2023-03-06 RX ADMIN — MELATONIN TAB 5 MG 10 MG: 5 TAB at 21:21

## 2023-03-06 RX ADMIN — INSULIN ASPART 9 UNITS: 100 INJECTION, SOLUTION INTRAVENOUS; SUBCUTANEOUS at 17:20

## 2023-03-06 RX ADMIN — Medication 10 ML: at 08:36

## 2023-03-06 RX ADMIN — IPRATROPIUM BROMIDE AND ALBUTEROL SULFATE 3 ML: .5; 3 SOLUTION RESPIRATORY (INHALATION) at 07:35

## 2023-03-06 RX ADMIN — PYRIDOSTIGMINE BROMIDE 60 MG: 60 TABLET ORAL at 23:05

## 2023-03-06 RX ADMIN — INSULIN DETEMIR 50 UNITS: 100 INJECTION, SOLUTION SUBCUTANEOUS at 21:20

## 2023-03-06 RX ADMIN — DEXAMETHASONE 6 MG: 4 TABLET ORAL at 08:35

## 2023-03-06 RX ADMIN — MYCOPHENOLATE MOFETIL 500 MG: 250 CAPSULE ORAL at 21:21

## 2023-03-06 RX ADMIN — ASPIRIN 81 MG 81 MG: 81 TABLET ORAL at 08:35

## 2023-03-06 RX ADMIN — MYCOPHENOLATE MOFETIL 500 MG: 250 CAPSULE ORAL at 08:35

## 2023-03-06 RX ADMIN — IPRATROPIUM BROMIDE AND ALBUTEROL SULFATE 3 ML: .5; 3 SOLUTION RESPIRATORY (INHALATION) at 15:27

## 2023-03-06 RX ADMIN — IPRATROPIUM BROMIDE AND ALBUTEROL SULFATE 3 ML: .5; 3 SOLUTION RESPIRATORY (INHALATION) at 20:05

## 2023-03-06 RX ADMIN — PYRIDOSTIGMINE BROMIDE 60 MG: 60 TABLET ORAL at 17:20

## 2023-03-06 RX ADMIN — TERAZOSIN 2 MG: 1 CAPSULE ORAL at 21:21

## 2023-03-06 RX ADMIN — CETIRIZINE HYDROCHLORIDE 10 MG: 10 TABLET ORAL at 08:36

## 2023-03-06 RX ADMIN — HYDROCODONE BITARTRATE AND ACETAMINOPHEN 1 TABLET: 10; 325 TABLET ORAL at 17:20

## 2023-03-06 RX ADMIN — HYDROCODONE BITARTRATE AND ACETAMINOPHEN 1 TABLET: 10; 325 TABLET ORAL at 02:21

## 2023-03-06 RX ADMIN — HYDROCODONE BITARTRATE AND ACETAMINOPHEN 1 TABLET: 10; 325 TABLET ORAL at 23:05

## 2023-03-06 RX ADMIN — Medication 1 PATCH: at 08:36

## 2023-03-06 NOTE — CASE MANAGEMENT/SOCIAL WORK
Continued Stay Note  MURIEL Chavez     Patient Name: Kt Armstrong  MRN: [id number]  Today's Date: 3/6/2023    Admit Date: 3/4/2023    Plan: plan home w family   Discharge Plan     Row Name 03/06/23 1559       Plan    Plan plan home w family    Patient/Family in Agreement with Plan yes    Plan Comments Wearing appropriate PPE, spoke with patient at bedside, RT present, patient receiving breathing tx. Face sheet verified. Patient  lives in a mobile home with his daughter, 13yo grandson and cousin. He is independent of ADLs including driving. He has a cane, nebulizer and rw he uses prn and 02 provided per Tiki. He has usesd HH in the past, but does not recall the agency. He has not used inpatient rehab previously.  He sees Dr Kathleen as PCP. He does not have a living will and is not interested in creating one.  He uses [facility] [place] and denies issues obtaining medications. No concerns r/t housing,utilities, food or finances. He would be agreeable to HH at [place] if needed. He also would like a small portable concentrator  for his 02. CM will follow up with Tiki regarding his home 02. CM # placed on white board, will follow for dc needs.               Discharge Codes    No documentation.                     Constantino May RN

## 2023-03-06 NOTE — PROGRESS NOTES
"Hospitalist Team      Patient Care Team:  Ney Kathleen MD as PCP - General (Family Medicine)  Chris Driver MD as Consulting Physician (Pulmonary Disease)      Chief Complaint: Follow-up Acute-on-Chronic Hypoxic Respiratory Failure    Subjective    Feels well this morning.  Not as dyspneic w/ activity and he notes a good appetite and PO intake.  He denies chest pain.    Objective    Vital Signs  Temp:  [96 °F (35.6 °C)-97.7 °F (36.5 °C)] 96 °F (35.6 °C)  Heart Rate:  [] 79  Resp:  [18-20] 20  BP: (108-128)/(58-79) 128/63  Oxygen Therapy  SpO2: (!) 89 %  Pulse Oximetry Type: Continuous  Device (Oxygen Therapy): nasal cannula  Flow (L/min): 5}    Flowsheet Rows    Flowsheet Row First Filed Value   Admission Height 188 cm (74\") Documented at 03/04/2023 1821   Admission Weight 120 kg (263 lb 8 oz) Documented at 03/04/2023 1821          Physical Exam:    General: Appears stated age in no acute distress.  Lungs: Breath sounds are diminished throughout all fields.  I appreciate no wheeze or rales.  Respirations are non-labored.  CV: Regular rate and rhythm.  No murmurs appreciated.  Radial pulses are 2+ and symmetric.  Abdomen: Obese, soft, and non-tender w/ active bowel sounds.  MSK: No C/C/E.  Skin: No evidence of embolic phenomena.  Neuro: CN II-XII grossly intact.  Psych: Pleasant affect Ox3.    Results Review:     I reviewed the patient's new clinical results.    Lab Results (last 24 hours)     Procedure Component Value Units Date/Time    POC Glucose Once [[id number]]  (Abnormal) Collected: 03/06/23 1143    Specimen: Blood Updated: 03/06/23 1149     Glucose 365 mg/dL      Comment: Meter: [id number] : [id number] Jacobo JONAS       C-reactive Protein [[id number]]  (Abnormal) Collected: 03/06/23 0909    Specimen: Blood Updated: 03/06/23 1050     C-Reactive Protein 4.05 mg/dL     Comprehensive Metabolic Panel [[id number]]  (Abnormal) Collected: 03/06/23 0909    Specimen: Blood Updated: 03/06/23 " "0946     Glucose 321 mg/dL      BUN 20 mg/dL      Creatinine 0.74 mg/dL      Sodium 130 mmol/L      Potassium 4.9 mmol/L      Chloride 89 mmol/L      CO2 30.8 mmol/L      Calcium 9.6 mg/dL      Total Protein 7.1 g/dL      Albumin 4.2 g/dL      ALT (SGPT) 15 U/L      AST (SGOT) 15 U/L      Alkaline Phosphatase 59 U/L      Total Bilirubin 0.3 mg/dL      Globulin 2.9 gm/dL      A/G Ratio 1.4 g/dL      BUN/Creatinine Ratio 27.0     Anion Gap 10.2 mmol/L      eGFR 97.5 mL/min/1.73     Narrative:      GFR Normal >60  Chronic Kidney Disease <60  Kidney Failure <15      Procalcitonin [[id number]]  (Normal) Collected: 03/06/23 0909    Specimen: Blood Updated: 03/06/23 0941     Procalcitonin 0.06 ng/mL     Narrative:      As a Marker for Sepsis (Non-Neonates):    1. <0.5 ng/mL represents a low risk of severe sepsis and/or septic shock.  2. >2 ng/mL represents a high risk of severe sepsis and/or septic shock.    As a Marker for Lower Respiratory Tract Infections that require antibiotic therapy:    PCT on Admission    Antibiotic Therapy       6-12 Hrs later    >0.5                Strongly Recommended  >0.25 - <0.5        Recommended   0.1 - 0.25          Discouraged              Remeasure/reassess PCT  <0.1                Strongly Discouraged     Remeasure/reassess PCT    As 28 day mortality risk marker: \"Change in Procalcitonin Result\" (>80% or <=80%) if Day 0 (or Day 1) and Day 4 values are available. Refer to http://[email or web address]-pct-calculator.com    Change in PCT <=80%  A decrease of PCT levels below or equal to 80% defines a positive change in PCT test result representing a higher risk for 28-day all-cause mortality of patients diagnosed with severe sepsis for septic shock.    Change in PCT >80%  A decrease of PCT levels of more than 80% defines a negative change in PCT result representing a lower risk for 28-day all-cause mortality of patients diagnosed with severe sepsis or septic shock.       CBC & Differential [[id number]] "  (Abnormal) Collected: 03/06/23 0909    Specimen: Blood Updated: 03/06/23 0919    Narrative:      The following orders were created for panel order CBC & Differential.  Procedure                               Abnormality         Status                     ---------                               -----------         ------                     CBC Auto Differential[[id number]]        Abnormal            Final result                 Please view results for these tests on the individual orders.    CBC Auto Differential [[id number]]  (Abnormal) Collected: 03/06/23 0909    Specimen: Blood Updated: 03/06/23 0919     WBC 8.26 10*3/mm3      RBC 4.31 10*6/mm3      Hemoglobin 13.2 g/dL      Hematocrit 42.9 %      MCV 99.5 fL      MCH 30.6 pg      MCHC 30.8 g/dL      RDW 14.0 %      RDW-SD 51.7 fl      MPV 11.4 fL      Platelets 215 10*3/mm3      Neutrophil % 86.2 %      Lymphocyte % 6.2 %      Monocyte % 7.1 %      Eosinophil % 0.0 %      Basophil % 0.1 %      Immature Grans % 0.4 %      Neutrophils, Absolute 7.12 10*3/mm3      Lymphocytes, Absolute 0.51 10*3/mm3      Monocytes, Absolute 0.59 10*3/mm3      Eosinophils, Absolute 0.00 10*3/mm3      Basophils, Absolute 0.01 10*3/mm3      Immature Grans, Absolute 0.03 10*3/mm3      nRBC 0.0 /100 WBC     POC Glucose Once [[id number]]  (Abnormal) Collected: 03/06/23 0806    Specimen: Blood Updated: 03/06/23 0812     Glucose 308 mg/dL      Comment: Meter: [id number] : [id number] Vinay Laguerre CNA       Respiratory Panel PCR w/COVID-19(SARS-CoV-2) ZEUS/SHAW/FRANCIA/PAD/COR/MAD/GIL In-House, NP Swab in [facility], 3-4 HR TAT - Swab, Nasopharynx [[id number]]  (Abnormal) Collected: 03/05/23 1333    Specimen: Swab from Nasopharynx Updated: 03/05/23 1855     ADENOVIRUS, PCR Not Detected     Coronavirus 229E Not Detected     Coronavirus HKU1 Not Detected     Coronavirus NL63 Not Detected     Coronavirus OC43 Not Detected     COVID19 Detected     Human Metapneumovirus Not Detected     Human  Rhinovirus/Enterovirus Not Detected     Influenza A PCR Not Detected     Influenza B PCR Not Detected     Parainfluenza Virus 1 Not Detected     Parainfluenza Virus 2 Not Detected     Parainfluenza Virus 3 Not Detected     Parainfluenza Virus 4 Not Detected     RSV, PCR Not Detected     Bordetella pertussis pcr Not Detected     Bordetella parapertussis PCR Not Detected     Chlamydophila pneumoniae PCR Not Detected     Mycoplasma pneumo by PCR Not Detected    Narrative:      In the setting of a positive respiratory panel with a viral infection PLUS a negative procalcitonin without other underlying concern for bacterial infection, consider observing off antibiotics or discontinuation of antibiotics and continue supportive care. If the respiratory panel is positive for atypical bacterial infection (Bordetella pertussis, Chlamydophila pneumoniae, or Mycoplasma pneumoniae), consider antibiotic de-escalation to target atypical bacterial infection.    Blood Culture - Blood, Arm, Left [[id number]]  (Normal) Collected: 03/04/23 1836    Specimen: Blood from Arm, Left Updated: 03/05/23 1845     Blood Culture No growth at 24 hours    Blood Culture - Blood, Arm, Right [[id number]]  (Normal) Collected: 03/04/23 1835    Specimen: Blood from Arm, Right Updated: 03/05/23 1845     Blood Culture No growth at 24 hours    POC Glucose Once [[id number]]  (Abnormal) Collected: 03/05/23 1701    Specimen: Blood Updated: 03/05/23 1707     Glucose 339 mg/dL      Comment: Meter: [id number] : [id number] Kelly Beth JONAS             Imaging Results (Last 24 Hours)     ** No results found for the last 24 hours. **          Medication Review:   I have reviewed the patient's current medication list    Current Facility-Administered Medications:   •  acetaminophen (TYLENOL) tablet 650 mg, 650 mg, Oral, Q4H PRN **OR** acetaminophen (TYLENOL) 160 MG/5ML solution 650 mg, 650 mg, Oral, Q4H PRN **OR** acetaminophen (TYLENOL) suppository 650 mg, 650 mg,  Rectal, Q4H PRN, Lisa Hagan DO  •  aspirin chewable tablet 81 mg, 81 mg, Oral, Daily, Cj Villanueva, DO, 81 mg at 03/06/23 0835  •  atorvastatin (LIPITOR) tablet 10 mg, 10 mg, Oral, Daily, Cj Villanueva, DO, 10 mg at 03/06/23 0835  •  cetirizine (zyrTEC) tablet 10 mg, 10 mg, Oral, Daily, Cj Villanueva, DO, 10 mg at 03/06/23 0836  •  dexamethasone (DECADRON) tablet 6 mg, 6 mg, Oral, Daily With Breakfast, Sanjiv Rider MD, 6 mg at 03/06/23 0835  •  dextrose (D50W) (25 g/50 mL) IV injection 25 g, 25 g, Intravenous, Q15 Min PRN, Lisa Hagan DO  •  dextrose (GLUTOSE) oral gel 15 g, 15 g, Oral, Q15 Min PRN, Lisa Hagan DO  •  docusate sodium (COLACE) capsule 200 mg, 200 mg, Oral, Daily PRN, Cj Villanueva, DO  •  glucagon (GLUCAGEN) injection 1 mg, 1 mg, Intramuscular, Q15 Min PRN, Lisa Hagan DO  •  HYDROcodone-acetaminophen (NORCO)  MG per tablet 1 tablet, 1 tablet, Oral, Q6H PRN, Lisa Hagan DO, 1 tablet at 03/06/23 0840  •  Insulin Aspart (novoLOG) injection 0-9 Units, 0-9 Units, Subcutaneous, TID AC, Lisa Hagan DO, 8 Units at 03/06/23 1230  •  insulin detemir (LEVEMIR) injection 30 Units, 30 Units, Subcutaneous, Q12H, Sanjiv Rider MD, 30 Units at 03/06/23 0835  •  ipratropium-albuterol (DUO-NEB) nebulizer solution 3 mL, 3 mL, Nebulization, Q4H PRN, Lisa Hagan DO  •  ipratropium-albuterol (DUO-NEB) nebulizer solution 3 mL, 3 mL, Nebulization, 4x Daily - RT, Lisa Hagan DO, 3 mL at 03/06/23 1109  •  melatonin tablet 10 mg, 10 mg, Oral, Nightly PRN, jC Villanueva,   •  montelukast (SINGULAIR) tablet 10 mg, 10 mg, Oral, Nightly, Cj Villanueva DO, 10 mg at 03/05/23 2005  •  mycophenolate (CELLCEPT) capsule 500 mg, 500 mg, Oral, Q12H, Lisa Hagan DO, 500 mg at 03/06/23 0835  •  nicotine (NICODERM CQ) 14 MG/24HR patch 1 patch, 1 patch, Transdermal, Q24H, Cj Villanueva DO, 1 patch at 03/06/23 0836  •   ondansetron (ZOFRAN) tablet 4 mg, 4 mg, Oral, Q6H PRN **OR** ondansetron (ZOFRAN) injection 4 mg, 4 mg, Intravenous, Q6H PRN, Hagan, Birrilla Juany, DO  •  pyridostigmine (MESTINON) tablet 60 mg, 60 mg, Oral, 4x Daily, Hagan, Birrilla Juany, DO, 60 mg at 03/06/23 1229  •  sodium chloride 0.9 % flush 10 mL, 10 mL, Intravenous, PRN, Hagan, Birrilla Juany, DO  •  sodium chloride 0.9 % flush 10 mL, 10 mL, Intravenous, PRN, Hagan, Birrilla Juany, DO  •  sodium chloride 0.9 % flush 10 mL, 10 mL, Intravenous, Q12H, Hagan, Birrilla Juany, DO, 10 mL at 03/06/23 0836  •  sodium chloride 0.9 % infusion 40 mL, 40 mL, Intravenous, PRN, Hagan, Birrilla Juany, DO  •  terazosin (HYTRIN) capsule 2 mg, 2 mg, Oral, Nightly, Cj Villanueva, DO, 2 mg at 03/05/23 2005      Assessment & Plan     1.  Acute-on-Chronic Hypoxic Respiratory Failure due to AECOPD from Acute COVID-19 Infection: O2 requirements still above baseline.  I've changed him to Decadron, and his symptoms onset was 3/1/22 so within the initiation phase of Remdesivir.  CRP as noted.  PCT is still negative so I've stopped his Doxycycline.  Continue to trend labs and monitor O2 needs.  CXR demonstrates no pneumonia.    2.  Diabetes Mellitus, Type 2 in Obese: Bedsides and A.M. not at goal.  Hyperglycemia due to steroids which is a well-known side effect and not a complication.  I've increased his basal insulin to home dose.  Continue to monitor trend.    3.  Hx/O Myasthenia Gravis: No acute issues on home regimen.    4.  Tobacco abuse: On Nicoderm.    Plan for disposition: Predicated on hospital course.    Sanjiv Rider MD  03/06/23  12:41 EST

## 2023-03-06 NOTE — SIGNIFICANT NOTE
03/05/23 1901   Provider Notification   Reason for Communication Critical lab value  (resp panel)   Provider Name Matt   Notification Route In person, on unit   Response No new orders

## 2023-03-06 NOTE — PLAN OF CARE
Goal Outcome Evaluation:           Progress: improving  Outcome Evaluation: vss, o2 needs monitored. steroid adjusted. up independently. glucose monitored, insulin adjusted

## 2023-03-06 NOTE — PLAN OF CARE
Goal Outcome Evaluation:  Plan of Care Reviewed With: patient        Progress: improving  Outcome Evaluation: VS stable, on NC 5 lpm sats maintaining bw 88-90%, no c/o SOA, IV solu-medrol, PO abx.

## 2023-03-06 NOTE — SIGNIFICANT NOTE
03/06/23 1723   Provider Notification   Reason for Communication Evaluate  (dinner glucose)   Provider Name willian   Notification Route Text page   Response See orders

## 2023-03-07 LAB
ALBUMIN SERPL-MCNC: 3.7 G/DL (ref 3.5–5.2)
ALBUMIN/GLOB SERPL: 1.4 G/DL
ALP SERPL-CCNC: 56 U/L (ref 39–117)
ALT SERPL W P-5'-P-CCNC: 22 U/L (ref 1–41)
ANION GAP SERPL CALCULATED.3IONS-SCNC: 10.5 MMOL/L (ref 5–15)
AST SERPL-CCNC: 25 U/L (ref 1–40)
BILIRUB SERPL-MCNC: 0.2 MG/DL (ref 0–1.2)
BUN SERPL-MCNC: 22 MG/DL (ref 8–23)
BUN/CREAT SERPL: 31.9 (ref 7–25)
CALCIUM SPEC-SCNC: 9.6 MG/DL (ref 8.6–10.5)
CHLORIDE SERPL-SCNC: 98 MMOL/L (ref 98–107)
CO2 SERPL-SCNC: 29.5 MMOL/L (ref 22–29)
CREAT SERPL-MCNC: 0.69 MG/DL (ref 0.76–1.27)
CRP SERPL-MCNC: 2.38 MG/DL (ref 0–0.5)
D DIMER PPP FEU-MCNC: 0.57 MCGFEU/ML (ref 0–0.7)
EGFRCR SERPLBLD CKD-EPI 2021: 99.6 ML/MIN/1.73
GLOBULIN UR ELPH-MCNC: 2.7 GM/DL
GLUCOSE BLDC GLUCOMTR-MCNC: 112 MG/DL (ref 70–130)
GLUCOSE BLDC GLUCOMTR-MCNC: 218 MG/DL (ref 70–130)
GLUCOSE BLDC GLUCOMTR-MCNC: 253 MG/DL (ref 70–130)
GLUCOSE BLDC GLUCOMTR-MCNC: 388 MG/DL (ref 70–130)
GLUCOSE SERPL-MCNC: 187 MG/DL (ref 65–99)
POTASSIUM SERPL-SCNC: 4 MMOL/L (ref 3.5–5.2)
PROCALCITONIN SERPL-MCNC: 0.06 NG/ML (ref 0–0.25)
PROT SERPL-MCNC: 6.4 G/DL (ref 6–8.5)
SODIUM SERPL-SCNC: 138 MMOL/L (ref 136–145)

## 2023-03-07 PROCEDURE — 63710000001 INSULIN ASPART PER 5 UNITS: Performed by: INTERNAL MEDICINE

## 2023-03-07 PROCEDURE — 94761 N-INVAS EAR/PLS OXIMETRY MLT: CPT

## 2023-03-07 PROCEDURE — 86140 C-REACTIVE PROTEIN: CPT | Performed by: HOSPITALIST

## 2023-03-07 PROCEDURE — 94664 DEMO&/EVAL PT USE INHALER: CPT

## 2023-03-07 PROCEDURE — 85379 FIBRIN DEGRADATION QUANT: CPT | Performed by: HOSPITALIST

## 2023-03-07 PROCEDURE — 25010000002 REMDESIVIR 100 MG/20ML SOLUTION 1 EACH VIAL: Performed by: HOSPITALIST

## 2023-03-07 PROCEDURE — 84145 PROCALCITONIN (PCT): CPT | Performed by: HOSPITALIST

## 2023-03-07 PROCEDURE — 80053 COMPREHEN METABOLIC PANEL: CPT | Performed by: HOSPITALIST

## 2023-03-07 PROCEDURE — 82962 GLUCOSE BLOOD TEST: CPT

## 2023-03-07 PROCEDURE — XW033E5 INTRODUCTION OF REMDESIVIR ANTI-INFECTIVE INTO PERIPHERAL VEIN, PERCUTANEOUS APPROACH, NEW TECHNOLOGY GROUP 5: ICD-10-PCS | Performed by: HOSPITALIST

## 2023-03-07 PROCEDURE — 63710000001 DEXAMETHASONE PER 0.25 MG: Performed by: HOSPITALIST

## 2023-03-07 PROCEDURE — 63710000001 MYCOPHENOLATE MOFETIL PER 250 MG: Performed by: INTERNAL MEDICINE

## 2023-03-07 PROCEDURE — 94799 UNLISTED PULMONARY SVC/PX: CPT

## 2023-03-07 PROCEDURE — 63710000001 INSULIN DETEMIR PER 5 UNITS: Performed by: HOSPITALIST

## 2023-03-07 RX ADMIN — Medication 10 ML: at 20:52

## 2023-03-07 RX ADMIN — PYRIDOSTIGMINE BROMIDE 60 MG: 60 TABLET ORAL at 12:17

## 2023-03-07 RX ADMIN — HYDROCODONE BITARTRATE AND ACETAMINOPHEN 1 TABLET: 10; 325 TABLET ORAL at 05:07

## 2023-03-07 RX ADMIN — MYCOPHENOLATE MOFETIL 500 MG: 250 CAPSULE ORAL at 08:40

## 2023-03-07 RX ADMIN — INSULIN DETEMIR 50 UNITS: 100 INJECTION, SOLUTION SUBCUTANEOUS at 20:52

## 2023-03-07 RX ADMIN — INSULIN ASPART 6 UNITS: 100 INJECTION, SOLUTION INTRAVENOUS; SUBCUTANEOUS at 17:36

## 2023-03-07 RX ADMIN — INSULIN ASPART 4 UNITS: 100 INJECTION, SOLUTION INTRAVENOUS; SUBCUTANEOUS at 12:17

## 2023-03-07 RX ADMIN — HYDROCODONE BITARTRATE AND ACETAMINOPHEN 1 TABLET: 10; 325 TABLET ORAL at 23:40

## 2023-03-07 RX ADMIN — ATORVASTATIN CALCIUM 10 MG: 10 TABLET, FILM COATED ORAL at 08:40

## 2023-03-07 RX ADMIN — IPRATROPIUM BROMIDE AND ALBUTEROL SULFATE 3 ML: .5; 3 SOLUTION RESPIRATORY (INHALATION) at 16:21

## 2023-03-07 RX ADMIN — IPRATROPIUM BROMIDE AND ALBUTEROL SULFATE 3 ML: .5; 3 SOLUTION RESPIRATORY (INHALATION) at 11:39

## 2023-03-07 RX ADMIN — INSULIN DETEMIR 50 UNITS: 100 INJECTION, SOLUTION SUBCUTANEOUS at 08:40

## 2023-03-07 RX ADMIN — PYRIDOSTIGMINE BROMIDE 60 MG: 60 TABLET ORAL at 17:36

## 2023-03-07 RX ADMIN — IPRATROPIUM BROMIDE AND ALBUTEROL SULFATE 3 ML: .5; 3 SOLUTION RESPIRATORY (INHALATION) at 19:28

## 2023-03-07 RX ADMIN — MONTELUKAST SODIUM 10 MG: 10 TABLET, COATED ORAL at 20:52

## 2023-03-07 RX ADMIN — Medication 1 PATCH: at 08:43

## 2023-03-07 RX ADMIN — TERAZOSIN 2 MG: 1 CAPSULE ORAL at 20:52

## 2023-03-07 RX ADMIN — IPRATROPIUM BROMIDE AND ALBUTEROL SULFATE 3 ML: .5; 3 SOLUTION RESPIRATORY (INHALATION) at 07:29

## 2023-03-07 RX ADMIN — HYDROCODONE BITARTRATE AND ACETAMINOPHEN 1 TABLET: 10; 325 TABLET ORAL at 17:41

## 2023-03-07 RX ADMIN — PYRIDOSTIGMINE BROMIDE 60 MG: 60 TABLET ORAL at 05:07

## 2023-03-07 RX ADMIN — DEXAMETHASONE 6 MG: 4 TABLET ORAL at 08:39

## 2023-03-07 RX ADMIN — PYRIDOSTIGMINE BROMIDE 60 MG: 60 TABLET ORAL at 23:38

## 2023-03-07 RX ADMIN — Medication 10 ML: at 08:40

## 2023-03-07 RX ADMIN — ASPIRIN 81 MG 81 MG: 81 TABLET ORAL at 08:40

## 2023-03-07 RX ADMIN — MYCOPHENOLATE MOFETIL 500 MG: 250 CAPSULE ORAL at 20:52

## 2023-03-07 RX ADMIN — ACETAMINOPHEN 650 MG: 325 TABLET ORAL at 17:41

## 2023-03-07 RX ADMIN — CETIRIZINE HYDROCHLORIDE 10 MG: 10 TABLET ORAL at 08:41

## 2023-03-07 RX ADMIN — REMDESIVIR 100 MG: 100 INJECTION, POWDER, LYOPHILIZED, FOR SOLUTION INTRAVENOUS at 16:27

## 2023-03-07 RX ADMIN — HYDROCODONE BITARTRATE AND ACETAMINOPHEN 1 TABLET: 10; 325 TABLET ORAL at 12:17

## 2023-03-07 RX ADMIN — ACETAMINOPHEN 650 MG: 325 TABLET ORAL at 04:22

## 2023-03-07 NOTE — PROGRESS NOTES
"Hospitalist Team      Patient Care Team:  Ney Kathleen MD as PCP - General (Family Medicine)  Chris Driver MD as Consulting Physician (Pulmonary Disease)      Chief Complaint:  Acute-on-Chronic Hypoxic Respiratory Failure     Subjective    Doing well today and he has been weaned to 4L.  During my interview and exam, I weaned him to 3L.  He denies chest pain and dyspnea.  He was up this morning and shaved.  He is tolerating PO.    Objective    Vital Signs  Temp:  [96.3 °F (35.7 °C)-97.8 °F (36.6 °C)] 96.4 °F (35.8 °C)  Heart Rate:  [81-93] 86  Resp:  [20-24] 20  BP: (105-125)/(58-74) 114/64  Oxygen Therapy  SpO2: 91 %  Pulse Oximetry Type: Continuous  Device (Oxygen Therapy): humidified, nasal cannula  Flow (L/min): 4}    Flowsheet Rows    Flowsheet Row First Filed Value   Admission Height 188 cm (74\") Documented at 03/04/2023 1821   Admission Weight 120 kg (263 lb 8 oz) Documented at 03/04/2023 1821          Physical Exam:    General: Appears in no acute distress.  Lungs: Clear through the upper fields.  No wheeze or rales appreciated in the bases.  Respirations are non-labored.  CV: Regular rate and rhythm.  No murmurs.  Abdomen: Obese, soft and non-tender w/ active bowel sounds.  MSK: No C/C/E.  Neuro: CN II-XII grossly intact.  Psych: Pleasant affect.    Results Review:     I reviewed the patient's new clinical results.    Lab Results (last 24 hours)     Procedure Component Value Units Date/Time    POC Glucose Once [[id number]]  (Abnormal) Collected: 03/07/23 1639    Specimen: Blood Updated: 03/07/23 1646     Glucose 253 mg/dL      Comment: Meter: [id number] : [id number] Ernspiker Liane NA       POC Glucose Once [[id number]]  (Abnormal) Collected: 03/07/23 1202    Specimen: Blood Updated: 03/07/23 1209     Glucose 218 mg/dL      Comment: Meter: [id number] : [id number] Ernspiker Liane NA       C-reactive Protein [[id number]]  (Abnormal) Collected: 03/07/23 0353    Specimen: Blood Updated: " "03/07/23 1048     C-Reactive Protein 2.38 mg/dL     POC Glucose Once [[id number]]  (Normal) Collected: 03/07/23 0757    Specimen: Blood Updated: 03/07/23 0803     Glucose 112 mg/dL      Comment: Meter: [id number] : [id number] Jacobo JONAS       Comprehensive Metabolic Panel [[id number]]  (Abnormal) Collected: 03/07/23 0353    Specimen: Blood Updated: 03/07/23 0554     Glucose 187 mg/dL      BUN 22 mg/dL      Creatinine 0.69 mg/dL      Sodium 138 mmol/L      Potassium 4.0 mmol/L      Chloride 98 mmol/L      CO2 29.5 mmol/L      Calcium 9.6 mg/dL      Total Protein 6.4 g/dL      Albumin 3.7 g/dL      ALT (SGPT) 22 U/L      AST (SGOT) 25 U/L      Alkaline Phosphatase 56 U/L      Total Bilirubin 0.2 mg/dL      Globulin 2.7 gm/dL      A/G Ratio 1.4 g/dL      BUN/Creatinine Ratio 31.9     Anion Gap 10.5 mmol/L      eGFR 99.6 mL/min/1.73     Narrative:      GFR Normal >60  Chronic Kidney Disease <60  Kidney Failure <15      Procalcitonin [[id number]]  (Normal) Collected: 03/07/23 0353    Specimen: Blood Updated: 03/07/23 0543     Procalcitonin 0.06 ng/mL     Narrative:      As a Marker for Sepsis (Non-Neonates):    1. <0.5 ng/mL represents a low risk of severe sepsis and/or septic shock.  2. >2 ng/mL represents a high risk of severe sepsis and/or septic shock.    As a Marker for Lower Respiratory Tract Infections that require antibiotic therapy:    PCT on Admission    Antibiotic Therapy       6-12 Hrs later    >0.5                Strongly Recommended  >0.25 - <0.5        Recommended   0.1 - 0.25          Discouraged              Remeasure/reassess PCT  <0.1                Strongly Discouraged     Remeasure/reassess PCT    As 28 day mortality risk marker: \"Change in Procalcitonin Result\" (>80% or <=80%) if Day 0 (or Day 1) and Day 4 values are available. Refer to http://[email or web address]-pct-calculator.com    Change in PCT <=80%  A decrease of PCT levels below or equal to 80% defines a positive change in PCT test result " "representing a higher risk for 28-day all-cause mortality of patients diagnosed with severe sepsis for septic shock.    Change in PCT >80%  A decrease of PCT levels of more than 80% defines a negative change in PCT result representing a lower risk for 28-day all-cause mortality of patients diagnosed with severe sepsis or septic shock.       D-dimer, Quantitative [[id number]]  (Normal) Collected: 03/07/23 0353    Specimen: Blood Updated: 03/07/23 0543     D-Dimer, Quantitative 0.57 MCGFEU/mL     Narrative:      According to the assay 's published package insert, a normal (<0.50 MCGFEU/mL) D-dimer result in conjunction with a non-high clinical probability assessment, excludes deep vein thrombosis (DVT) and pulmonary embolism (PE) with high sensitivity.    D-dimer values increase with age and this can make VTE exclusion of an older population difficult. To address this, the [employer], based on best available evidence and recent guidelines, recommends that clinicians use age-adjusted D-dimer thresholds in patients greater than 50 years of age with: a) a low probability of PE who do not meet all Pulmonary Embolism Rule Out Criteria, or b) in those with intermediate probability of PE.   The formula for an age-adjusted D-dimer cut-off is \"age/100\".  For example, a 60 year old patient would have an age-adjusted cut-off of 0.60 MCGFEU/mL and an 80 year old 0.80 MCGFEU/mL.    Blood Culture - Blood, Arm, Left [[id number]]  (Normal) Collected: 03/04/23 1836    Specimen: Blood from Arm, Left Updated: 03/06/23 1845     Blood Culture No growth at 2 days    Blood Culture - Blood, Arm, Right [[id number]]  (Normal) Collected: 03/04/23 1835    Specimen: Blood from Arm, Right Updated: 03/06/23 1845     Blood Culture No growth at 2 days          Imaging Results (Last 24 Hours)     ** No results found for the last 24 hours. **          Medication Review:   I have reviewed the patient's current medication " list    Current Facility-Administered Medications:   •  acetaminophen (TYLENOL) tablet 650 mg, 650 mg, Oral, Q4H PRN, 650 mg at 03/07/23 0422 **OR** acetaminophen (TYLENOL) 160 MG/5ML solution 650 mg, 650 mg, Oral, Q4H PRN **OR** acetaminophen (TYLENOL) suppository 650 mg, 650 mg, Rectal, Q4H PRN, Lisa Hagan DO  •  aspirin chewable tablet 81 mg, 81 mg, Oral, Daily, Cj Villanueva, DO, 81 mg at 03/07/23 0840  •  atorvastatin (LIPITOR) tablet 10 mg, 10 mg, Oral, Daily, Cj Villanueva, DO, 10 mg at 03/07/23 0840  •  cetirizine (zyrTEC) tablet 10 mg, 10 mg, Oral, Daily, Cj Villanueva, DO, 10 mg at 03/07/23 0841  •  dexamethasone (DECADRON) tablet 6 mg, 6 mg, Oral, Daily With Breakfast, Sanjiv Rider MD, 6 mg at 03/07/23 0839  •  dextrose (D50W) (25 g/50 mL) IV injection 25 g, 25 g, Intravenous, Q15 Min PRN, Lisa Hagan DO  •  dextrose (GLUTOSE) oral gel 15 g, 15 g, Oral, Q15 Min PRN, Lisa Hagan DO  •  docusate sodium (COLACE) capsule 200 mg, 200 mg, Oral, Daily PRN, Cj Villanueva, DO  •  glucagon (GLUCAGEN) injection 1 mg, 1 mg, Intramuscular, Q15 Min PRN, Lisa Hagan DO  •  HYDROcodone-acetaminophen (NORCO)  MG per tablet 1 tablet, 1 tablet, Oral, Q6H PRN, Lisa Hagan DO, 1 tablet at 03/07/23 1217  •  Insulin Aspart (novoLOG) injection 0-9 Units, 0-9 Units, Subcutaneous, TID AC, Lisa Hagan DO, 4 Units at 03/07/23 1217  •  insulin detemir (LEVEMIR) injection 50 Units, 50 Units, Subcutaneous, Q12H, Sanjiv Rider MD, 50 Units at 03/07/23 0840  •  ipratropium-albuterol (DUO-NEB) nebulizer solution 3 mL, 3 mL, Nebulization, Q4H PRN, Lisa Hagan DO  •  ipratropium-albuterol (DUO-NEB) nebulizer solution 3 mL, 3 mL, Nebulization, 4x Daily - RT, Lisa Hagan DO, 3 mL at 03/07/23 1621  •  melatonin tablet 10 mg, 10 mg, Oral, Nightly PRN, Cj Villanueva, DO, 10 mg at 03/06/23 2121  •  montelukast (SINGULAIR) tablet 10 mg, 10  mg, Oral, Nightly, Cj Villanueva, DO, 10 mg at 03/06/23 2121  •  mycophenolate (CELLCEPT) capsule 500 mg, 500 mg, Oral, Q12H, Hagan, Birrilla Juany, DO, 500 mg at 03/07/23 0840  •  nicotine (NICODERM CQ) 14 MG/24HR patch 1 patch, 1 patch, Transdermal, Q24H, Cj Villanueva, DO, 1 patch at 03/07/23 0843  •  ondansetron (ZOFRAN) tablet 4 mg, 4 mg, Oral, Q6H PRN **OR** ondansetron (ZOFRAN) injection 4 mg, 4 mg, Intravenous, Q6H PRN, Hagan, Harrisrilla Juany, DO  •  Pharmacy Consult - Remdesivir, , Does not apply, Continuous PRN, Sanjiv Rider MD  •  pyridostigmine (MESTINON) tablet 60 mg, 60 mg, Oral, 4x Daily, Hagan, Harrisrilla Juany, DO, 60 mg at 03/07/23 1217  •  [COMPLETED] remdesivir 200 mg in sodium chloride 0.9 % 290 mL IVPB (powder vial), 200 mg, Intravenous, Q24H, 200 mg at 03/06/23 1412 **FOLLOWED BY** remdesivir 100 mg in sodium chloride 0.9 % 270 mL IVPB (powder vial), 100 mg, Intravenous, Q24H, Sanjiv Rider MD, 100 mg at 03/07/23 1627  •  sodium chloride 0.9 % flush 10 mL, 10 mL, Intravenous, PRN, Hagan, Birrilla Juany, DO  •  sodium chloride 0.9 % flush 10 mL, 10 mL, Intravenous, PRN, Hagan, Birrilla Juany, DO  •  sodium chloride 0.9 % flush 10 mL, 10 mL, Intravenous, Q12H, Hagan, Birrilla Juany, DO, 10 mL at 03/07/23 0840  •  sodium chloride 0.9 % infusion 40 mL, 40 mL, Intravenous, PRN, Hagan, Birrilla Juany, DO  •  terazosin (HYTRIN) capsule 2 mg, 2 mg, Oral, Nightly, Cj Villanueva, DO, 2 mg at 03/06/23 2121      Assessment & Plan     1.  Acute-on-Chronic Hypoxic Respiratory Failure due to AECOPD from Acute COVID-19 Infection: Clinically better.  Maintaining SaO2 on 3L so will continue.  Continue Decadron and Remdesivir.     2.  Diabetes Mellitus, Type 2 in Obese: A.M. at goal and bedsides better.  Continue current insulin regimen.     3.  Hx/O Myasthenia Gravis: No acute issues on home regimen.     4.  Tobacco abuse: Continue Nicoderm.    Plan for disposition: A.D. in A.M.    Sanjiv Rider,  MD  03/07/23  17:35 EST

## 2023-03-07 NOTE — PLAN OF CARE
Problem: Adult Inpatient Plan of Care  Goal: Plan of Care Review  Outcome: Ongoing, Progressing  Flowsheets (Taken 3/7/2023 6132)  Progress: improving  Plan of Care Reviewed With: patient  Outcome Evaluation: VSS pt weaned down to 4L nc at this time. Pain meds given prn. In isolation. nicotine patch on arm. SSI given   Goal Outcome Evaluation:  Plan of Care Reviewed With: patient        Progress: improving  Outcome Evaluation: VSS pt weaned down to 4L nc at this time. Pain meds given prn. In isolation. nicotine patch on arm. SSI given

## 2023-03-08 LAB
GLUCOSE BLDC GLUCOMTR-MCNC: 182 MG/DL (ref 70–130)
GLUCOSE BLDC GLUCOMTR-MCNC: 193 MG/DL (ref 70–130)
GLUCOSE BLDC GLUCOMTR-MCNC: 283 MG/DL (ref 70–130)
GLUCOSE BLDC GLUCOMTR-MCNC: 347 MG/DL (ref 70–130)

## 2023-03-08 PROCEDURE — 25010000002 REMDESIVIR 100 MG/20ML SOLUTION 1 EACH VIAL: Performed by: HOSPITALIST

## 2023-03-08 PROCEDURE — 94761 N-INVAS EAR/PLS OXIMETRY MLT: CPT

## 2023-03-08 PROCEDURE — 94618 PULMONARY STRESS TESTING: CPT

## 2023-03-08 PROCEDURE — 63710000001 MYCOPHENOLATE MOFETIL PER 250 MG: Performed by: INTERNAL MEDICINE

## 2023-03-08 PROCEDURE — 63710000001 INSULIN ASPART PER 5 UNITS: Performed by: INTERNAL MEDICINE

## 2023-03-08 PROCEDURE — 94664 DEMO&/EVAL PT USE INHALER: CPT

## 2023-03-08 PROCEDURE — 94799 UNLISTED PULMONARY SVC/PX: CPT

## 2023-03-08 PROCEDURE — 82962 GLUCOSE BLOOD TEST: CPT

## 2023-03-08 PROCEDURE — 63710000001 INSULIN DETEMIR PER 5 UNITS: Performed by: HOSPITALIST

## 2023-03-08 PROCEDURE — 63710000001 DEXAMETHASONE PER 0.25 MG: Performed by: HOSPITALIST

## 2023-03-08 RX ADMIN — HYDROCODONE BITARTRATE AND ACETAMINOPHEN 1 TABLET: 10; 325 TABLET ORAL at 12:26

## 2023-03-08 RX ADMIN — INSULIN DETEMIR 40 UNITS: 100 INJECTION, SOLUTION SUBCUTANEOUS at 20:37

## 2023-03-08 RX ADMIN — TERAZOSIN 2 MG: 1 CAPSULE ORAL at 20:37

## 2023-03-08 RX ADMIN — REMDESIVIR 100 MG: 100 INJECTION, POWDER, LYOPHILIZED, FOR SOLUTION INTRAVENOUS at 14:17

## 2023-03-08 RX ADMIN — HYDROCODONE BITARTRATE AND ACETAMINOPHEN 1 TABLET: 10; 325 TABLET ORAL at 20:37

## 2023-03-08 RX ADMIN — Medication 1 PATCH: at 08:49

## 2023-03-08 RX ADMIN — PYRIDOSTIGMINE BROMIDE 60 MG: 60 TABLET ORAL at 06:36

## 2023-03-08 RX ADMIN — MONTELUKAST SODIUM 10 MG: 10 TABLET, COATED ORAL at 20:37

## 2023-03-08 RX ADMIN — IPRATROPIUM BROMIDE AND ALBUTEROL SULFATE 3 ML: .5; 3 SOLUTION RESPIRATORY (INHALATION) at 20:48

## 2023-03-08 RX ADMIN — INSULIN ASPART 2 UNITS: 100 INJECTION, SOLUTION INTRAVENOUS; SUBCUTANEOUS at 12:24

## 2023-03-08 RX ADMIN — IPRATROPIUM BROMIDE AND ALBUTEROL SULFATE 3 ML: .5; 3 SOLUTION RESPIRATORY (INHALATION) at 15:40

## 2023-03-08 RX ADMIN — MYCOPHENOLATE MOFETIL 500 MG: 250 CAPSULE ORAL at 08:49

## 2023-03-08 RX ADMIN — PYRIDOSTIGMINE BROMIDE 60 MG: 60 TABLET ORAL at 12:24

## 2023-03-08 RX ADMIN — CETIRIZINE HYDROCHLORIDE 10 MG: 10 TABLET ORAL at 08:49

## 2023-03-08 RX ADMIN — Medication 10 ML: at 08:50

## 2023-03-08 RX ADMIN — Medication 10 ML: at 21:14

## 2023-03-08 RX ADMIN — ASPIRIN 81 MG 81 MG: 81 TABLET ORAL at 08:49

## 2023-03-08 RX ADMIN — ATORVASTATIN CALCIUM 10 MG: 10 TABLET, FILM COATED ORAL at 08:49

## 2023-03-08 RX ADMIN — MYCOPHENOLATE MOFETIL 500 MG: 250 CAPSULE ORAL at 20:37

## 2023-03-08 RX ADMIN — INSULIN ASPART 2 UNITS: 100 INJECTION, SOLUTION INTRAVENOUS; SUBCUTANEOUS at 08:48

## 2023-03-08 RX ADMIN — IPRATROPIUM BROMIDE AND ALBUTEROL SULFATE 3 ML: .5; 3 SOLUTION RESPIRATORY (INHALATION) at 11:55

## 2023-03-08 RX ADMIN — DEXAMETHASONE 6 MG: 4 TABLET ORAL at 08:49

## 2023-03-08 RX ADMIN — HYDROCODONE BITARTRATE AND ACETAMINOPHEN 1 TABLET: 10; 325 TABLET ORAL at 06:38

## 2023-03-08 RX ADMIN — IPRATROPIUM BROMIDE AND ALBUTEROL SULFATE 3 ML: .5; 3 SOLUTION RESPIRATORY (INHALATION) at 07:43

## 2023-03-08 RX ADMIN — INSULIN ASPART 6 UNITS: 100 INJECTION, SOLUTION INTRAVENOUS; SUBCUTANEOUS at 17:15

## 2023-03-08 RX ADMIN — PYRIDOSTIGMINE BROMIDE 60 MG: 60 TABLET ORAL at 17:15

## 2023-03-08 RX ADMIN — INSULIN DETEMIR 50 UNITS: 100 INJECTION, SOLUTION SUBCUTANEOUS at 08:48

## 2023-03-08 NOTE — PLAN OF CARE
Goal Outcome Evaluation:  Plan of Care Reviewed With: patient        Progress: improving  Outcome Evaluation: VSS. Rested decently during the night. No complaints of shortness of breath or difficulty breathing. Nicotene patch on arm.

## 2023-03-08 NOTE — PROGRESS NOTES
Adult Nutrition  Assessment/PES    Patient Name:  Kt Armstrong  YOB: 1950  MRN: [id number]  Admit Date:  3/4/2023    Assessment Date:  3/8/2023    Comments:  Agree with diet. Excellent po intake.   Will cont to follow and remain available.     Reason for Assessment     Row Name 03/08/23 1347          Reason for Assessment    Reason For Assessment follow-up protocol     Diagnosis pulmonary disease;infection/sepsis  [facility]                Nutrition/Diet History     Row Name 03/08/23 1347          Nutrition/Diet History    Typical Intake (Food/Fluid/EN/PN) Pt with excellent po intake                Labs/Tests/Procedures/Meds     Row Name 03/08/23 1347          Labs/Procedures/Meds    Lab Results Reviewed reviewed     Lab Results Comments glu 388, 253        Diagnostic Tests/Procedures    Diagnostic Test/Procedure Reviewed reviewed        Medications    Pertinent Medications Reviewed reviewed     Pertinent Medications Comments novolog, levemir, decadron                    Nutrition Prescription Ordered     Row Name 03/08/23 1348          Nutrition Prescription PO    Common Modifiers Cardiac;Consistent Carbohydrate                Evaluation of Received Nutrient/Fluid Intake     Row Name 03/08/23 1348          Fluid Intake Evaluation    Oral Fluid (mL) 1066  ave x 3, 44%        PO Evaluation    Number of Meals 7     % PO Intake 100                   Problem/Interventions:   Problem 1     Row Name 03/08/23 1348          Nutrition Diagnoses Problem 1    Problem 1 Inadequate Nutrient Intake     Etiology (related to) Medical Diagnosis;Factors Affecting Nutrition     Signs/Symptoms (evidenced by) Report/Observation     Resolved? Yes                      Intervention Goal     Row Name 03/08/23 1348          Intervention Goal    General Meet nutritional needs for age/condition     PO Maintain intake;PO intake (%)     PO Intake % 80 %  or greater                Nutrition Intervention     Row Name  03/08/23 1349          Nutrition Intervention    RD/Tech Action Follow Tx progress                  Education/Evaluation     Row Name 03/08/23 1349          Education    Education No discharge needs identified at this time        Monitor/Evaluation    Monitor Per protocol;I&O;PO intake;Pertinent labs;Weight                 Electronically signed by:  Nicole España RD  03/08/23 13:49 EST

## 2023-03-08 NOTE — PLAN OF CARE
Goal Outcome Evaluation:  Plan of Care Reviewed With: patient        Progress: improving  Outcome Evaluation: vss. pt remains on 3L O2. pain controlled with po, prn medication. nicotine patch to L arm. remdesivir, po decadron in place. pt resting in room. no other complaints at this time.

## 2023-03-08 NOTE — PROCEDURES
Exercise Oximetry    Patient Name:Kt Armstrong   MRN: [id number]   Date: 03/08/23             ROOM AIR BASELINE   SpO2% 81   Heart Rate 98   Blood Pressure      EXERCISE ON ROOM AIR SpO2% EXERCISE ON O2 @ 3 LPM SpO2%   1 MINUTE  1 MINUTE  89   2 MINUTES  2 MINUTES increased to 4lpm  85   3 MINUTES  3 MINUTES  88   4 MINUTES  4 MINUTES increased to 5lpm  86   5 MINUTES  5 MINUTES  89   6 MINUTES  6 MINUTES  90              Distance Walked   Distance Walked 200ft   Dyspnea (Jackson Scale)   Dyspnea (Jackson Scale)   Fatigue (Jackson Scale)   Fatigue (Jackson Scale)   SpO2% Post Exercise   SpO2% Post Exercise 91   HR Post Exercise   HR Post Exercise 110   Time to Recovery   Time to Recovery 2min     Comments: Head probe and PLB used with walk.

## 2023-03-08 NOTE — PROGRESS NOTES
"Hospitalist Team      Patient Care Team:  Ney Kathleen MD as PCP - General (Family Medicine)  Chris Driver MD as Consulting Physician (Pulmonary Disease)        Chief Complaint:  Follow-up AHRF due to COVID    Subjective    Feels well, but did not perform well during the wlaking oximetry.  He denies feeling dyspneic and denies chest pain.  He is tolerating PO.    Objective    Vital Signs  Temp:  [96.9 °F (36.1 °C)-97.5 °F (36.4 °C)] 97 °F (36.1 °C)  Heart Rate:  [76-90] 81  Resp:  [20-22] 20  BP: (114-119)/(70-73) 119/70  Oxygen Therapy  SpO2: 90 %  Pulse Oximetry Type: Continuous  Device (Oxygen Therapy): nasal cannula  Flow (L/min): 3}    Flowsheet Rows    Flowsheet Row First Filed Value   Admission Height 188 cm (74\") Documented at 03/04/2023 1821   Admission Weight 120 kg (263 lb 8 oz) Documented at 03/04/2023 1821          Physical Exam:    General: Appears in no acute distress.  Lungs: Breath sounds are diminished throughout all fields.  No wheeze or accessory use noted.  CV: Regular rate and rhythm.  No murmur appreciated.  Radial pulses are 2+ and symmetric.  Abdomen: Obese, soft, and non-tender w/ active bowel sounds.  MSK: No C/C/E.  Neuro: CN II-XII grossly intact  Psych: Pleasant affect.  Ox3.    Results Review:     I reviewed the patient's new clinical results.    Lab Results (last 24 hours)     Procedure Component Value Units Date/Time    POC Glucose Once [[id number]]  (Abnormal) Collected: 03/08/23 1145    Specimen: Blood Updated: 03/08/23 1151     Glucose 182 mg/dL      Comment: Meter: [id number] : [id number] Vinay Carlotta CNA       POC Glucose Once [[id number]]  (Abnormal) Collected: 03/08/23 0745    Specimen: Blood Updated: 03/08/23 0752     Glucose 193 mg/dL      Comment: Meter: [id number] : [id number] Vinay Carlotta CNA       POC Glucose Once [[id number]]  (Abnormal) Collected: 03/07/23 2050    Specimen: Blood Updated: 03/07/23 2056     Glucose 388 mg/dL      Comment: " Treated Patient Meter: [id number] : [id number] Manpreet Laguerre RN (Validat       Blood Culture - Blood, Arm, Left [[id number]]  (Normal) Collected: 03/04/23 1836    Specimen: Blood from Arm, Left Updated: 03/07/23 1845     Blood Culture No growth at 3 days    Blood Culture - Blood, Arm, Right [[id number]]  (Normal) Collected: 03/04/23 1835    Specimen: Blood from Arm, Right Updated: 03/07/23 1845     Blood Culture No growth at 3 days    POC Glucose Once [[id number]]  (Abnormal) Collected: 03/07/23 1639    Specimen: Blood Updated: 03/07/23 1646     Glucose 253 mg/dL      Comment: Meter: [id number] : [id number] Jacobo JONAS             Imaging Results (Last 24 Hours)     ** No results found for the last 24 hours. **          Medication Review:   I have reviewed the patient's current medication list    Current Facility-Administered Medications:   •  acetaminophen (TYLENOL) tablet 650 mg, 650 mg, Oral, Q4H PRN, 650 mg at 03/07/23 1741 **OR** acetaminophen (TYLENOL) 160 MG/5ML solution 650 mg, 650 mg, Oral, Q4H PRN **OR** acetaminophen (TYLENOL) suppository 650 mg, 650 mg, Rectal, Q4H PRN, Lisa Hagan DO  •  aspirin chewable tablet 81 mg, 81 mg, Oral, Daily, Cj Villanueva DO, 81 mg at 03/08/23 0849  •  atorvastatin (LIPITOR) tablet 10 mg, 10 mg, Oral, Daily, Cj Villanueva DO, 10 mg at 03/08/23 0849  •  cetirizine (zyrTEC) tablet 10 mg, 10 mg, Oral, Daily, Cj iVllanueva DO, 10 mg at 03/08/23 0849  •  dexamethasone (DECADRON) tablet 6 mg, 6 mg, Oral, Daily With Breakfast, Sanjiv Rider MD, 6 mg at 03/08/23 0849  •  dextrose (D50W) (25 g/50 mL) IV injection 25 g, 25 g, Intravenous, Q15 Min PRN, Hagan, Birrilla Juany, DO  •  dextrose (GLUTOSE) oral gel 15 g, 15 g, Oral, Q15 Min PRN, Lisa Hagan, DO  •  docusate sodium (COLACE) capsule 200 mg, 200 mg, Oral, Daily PRN, Cj Villanueva, DO  •  glucagon (GLUCAGEN) injection 1 mg, 1 mg, Intramuscular, Q15 Min PRN, Lisa Hagan,  DO  •  HYDROcodone-acetaminophen (NORCO)  MG per tablet 1 tablet, 1 tablet, Oral, Q6H PRN, Lisa Hagan DO, 1 tablet at 03/08/23 1226  •  Insulin Aspart (novoLOG) injection 0-9 Units, 0-9 Units, Subcutaneous, TID AC, Lisa Hagan, DO, 2 Units at 03/08/23 1224  •  insulin detemir (LEVEMIR) injection 50 Units, 50 Units, Subcutaneous, Q12H, Sanjiv Rider MD, 50 Units at 03/08/23 0848  •  ipratropium-albuterol (DUO-NEB) nebulizer solution 3 mL, 3 mL, Nebulization, Q4H PRN, Lisa Hagan DO  •  ipratropium-albuterol (DUO-NEB) nebulizer solution 3 mL, 3 mL, Nebulization, 4x Daily - RT, Lisa Hagan, , 3 mL at 03/08/23 1540  •  melatonin tablet 10 mg, 10 mg, Oral, Nightly PRN, Cj Villanueva, DO, 10 mg at 03/06/23 2121  •  montelukast (SINGULAIR) tablet 10 mg, 10 mg, Oral, Nightly, Cj Villanueva, DO, 10 mg at 03/07/23 2052  •  mycophenolate (CELLCEPT) capsule 500 mg, 500 mg, Oral, Q12H, Lisa Hagan, DO, 500 mg at 03/08/23 0849  •  nicotine (NICODERM CQ) 14 MG/24HR patch 1 patch, 1 patch, Transdermal, Q24H, Cj Villanueva, DO, 1 patch at 03/08/23 0849  •  ondansetron (ZOFRAN) tablet 4 mg, 4 mg, Oral, Q6H PRN **OR** ondansetron (ZOFRAN) injection 4 mg, 4 mg, Intravenous, Q6H PRN, Lisa Hagan DO  •  Pharmacy Consult - Remdesivir, , Does not apply, Continuous PRN, Sanjiv Rider MD  •  pyridostigmine (MESTINON) tablet 60 mg, 60 mg, Oral, 4x Daily, Lisa Hagan DO, 60 mg at 03/08/23 1224  •  [COMPLETED] remdesivir 200 mg in sodium chloride 0.9 % 290 mL IVPB (powder vial), 200 mg, Intravenous, Q24H, 200 mg at 03/06/23 1412 **FOLLOWED BY** remdesivir 100 mg in sodium chloride 0.9 % 270 mL IVPB (powder vial), 100 mg, Intravenous, Q24H, Sanjiv Rider MD, Stopped at 03/08/23 1533  •  sodium chloride 0.9 % flush 10 mL, 10 mL, Intravenous, PRN, Lisa Hagan DO  •  sodium chloride 0.9 % flush 10 mL, 10 mL, Intravenous, PRN, Lisa Hagan  DO Juany  •  sodium chloride 0.9 % flush 10 mL, 10 mL, Intravenous, Q12H, Lisa Hagan DO, 10 mL at 03/08/23 0850  •  sodium chloride 0.9 % infusion 40 mL, 40 mL, Intravenous, PRN, Lisa Hagan DO  •  terazosin (HYTRIN) capsule 2 mg, 2 mg, Oral, Nightly, Cj Villanueva DO, 2 mg at 03/07/23 2052      Assessment & Plan     1.  Acute-on-Chronic Hypoxic Respiratory Failure due to AECOPD from Acute COVID-19 Infection: Disappointed that he required so much O2 w/ exertion although recovery was quick.  Continue to wean as able.  Continue Decadron and Remdesivir.  I'm going to recheck his inflammatory markers in the morning.     2.  Diabetes Mellitus, Type 2 in Obese: Bedsides near goal.  I'm going to decrease his evening Levemir dose, but leave the daytime the same.     3.  Hx/O Myasthenia Gravis: No acute issues on home regimen.     4.  Tobacco abuse: Continue Nicoderm.    Plan for disposition: Home soon.    Sanjiv Rider MD  03/08/23  16:37 EST

## 2023-03-09 ENCOUNTER — READMISSION MANAGEMENT (OUTPATIENT)
Dept: CALL CENTER | Facility: HOSPITAL | Age: 73
End: 2023-03-09
Payer: MEDICARE

## 2023-03-09 ENCOUNTER — APPOINTMENT (OUTPATIENT)
Dept: GENERAL RADIOLOGY | Facility: HOSPITAL | Age: 73
DRG: 177 | End: 2023-03-09
Payer: MEDICARE

## 2023-03-09 VITALS
TEMPERATURE: 97.2 F | SYSTOLIC BLOOD PRESSURE: 113 MMHG | OXYGEN SATURATION: 95 % | HEIGHT: 74 IN | RESPIRATION RATE: 22 BRPM | WEIGHT: 263.2 LBS | BODY MASS INDEX: 33.78 KG/M2 | DIASTOLIC BLOOD PRESSURE: 72 MMHG | HEART RATE: 101 BPM

## 2023-03-09 PROBLEM — D89.831 CYTOKINE RELEASE SYNDROME, GRADE 1: Status: ACTIVE | Noted: 2023-03-09

## 2023-03-09 LAB
ALBUMIN SERPL-MCNC: 3.7 G/DL (ref 3.5–5.2)
ALBUMIN/GLOB SERPL: 1.4 G/DL
ALP SERPL-CCNC: 53 U/L (ref 39–117)
ALT SERPL W P-5'-P-CCNC: 46 U/L (ref 1–41)
ANION GAP SERPL CALCULATED.3IONS-SCNC: 10.8 MMOL/L (ref 5–15)
AST SERPL-CCNC: 18 U/L (ref 1–40)
BACTERIA SPEC AEROBE CULT: NORMAL
BACTERIA SPEC AEROBE CULT: NORMAL
BASOPHILS # BLD AUTO: 0.03 10*3/MM3 (ref 0–0.2)
BASOPHILS NFR BLD AUTO: 0.5 % (ref 0–1.5)
BILIRUB SERPL-MCNC: 0.2 MG/DL (ref 0–1.2)
BUN SERPL-MCNC: 17 MG/DL (ref 8–23)
BUN/CREAT SERPL: 27 (ref 7–25)
CALCIUM SPEC-SCNC: 8.9 MG/DL (ref 8.6–10.5)
CHLORIDE SERPL-SCNC: 95 MMOL/L (ref 98–107)
CO2 SERPL-SCNC: 27.2 MMOL/L (ref 22–29)
CREAT SERPL-MCNC: 0.63 MG/DL (ref 0.76–1.27)
CRP SERPL-MCNC: 0.83 MG/DL (ref 0–0.5)
DEPRECATED RDW RBC AUTO: 48.7 FL (ref 37–54)
EGFRCR SERPLBLD CKD-EPI 2021: 102.3 ML/MIN/1.73
EOSINOPHIL # BLD AUTO: 0.01 10*3/MM3 (ref 0–0.4)
EOSINOPHIL NFR BLD AUTO: 0.2 % (ref 0.3–6.2)
ERYTHROCYTE [DISTWIDTH] IN BLOOD BY AUTOMATED COUNT: 13.6 % (ref 12.3–15.4)
GLOBULIN UR ELPH-MCNC: 2.6 GM/DL
GLUCOSE BLDC GLUCOMTR-MCNC: 192 MG/DL (ref 70–130)
GLUCOSE BLDC GLUCOMTR-MCNC: 236 MG/DL (ref 70–130)
GLUCOSE SERPL-MCNC: 268 MG/DL (ref 65–99)
HCT VFR BLD AUTO: 40.2 % (ref 37.5–51)
HGB BLD-MCNC: 12.7 G/DL (ref 13–17.7)
IMM GRANULOCYTES # BLD AUTO: 0.22 10*3/MM3 (ref 0–0.05)
IMM GRANULOCYTES NFR BLD AUTO: 3.8 % (ref 0–0.5)
LYMPHOCYTES # BLD AUTO: 1.17 10*3/MM3 (ref 0.7–3.1)
LYMPHOCYTES NFR BLD AUTO: 20.4 % (ref 19.6–45.3)
MCH RBC QN AUTO: 30.4 PG (ref 26.6–33)
MCHC RBC AUTO-ENTMCNC: 31.6 G/DL (ref 31.5–35.7)
MCV RBC AUTO: 96.2 FL (ref 79–97)
MONOCYTES # BLD AUTO: 0.54 10*3/MM3 (ref 0.1–0.9)
MONOCYTES NFR BLD AUTO: 9.4 % (ref 5–12)
NEUTROPHILS NFR BLD AUTO: 3.77 10*3/MM3 (ref 1.7–7)
NEUTROPHILS NFR BLD AUTO: 65.7 % (ref 42.7–76)
NRBC BLD AUTO-RTO: 0 /100 WBC (ref 0–0.2)
PLATELET # BLD AUTO: 241 10*3/MM3 (ref 140–450)
PMV BLD AUTO: 11.3 FL (ref 6–12)
POTASSIUM SERPL-SCNC: 4.2 MMOL/L (ref 3.5–5.2)
PROCALCITONIN SERPL-MCNC: 0.06 NG/ML (ref 0–0.25)
PROT SERPL-MCNC: 6.3 G/DL (ref 6–8.5)
RBC # BLD AUTO: 4.18 10*6/MM3 (ref 4.14–5.8)
SODIUM SERPL-SCNC: 133 MMOL/L (ref 136–145)
WBC NRBC COR # BLD: 5.74 10*3/MM3 (ref 3.4–10.8)

## 2023-03-09 PROCEDURE — 80053 COMPREHEN METABOLIC PANEL: CPT | Performed by: HOSPITALIST

## 2023-03-09 PROCEDURE — 25010000002 REMDESIVIR 100 MG/20ML SOLUTION 1 EACH VIAL: Performed by: HOSPITALIST

## 2023-03-09 PROCEDURE — 84145 PROCALCITONIN (PCT): CPT | Performed by: HOSPITALIST

## 2023-03-09 PROCEDURE — 63710000001 MYCOPHENOLATE MOFETIL PER 250 MG: Performed by: INTERNAL MEDICINE

## 2023-03-09 PROCEDURE — 82962 GLUCOSE BLOOD TEST: CPT

## 2023-03-09 PROCEDURE — 63710000001 DEXAMETHASONE PER 0.25 MG: Performed by: HOSPITALIST

## 2023-03-09 PROCEDURE — 94761 N-INVAS EAR/PLS OXIMETRY MLT: CPT

## 2023-03-09 PROCEDURE — 86140 C-REACTIVE PROTEIN: CPT | Performed by: HOSPITALIST

## 2023-03-09 PROCEDURE — 71046 X-RAY EXAM CHEST 2 VIEWS: CPT

## 2023-03-09 PROCEDURE — 63710000001 INSULIN DETEMIR PER 5 UNITS: Performed by: HOSPITALIST

## 2023-03-09 PROCEDURE — 94799 UNLISTED PULMONARY SVC/PX: CPT

## 2023-03-09 PROCEDURE — 94618 PULMONARY STRESS TESTING: CPT

## 2023-03-09 PROCEDURE — 63710000001 INSULIN ASPART PER 5 UNITS: Performed by: INTERNAL MEDICINE

## 2023-03-09 PROCEDURE — 85025 COMPLETE CBC W/AUTO DIFF WBC: CPT | Performed by: HOSPITALIST

## 2023-03-09 RX ORDER — DEXAMETHASONE 6 MG/1
6 TABLET ORAL
Qty: 6 TABLET | Refills: 0 | Status: SHIPPED | OUTPATIENT
Start: 2023-03-10 | End: 2023-03-16

## 2023-03-09 RX ORDER — HYDROCODONE BITARTRATE AND ACETAMINOPHEN 10; 325 MG/1; MG/1
1 TABLET ORAL EVERY 6 HOURS PRN
Refills: 0
Start: 2023-03-09

## 2023-03-09 RX ADMIN — MYCOPHENOLATE MOFETIL 500 MG: 250 CAPSULE ORAL at 08:40

## 2023-03-09 RX ADMIN — IPRATROPIUM BROMIDE AND ALBUTEROL SULFATE 3 ML: .5; 3 SOLUTION RESPIRATORY (INHALATION) at 14:59

## 2023-03-09 RX ADMIN — HYDROCODONE BITARTRATE AND ACETAMINOPHEN 1 TABLET: 10; 325 TABLET ORAL at 12:25

## 2023-03-09 RX ADMIN — Medication 10 ML: at 08:41

## 2023-03-09 RX ADMIN — INSULIN ASPART 4 UNITS: 100 INJECTION, SOLUTION INTRAVENOUS; SUBCUTANEOUS at 12:24

## 2023-03-09 RX ADMIN — INSULIN ASPART 2 UNITS: 100 INJECTION, SOLUTION INTRAVENOUS; SUBCUTANEOUS at 08:30

## 2023-03-09 RX ADMIN — CETIRIZINE HYDROCHLORIDE 10 MG: 10 TABLET ORAL at 08:41

## 2023-03-09 RX ADMIN — ASPIRIN 81 MG 81 MG: 81 TABLET ORAL at 08:41

## 2023-03-09 RX ADMIN — DEXAMETHASONE 6 MG: 4 TABLET ORAL at 08:41

## 2023-03-09 RX ADMIN — IPRATROPIUM BROMIDE AND ALBUTEROL SULFATE 3 ML: .5; 3 SOLUTION RESPIRATORY (INHALATION) at 07:04

## 2023-03-09 RX ADMIN — PYRIDOSTIGMINE BROMIDE 60 MG: 60 TABLET ORAL at 06:36

## 2023-03-09 RX ADMIN — HYDROCODONE BITARTRATE AND ACETAMINOPHEN 1 TABLET: 10; 325 TABLET ORAL at 02:57

## 2023-03-09 RX ADMIN — Medication 1 PATCH: at 08:41

## 2023-03-09 RX ADMIN — ATORVASTATIN CALCIUM 10 MG: 10 TABLET, FILM COATED ORAL at 08:40

## 2023-03-09 RX ADMIN — REMDESIVIR 100 MG: 100 INJECTION, POWDER, LYOPHILIZED, FOR SOLUTION INTRAVENOUS at 13:15

## 2023-03-09 RX ADMIN — IPRATROPIUM BROMIDE AND ALBUTEROL SULFATE 3 ML: .5; 3 SOLUTION RESPIRATORY (INHALATION) at 11:27

## 2023-03-09 RX ADMIN — PYRIDOSTIGMINE BROMIDE 60 MG: 60 TABLET ORAL at 12:24

## 2023-03-09 RX ADMIN — INSULIN DETEMIR 50 UNITS: 100 INJECTION, SOLUTION SUBCUTANEOUS at 08:30

## 2023-03-09 NOTE — PLAN OF CARE
Goal Outcome Evaluation:  Plan of Care Reviewed With: patient        Progress: improving  Outcome Evaluation: Rested well during the night. No complaints of shortness of breath or difficulty breathing. VSS.

## 2023-03-09 NOTE — DISCHARGE SUMMARY
Kt Armstrong  1950  [id number]    Hospitalists Discharge Summary    Date of Admission: 3/4/2023  Date of Discharge:  3/9/2023    Primary Discharge Diagnoses:  1.  Acute-on-chronic Hypoxic Respiratory Failure due to Acute COVID Pneumonia  2.  AECOPD due to COVID Pneumonia  3.  Obesity Body mass index is 33.79 kg/m².    Secondary Discharge Diagnoses:  1.  Diabetes Mellitus, Type 2 in Obese  2.  Tobacco Abuse  3.  Hx/O Myasthenia Gravis  4.  Chronic Pain w/ Narcotic Dependence    History of Present Illness (taken from H&P):  Patient is a 72 year old male with a past medical history of COPD, chronic respiratory failure on 2L NC, diabetes mellitus type 2, HTN, HLP, and myasthenia gravis who presented to the ED complaining of worsening shortness of breath. Patient states he has had increasing shortness of breath over the past week, he has increased his oxygen from 2 to 4L NC. He has been using his inhaler 7 times a day. Patient reports he was treated with steroids and antibiotics a few weeks ago. He denies any productive cough, fever, chills, sob, or sick contacts.     Hospital Course:  Mr. Armstrong was admitted to the Med/Surg unit and continued on supplemental O2.  When the COVID was noted to be positive, I changed him to Decadron and added Remdesivir.  He tolerated the treatment well although he did experience hyperglycemia a known side-effect, not complication, to Decadron.  He typically wears 2-3L at home.  Intital walk test revelaed a need for 5L w/ exertion.  However, on repeat, his requirement was at this baseline.  He has no interest in tobacco cessation.    PCP  Patient Care Team:  Ney Kathleen MD as PCP - General (Family Medicine)  Chris Driver MD as Consulting Physician (Pulmonary Disease)    Consults:   Consults     No orders found from 2/3/2023 to 3/5/2023.          Operations and Procedures Performed:    03/09 1512 Walking Oximetry  03/08 1026 Walking Oximetry  Walking  Oximetry    Result Date: 3/9/2023  Narrative: GonzalezArgenis roblerozach RAMIRES, CRT     3/9/2023  3:15 PM Exercise Oximetry Patient Name:Kt Armstrong MRN: [id number] Date: 03/09/23         ROOM AIR BASELINE SpO2% 84 Heart Rate 94 Blood Pressure  EXERCISE ON ROOM AIR SpO2% EXERCISE ON O2 @ 3 LPM SpO2% 1 MINUTE  1 MINUTE 94  2 MINUTES  2 MINUTES 93 3 MINUTES  3 MINUTES 94 4 MINUTES  4 MINUTES 92 5 MINUTES  5 MINUTES 92 6 MINUTES  6 MINUTES 93          Distance Walked   Distance Walked 250ft Dyspnea (Jackson Scale)   Dyspnea (Jackson Scale) Fatigue (Jackson Scale)   Fatigue (Jackson Scale) SpO2% Post Exercise   SpO2% Post Exercise 95 HR Post Exercise   HR Post Exercise 101  Time to Recovery   Time to Recovery 1min Comments: Head probe and PLB used with walk    Walking Oximetry    Result Date: 3/8/2023  Narrative: Gonzalez, Ingrid A, CRT     3/8/2023 10:32 AM Exercise Oximetry Patient Name:Kt Armstrong MRN: [id number] Date: 03/08/23         ROOM AIR BASELINE SpO2% 81 Heart Rate 98 Blood Pressure  EXERCISE ON ROOM AIR SpO2% EXERCISE ON O2 @ 3 LPM SpO2% 1 MINUTE  1 MINUTE  89 2 MINUTES  2 MINUTES increased to 4lpm  85 3 MINUTES  3 MINUTES  88 4 MINUTES  4 MINUTES increased to 5lpm  86 5 MINUTES  5 MINUTES  89 6 MINUTES  6 MINUTES  90          Distance Walked   Distance Walked 200ft Dyspnea (Jackson Scale)   Dyspnea (Jackson Scale) Fatigue (Jackson Scale)   Fatigue (Jackson Scale) SpO2% Post Exercise   SpO2% Post Exercise 91 HR Post Exercise   HR Post Exercise 110 Time to Recovery   Time to Recovery 2min Comments: Head probe and PLB used with walk.    XR Chest 2 View    Result Date: 3/4/2023  Narrative: CR Chest 2 Vws INDICATION:  Short of air for weeks. Severe symptoms today. Chest pain. Asthma and COPD COMPARISON:  11/27/2022 FINDINGS: PA and lateral views of the chest.  Cardiac silhouette is within normal limits and stable. The vascularity is unremarkable.  Probable bibasilar atelectasis/scarring, right greater than left. There is no effusion or dense  consolidation. Thoracic spondylosis.     Impression: 1. Probable bibasilar atelectasis/scarring. No effusion or dense consolidation. Signer Name: Jaden Danielle MD  Signed: 3/4/2023 7:43 PM  Workstation Name: PRESLEY  [facility]    XR Chest PA & Lateral    Result Date: 3/9/2023  Narrative: XR CHEST PA AND LATERAL-: 3/9/2023 7:39 AM  INDICATION:  Respiratory failure. Covid 19 positive. Short of air and cough 1.5 weeks  COMPARISON:  03/04/2023.  FINDINGS: PA and lateral views of the chest.  Cardiac silhouette is stable. Unremarkable vascularity. Improvement in inspiratory result. Persistent curvilinear and band like opacities in the left lung base favoring atelectasis or scarring over pneumonia. There is some atelectasis or fluid associated with the minor fissure. Perihilar opacities on the right appear slightly more confluent and partially obscure the right heart border and could reflect middle lobe pneumonia. Trace left effusion. No pneumothorax..       Impression:  1. Worsening right perihilar opacities suspicious for pneumonia. Atelectasis/fluid associated with minor fissure. 2. Opacities in the left base favoring atelectasis or scarring over pneumonia. Trace left effusion. 3. Slight improvement in inspiratory result. No pneumothorax.  This report was finalized on 3/9/2023 8:11 AM by Dr. Jaden Danielle MD.        Allergies:  has No Known Allergies.    Efren  reviewed    Discharge Medications:     Discharge Medications      New Medications      Instructions Start Date   dexamethasone 6 MG tablet  Commonly known as: DECADRON   6 mg, Oral, Daily With Breakfast   Start Date: March 10, 2023        Changes to Medications      Instructions Start Date   HYDROcodone-acetaminophen  MG per tablet  Commonly known as: NORCO  What changed: how to take this   1 tablet, Oral, Every 6 Hours PRN         Continue These Medications      Instructions Start Date   albuterol (2.5 MG/3ML) 0.083% nebulizer  solution  Commonly known as: PROVENTIL   2.5 mg, Nebulization, Every 4 Hours PRN      albuterol sulfate  (90 Base) MCG/ACT inhaler  Commonly known as: PROVENTIL HFA;VENTOLIN HFA;PROAIR HFA   2 puffs, Inhalation, Every 4 Hours PRN      aspirin 81 MG chewable tablet   81 mg, Oral, Daily      Kd Contour Test test strip  Generic drug: glucose blood   No dose, route, or frequency recorded.      BD Pen Needle Yaritza U/F 32G X 4 MM misc  Generic drug: Insulin Pen Needle   USE 1 NEEDLE SUBCUTANEOUSLY QD      cetirizine 10 MG tablet  Commonly known as: zyrTEC   10 mg, Oral, Daily      dicyclomine 20 MG tablet  Commonly known as: BENTYL   20 mg, Oral, 4 Times Daily      docusate sodium 100 MG capsule  Commonly known as: COLACE   200 mg, Oral, Daily PRN      doxazosin 2 MG tablet  Commonly known as: CARDURA   2 mg, Oral, Daily      EPINEPHrine (Anaphylaxis) 1 MG/ML injection  Commonly known as: ADRENALIN   0.3 mg, Intramuscular, Once As Needed      glipiZIDE-metFORMIN 5-500 MG per tablet  Commonly known as: METAGLIP   2 tablets, Oral, 2 Times Daily Before Meals      insulin glargine 100 UNIT/ML injection  Commonly known as: LANTUS, SEMGLEE   45 Units, Subcutaneous, Every Morning, Every morning      insulin glargine 100 UNIT/ML injection  Commonly known as: LANTUS, SEMGLEE   35 Units, Subcutaneous, Nightly      melatonin 5 MG tablet tablet   10 mg, Oral, Nightly PRN      montelukast 10 MG tablet  Commonly known as: SINGULAIR   10 mg, Oral, Nightly      mycophenolate 500 MG tablet  Commonly known as: CELLCEPT   1,000 mg, Oral, 2 Times Daily      nicotine 14 MG/24HR patch  Commonly known as: NICODERM CQ   1 patch, Transdermal, Every 24 Hours Scheduled      O2  Commonly known as: OXYGEN   2 L/min, Inhalation, Once, Oxygen 2-3 L      pioglitazone 45 MG tablet  Commonly known as: ACTOS   45 mg, Oral, Daily      pyridostigmine 60 MG tablet  Commonly known as: MESTINON   60 mg, Oral, Every 4 Hours      SIMVASTATIN PO   40 mg,  Oral, Every Evening      Trelegy Ellipta 100-62.5-25 MCG/ACT inhaler  Generic drug: Fluticasone-Umeclidin-Vilant   1 puff, Inhalation, Daily - RT         Stop These Medications    lisinopril 10 MG tablet  Commonly known as: PRINIVIL,ZESTRIL            Last Lab Results:   Lab Results (most recent)     Procedure Component Value Units Date/Time    POC Glucose Once [[id number]]  (Abnormal) Collected: 03/09/23 1138    Specimen: Blood Updated: 03/09/23 1144     Glucose 236 mg/dL      Comment: Meter: [id number] : [id number] [place] [employer] CNA       C-reactive Protein [[id number]]  (Abnormal) Collected: 03/09/23 0353    Specimen: Blood Updated: 03/09/23 1132     C-Reactive Protein 0.83 mg/dL     POC Glucose Once [[id number]]  (Abnormal) Collected: 03/09/23 0738    Specimen: Blood Updated: 03/09/23 0744     Glucose 192 mg/dL      Comment: Meter: [id number] : [id number] [place] [employer] CNA       Comprehensive Metabolic Panel [[id number]]  (Abnormal) Collected: 03/09/23 0353    Specimen: Blood Updated: 03/09/23 0536     Glucose 268 mg/dL      BUN 17 mg/dL      Creatinine 0.63 mg/dL      Sodium 133 mmol/L      Potassium 4.2 mmol/L      Chloride 95 mmol/L      CO2 27.2 mmol/L      Calcium 8.9 mg/dL      Total Protein 6.3 g/dL      Albumin 3.7 g/dL      ALT (SGPT) 46 U/L      AST (SGOT) 18 U/L      Alkaline Phosphatase 53 U/L      Total Bilirubin 0.2 mg/dL      Globulin 2.6 gm/dL      A/G Ratio 1.4 g/dL      BUN/Creatinine Ratio 27.0     Anion Gap 10.8 mmol/L      eGFR 102.3 mL/min/1.73     Narrative:      GFR Normal >60  Chronic Kidney Disease <60  Kidney Failure <15      Procalcitonin [[id number]]  (Normal) Collected: 03/09/23 0353    Specimen: Blood Updated: 03/09/23 0535     Procalcitonin 0.06 ng/mL     Narrative:      As a Marker for Sepsis (Non-Neonates):    1. <0.5 ng/mL represents a low risk of severe sepsis and/or septic shock.  2. >2 ng/mL represents a high risk of severe sepsis and/or septic shock.    As a Marker  "for Lower Respiratory Tract Infections that require antibiotic therapy:    PCT on Admission    Antibiotic Therapy       6-12 Hrs later    >0.5                Strongly Recommended  >0.25 - <0.5        Recommended   0.1 - 0.25          Discouraged              Remeasure/reassess PCT  <0.1                Strongly Discouraged     Remeasure/reassess PCT    As 28 day mortality risk marker: \"Change in Procalcitonin Result\" (>80% or <=80%) if Day 0 (or Day 1) and Day 4 values are available. Refer to [email or web address]pct[facility][email or web address]    Change in PCT <=80%  A decrease of PCT levels below or equal to 80% defines a positive change in PCT test result representing a higher risk for 28-day all-cause mortality of patients diagnosed with severe sepsis for septic shock.    Change in PCT >80%  A decrease of PCT levels of more than 80% defines a negative change in PCT result representing a lower risk for 28-day all-cause mortality of patients diagnosed with severe sepsis or septic shock.       CBC & Differential [[id number]]  (Abnormal) Collected: 03/09/23 0353    Specimen: Blood Updated: 03/09/23 0457    Narrative:      The following orders were created for panel order CBC & Differential.  Procedure                               Abnormality         Status                     ---------                               -----------         ------                     CBC Auto Differential[[id number]]        Abnormal            Final result                 Please view results for these tests on the individual orders.    CBC Auto Differential [[id number]]  (Abnormal) Collected: 03/09/23 0353    Specimen: Blood Updated: 03/09/23 0457     WBC 5.74 10*3/mm3      RBC 4.18 10*6/mm3      Hemoglobin 12.7 g/dL      Hematocrit 40.2 %      MCV 96.2 fL      MCH 30.4 pg      MCHC 31.6 g/dL      RDW 13.6 %      RDW-SD 48.7 fl      MPV 11.3 fL      Platelets 241 10*3/mm3      Neutrophil % 65.7 %      Lymphocyte % 20.4 %      Monocyte % 9.4 %      " Eosinophil % 0.2 %      Basophil % 0.5 %      Immature Grans % 3.8 %      Neutrophils, Absolute 3.77 10*3/mm3      Lymphocytes, Absolute 1.17 10*3/mm3      Monocytes, Absolute 0.54 10*3/mm3      Eosinophils, Absolute 0.01 10*3/mm3      Basophils, Absolute 0.03 10*3/mm3      Immature Grans, Absolute 0.22 10*3/mm3      nRBC 0.0 /100 WBC     Blood Culture - Blood, Arm, Left [[id number]]  (Normal) Collected: 03/04/23 1836    Specimen: Blood from Arm, Left Updated: 03/08/23 1845     Blood Culture No growth at 4 days    Blood Culture - Blood, Arm, Right [[id number]]  (Normal) Collected: 03/04/23 1835    Specimen: Blood from Arm, Right Updated: 03/08/23 1845     Blood Culture No growth at 4 days    C-reactive Protein [[id number]]  (Abnormal) Collected: 03/07/23 0353    Specimen: Blood Updated: 03/07/23 1048     C-Reactive Protein 2.38 mg/dL     Comprehensive Metabolic Panel [[id number]]  (Abnormal) Collected: 03/07/23 0353    Specimen: Blood Updated: 03/07/23 0554     Glucose 187 mg/dL      BUN 22 mg/dL      Creatinine 0.69 mg/dL      Sodium 138 mmol/L      Potassium 4.0 mmol/L      Chloride 98 mmol/L      CO2 29.5 mmol/L      Calcium 9.6 mg/dL      Total Protein 6.4 g/dL      Albumin 3.7 g/dL      ALT (SGPT) 22 U/L      AST (SGOT) 25 U/L      Alkaline Phosphatase 56 U/L      Total Bilirubin 0.2 mg/dL      Globulin 2.7 gm/dL      A/G Ratio 1.4 g/dL      BUN/Creatinine Ratio 31.9     Anion Gap 10.5 mmol/L      eGFR 99.6 mL/min/1.73     Narrative:      GFR Normal >60  Chronic Kidney Disease <60  Kidney Failure <15      Procalcitonin [[id number]]  (Normal) Collected: 03/07/23 0353    Specimen: Blood Updated: 03/07/23 0543     Procalcitonin 0.06 ng/mL     Narrative:      As a Marker for Sepsis (Non-Neonates):    1. <0.5 ng/mL represents a low risk of severe sepsis and/or septic shock.  2. >2 ng/mL represents a high risk of severe sepsis and/or septic shock.    As a Marker for Lower Respiratory Tract Infections that require  "antibiotic therapy:    PCT on Admission    Antibiotic Therapy       6-12 Hrs later    >0.5                Strongly Recommended  >0.25 - <0.5        Recommended   0.1 - 0.25          Discouraged              Remeasure/reassess PCT  <0.1                Strongly Discouraged     Remeasure/reassess PCT    As 28 day mortality risk marker: \"Change in Procalcitonin Result\" (>80% or <=80%) if Day 0 (or Day 1) and Day 4 values are available. Refer to http://www.[email or web address] [email or web address]-pct-calculator.[email or web address]    Change in PCT <=80%  A decrease of PCT levels below or equal to 80% defines a positive change in PCT test result representing a higher risk for 28-day all-cause mortality of patients diagnosed with severe sepsis for septic shock.    Change in PCT >80%  A decrease of PCT levels of more than 80% defines a negative change in PCT result representing a lower risk for 28-day all-cause mortality of patients diagnosed with severe sepsis or septic shock.       D-dimer, Quantitative [[id number]]  (Normal) Collected: 03/07/23 0353    Specimen: Blood Updated: 03/07/23 0543     D-Dimer, Quantitative 0.57 MCGFEU/mL     Narrative:      According to the assay 's published package insert, a normal (<0.50 MCGFEU/mL) D-dimer result in conjunction with a non-high clinical probability assessment, excludes deep vein thrombosis (DVT) and pulmonary embolism (PE) with high sensitivity.    D-dimer values increase with age and this can make VTE exclusion of an older population difficult. To address this, the [employer] College [employer], based on best available evidence and recent guidelines, recommends that clinicians use age-adjusted D-dimer thresholds in patients greater than 50 years of age with: a) a low probability of PE who do not meet all Pulmonary Embolism Rule Out Criteria, or b) in those with intermediate probability of PE.   The formula for an age-adjusted D-dimer cut-off is \"age/100\".  For example, a 60 year old patient would have an " age-adjusted cut-off of 0.60 MCGFEU/mL and an 80 year old 0.80 MCGFEU/mL.    CBC & Differential [[id number]]  (Abnormal) Collected: 03/06/23 0909    Specimen: Blood Updated: 03/06/23 0919    Narrative:      The following orders were created for panel order CBC & Differential.  Procedure                               Abnormality         Status                     ---------                               -----------         ------                     CBC Auto Differential[[id number]]        Abnormal            Final result                 Please view results for these tests on the individual orders.    CBC Auto Differential [[id number]]  (Abnormal) Collected: 03/06/23 0909    Specimen: Blood Updated: 03/06/23 0919     WBC 8.26 10*3/mm3      RBC 4.31 10*6/mm3      Hemoglobin 13.2 g/dL      Hematocrit 42.9 %      MCV 99.5 fL      MCH 30.6 pg      MCHC 30.8 g/dL      RDW 14.0 %      RDW-SD 51.7 fl      MPV 11.4 fL      Platelets 215 10*3/mm3      Neutrophil % 86.2 %      Lymphocyte % 6.2 %      Monocyte % 7.1 %      Eosinophil % 0.0 %      Basophil % 0.1 %      Immature Grans % 0.4 %      Neutrophils, Absolute 7.12 10*3/mm3      Lymphocytes, Absolute 0.51 10*3/mm3      Monocytes, Absolute 0.59 10*3/mm3      Eosinophils, Absolute 0.00 10*3/mm3      Basophils, Absolute 0.01 10*3/mm3      Immature Grans, Absolute 0.03 10*3/mm3      nRBC 0.0 /100 WBC     Respiratory Panel PCR w/COVID-19(SARS-CoV-2) ZEUS/SHAW/FRANCIA/PAD/COR/MAD/GIL In-House, NP Swab in UTM/VTM, 3-4 HR TAT - Swab, Nasopharynx [[id number]]  (Abnormal) Collected: 03/05/23 1333    Specimen: Swab from Nasopharynx Updated: 03/05/23 1485     ADENOVIRUS, PCR Not Detected     Coronavirus 229E Not Detected     Coronavirus HKU1 Not Detected     Coronavirus NL63 Not Detected     Coronavirus OC43 Not Detected     COVID19 Detected     Human Metapneumovirus Not Detected     Human Rhinovirus/Enterovirus Not Detected     Influenza A PCR Not Detected     Influenza B PCR Not Detected      Parainfluenza Virus 1 Not Detected     Parainfluenza Virus 2 Not Detected     Parainfluenza Virus 3 Not Detected     Parainfluenza Virus 4 Not Detected     RSV, PCR Not Detected     Bordetella pertussis pcr Not Detected     Bordetella parapertussis PCR Not Detected     Chlamydophila pneumoniae PCR Not Detected     Mycoplasma pneumo by PCR Not Detected    Narrative:      In the setting of a positive respiratory panel with a viral infection PLUS a negative procalcitonin without other underlying concern for bacterial infection, consider observing off antibiotics or discontinuation of antibiotics and continue supportive care. If the respiratory panel is positive for atypical bacterial infection (Bordetella pertussis, Chlamydophila pneumoniae, or Mycoplasma pneumoniae), consider antibiotic de-escalation to target atypical bacterial infection.    Basic Metabolic Panel [[id number]]  (Abnormal) Collected: 03/05/23 0453    Specimen: Blood Updated: 03/05/23 0544     Glucose 363 mg/dL      BUN 18 mg/dL      Creatinine 0.77 mg/dL      Sodium 135 mmol/L      Potassium 4.5 mmol/L      Chloride 93 mmol/L      CO2 29.6 mmol/L      Calcium 9.3 mg/dL      BUN/Creatinine Ratio 23.4     Anion Gap 12.4 mmol/L      eGFR 96.3 mL/min/1.73     Narrative:      GFR Normal >60  Chronic Kidney Disease <60  Kidney Failure <15      Magnesium [[id number]]  (Normal) Collected: 03/05/23 0453    Specimen: Blood Updated: 03/05/23 0539     Magnesium 2.0 mg/dL     Blood Gas, Arterial - [[id number]]  (Abnormal) Collected: 03/04/23 2028    Specimen: Arterial Blood Updated: 03/04/23 2042     Site Left Radial     Rich's Test Positive     pH, Arterial 7.408 pH units      pCO2, Arterial 57.3 mm Hg      Comment: 83 Value above reference range        pO2, Arterial 48.3 mm Hg      Comment: 85 Value below critical limit        HCO3, Arterial 36.1 mmol/L      Comment: 83 Value above reference range        Base Excess, Arterial 9.5 mmol/L      Comment: 83 Value above  reference range        O2 Saturation, Arterial 85.0 %      Comment: 84 Value below reference range        Hemoglobin, Blood Gas 12.7 g/dL      Comment: 84 Value below reference range        Temperature 37.0 C      Barometric Pressure for Blood Gas 740 mmHg      Comment:  The parameter value has a question [id number] Calibration error(s) present        Modality Nasal Cannula     Flow Rate 3.0 lpm      Ventilator Mode --     Notified Who rb and v dr [email or web address]     Notified By [id number]     Notified Time 03/04/2023 20:39     Collected by [id number]     Comment: Meter: [id number]     :  [id number]        pCO2, Temperature Corrected 57.3 mm Hg      pH, Temp Corrected 7.408 pH Units      pO2, Temperature Corrected 48.3 mm Hg     [place] Draw [[id number]] Collected: 03/04/23 1835    Specimen: Blood Updated: 03/04/23 2000    Narrative:      The following orders were created for panel order [place] Draw.  Procedure                               Abnormality         Status                     ---------                               -----------         ------                     Green Top (Gel)[[id number]]                                  Final result               Lavender Top[[id number]]                                     Final result               Gold Top - SST[[id number]]                                   Final result               Light Blue Top[[id number]]                                   Final result                 Please view results for these tests on the individual orders.    Lavender Top [[id number]] Collected: 03/04/23 1835    Specimen: Blood Updated: 03/04/23 2000     Extra Tube hold for add-on     Comment: Auto resulted       Light Blue Top [[id number]] Collected: 03/04/23 1835    Specimen: Blood Updated: 03/04/23 1946     Extra Tube Hold for add-ons.     Comment: Auto resulted       Gold Top - SST [[id number]] Collected: 03/04/23 1835    Specimen: Blood Updated: 03/04/23 1946     Extra Tube Hold for add-ons.      Comment: Auto resulted.       Green Top (Gel) [[id number]] Collected: 03/04/23 1835    Specimen: Blood Updated: 03/04/23 1946     Extra Tube Hold for add-ons.     Comment: Auto resulted.       BNP [[id number]]  (Normal) Collected: 03/04/23 1835    Specimen: Blood Updated: 03/04/23 1943     proBNP <36.0 pg/mL     Narrative:      Among patients with dyspnea, NT-proBNP is highly sensitive for the detection of acute congestive heart failure. In addition NT-proBNP of <300 pg/ml effectively rules out acute congestive heart failure with 99% negative predictive value.    Results may be falsely decreased if patient taking Biotin.      Single High Sensitivity Troponin T [[id number]]  (Abnormal) Collected: 03/04/23 1835    Specimen: Blood Updated: 03/04/23 1915     HS Troponin T 42 ng/L     Narrative:      High Sensitive Troponin T Reference Range:  <10.0 ng/L- Negative Female for AMI  <15.0 ng/L- Negative Male for AMI  >=10 - Abnormal Female indicating possible myocardial injury.  >=15 - Abnormal Male indicating possible myocardial injury.   Clinicians would have to utilize clinical acumen, EKG, Troponin, and serial changes to determine if it is an Acute Myocardial Infarction or myocardial injury due to an underlying chronic condition.         Lactic Acid, Plasma [[id number]]  (Normal) Collected: 03/04/23 1835    Specimen: Blood Updated: 03/04/23 1914     Lactate 1.0 mmol/L         Imaging Results (Most Recent)     Procedure Component Value Units Date/Time    XR Chest PA & Lateral [[id number]] Collected: 03/09/23 0807     Updated: 03/09/23 0813    Narrative:      XR CHEST PA AND LATERAL-: 3/9/2023 7:39 AM     INDICATION:    Respiratory failure. Covid 19 positive. Short of air and cough 1.5 weeks     COMPARISON:    03/04/2023.     FINDINGS:   PA and lateral views of the chest.  Cardiac silhouette is stable.  Unremarkable vascularity. Improvement in inspiratory result. Persistent  curvilinear and band like opacities in the left lung  base favoring  atelectasis or scarring over pneumonia. There is some atelectasis or  fluid associated with the minor fissure. Perihilar opacities on the  right appear slightly more confluent and partially obscure the right  heart border and could reflect middle lobe pneumonia. Trace left  effusion. No pneumothorax..         Impression:         1. Worsening right perihilar opacities suspicious for pneumonia.  Atelectasis/fluid associated with minor fissure.  2. Opacities in the left base favoring atelectasis or scarring over  pneumonia. Trace left effusion.  3. Slight improvement in inspiratory result. No pneumothorax.     This report was finalized on 3/9/2023 8:11 AM by Dr. Jaden Danielle MD.       XR Chest 2 View [[id number]] Collected: 03/04/23 1943     Updated: 03/04/23 1945    Narrative:      CR Chest 2 Vws    INDICATION:    Short of air for weeks. Severe symptoms today. Chest pain. Asthma and COPD    COMPARISON:    11/27/2022    FINDINGS:   PA and lateral views of the chest.  Cardiac silhouette is within normal limits and stable. The vascularity is unremarkable.  Probable bibasilar atelectasis/scarring, right greater than left. There is no effusion or dense consolidation. Thoracic  spondylosis.      Impression:        1. Probable bibasilar atelectasis/scarring. No effusion or dense consolidation.    Signer Name: Jaden Danielle MD   Signed: 3/4/2023 7:43 PM   Workstation Name: [id number]    [facility]          PROCEDURES      Condition on Discharge:  Stable    Physical Exam at Discharge  Vital Signs  Temp:  [96 °F (35.6 °C)-97.2 °F (36.2 °C)] 97.2 °F (36.2 °C)  Heart Rate:  [] 101  Resp:  [18-22] 22  BP: (101-113)/(71-72) 113/72    Physical Exam:  Physical Exam   Constitutional: Patient appears well-developed and well-nourished and in no acute distress   Pulmonary/Chest: No respiratory distress.   Musculoskeletal: Normal Muscle tone  Neurological: Cranial nerves II-XII are grossly  intact with no focal deficits.    Discharge Disposition  Home    Visiting Nurse:    No     Home PT/OT:  No     Home Safety Evaluation:  No     DME  None new    Discharge Diet:      Dietary Orders (From admission, onward)     Start     Ordered    03/04/23 2250  Diet: Cardiac Diets, Diabetic Diets; Healthy Heart (2-3 Na+); Consistent Carbohydrate; Texture: Regular Texture (IDDSI 7); Fluid Consistency: Thin (IDDSI 0)  Diet Effective Now        References:    Diet Order [place]   Question Answer Comment   Diets: Cardiac Diets    Diets: Diabetic Diets    Cardiac Diet: Healthy Heart (2-3 Na+)    Diabetic Diet: Consistent Carbohydrate    Texture: Regular Texture (IDDSI 7)    Fluid Consistency: Thin (IDDSI 0)        03/04/23 4224                Activity at Discharge:  As tolerated      Follow-up Appointments  No future appointments.  Additional Instructions for the Follow-ups that You Need to Schedule     Discharge Follow-up with PCP   As directed       Currently Documented PCP:    Ney Kathleen MD    PCP Phone Number:    [phone]     Follow Up Details: Next week for BP check               Test Results Pending at Discharge  Pending Labs     Order Current Status    Blood Culture - Blood, Arm, Left Preliminary result    Blood Culture - Blood, Arm, Right Preliminary result           Sanjiv Rider MD  03/09/23  16:04 EST    Time: <30 minutes

## 2023-03-09 NOTE — PROCEDURES
Exercise Oximetry    Patient Name:Kt Armstrong   MRN: [id number]   Date: 03/09/23             ROOM AIR BASELINE   SpO2% 84   Heart Rate 94   Blood Pressure      EXERCISE ON ROOM AIR SpO2% EXERCISE ON O2 @ 3 LPM SpO2%   1 MINUTE  1 MINUTE 94     2 MINUTES  2 MINUTES 93   3 MINUTES  3 MINUTES 94   4 MINUTES  4 MINUTES 92   5 MINUTES  5 MINUTES 92   6 MINUTES  6 MINUTES 93              Distance Walked   Distance Walked 250ft   Dyspnea (Jackson Scale)   Dyspnea (Jackson Scale)   Fatigue (Jackson Scale)   Fatigue (Jackson Scale)   SpO2% Post Exercise   SpO2% Post Exercise 95   HR Post Exercise   HR Post Exercise 101    Time to Recovery   Time to Recovery 1min     Comments: Head probe and PLB used with walk

## 2023-03-10 NOTE — CASE MANAGEMENT/SOCIAL WORK
Case Management Discharge Note      Final Note: Discharged home.    Provided Post Acute Provider List?: N/A  Provided Post Acute Provider Quality & Resource List?: N/A    Selected Continued Care - Discharged on 3/9/2023 Admission date: 3/4/2023 - Discharge disposition: Home or Self Care    Destination    No services have been selected for the patient.              Durable Medical Equipment    No services have been selected for the patient.              Dialysis/Infusion    No services have been selected for the patient.              Home Medical Care    No services have been selected for the patient.              Therapy    No services have been selected for the patient.              Community Resources    No services have been selected for the patient.              Community & DME    No services have been selected for the patient.                       Final Discharge Disposition Code: 01 - home or self-care

## 2023-03-10 NOTE — OUTREACH NOTE
Prep Survey    Flowsheet Row Responses   [facility] facility patient discharged from? [facility]   Is LACE score < 7 ? No   Eligibility Readm Mgmt   Discharge diagnosis **COPD exacerbation    Does the patient have one of the following disease processes/diagnoses(primary or secondary)? COPD   Does the patient have Home health ordered? No   Is there a DME ordered? No   Prep survey completed? Yes          Thao BRYAN - Registered Nurse

## 2023-03-13 ENCOUNTER — READMISSION MANAGEMENT (OUTPATIENT)
Dept: CALL CENTER | Facility: HOSPITAL | Age: 73
End: 2023-03-13
Payer: MEDICARE

## 2023-03-13 NOTE — OUTREACH NOTE
COPD/PN Week 1 Survey    Flowsheet Row Responses   [facility] patient discharged from? [place]   Does the patient have one of the following disease processes/diagnoses(primary or secondary)? COPD   Week 1 attempt successful? Yes   Call start time 0945   Call end time 0948   Discharge diagnosis **COPD exacerbation    Meds reviewed with patient/caregiver? Yes   Is the patient having any side effects they believe may be caused by any medication additions or changes? No   Does the patient have all medications ordered at discharge? Yes   Prescription comments PATIENT STATES HE UNDERSTOOD THAT HE WOULD GET A PRESCRIPTION FOR NICOTINE PATCHES. CALL NOTE TO BE SENT TO CASE MANAGEMENT FOR ASSISTANCE WITH THIS.    Is the patient taking all medications as directed (includes completed medication regime)? Yes   Does the patient have a primary care provider?  Yes   Does the patient have an appointment with their PCP or specialist within 7 days of discharge? No   What is preventing the patient from scheduling follow up appointments within 7 days of discharge? Haven't had time   Nursing Interventions Educated patient on importance of making appointment, Advised patient to make appointment   Has the patient kept scheduled appointments due by today? N/A   Has home health visited the patient within 72 hours of discharge? N/A   Pulse Ox monitoring None   Psychosocial issues? No   Did the patient receive a copy of their discharge instructions? Yes   Nursing interventions Reviewed instructions with patient   What is the patient's perception of their health status since discharge? Improving   Nursing Interventions Nurse provided patient education   If the patient is a current smoker, are they able to teach back resources for cessation? Smoking cessation medications   Is the patient/caregiver able to teach back the hierarchy of who to call/visit for symptoms/problems? PCP, Specialist, Home health nurse, Urgent Care, ED, 911 Yes   Is  the patient able to teach back COPD zones? Yes   Nursing interventions Education provided on various zones   Patient reports what zone on this call? Green Zone   Green Zone Reports doing well, Breathing without shortness of breath, Usual activity and exercise level, Appetite is good, Slept well last night, Usual amounts of cough and phlegm/mucous   Green Zone interventions: Take daily medications, Use oxygen as prescribed, At all times avoid cigarette smoking, vaping and inhaled irritants   Week 1 call completed? Yes          Hoa HOPKINS - Licensed Nurse

## 2023-03-22 ENCOUNTER — READMISSION MANAGEMENT (OUTPATIENT)
Dept: CALL CENTER | Facility: HOSPITAL | Age: 73
End: 2023-03-22
Payer: MEDICARE

## 2023-03-22 NOTE — OUTREACH NOTE
COPD/PN Week 2 Survey    Flowsheet Row Responses   [facility] facility patient discharged from? [place]   Does the patient have one of the following disease processes/diagnoses(primary or secondary)? COPD   Week 2 attempt successful? No   Unsuccessful attempts Attempt 1          Hoa Ackerman Nurse

## 2023-03-28 ENCOUNTER — READMISSION MANAGEMENT (OUTPATIENT)
Dept: CALL CENTER | Facility: HOSPITAL | Age: 73
End: 2023-03-28
Payer: MEDICARE

## 2023-03-28 NOTE — OUTREACH NOTE
COPD/PN Week 3 Survey    Flowsheet Row Responses   [facility] facility patient discharged from? [place]   Does the patient have one of the following disease processes/diagnoses(primary or secondary)? COPD   Week 3 attempt successful? No   Unsuccessful attempts Attempt 1          Mary Jane H - Registered Nurse

## 2023-03-31 ENCOUNTER — APPOINTMENT (OUTPATIENT)
Dept: GENERAL RADIOLOGY | Facility: HOSPITAL | Age: 73
End: 2023-03-31
Payer: MEDICARE

## 2023-03-31 ENCOUNTER — READMISSION MANAGEMENT (OUTPATIENT)
Dept: CALL CENTER | Facility: HOSPITAL | Age: 73
End: 2023-03-31
Payer: MEDICARE

## 2023-03-31 ENCOUNTER — HOSPITAL ENCOUNTER (EMERGENCY)
Facility: HOSPITAL | Age: 73
Discharge: HOME | End: 2023-03-31
Attending: EMERGENCY MEDICINE | Admitting: EMERGENCY MEDICINE
Payer: MEDICARE

## 2023-03-31 VITALS
OXYGEN SATURATION: 94 % | SYSTOLIC BLOOD PRESSURE: 109 MMHG | HEART RATE: 112 BPM | RESPIRATION RATE: 24 BRPM | WEIGHT: 276.9 LBS | DIASTOLIC BLOOD PRESSURE: 67 MMHG | HEIGHT: 74 IN | BODY MASS INDEX: 35.54 KG/M2 | TEMPERATURE: 97.9 F

## 2023-03-31 DIAGNOSIS — J44.1 COPD EXACERBATION: Primary | ICD-10-CM

## 2023-03-31 LAB
ALBUMIN SERPL-MCNC: 4 G/DL (ref 3.5–5.2)
ALBUMIN/GLOB SERPL: 1.5 G/DL
ALP SERPL-CCNC: 59 U/L (ref 39–117)
ALT SERPL W P-5'-P-CCNC: 13 U/L (ref 1–41)
ANION GAP SERPL CALCULATED.3IONS-SCNC: 6.9 MMOL/L (ref 5–15)
AST SERPL-CCNC: 12 U/L (ref 1–40)
BASOPHILS # BLD AUTO: 0.04 10*3/MM3 (ref 0–0.2)
BASOPHILS NFR BLD AUTO: 0.6 % (ref 0–1.5)
BILIRUB SERPL-MCNC: 0.2 MG/DL (ref 0–1.2)
BUN SERPL-MCNC: 19 MG/DL (ref 8–23)
BUN/CREAT SERPL: 17.6 (ref 7–25)
CALCIUM SPEC-SCNC: 9.5 MG/DL (ref 8.6–10.5)
CHLORIDE SERPL-SCNC: 98 MMOL/L (ref 98–107)
CO2 SERPL-SCNC: 33.1 MMOL/L (ref 22–29)
CREAT SERPL-MCNC: 1.08 MG/DL (ref 0.76–1.27)
D DIMER PPP FEU-MCNC: 0.44 MCGFEU/ML (ref 0–0.7)
DEPRECATED RDW RBC AUTO: 50.1 FL (ref 37–54)
EGFRCR SERPLBLD CKD-EPI 2021: 73.8 ML/MIN/1.73
EOSINOPHIL # BLD AUTO: 0.15 10*3/MM3 (ref 0–0.4)
EOSINOPHIL NFR BLD AUTO: 2.1 % (ref 0.3–6.2)
ERYTHROCYTE [DISTWIDTH] IN BLOOD BY AUTOMATED COUNT: 13.6 % (ref 12.3–15.4)
FLUAV RNA RESP QL NAA+PROBE: NOT DETECTED
FLUBV RNA RESP QL NAA+PROBE: NOT DETECTED
GLOBULIN UR ELPH-MCNC: 2.7 GM/DL
GLUCOSE SERPL-MCNC: 204 MG/DL (ref 65–99)
HCT VFR BLD AUTO: 41.5 % (ref 37.5–51)
HGB BLD-MCNC: 12.9 G/DL (ref 13–17.7)
HOLD SPECIMEN: NORMAL
HOLD SPECIMEN: NORMAL
IMM GRANULOCYTES # BLD AUTO: 0.04 10*3/MM3 (ref 0–0.05)
IMM GRANULOCYTES NFR BLD AUTO: 0.6 % (ref 0–0.5)
LYMPHOCYTES # BLD AUTO: 1.52 10*3/MM3 (ref 0.7–3.1)
LYMPHOCYTES NFR BLD AUTO: 21.4 % (ref 19.6–45.3)
MCH RBC QN AUTO: 31 PG (ref 26.6–33)
MCHC RBC AUTO-ENTMCNC: 31.1 G/DL (ref 31.5–35.7)
MCV RBC AUTO: 99.8 FL (ref 79–97)
MONOCYTES # BLD AUTO: 0.58 10*3/MM3 (ref 0.1–0.9)
MONOCYTES NFR BLD AUTO: 8.2 % (ref 5–12)
NEUTROPHILS NFR BLD AUTO: 4.77 10*3/MM3 (ref 1.7–7)
NEUTROPHILS NFR BLD AUTO: 67.1 % (ref 42.7–76)
NRBC BLD AUTO-RTO: 0 /100 WBC (ref 0–0.2)
NT-PROBNP SERPL-MCNC: <36 PG/ML (ref 0–900)
PLATELET # BLD AUTO: 175 10*3/MM3 (ref 140–450)
PMV BLD AUTO: 12 FL (ref 6–12)
POTASSIUM SERPL-SCNC: 4.5 MMOL/L (ref 3.5–5.2)
PROCALCITONIN SERPL-MCNC: 0.11 NG/ML (ref 0–0.25)
PROT SERPL-MCNC: 6.7 G/DL (ref 6–8.5)
QT INTERVAL: 346 MS
RBC # BLD AUTO: 4.16 10*6/MM3 (ref 4.14–5.8)
SARS-COV-2 RNA RESP QL NAA+PROBE: NOT DETECTED
SODIUM SERPL-SCNC: 138 MMOL/L (ref 136–145)
TROPONIN T SERPL HS-MCNC: 43 NG/L
WBC NRBC COR # BLD: 7.1 10*3/MM3 (ref 3.4–10.8)
WHOLE BLOOD HOLD COAG: NORMAL
WHOLE BLOOD HOLD SPECIMEN: NORMAL

## 2023-03-31 PROCEDURE — 94799 UNLISTED PULMONARY SVC/PX: CPT

## 2023-03-31 PROCEDURE — 80053 COMPREHEN METABOLIC PANEL: CPT | Performed by: EMERGENCY MEDICINE

## 2023-03-31 PROCEDURE — 85025 COMPLETE CBC W/AUTO DIFF WBC: CPT

## 2023-03-31 PROCEDURE — 25010000002 METHYLPREDNISOLONE PER 125 MG: Performed by: EMERGENCY MEDICINE

## 2023-03-31 PROCEDURE — 85379 FIBRIN DEGRADATION QUANT: CPT | Performed by: EMERGENCY MEDICINE

## 2023-03-31 PROCEDURE — 84145 PROCALCITONIN (PCT): CPT | Performed by: EMERGENCY MEDICINE

## 2023-03-31 PROCEDURE — 83880 ASSAY OF NATRIURETIC PEPTIDE: CPT | Performed by: EMERGENCY MEDICINE

## 2023-03-31 PROCEDURE — 99284 EMERGENCY DEPT VISIT MOD MDM: CPT

## 2023-03-31 PROCEDURE — 84484 ASSAY OF TROPONIN QUANT: CPT | Performed by: EMERGENCY MEDICINE

## 2023-03-31 PROCEDURE — 96374 THER/PROPH/DIAG INJ IV PUSH: CPT

## 2023-03-31 PROCEDURE — 94761 N-INVAS EAR/PLS OXIMETRY MLT: CPT

## 2023-03-31 PROCEDURE — 71045 X-RAY EXAM CHEST 1 VIEW: CPT

## 2023-03-31 PROCEDURE — 94640 AIRWAY INHALATION TREATMENT: CPT

## 2023-03-31 PROCEDURE — 87636 SARSCOV2 & INF A&B AMP PRB: CPT | Performed by: EMERGENCY MEDICINE

## 2023-03-31 PROCEDURE — 93005 ELECTROCARDIOGRAM TRACING: CPT

## 2023-03-31 PROCEDURE — 93005 ELECTROCARDIOGRAM TRACING: CPT | Performed by: EMERGENCY MEDICINE

## 2023-03-31 PROCEDURE — 93010 ELECTROCARDIOGRAM REPORT: CPT | Performed by: INTERNAL MEDICINE

## 2023-03-31 RX ORDER — SODIUM CHLORIDE 0.9 % (FLUSH) 0.9 %
10 SYRINGE (ML) INJECTION AS NEEDED
Status: DISCONTINUED | OUTPATIENT
Start: 2023-03-31 | End: 2023-03-31 | Stop reason: HOSPADM

## 2023-03-31 RX ORDER — METHYLPREDNISOLONE 4 MG/1
TABLET ORAL
Qty: 21 TABLET | Refills: 0 | Status: SHIPPED | OUTPATIENT
Start: 2023-03-31

## 2023-03-31 RX ORDER — IPRATROPIUM BROMIDE AND ALBUTEROL SULFATE 2.5; .5 MG/3ML; MG/3ML
3 SOLUTION RESPIRATORY (INHALATION) ONCE
Status: COMPLETED | OUTPATIENT
Start: 2023-03-31 | End: 2023-03-31

## 2023-03-31 RX ORDER — METHYLPREDNISOLONE SODIUM SUCCINATE 125 MG/2ML
125 INJECTION, POWDER, LYOPHILIZED, FOR SOLUTION INTRAMUSCULAR; INTRAVENOUS ONCE
Status: COMPLETED | OUTPATIENT
Start: 2023-03-31 | End: 2023-03-31

## 2023-03-31 RX ADMIN — METHYLPREDNISOLONE SODIUM SUCCINATE 125 MG: 125 INJECTION, POWDER, FOR SOLUTION INTRAMUSCULAR; INTRAVENOUS at 15:49

## 2023-03-31 RX ADMIN — IPRATROPIUM BROMIDE AND ALBUTEROL SULFATE 3 ML: .5; 3 SOLUTION RESPIRATORY (INHALATION) at 17:38

## 2023-03-31 RX ADMIN — IPRATROPIUM BROMIDE AND ALBUTEROL SULFATE 3 ML: .5; 3 SOLUTION RESPIRATORY (INHALATION) at 17:32

## 2023-03-31 RX ADMIN — IPRATROPIUM BROMIDE AND ALBUTEROL SULFATE 3 ML: .5; 3 SOLUTION RESPIRATORY (INHALATION) at 15:59

## 2023-03-31 NOTE — DISCHARGE INSTRUCTIONS
Stop smoking.  Take the Medrol Dosepak as prescribed.  Use your albuterol nebulizer every 4 hours while awake for 3 days and then every 4 hours as needed.  Follow-up with Dr. Kathleen next week.  Return to the emergency department there is increasing shortness of breath, worse in any way at all.

## 2023-03-31 NOTE — OUTREACH NOTE
Medical Week 3 Survey    Flowsheet Row Responses   [facility] patient discharged from? [place]   Does the patient have one of the following disease processes/diagnoses(primary or secondary)? COPD   Call start time 0927   Call end time 0938   Discharge diagnosis **COPD exacerbation    Meds reviewed with patient/caregiver? Yes   Is the patient taking all medications as directed (includes completed medication regime)? Yes   Does the patient have a primary care provider?  Yes   Has the patient kept scheduled appointments due by today? Yes   Has home health visited the patient within 72 hours of discharge? N/A   Psychosocial issues? No   Did the patient receive a copy of their discharge instructions? Yes   Nursing interventions Reviewed instructions with patient   What is the patient's perception of their health status since discharge? Worsening   Is the patient/caregiver able to teach back the hierarchy of who to call/visit for symptoms/problems? PCP, Specialist, Home health nurse, Urgent Care, ED, 911 Yes   Wrap up additional comments Pt reports he is feeling worse today , increased SOA and tired. Pt using albuterol. Advised Pt to call PCP at this time and he VU. Also educated Pt on Nurse call center number.           SHANEKA AJCKSON - Registered Nurse

## 2023-03-31 NOTE — ED PROVIDER NOTES
Subjective   History of Present Illness  History of Present Illness    Chief complaint: Shortness of air    Location: Home    Quality/Severity: Moderate    Timing/Onset/Duration: Over the last couple of days    Modifying Factors: Worse with exertion    Associated Symptoms: No headache.  No fever or chills.  Patient's had a cough productive of clear phlegm.  No sore throat earache or nasal congestion.  Patient complains of a chest heaviness.  He complains of his abdomen being swollen.  He complains of swelling in the feet.  No diarrhea or burning when he urinates.  No nausea or vomiting    Narrative: This 72-year-old white male with a history of COPD, who continues to smoke, who has myasthenia gravis and hypertension and dyslipidemia, presents with increasing shortness of breath.    PCP:Ney Kathleen MD  Review of Systems   Constitutional: Negative for chills and fever.   HENT: Negative for congestion, ear pain and sore throat.    Respiratory: Positive for cough and shortness of breath.    Cardiovascular: Negative for chest pain.   Gastrointestinal: Negative for abdominal pain, diarrhea, nausea and vomiting.   Genitourinary: Negative for dysuria.   Musculoskeletal: Negative for back pain.   Skin: Negative for rash.   Neurological: Negative for headaches.   Psychiatric/Behavioral: Negative for confusion.        Medication List      CONTINUE taking these medications    BD Pen Needle Yaritza U/F 32G X 4 MM misc  Generic drug: Insulin Pen Needle        ASK your doctor about these medications    * albuterol (2.5 MG/3ML) 0.083% nebulizer solution  Commonly known as: PROVENTIL  Take 2.5 mg by nebulization Every 4 (Four) Hours As Needed for Wheezing.     * albuterol sulfate  (90 Base) MCG/ACT inhaler  Commonly known as: PROVENTIL HFA;VENTOLIN HFA;PROAIR HFA     aspirin 81 MG chewable tablet     Kd Contour Test test strip  Generic drug: glucose blood     cetirizine 10 MG tablet  Commonly known as: zyrTEC      dicyclomine 20 MG tablet  Commonly known as: BENTYL     docusate sodium 100 MG capsule  Commonly known as: COLACE     doxazosin 2 MG tablet  Commonly known as: CARDURA     EPINEPHrine (Anaphylaxis) 1 MG/ML injection  Commonly known as: ADRENALIN     glipiZIDE-metFORMIN 5-500 MG per tablet  Commonly known as: METAGLIP     HYDROcodone-acetaminophen  MG per tablet  Commonly known as: NORCO  Take 1 tablet by mouth Every 6 (Six) Hours As Needed for Mild Pain or Moderate Pain.     * insulin glargine 100 UNIT/ML injection  Commonly known as: LANTUS, SEMGLEE     * insulin glargine 100 UNIT/ML injection  Commonly known as: LANTUS, SEMGLEE     melatonin 5 MG tablet tablet     montelukast 10 MG tablet  Commonly known as: SINGULAIR     mycophenolate 500 MG tablet  Commonly known as: CELLCEPT     nicotine 14 MG/24HR patch  Commonly known as: NICODERM CQ  Place 1 patch on the skin as directed by provider Daily.     O2  Commonly known as: OXYGEN     pioglitazone 45 MG tablet  Commonly known as: ACTOS     pyridostigmine 60 MG tablet  Commonly known as: MESTINON     SIMVASTATIN PO     Trelegy Ellipta 100-62.5-25 MCG/ACT inhaler  Generic drug: Fluticasone-Umeclidin-Vilant         * This list has 4 medication(s) that are the same as other medications prescribed for you. Read the directions carefully, and ask your doctor or other care provider to review them with you.                Past Medical History:   Diagnosis Date   • Arthritis of back    • Arthritis of neck    • Asthma    • COPD (chronic obstructive pulmonary disease) ([facility])    • Depression    • DM (diabetes mellitus) ([facility])    • GERD (gastroesophageal reflux disease)    • Hip arthrosis    • Hyperlipidemia    • Hypertension    • Knee swelling    • Myasthenia gravis ([facility])    • Periarthritis of shoulder    • Rotator cuff syndrome        No Known Allergies    Past Surgical History:   Procedure Laterality Date   • APPENDECTOMY     • CHOLECYSTECTOMY     • ROTATOR CUFF REPAIR  Right        Family History   Problem Relation Age of Onset   • Diabetes Mother    • COPD Father        Social History     Socioeconomic History   • Marital status:    Tobacco Use   • Smoking status: Every Day     Packs/day: 0.50     Years: 58.00     Pack years: 29.00     Types: Cigarettes   • Smokeless tobacco: Never   • Tobacco comments:     pt asking for nicotine patch   Vaping Use   • Vaping Use: Never used   Substance and Sexual Activity   • Alcohol use: No   • Drug use: No   • Sexual activity: Defer           Objective   Physical Exam  Vitals (The temperature is 97.9 °F, pulse 106, respirations 30, /85, room air pulse ox 90%) and nursing note reviewed.   Constitutional:       Appearance: He is well-developed.   HENT:      Head: Normocephalic and atraumatic.      Mouth/Throat:      Mouth: Mucous membranes are moist.   Cardiovascular:      Rate and Rhythm: Normal rate and regular rhythm.      Heart sounds: No murmur heard.    No friction rub. No gallop.   Pulmonary:      Breath sounds: Decreased breath sounds and wheezing (Mild bilateral expiratory) present.      Comments: The abdomen is distended  Chest:      Chest wall: No tenderness.   Abdominal:      General: Bowel sounds are normal.      Palpations: Abdomen is soft. There is no mass.      Tenderness: There is no abdominal tenderness. There is no guarding or rebound.   Musculoskeletal:         General: Normal range of motion.      Cervical back: Normal range of motion and neck supple.      Right lower leg: Edema (2+ pitting) present.      Left lower leg: Edema (2+ pitting) present.   Skin:     General: Skin is warm and dry.      Findings: No rash.   Neurological:      General: No focal deficit present.      Mental Status: He is alert and oriented to person, place, and time.         Procedures           ED Course  ED Course as of 03/31/23 1659   Fri Mar 31, 2023   1528 The hemoglobin is 12.9.  The CBC is otherwise unremarkable [RC]   1626 The  COVID flu is negative.    The high-sensitivity troponin is 43 and elevated    The hemoglobin is 12.9.  The CBC is otherwise unremarkable [RC]   1627 On 3/9/2023 the hemoglobin was 12.7.  The hemoglobin is stable today. [RC]   1627 The proBNP is normal at less than 36.    The D-dimer is normal at 0.44.    The serum glucose is mildly elevated at 204.  The CO2 is 33.  The GFR 73.  The CMP is otherwise unremarkable. [RC]   1646 On 3/4/2023 the high-sensitivity troponin was 42. [RC]   1655 The procalcitonin is normal. [RC]      ED Course User Index  [RC] Mason Ling MD      15:42 EDT, 03/31/23:  The EKG was obtained at 1524 and read by me at 1524.  The EKG shows a sinus tachycardia with rate of 105.  There is a normal axis with no hypertrophy.  The LA, QRS, QT intervals are unremarkable.  There is right ventricular hypertrophy.  There is no ectopy.  There is no acute ST elevation or depression.    17:00 EDT, 03/31/23:  The patient was reassessed.  He feels better.  His lungs have bilateral expiratory wheezes, he is moving better air.  He is sats on 2 L or 87% with a good Pleth.  I will order another nebulizer     17:41 EDT, 03/31/23:  The patient was reassessed.  His sats are 94 to 95% on 2 L/min.  He feels better.  Lung exam: Mild bilateral expiratory wheezes the patient's diagnosis of COPD exacerbation was discussed with him.  He should use his Proventil nebulizer every 4 hours while awake  for 3 days then every 4 hours as needed.  He should take the Medrol dose pack as prescribed.  The patient should follow-up with Dr. Kathleen next week.  The patient should stop smoking.  He should return to the emergency department there is increased shortness of breath, worse in any way at all.  All the patient question answered he will be discharged in good condition                                MDM    Final diagnoses:   COPD exacerbation (HCC)       ED Disposition  ED Disposition     None          No follow-up provider  specified.       Medication List      No changes were made to your prescriptions during this visit.          Mason Ling MD  03/31/23 2041

## 2023-05-29 ENCOUNTER — APPOINTMENT (OUTPATIENT)
Dept: GENERAL RADIOLOGY | Facility: HOSPITAL | Age: 73
DRG: 190 | End: 2023-05-29
Payer: MEDICARE

## 2023-05-29 ENCOUNTER — HOSPITAL ENCOUNTER (INPATIENT)
Facility: HOSPITAL | Age: 73
LOS: 3 days | Discharge: HOME | DRG: 190 | End: 2023-06-01
Attending: EMERGENCY MEDICINE | Admitting: HOSPITALIST
Payer: MEDICARE

## 2023-05-29 DIAGNOSIS — J44.1 COPD WITH ACUTE EXACERBATION: Primary | ICD-10-CM

## 2023-05-29 DIAGNOSIS — Z99.81 CHRONIC RESPIRATORY FAILURE WITH HYPOXIA, ON HOME OXYGEN THERAPY: ICD-10-CM

## 2023-05-29 DIAGNOSIS — J96.11 CHRONIC RESPIRATORY FAILURE WITH HYPOXIA, ON HOME OXYGEN THERAPY: ICD-10-CM

## 2023-05-29 LAB
ALBUMIN SERPL-MCNC: 4.3 G/DL (ref 3.5–5.2)
ALBUMIN/GLOB SERPL: 1.8 G/DL
ALP SERPL-CCNC: 48 U/L (ref 39–117)
ALT SERPL W P-5'-P-CCNC: 16 U/L (ref 1–41)
ANION GAP SERPL CALCULATED.3IONS-SCNC: 9.4 MMOL/L (ref 5–15)
AST SERPL-CCNC: 14 U/L (ref 1–40)
BASOPHILS # BLD AUTO: 0.08 10*3/MM3 (ref 0–0.2)
BASOPHILS NFR BLD AUTO: 1 % (ref 0–1.5)
BILIRUB SERPL-MCNC: <0.2 MG/DL (ref 0–1.2)
BUN SERPL-MCNC: 16 MG/DL (ref 8–23)
BUN/CREAT SERPL: 19.8 (ref 7–25)
CALCIUM SPEC-SCNC: 9.7 MG/DL (ref 8.6–10.5)
CHLORIDE SERPL-SCNC: 98 MMOL/L (ref 98–107)
CO2 SERPL-SCNC: 31.6 MMOL/L (ref 22–29)
CREAT SERPL-MCNC: 0.81 MG/DL (ref 0.76–1.27)
DEPRECATED RDW RBC AUTO: 49.5 FL (ref 37–54)
EGFRCR SERPLBLD CKD-EPI 2021: 94.8 ML/MIN/1.73
EOSINOPHIL # BLD AUTO: 0.19 10*3/MM3 (ref 0–0.4)
EOSINOPHIL NFR BLD AUTO: 2.3 % (ref 0.3–6.2)
ERYTHROCYTE [DISTWIDTH] IN BLOOD BY AUTOMATED COUNT: 13.3 % (ref 12.3–15.4)
GLOBULIN UR ELPH-MCNC: 2.4 GM/DL
GLUCOSE SERPL-MCNC: 184 MG/DL (ref 65–99)
HBA1C MFR BLD: 7.8 % (ref 4.8–5.6)
HCT VFR BLD AUTO: 44.5 % (ref 37.5–51)
HGB BLD-MCNC: 13.7 G/DL (ref 13–17.7)
HOLD SPECIMEN: NORMAL
IMM GRANULOCYTES # BLD AUTO: 0.04 10*3/MM3 (ref 0–0.05)
IMM GRANULOCYTES NFR BLD AUTO: 0.5 % (ref 0–0.5)
L PNEUMO1 AG UR QL IA: NEGATIVE
LYMPHOCYTES # BLD AUTO: 1.83 10*3/MM3 (ref 0.7–3.1)
LYMPHOCYTES NFR BLD AUTO: 22.3 % (ref 19.6–45.3)
MCH RBC QN AUTO: 31 PG (ref 26.6–33)
MCHC RBC AUTO-ENTMCNC: 30.8 G/DL (ref 31.5–35.7)
MCV RBC AUTO: 100.7 FL (ref 79–97)
MONOCYTES # BLD AUTO: 0.62 10*3/MM3 (ref 0.1–0.9)
MONOCYTES NFR BLD AUTO: 7.6 % (ref 5–12)
NEUTROPHILS NFR BLD AUTO: 5.44 10*3/MM3 (ref 1.7–7)
NEUTROPHILS NFR BLD AUTO: 66.3 % (ref 42.7–76)
NRBC BLD AUTO-RTO: 0 /100 WBC (ref 0–0.2)
NT-PROBNP SERPL-MCNC: <36 PG/ML (ref 0–900)
PLATELET # BLD AUTO: 191 10*3/MM3 (ref 140–450)
PMV BLD AUTO: 11.8 FL (ref 6–12)
POTASSIUM SERPL-SCNC: 4.4 MMOL/L (ref 3.5–5.2)
PROCALCITONIN SERPL-MCNC: 0.07 NG/ML (ref 0–0.25)
PROT SERPL-MCNC: 6.7 G/DL (ref 6–8.5)
RBC # BLD AUTO: 4.42 10*6/MM3 (ref 4.14–5.8)
S PNEUM AG SPEC QL LA: NEGATIVE
SODIUM SERPL-SCNC: 139 MMOL/L (ref 136–145)
TROPONIN T SERPL HS-MCNC: 26 NG/L
TSH SERPL DL<=0.05 MIU/L-ACNC: 1.05 UIU/ML (ref 0.27–4.2)
WBC NRBC COR # BLD: 8.2 10*3/MM3 (ref 3.4–10.8)
WHOLE BLOOD HOLD COAG: NORMAL
WHOLE BLOOD HOLD SPECIMEN: NORMAL

## 2023-05-29 PROCEDURE — 71046 X-RAY EXAM CHEST 2 VIEWS: CPT

## 2023-05-29 PROCEDURE — 93005 ELECTROCARDIOGRAM TRACING: CPT | Performed by: EMERGENCY MEDICINE

## 2023-05-29 PROCEDURE — 99285 EMERGENCY DEPT VISIT HI MDM: CPT

## 2023-05-29 PROCEDURE — 94799 UNLISTED PULMONARY SVC/PX: CPT

## 2023-05-29 PROCEDURE — 94640 AIRWAY INHALATION TREATMENT: CPT

## 2023-05-29 PROCEDURE — 84145 PROCALCITONIN (PCT): CPT | Performed by: NURSE PRACTITIONER

## 2023-05-29 PROCEDURE — 80053 COMPREHEN METABOLIC PANEL: CPT | Performed by: EMERGENCY MEDICINE

## 2023-05-29 PROCEDURE — 99223 1ST HOSP IP/OBS HIGH 75: CPT | Performed by: NURSE PRACTITIONER

## 2023-05-29 PROCEDURE — 36415 COLL VENOUS BLD VENIPUNCTURE: CPT

## 2023-05-29 PROCEDURE — 85025 COMPLETE CBC W/AUTO DIFF WBC: CPT | Performed by: EMERGENCY MEDICINE

## 2023-05-29 PROCEDURE — 94761 N-INVAS EAR/PLS OXIMETRY MLT: CPT

## 2023-05-29 PROCEDURE — 87449 NOS EACH ORGANISM AG IA: CPT | Performed by: NURSE PRACTITIONER

## 2023-05-29 PROCEDURE — 83880 ASSAY OF NATRIURETIC PEPTIDE: CPT | Performed by: EMERGENCY MEDICINE

## 2023-05-29 PROCEDURE — 25010000002 METHYLPREDNISOLONE PER 125 MG: Performed by: EMERGENCY MEDICINE

## 2023-05-29 PROCEDURE — 84443 ASSAY THYROID STIM HORMONE: CPT | Performed by: NURSE PRACTITIONER

## 2023-05-29 PROCEDURE — 83036 HEMOGLOBIN GLYCOSYLATED A1C: CPT | Performed by: NURSE PRACTITIONER

## 2023-05-29 PROCEDURE — 93010 ELECTROCARDIOGRAM REPORT: CPT | Performed by: INTERNAL MEDICINE

## 2023-05-29 PROCEDURE — 0202U NFCT DS 22 TRGT SARS-COV-2: CPT | Performed by: NURSE PRACTITIONER

## 2023-05-29 PROCEDURE — 84484 ASSAY OF TROPONIN QUANT: CPT | Performed by: EMERGENCY MEDICINE

## 2023-05-29 RX ORDER — FAMOTIDINE 20 MG/1
40 TABLET, FILM COATED ORAL
Status: DISCONTINUED | OUTPATIENT
Start: 2023-05-30 | End: 2023-06-01 | Stop reason: HOSPADM

## 2023-05-29 RX ORDER — ATORVASTATIN CALCIUM 10 MG/1
10 TABLET, FILM COATED ORAL DAILY
Status: DISCONTINUED | OUTPATIENT
Start: 2023-05-30 | End: 2023-06-01 | Stop reason: HOSPADM

## 2023-05-29 RX ORDER — ONDANSETRON 4 MG/1
4 TABLET, FILM COATED ORAL EVERY 6 HOURS PRN
Status: DISCONTINUED | OUTPATIENT
Start: 2023-05-29 | End: 2023-06-01 | Stop reason: HOSPADM

## 2023-05-29 RX ORDER — BISACODYL 5 MG/1
5 TABLET, DELAYED RELEASE ORAL DAILY PRN
Status: DISCONTINUED | OUTPATIENT
Start: 2023-05-29 | End: 2023-06-01 | Stop reason: HOSPADM

## 2023-05-29 RX ORDER — ONDANSETRON 2 MG/ML
4 INJECTION INTRAMUSCULAR; INTRAVENOUS EVERY 6 HOURS PRN
Status: DISCONTINUED | OUTPATIENT
Start: 2023-05-29 | End: 2023-06-01 | Stop reason: HOSPADM

## 2023-05-29 RX ORDER — ACETAMINOPHEN 650 MG/1
650 SUPPOSITORY RECTAL EVERY 4 HOURS PRN
Status: DISCONTINUED | OUTPATIENT
Start: 2023-05-29 | End: 2023-06-01 | Stop reason: HOSPADM

## 2023-05-29 RX ORDER — IPRATROPIUM BROMIDE AND ALBUTEROL SULFATE 2.5; .5 MG/3ML; MG/3ML
SOLUTION RESPIRATORY (INHALATION)
Status: COMPLETED
Start: 2023-05-29 | End: 2023-05-29

## 2023-05-29 RX ORDER — POLYETHYLENE GLYCOL 3350 17 G/17G
17 POWDER, FOR SOLUTION ORAL DAILY PRN
Status: DISCONTINUED | OUTPATIENT
Start: 2023-05-29 | End: 2023-06-01 | Stop reason: HOSPADM

## 2023-05-29 RX ORDER — IPRATROPIUM BROMIDE AND ALBUTEROL SULFATE 2.5; .5 MG/3ML; MG/3ML
3 SOLUTION RESPIRATORY (INHALATION) ONCE
Status: COMPLETED | OUTPATIENT
Start: 2023-05-29 | End: 2023-05-29

## 2023-05-29 RX ORDER — INSULIN ASPART 100 [IU]/ML
2-7 INJECTION, SOLUTION INTRAVENOUS; SUBCUTANEOUS
Status: DISCONTINUED | OUTPATIENT
Start: 2023-05-30 | End: 2023-05-30

## 2023-05-29 RX ORDER — SODIUM CHLORIDE 9 MG/ML
40 INJECTION, SOLUTION INTRAVENOUS AS NEEDED
Status: DISCONTINUED | OUTPATIENT
Start: 2023-05-29 | End: 2023-06-01 | Stop reason: HOSPADM

## 2023-05-29 RX ORDER — MYCOPHENOLATE MOFETIL 250 MG/1
1000 CAPSULE ORAL EVERY 12 HOURS SCHEDULED
Status: DISCONTINUED | OUTPATIENT
Start: 2023-05-29 | End: 2023-06-01 | Stop reason: HOSPADM

## 2023-05-29 RX ORDER — HYDROCODONE BITARTRATE AND ACETAMINOPHEN 10; 325 MG/1; MG/1
1 TABLET ORAL EVERY 6 HOURS PRN
Status: DISCONTINUED | OUTPATIENT
Start: 2023-05-29 | End: 2023-06-01 | Stop reason: HOSPADM

## 2023-05-29 RX ORDER — ACETAMINOPHEN 160 MG/5ML
650 SOLUTION ORAL EVERY 4 HOURS PRN
Status: DISCONTINUED | OUTPATIENT
Start: 2023-05-29 | End: 2023-06-01 | Stop reason: HOSPADM

## 2023-05-29 RX ORDER — SODIUM CHLORIDE 0.9 % (FLUSH) 0.9 %
10 SYRINGE (ML) INJECTION EVERY 12 HOURS SCHEDULED
Status: DISCONTINUED | OUTPATIENT
Start: 2023-05-29 | End: 2023-06-01 | Stop reason: HOSPADM

## 2023-05-29 RX ORDER — GUAIFENESIN 600 MG/1
1200 TABLET, EXTENDED RELEASE ORAL 2 TIMES DAILY
Status: DISCONTINUED | OUTPATIENT
Start: 2023-05-29 | End: 2023-06-01 | Stop reason: HOSPADM

## 2023-05-29 RX ORDER — CHOLECALCIFEROL (VITAMIN D3) 125 MCG
10 CAPSULE ORAL NIGHTLY PRN
Status: DISCONTINUED | OUTPATIENT
Start: 2023-05-29 | End: 2023-06-01 | Stop reason: HOSPADM

## 2023-05-29 RX ORDER — CHOLECALCIFEROL (VITAMIN D3) 125 MCG
5 CAPSULE ORAL NIGHTLY PRN
Status: DISCONTINUED | OUTPATIENT
Start: 2023-05-29 | End: 2023-05-29

## 2023-05-29 RX ORDER — NICOTINE POLACRILEX 4 MG
15 LOZENGE BUCCAL
Status: DISCONTINUED | OUTPATIENT
Start: 2023-05-29 | End: 2023-05-30

## 2023-05-29 RX ORDER — DEXTROSE MONOHYDRATE 25 G/50ML
25 INJECTION, SOLUTION INTRAVENOUS
Status: DISCONTINUED | OUTPATIENT
Start: 2023-05-29 | End: 2023-05-30

## 2023-05-29 RX ORDER — METHYLPREDNISOLONE SODIUM SUCCINATE 125 MG/2ML
125 INJECTION, POWDER, LYOPHILIZED, FOR SOLUTION INTRAMUSCULAR; INTRAVENOUS ONCE
Status: COMPLETED | OUTPATIENT
Start: 2023-05-29 | End: 2023-05-29

## 2023-05-29 RX ORDER — ALBUTEROL SULFATE 2.5 MG/3ML
2.5 SOLUTION RESPIRATORY (INHALATION)
Status: COMPLETED | OUTPATIENT
Start: 2023-05-29 | End: 2023-05-29

## 2023-05-29 RX ORDER — SODIUM CHLORIDE 0.9 % (FLUSH) 0.9 %
10 SYRINGE (ML) INJECTION AS NEEDED
Status: DISCONTINUED | OUTPATIENT
Start: 2023-05-29 | End: 2023-06-01 | Stop reason: HOSPADM

## 2023-05-29 RX ORDER — AMOXICILLIN 250 MG
2 CAPSULE ORAL 2 TIMES DAILY
Status: DISCONTINUED | OUTPATIENT
Start: 2023-05-29 | End: 2023-06-01 | Stop reason: HOSPADM

## 2023-05-29 RX ORDER — METHYLPREDNISOLONE SODIUM SUCCINATE 40 MG/ML
40 INJECTION, POWDER, LYOPHILIZED, FOR SOLUTION INTRAMUSCULAR; INTRAVENOUS EVERY 12 HOURS
Status: DISCONTINUED | OUTPATIENT
Start: 2023-05-29 | End: 2023-05-30

## 2023-05-29 RX ORDER — PYRIDOSTIGMINE BROMIDE 60 MG/1
60 TABLET ORAL EVERY 4 HOURS
Status: DISCONTINUED | OUTPATIENT
Start: 2023-05-29 | End: 2023-06-01 | Stop reason: HOSPADM

## 2023-05-29 RX ORDER — MONTELUKAST SODIUM 10 MG/1
10 TABLET ORAL NIGHTLY
Status: DISCONTINUED | OUTPATIENT
Start: 2023-05-29 | End: 2023-06-01 | Stop reason: HOSPADM

## 2023-05-29 RX ORDER — IPRATROPIUM BROMIDE AND ALBUTEROL SULFATE 2.5; .5 MG/3ML; MG/3ML
3 SOLUTION RESPIRATORY (INHALATION)
Status: DISCONTINUED | OUTPATIENT
Start: 2023-05-29 | End: 2023-06-01 | Stop reason: HOSPADM

## 2023-05-29 RX ORDER — NICOTINE 21 MG/24HR
1 PATCH, TRANSDERMAL 24 HOURS TRANSDERMAL EVERY 24 HOURS
Status: DISCONTINUED | OUTPATIENT
Start: 2023-05-29 | End: 2023-06-01 | Stop reason: HOSPADM

## 2023-05-29 RX ORDER — NITROGLYCERIN 0.4 MG/1
0.4 TABLET SUBLINGUAL
Status: DISCONTINUED | OUTPATIENT
Start: 2023-05-29 | End: 2023-05-31

## 2023-05-29 RX ORDER — IPRATROPIUM BROMIDE AND ALBUTEROL SULFATE 2.5; .5 MG/3ML; MG/3ML
3 SOLUTION RESPIRATORY (INHALATION)
Status: DISCONTINUED | OUTPATIENT
Start: 2023-05-29 | End: 2023-05-29

## 2023-05-29 RX ORDER — ASPIRIN 81 MG/1
81 TABLET, CHEWABLE ORAL DAILY
Status: DISCONTINUED | OUTPATIENT
Start: 2023-05-30 | End: 2023-06-01 | Stop reason: HOSPADM

## 2023-05-29 RX ORDER — TERAZOSIN 1 MG/1
2 CAPSULE ORAL NIGHTLY
Status: DISCONTINUED | OUTPATIENT
Start: 2023-05-29 | End: 2023-06-01 | Stop reason: HOSPADM

## 2023-05-29 RX ORDER — ACETAMINOPHEN 325 MG/1
650 TABLET ORAL EVERY 4 HOURS PRN
Status: DISCONTINUED | OUTPATIENT
Start: 2023-05-29 | End: 2023-06-01 | Stop reason: HOSPADM

## 2023-05-29 RX ORDER — BISACODYL 10 MG
10 SUPPOSITORY, RECTAL RECTAL DAILY PRN
Status: DISCONTINUED | OUTPATIENT
Start: 2023-05-29 | End: 2023-06-01 | Stop reason: HOSPADM

## 2023-05-29 RX ADMIN — DOCUSATE SODIUM 50 MG AND SENNOSIDES 8.6 MG 2 TABLET: 8.6; 5 TABLET, FILM COATED ORAL at 23:00

## 2023-05-29 RX ADMIN — METHYLPREDNISOLONE SODIUM SUCCINATE 125 MG: 125 INJECTION INTRAMUSCULAR; INTRAVENOUS at 20:11

## 2023-05-29 RX ADMIN — IPRATROPIUM BROMIDE AND ALBUTEROL SULFATE 3 ML: .5; 3 SOLUTION RESPIRATORY (INHALATION) at 19:57

## 2023-05-29 RX ADMIN — HYDROCODONE BITARTRATE AND ACETAMINOPHEN 1 TABLET: 10; 325 TABLET ORAL at 23:08

## 2023-05-29 RX ADMIN — PYRIDOSTIGMINE BROMIDE 60 MG: 60 TABLET ORAL at 23:00

## 2023-05-29 RX ADMIN — ALBUTEROL SULFATE 2.5 MG: 2.5 SOLUTION RESPIRATORY (INHALATION) at 20:51

## 2023-05-29 RX ADMIN — IPRATROPIUM BROMIDE AND ALBUTEROL SULFATE 3 ML: 2.5; .5 SOLUTION RESPIRATORY (INHALATION) at 19:57

## 2023-05-29 RX ADMIN — ALBUTEROL SULFATE 2.5 MG: 2.5 SOLUTION RESPIRATORY (INHALATION) at 20:18

## 2023-05-29 RX ADMIN — Medication 10 ML: at 23:08

## 2023-05-29 RX ADMIN — GUAIFENESIN 1200 MG: 600 TABLET, EXTENDED RELEASE ORAL at 22:58

## 2023-05-29 RX ADMIN — MONTELUKAST SODIUM 10 MG: 10 TABLET, COATED ORAL at 22:59

## 2023-05-29 RX ADMIN — NICOTINE 1 PATCH: 21 PATCH, EXTENDED RELEASE TRANSDERMAL at 22:56

## 2023-05-29 NOTE — ED PROVIDER NOTES
"Subjective     History provided by:  Patient    History of Present Illness    · Chief complaint: Shortness of breath    · Location: Lungs    · Quality/Severity: Moderately severe shortness of breath.  Wheezing.  Cough productive of clear sputum.    · Timing/Onset: Been present for about a week.    · Modifying Factors: Symptoms started when he ran out of his steroids prescribed by his PCP.    · Associated symptoms: No fever.  Denies chest pain.    · Narrative: The patient is a 72-year-old white male with a history of chronic respiratory failure and COPD on home O2 2 L.  He uses albuterol inhaler and nebulizer machine.  He has been taking steroids prescribed by his PCP but when he ran out his breathing got worse.  The patient continues to smoke 1/2 pack/day.    Review of Systems  Past Medical History:   Diagnosis Date   • Arthritis of back    • Arthritis of neck    • Asthma    • COPD (chronic obstructive pulmonary disease)    • Depression    • DM (diabetes mellitus)    • GERD (gastroesophageal reflux disease)    • Hip arthrosis    • Hyperlipidemia    • Hypertension    • Knee swelling    • Myasthenia gravis    • Periarthritis of shoulder    • Rotator cuff syndrome      /75 (BP Location: Right arm, Patient Position: Sitting)   Pulse 115   Temp 97.6 °F (36.4 °C) (Oral)   Resp (!) 32   Ht 188 cm (74\")   Wt 123 kg (271 lb)   SpO2 92%   BMI 34.79 kg/m²     Past Medical History:   Diagnosis Date   • Arthritis of back    • Arthritis of neck    • Asthma    • COPD (chronic obstructive pulmonary disease)    • Depression    • DM (diabetes mellitus)    • GERD (gastroesophageal reflux disease)    • Hip arthrosis    • Hyperlipidemia    • Hypertension    • Knee swelling    • Myasthenia gravis    • Periarthritis of shoulder    • Rotator cuff syndrome        No Known Allergies    Past Surgical History:   Procedure Laterality Date   • APPENDECTOMY     • CHOLECYSTECTOMY     • ROTATOR CUFF REPAIR Right        Family History "   Problem Relation Age of Onset   • Diabetes Mother    • COPD Father        Social History     Socioeconomic History   • Marital status:    Tobacco Use   • Smoking status: Every Day     Packs/day: 0.50     Years: 58.00     Pack years: 29.00     Types: Cigarettes   • Smokeless tobacco: Never   • Tobacco comments:     pt asking for nicotine patch   Vaping Use   • Vaping Use: Never used   Substance and Sexual Activity   • Alcohol use: No   • Drug use: No   • Sexual activity: Defer           Objective   Physical Exam  Vitals and nursing note reviewed.   Constitutional:       General: He is in acute distress.      Appearance: He is not ill-appearing, toxic-appearing or diaphoretic.      Comments: The patient is obese and in no respiratory distress.  Review of his vital signs: He is afebrile with a temperature of 99.1, tachypnea with a respiratory rate of 26 with a diminished oxygen saturation of 87%, tachycardic with a heart rate of 116, blood pressure 133/72.   HENT:      Head: Normocephalic and atraumatic.      Mouth/Throat:      Mouth: Mucous membranes are moist.   Eyes:      Pupils: Pupils are equal, round, and reactive to light.   Cardiovascular:      Rate and Rhythm: Regular rhythm. Tachycardia present.      Heart sounds: No murmur heard.  Pulmonary:      Breath sounds: Examination of the right-upper field reveals decreased breath sounds and wheezing. Examination of the left-upper field reveals decreased breath sounds and wheezing. Examination of the right-middle field reveals decreased breath sounds and wheezing. Examination of the left-middle field reveals decreased breath sounds and wheezing. Examination of the right-lower field reveals decreased breath sounds and wheezing. Examination of the left-lower field reveals decreased breath sounds and wheezing. Decreased breath sounds and wheezing present. No rhonchi or rales.   Abdominal:      General: Bowel sounds are normal.      Palpations: Abdomen is soft.    Musculoskeletal:      Cervical back: Normal range of motion.      Right lower leg: No tenderness. No edema.      Left lower leg: No tenderness. No edema.   Skin:     General: Skin is warm and dry.      Capillary Refill: Capillary refill takes less than 2 seconds.   Neurological:      General: No focal deficit present.      Mental Status: He is alert and oriented to person, place, and time.         Procedures           ED Course  ED Course as of 05/29/23 2246   Mon May 29, 2023   1956 The patient's chest x-ray was interpreted by me and the radiologist has background COPD but no acute infiltrate. [TP]   2057 Review the patient's laboratory studies: The patient is high since troponin was elevated slightly in the indeterminate range of 26.  proBNP less than 36.  CMP has an elevated glucose of 184, and an elevated CO2 of 31.6 due to his COPD.  Remainder of the CMP is within normal limits.  CBC is normal. [TP]   2058 The patient was administered Solu-Medrol 125 mg IV.  He was administered a DuoNeb followed by 2 albuterol nebulizer treatments. [TP]   2244 My interpretation of the patient's EKG tracing performed 19: 46 is sinus tachycardia with a rate of 105, normal axis, normal conduction, no acute ST segment elevation or depression consistent with ischemia, no ectopy, normal R wave transition, normal WA and QT intervals.  Otherwise normal EKG. [TP]   2245 21: 17 the patient was reexamined and noted to have an oxygen saturation of 91% on 3 L of oxygen via nasal cannula which is more than his baseline 2 L of oxygen he is normally on at home.  He has slightly improved air movement, but still has expiratory phase wheezing.  I would believe he would be hypoxic in the 80s should even try to walk across the room. [TP]   2245 21:24 patient discussed with NARENDRA Rodgers hospitalist, who agreed to admit the patient. [TP]      ED Course User Index  [TP] Gio Nix MD                                           Medical  Decision Making  My differential diagnosis for dyspnea includes but is not limited to:  Asthma, COPD, pneumonia, pulmonary embolus, acute respiratory distress syndrome, pneumothorax, pleural effusion, pulmonary fibrosis, congestive heart failure, myocardial infarction, DKA, uremia, acidosis, sepsis, anemia, drug related, hyperventilation, CNS disease    COPD with acute exacerbation: acute illness or injury  Amount and/or Complexity of Data Reviewed  Labs: ordered. Decision-making details documented in ED Course.  Radiology: ordered. Decision-making details documented in ED Course.  ECG/medicine tests: ordered and independent interpretation performed. Decision-making details documented in ED Course.  Discussion of management or test interpretation with external provider(s): Details documented in the ED course.    Risk  Prescription drug management.  Decision regarding hospitalization.          Final diagnoses:   COPD with acute exacerbation       ED Disposition  ED Disposition     ED Disposition   Decision to Admit    Condition   --    Comment   Level of Care: Telemetry [5]   Diagnosis: COPD with acute exacerbation [526752]   Admitting Physician: AMIRAH BRYANT [[id number]]   Isolate for COVID?: No [0]   Bed Request Comments: Acute exacerbation of COPD   Certification: I Certify That Inpatient Hospital Services Are Medically Necessary For Greater Than 2 Midnights               No follow-up provider specified.       Medication List      No changes were made to your prescriptions during this visit.         Labs Reviewed   COMPREHENSIVE METABOLIC PANEL - Abnormal; Notable for the following components:       Result Value    Glucose 184 (*)     CO2 31.6 (*)     All other components within normal limits    Narrative:     GFR Normal >60  Chronic Kidney Disease <60  Kidney Failure <15     SINGLE HSTROPONIN T - Abnormal; Notable for the following components:    HS Troponin T 26 (*)     All other components within normal limits     Narrative:     High Sensitive Troponin T Reference Range:  <10.0 ng/L- Negative Female for AMI  <15.0 ng/L- Negative Male for AMI  >=10 - Abnormal Female indicating possible myocardial injury.  >=15 - Abnormal Male indicating possible myocardial injury.   Clinicians would have to utilize clinical acumen, EKG, Troponin, and serial changes to determine if it is an Acute Myocardial Infarction or myocardial injury due to an underlying chronic condition.        CBC WITH AUTO DIFFERENTIAL - Abnormal; Notable for the following components:    .7 (*)     MCHC 30.8 (*)     All other components within normal limits   BNP (IN-HOUSE) - Normal    Narrative:     Among patients with dyspnea, NT-proBNP is highly sensitive for the detection of acute congestive heart failure. In addition NT-proBNP of <300 pg/ml effectively rules out acute congestive heart failure with 99% negative predictive value.    Results may be falsely decreased if patient taking Biotin.     STREP PNEUMO AG, URINE OR CSF   LEGIONELLA ANTIGEN, URINE   RESPIRATORY PANEL PCR W/ COVID-19 (SARS-COV-2) ZEUS/SHAW/FRANCIA/PAD/[facility] IN-HOUSE, NP SWAB IN UT/VTP, 3-4 HR TAT   RESPIRATORY CULTURE   RAINBOW DRAW    Narrative:     The following orders were created for panel order [place] Draw.  Procedure                               Abnormality         Status                     ---------                               -----------         ------                     Green Top (Gel)[[id number]]                                  Final result               Lavender Top[[id number]]                                     Final result               Gold Top - SST[[id number]]                                                              Light Blue Top[[id number]]                                   Final result                 Please view results for these tests on the individual orders.   PROCALCITONIN   HEMOGLOBIN A1C   TSH   POCT GLUCOSE FINGERSTICK   POCT GLUCOSE FINGERSTICK   POCT  GLUCOSE FINGERSTICK   POCT GLUCOSE FINGERSTICK   POCT GLUCOSE FINGERSTICK   POCT GLUCOSE FINGERSTICK   POCT GLUCOSE FINGERSTICK   POCT GLUCOSE FINGERSTICK   CBC AND DIFFERENTIAL    Narrative:     The following orders were created for panel order CBC & Differential.  Procedure                               Abnormality         Status                     ---------                               -----------         ------                     CBC Auto Differential[[id number]]        Abnormal            Final result                 Please view results for these tests on the individual orders.   GREEN TOP   LAVENDER TOP   LIGHT BLUE TOP     XR Chest 2 View   Final Result   1. FINDINGS of chronic obstructive lung disease. Linear atelectasis or   scarring again seen at lung bases. Appearance improved from 03/31/2023.   No acute infiltrate appreciated.       This report was finalized on 5/29/2023 8:35 PM by Dr. Deepthi Burks MD.                 Medication List      No changes were made to your prescriptions during this visit.              Gio Nix MD  05/29/23 1185

## 2023-05-30 LAB
B PARAPERT DNA SPEC QL NAA+PROBE: NOT DETECTED
B PERT DNA SPEC QL NAA+PROBE: NOT DETECTED
C PNEUM DNA NPH QL NAA+NON-PROBE: NOT DETECTED
FLUAV SUBTYP SPEC NAA+PROBE: NOT DETECTED
FLUBV RNA ISLT QL NAA+PROBE: NOT DETECTED
GLUCOSE BLDC GLUCOMTR-MCNC: 206 MG/DL (ref 70–130)
GLUCOSE BLDC GLUCOMTR-MCNC: 223 MG/DL (ref 70–130)
GLUCOSE BLDC GLUCOMTR-MCNC: 233 MG/DL (ref 70–130)
GLUCOSE BLDC GLUCOMTR-MCNC: 301 MG/DL (ref 70–130)
HADV DNA SPEC NAA+PROBE: NOT DETECTED
HCOV 229E RNA SPEC QL NAA+PROBE: NOT DETECTED
HCOV HKU1 RNA SPEC QL NAA+PROBE: NOT DETECTED
HCOV NL63 RNA SPEC QL NAA+PROBE: NOT DETECTED
HCOV OC43 RNA SPEC QL NAA+PROBE: NOT DETECTED
HMPV RNA NPH QL NAA+NON-PROBE: NOT DETECTED
HPIV1 RNA ISLT QL NAA+PROBE: NOT DETECTED
HPIV2 RNA SPEC QL NAA+PROBE: NOT DETECTED
HPIV3 RNA NPH QL NAA+PROBE: NOT DETECTED
HPIV4 P GENE NPH QL NAA+PROBE: NOT DETECTED
M PNEUMO IGG SER IA-ACNC: NOT DETECTED
QT INTERVAL: 329 MS
RHINOVIRUS RNA SPEC NAA+PROBE: NOT DETECTED
RSV RNA NPH QL NAA+NON-PROBE: NOT DETECTED
SARS-COV-2 RNA NPH QL NAA+NON-PROBE: NOT DETECTED

## 2023-05-30 PROCEDURE — 63710000001 INSULIN ASPART PER 5 UNITS: Performed by: HOSPITALIST

## 2023-05-30 PROCEDURE — 87205 SMEAR GRAM STAIN: CPT | Performed by: NURSE PRACTITIONER

## 2023-05-30 PROCEDURE — 63710000001 INSULIN DETEMIR PER 5 UNITS: Performed by: NURSE PRACTITIONER

## 2023-05-30 PROCEDURE — 94799 UNLISTED PULMONARY SVC/PX: CPT

## 2023-05-30 PROCEDURE — 25010000002 METHYLPREDNISOLONE PER 40 MG: Performed by: NURSE PRACTITIONER

## 2023-05-30 PROCEDURE — 63710000001 MYCOPHENOLATE MOFETIL PER 250 MG: Performed by: NURSE PRACTITIONER

## 2023-05-30 PROCEDURE — 82948 REAGENT STRIP/BLOOD GLUCOSE: CPT

## 2023-05-30 PROCEDURE — 63710000001 INSULIN ASPART PER 5 UNITS: Performed by: NURSE PRACTITIONER

## 2023-05-30 PROCEDURE — 25010000002 ENOXAPARIN PER 10 MG: Performed by: NURSE PRACTITIONER

## 2023-05-30 PROCEDURE — 25010000002 METHYLPREDNISOLONE PER 125 MG: Performed by: HOSPITALIST

## 2023-05-30 PROCEDURE — 94664 DEMO&/EVAL PT USE INHALER: CPT

## 2023-05-30 PROCEDURE — 63710000001 INSULIN DETEMIR PER 5 UNITS: Performed by: HOSPITALIST

## 2023-05-30 PROCEDURE — 94761 N-INVAS EAR/PLS OXIMETRY MLT: CPT

## 2023-05-30 RX ORDER — METHYLPREDNISOLONE SODIUM SUCCINATE 125 MG/2ML
60 INJECTION, POWDER, LYOPHILIZED, FOR SOLUTION INTRAMUSCULAR; INTRAVENOUS EVERY 6 HOURS
Status: DISCONTINUED | OUTPATIENT
Start: 2023-05-30 | End: 2023-06-01 | Stop reason: HOSPADM

## 2023-05-30 RX ORDER — NICOTINE POLACRILEX 4 MG
15 LOZENGE BUCCAL
Status: DISCONTINUED | OUTPATIENT
Start: 2023-05-30 | End: 2023-06-01 | Stop reason: HOSPADM

## 2023-05-30 RX ORDER — DEXTROSE MONOHYDRATE 25 G/50ML
10-50 INJECTION, SOLUTION INTRAVENOUS
Status: DISCONTINUED | OUTPATIENT
Start: 2023-05-30 | End: 2023-06-01 | Stop reason: HOSPADM

## 2023-05-30 RX ORDER — INSULIN ASPART 100 [IU]/ML
1-200 INJECTION, SOLUTION INTRAVENOUS; SUBCUTANEOUS
Status: DISCONTINUED | OUTPATIENT
Start: 2023-05-30 | End: 2023-06-01 | Stop reason: HOSPADM

## 2023-05-30 RX ORDER — GLYCOPYRROLATE AND FORMOTEROL FUMARATE 9; 4.8 UG/1; UG/1
2 AEROSOL, METERED RESPIRATORY (INHALATION) 2 TIMES DAILY
COMMUNITY
Start: 2023-05-03

## 2023-05-30 RX ORDER — INSULIN ASPART 100 [IU]/ML
1-200 INJECTION, SOLUTION INTRAVENOUS; SUBCUTANEOUS AS NEEDED
Status: DISCONTINUED | OUTPATIENT
Start: 2023-05-30 | End: 2023-06-01 | Stop reason: HOSPADM

## 2023-05-30 RX ORDER — ENOXAPARIN SODIUM 100 MG/ML
40 INJECTION SUBCUTANEOUS EVERY 24 HOURS
Status: DISCONTINUED | OUTPATIENT
Start: 2023-05-30 | End: 2023-06-01 | Stop reason: HOSPADM

## 2023-05-30 RX ADMIN — INSULIN ASPART 12 UNITS: 100 INJECTION, SOLUTION INTRAVENOUS; SUBCUTANEOUS at 18:14

## 2023-05-30 RX ADMIN — HYDROCODONE BITARTRATE AND ACETAMINOPHEN 1 TABLET: 10; 325 TABLET ORAL at 18:29

## 2023-05-30 RX ADMIN — METHYLPREDNISOLONE SODIUM SUCCINATE 60 MG: 125 INJECTION INTRAMUSCULAR; INTRAVENOUS at 21:04

## 2023-05-30 RX ADMIN — METHYLPREDNISOLONE SODIUM SUCCINATE 60 MG: 125 INJECTION INTRAMUSCULAR; INTRAVENOUS at 15:34

## 2023-05-30 RX ADMIN — NICOTINE 1 PATCH: 21 PATCH, EXTENDED RELEASE TRANSDERMAL at 23:40

## 2023-05-30 RX ADMIN — FAMOTIDINE 40 MG: 20 TABLET ORAL at 06:53

## 2023-05-30 RX ADMIN — GUAIFENESIN 1200 MG: 600 TABLET, EXTENDED RELEASE ORAL at 21:07

## 2023-05-30 RX ADMIN — ENOXAPARIN SODIUM 40 MG: 40 INJECTION SUBCUTANEOUS at 10:30

## 2023-05-30 RX ADMIN — INSULIN ASPART 14 UNITS: 100 INJECTION, SOLUTION INTRAVENOUS; SUBCUTANEOUS at 12:46

## 2023-05-30 RX ADMIN — Medication 10 ML: at 21:09

## 2023-05-30 RX ADMIN — DOCUSATE SODIUM 50 MG AND SENNOSIDES 8.6 MG 2 TABLET: 8.6; 5 TABLET, FILM COATED ORAL at 08:51

## 2023-05-30 RX ADMIN — PYRIDOSTIGMINE BROMIDE 60 MG: 60 TABLET ORAL at 21:07

## 2023-05-30 RX ADMIN — PYRIDOSTIGMINE BROMIDE 60 MG: 60 TABLET ORAL at 10:30

## 2023-05-30 RX ADMIN — PYRIDOSTIGMINE BROMIDE 60 MG: 60 TABLET ORAL at 15:34

## 2023-05-30 RX ADMIN — INSULIN DETEMIR 31 UNITS: 100 INJECTION, SOLUTION SUBCUTANEOUS at 21:02

## 2023-05-30 RX ADMIN — INSULIN ASPART 2 UNITS: 100 INJECTION, SOLUTION INTRAVENOUS; SUBCUTANEOUS at 21:02

## 2023-05-30 RX ADMIN — IPRATROPIUM BROMIDE AND ALBUTEROL SULFATE 3 ML: .5; 3 SOLUTION RESPIRATORY (INHALATION) at 15:22

## 2023-05-30 RX ADMIN — HYDROCODONE BITARTRATE AND ACETAMINOPHEN 1 TABLET: 10; 325 TABLET ORAL at 06:53

## 2023-05-30 RX ADMIN — IPRATROPIUM BROMIDE AND ALBUTEROL SULFATE 3 ML: .5; 3 SOLUTION RESPIRATORY (INHALATION) at 11:19

## 2023-05-30 RX ADMIN — METHYLPREDNISOLONE SODIUM SUCCINATE 40 MG: 40 INJECTION, POWDER, FOR SOLUTION INTRAMUSCULAR; INTRAVENOUS at 08:52

## 2023-05-30 RX ADMIN — PYRIDOSTIGMINE BROMIDE 60 MG: 60 TABLET ORAL at 06:53

## 2023-05-30 RX ADMIN — INSULIN ASPART 3 UNITS: 100 INJECTION, SOLUTION INTRAVENOUS; SUBCUTANEOUS at 08:52

## 2023-05-30 RX ADMIN — MYCOPHENOLATE MOFETIL 1000 MG: 250 CAPSULE ORAL at 08:51

## 2023-05-30 RX ADMIN — ASPIRIN 81 MG CHEWABLE TABLET 81 MG: 81 TABLET CHEWABLE at 08:51

## 2023-05-30 RX ADMIN — MONTELUKAST SODIUM 10 MG: 10 TABLET, COATED ORAL at 21:07

## 2023-05-30 RX ADMIN — GUAIFENESIN 1200 MG: 600 TABLET, EXTENDED RELEASE ORAL at 08:51

## 2023-05-30 RX ADMIN — IPRATROPIUM BROMIDE AND ALBUTEROL SULFATE 3 ML: .5; 3 SOLUTION RESPIRATORY (INHALATION) at 07:30

## 2023-05-30 RX ADMIN — ATORVASTATIN CALCIUM 10 MG: 10 TABLET, FILM COATED ORAL at 08:51

## 2023-05-30 RX ADMIN — Medication 10 ML: at 08:51

## 2023-05-30 RX ADMIN — MYCOPHENOLATE MOFETIL 1000 MG: 250 CAPSULE ORAL at 21:07

## 2023-05-30 RX ADMIN — IPRATROPIUM BROMIDE AND ALBUTEROL SULFATE 3 ML: .5; 3 SOLUTION RESPIRATORY (INHALATION) at 19:30

## 2023-05-30 RX ADMIN — DOCUSATE SODIUM 50 MG AND SENNOSIDES 8.6 MG 2 TABLET: 8.6; 5 TABLET, FILM COATED ORAL at 21:07

## 2023-05-30 RX ADMIN — FAMOTIDINE 40 MG: 20 TABLET ORAL at 15:34

## 2023-05-30 RX ADMIN — TERAZOSIN 2 MG: 1 CAPSULE ORAL at 21:07

## 2023-05-30 RX ADMIN — INSULIN DETEMIR 45 UNITS: 100 INJECTION, SOLUTION SUBCUTANEOUS at 08:52

## 2023-05-30 NOTE — PLAN OF CARE
Goal Outcome Evaluation:           Progress: improving  Outcome Evaluation: vss, O2 needs monitored. no complaints of pain. solumedrol continued. blood sugar monitored

## 2023-05-30 NOTE — H&P
[facility] HOSPITALIST     Ney Kathleen MD    CHIEF COMPLAINT: SOA    HISTORY OF PRESENT ILLNESS:  The patient is a 72-year-old male, known to hospitalist service from previous admissions for similar concerns, who presented to the emergency department secondary to 1 week of increasing shortness of air.  He notes that Dr. Kathleen gave him antibiotic and steroid pack approximately 1 week ago, ran out yesterday and then again became more short of breath.  He notes that he has been wheezing and lots of coughing that is semiproductive.  He typically wears 2 L of oxygen at his baseline and notes that he increased it to 3 L last night without much improvement.  He continues to smoke cigarettes. He is never able to sleep laying flat.  At time of my exam, he is requesting food and coffee.    In ER labs were generally unremarkable, CXR showed improvement from 3/31/2023.  He was given albuterol and DuoNeb, Solu-Medrol and admission was requested.    He has a h/o oxygen dependent COPD, DM2 in obese with hyperglycemia, myasthenia gravis, chronic pain with chronic narcotic induced constipation, HTN, GERD, hyperlipidemia, BPH, tobacco abuse    He otherwise denies f/c/headache/rhinorrhea/nasal congestion/lightheadedness/syncopal sensation/n/v/d/chest pain/abdominal pain/sick exposures/change in bowel or bladder habits/no weight change/bloody emesis or bloody stools/change in medications or any other new concerns.    Past Medical History:   Diagnosis Date   • Arthritis of back    • Arthritis of neck    • Asthma    • COPD (chronic obstructive pulmonary disease)    • Depression    • DM (diabetes mellitus)    • GERD (gastroesophageal reflux disease)    • Hip arthrosis    • Hyperlipidemia    • Hypertension    • Knee swelling    • Myasthenia gravis    • Periarthritis of shoulder    • Rotator cuff syndrome      Past Surgical History:   Procedure Laterality Date   • APPENDECTOMY     • CHOLECYSTECTOMY     • ROTATOR CUFF  REPAIR Right      Family History   Problem Relation Age of Onset   • Diabetes Mother    • COPD Father      Social History     Tobacco Use   • Smoking status: Every Day     Packs/day: 0.50     Years: 58.00     Pack years: 29.00     Types: Cigarettes   • Smokeless tobacco: Never   • Tobacco comments:     pt asking for nicotine patch   Vaping Use   • Vaping Use: Never used   Substance Use Topics   • Alcohol use: No   • Drug use: No     Medications Prior to Admission   Medication Sig Dispense Refill Last Dose   • albuterol (PROVENTIL) (2.5 MG/3ML) 0.083% nebulizer solution Take 2.5 mg by nebulization Every 4 (Four) Hours As Needed for Wheezing. 30 each 0    • albuterol sulfate  (90 Base) MCG/ACT inhaler Inhale 2 puffs Every 4 (Four) Hours As Needed for Shortness of Air or Wheezing.      • aspirin 81 MG chewable tablet Chew 1 tablet Daily.      • JEWELL CONTOUR TEST test strip   1    • cetirizine (zyrTEC) 10 MG tablet Take 1 tablet by mouth Daily.      • dicyclomine (BENTYL) 20 MG tablet Take 1 tablet by mouth 4 (Four) Times a Day.      • docusate sodium (COLACE) 100 MG capsule Take 2 capsules by mouth Daily As Needed for Constipation.      • doxazosin (CARDURA) 2 MG tablet Take 1 tablet by mouth Daily.      • EPINEPHrine, Anaphylaxis, (ADRENALIN) 1 MG/ML injection Inject 0.3 mg into the appropriate muscle as directed by prescriber 1 (One) Time As Needed for Anaphylaxis.      • Fluticasone-Umeclidin-Vilant (Trelegy Ellipta) 100-62.5-25 MCG/INH inhaler Inhale 1 puff Daily.      • glipiZIDE-metFORMIN (METAGLIP) 5-500 MG per tablet Take 2 tablets by mouth 2 (Two) Times a Day Before Meals.      • HYDROcodone-acetaminophen (NORCO)  MG per tablet Take 1 tablet by mouth Every 6 (Six) Hours As Needed for Mild Pain or Moderate Pain.  0    • insulin glargine (LANTUS, SEMGLEE) 100 UNIT/ML injection Inject 45 Units under the skin into the appropriate area as directed Every Morning. Every morning      • insulin glargine  "(LANTUS, SEMGLEE) 100 UNIT/ML injection Inject 35 Units under the skin into the appropriate area as directed Every Night.      • Insulin Pen Needle (BD Pen Needle Yaritza U/F) 32G X 4 MM misc USE 1 NEEDLE SUBCUTANEOUSLY QD      • melatonin 5 MG tablet tablet Take 2 tablets by mouth At Night As Needed.      • methylPREDNISolone (Medrol) 4 MG dose pack follow package directions 21 tablet 0    • montelukast (SINGULAIR) 10 MG tablet Take 1 tablet by mouth Every Night.      • mycophenolate (CELLCEPT) 500 MG tablet Take 2 tablets by mouth 2 (Two) Times a Day.      • nicotine (NICODERM CQ) 14 MG/24HR patch Place 1 patch on the skin as directed by provider Daily. 21 each 0    • O2 (OXYGEN) Inhale 2 L/min 1 (One) Time. Oxygen 2-3 L      • pioglitazone (ACTOS) 45 MG tablet Take 1 tablet by mouth Daily.      • pyridostigmine (MESTINON) 60 MG tablet Take 1 tablet by mouth Every 4 (Four) Hours.      • SIMVASTATIN PO Take 40 mg by mouth Every Evening.        Allergies:  Patient has no known allergies.    Immunization History   Administered Date(s) Administered   • COVID-19 (PFIZER) Purple Cap Monovalent 04/06/2021, 04/27/2021, 10/27/2021   • FLUAD TRI 65YR+ 09/25/2018   • Flu Vaccine Quad PF >36MO 08/03/2017   • Fluzone High Dose =>65 Years ([employer] ONLY) 10/24/2019   • Pneumococcal Conjugate 13-Valent (PCV13) 09/25/2018       REVIEW OF SYSTEMS:  Please see the above history of present illness for pertinent positives and negatives.  The remainder of the patient's systems have been reviewed and are negative.     Vital Signs  Temp:  [97.6 °F (36.4 °C)-99.1 °F (37.3 °C)] 97.6 °F (36.4 °C)  Heart Rate:  [104-116] 115  Resp:  [24-34] 32  BP: (126-136)/(72-84) 126/75   Body mass index is 34.79 kg/m².    Flowsheet Rows    Flowsheet Row First Filed Value   Admission Height 188 cm (74\") Documented at 05/29/2023 1917   Admission Weight 125 kg (276 lb) Documented at 05/29/2023 1917           Physical Exam  Vitals reviewed.   Constitutional:  "      General: He is not in acute distress.     Appearance: He is obese.      Comments: Chronically ill appearance   HENT:      Head: Normocephalic and atraumatic.      Mouth/Throat:      Mouth: Mucous membranes are moist.      Comments: Edentulous  Eyes:      Extraocular Movements: Extraocular movements intact.      Pupils: Pupils are equal, round, and reactive to light.   Cardiovascular:      Rate and Rhythm: Regular rhythm. Tachycardia present.   Pulmonary:      Comments: Prolonged exhalation, diminished all fields, crackles right lower lobe greater than left lower lobe, scattered rhonchi  Abdominal:      General: Abdomen is flat. Bowel sounds are normal. There is no distension.      Palpations: Abdomen is soft.      Tenderness: There is no abdominal tenderness. There is no guarding.   Musculoskeletal:         General: Swelling present.      Comments: 1+ bilateral lower extremity pitting edema   Skin:     General: Skin is warm and dry.      Capillary Refill: Capillary refill takes less than 2 seconds.      Findings: No erythema.   Neurological:      General: No focal deficit present.      Mental Status: He is alert and oriented to person, place, and time.   Psychiatric:         Mood and Affect: Mood normal.         Behavior: Behavior normal.       Emotional Behavior:    Judgment and Insight: Poor   Mental Status:  Alertness alert   Memory: Fair   Mood and Affect:         Depression none               Anxiety none    Debilities:   Physical Weakness unable to determine   Handicaps unknown   Disabilities unknown   Agitation none     Results Review:    I reviewed the patient's new clinical results.  Lab Results (most recent)     Procedure Component Value Units Date/Time    [place] Draw [[id number]] Collected: 05/29/23 1925    Specimen: Blood Updated: 05/29/23 2030    Narrative:      The following orders were created for panel order [place] Draw.  Procedure                               Abnormality         Status                      ---------                               -----------         ------                     Green Top (Gel)[[id number]]                                  Final result               Lavender Top[[id number]]                                     Final result               Gold Top - SST[[id number]]                                                              Light Blue Top[[id number]]                                   Final result                 Please view results for these tests on the individual orders.    Green Top (Gel) [[id number]] Collected: 05/29/23 1925    Specimen: Blood Updated: 05/29/23 2030     Extra Tube Hold for add-ons.     Comment: Auto resulted.       Lavender Top [[id number]] Collected: 05/29/23 1925    Specimen: Blood Updated: 05/29/23 2030     Extra Tube hold for add-on     Comment: Auto resulted       Light Blue Top [[id number]] Collected: 05/29/23 1925    Specimen: Blood Updated: 05/29/23 2030     Extra Tube Hold for add-ons.     Comment: Auto resulted       BNP [[id number]]  (Normal) Collected: 05/29/23 1925    Specimen: Blood Updated: 05/29/23 2029     proBNP <36.0 pg/mL     Narrative:      Among patients with dyspnea, NT-proBNP is highly sensitive for the detection of acute congestive heart failure. In addition NT-proBNP of <300 pg/ml effectively rules out acute congestive heart failure with 99% negative predictive value.    Results may be falsely decreased if patient taking Biotin.      Single High Sensitivity Troponin T [[id number]]  (Abnormal) Collected: 05/29/23 1925    Specimen: Blood Updated: 05/29/23 1949     HS Troponin T 26 ng/L     Narrative:      High Sensitive Troponin T Reference Range:  <10.0 ng/L- Negative Female for AMI  <15.0 ng/L- Negative Male for AMI  >=10 - Abnormal Female indicating possible myocardial injury.  >=15 - Abnormal Male indicating possible myocardial injury.   Clinicians would have to utilize clinical acumen, EKG, Troponin, and serial changes to determine if it  is an Acute Myocardial Infarction or myocardial injury due to an underlying chronic condition.         Comprehensive Metabolic Panel [[id number]]  (Abnormal) Collected: 05/29/23 1925    Specimen: Blood Updated: 05/29/23 1947     Glucose 184 mg/dL      BUN 16 mg/dL      Creatinine 0.81 mg/dL      Sodium 139 mmol/L      Potassium 4.4 mmol/L      Chloride 98 mmol/L      CO2 31.6 mmol/L      Calcium 9.7 mg/dL      Total Protein 6.7 g/dL      Albumin 4.3 g/dL      ALT (SGPT) 16 U/L      AST (SGOT) 14 U/L      Alkaline Phosphatase 48 U/L      Total Bilirubin <0.2 mg/dL      Globulin 2.4 gm/dL      A/G Ratio 1.8 g/dL      BUN/Creatinine Ratio 19.8     Anion Gap 9.4 mmol/L      eGFR 94.8 mL/min/1.73     Narrative:      GFR Normal >60  Chronic Kidney Disease <60  Kidney Failure <15      CBC & Differential [[id number]]  (Abnormal) Collected: 05/29/23 1925    Specimen: Blood Updated: 05/29/23 1933    Narrative:      The following orders were created for panel order CBC & Differential.  Procedure                               Abnormality         Status                     ---------                               -----------         ------                     CBC Auto Differential[[id number]]        Abnormal            Final result                 Please view results for these tests on the individual orders.    CBC Auto Differential [[id number]]  (Abnormal) Collected: 05/29/23 1925    Specimen: Blood Updated: 05/29/23 1933     WBC 8.20 10*3/mm3      RBC 4.42 10*6/mm3      Hemoglobin 13.7 g/dL      Hematocrit 44.5 %      .7 fL      MCH 31.0 pg      MCHC 30.8 g/dL      RDW 13.3 %      RDW-SD 49.5 fl      MPV 11.8 fL      Platelets 191 10*3/mm3      Neutrophil % 66.3 %      Lymphocyte % 22.3 %      Monocyte % 7.6 %      Eosinophil % 2.3 %      Basophil % 1.0 %      Immature Grans % 0.5 %      Neutrophils, Absolute 5.44 10*3/mm3      Lymphocytes, Absolute 1.83 10*3/mm3      Monocytes, Absolute 0.62 10*3/mm3      Eosinophils,  Absolute 0.19 10*3/mm3      Basophils, Absolute 0.08 10*3/mm3      Immature Grans, Absolute 0.04 10*3/mm3      nRBC 0.0 /100 WBC           Imaging Results (Most Recent)     Procedure Component Value Units Date/Time    XR Chest 2 View [[id number]] Collected: 05/29/23 2033     Updated: 05/29/23 2038    Narrative:      CHEST X-RAY PA LATERAL         HISTORY:  Short of air this evening with history of COPD asthma hypertension and  diabetes.     TECHNIQUE:  PA and lateral views the chest reviewed. 3 films submitted. COMPARISON  chest x-ray 03/31/2023.     FINDINGS:  There is no pleural effusion. No pneumothorax. Heart size is top normal.  There is some linear atelectasis or scarring at lung bases that is  improved from the previous study. No acute parenchymal infiltrate or  acute congestive heart failure. The lungs are hyperinflated consistent  with chronic obstructive lung disease.       Impression:      1. FINDINGS of chronic obstructive lung disease. Linear atelectasis or  scarring again seen at lung bases. Appearance improved from 03/31/2023.  No acute infiltrate appreciated.     This report was finalized on 5/29/2023 8:35 PM by Dr. Deepthi Burks MD.           reviewed    ECG/EMG Results (most recent)     Procedure Component Value Units Date/Time    ECG 12 Lead ED Triage Standing Order; SOA [[id number]] Collected: 05/29/23 1946     Updated: 05/29/23 1948     QT Interval 329 ms     Narrative:      HEART RATE= 105  bpm  RR Interval= 572  ms  CA Interval= 159  ms  P Horizontal Axis= 1  deg  P Front Axis= 51  deg  QRSD Interval= 95  ms  QT Interval= 329  ms  QRS Axis= 51  deg  T Wave Axis= 41  deg  - OTHERWISE NORMAL ECG -  Sinus tachycardia  Electronically Signed By:   Date and Time of Study: 2023-05-29 19:46:59        reviewed    Assessment & Plan   A/C HRF secondary to AE chronic oxygen dependent COPD:   sats documented at 75% on RA, 87% on 2 L  Check RVP, sputum culture, procalcitonin, strep pneumo, Legionella  Add  "DuoNebs, solumedrol, Mucinex, Acapella, I-S  Wean oxygen as tolerated  Monitor     DM 2 in obese with hyperglycemia:   Body mass index is 34.79 kg/m².   Check A1c/TSH  Add home insulin regimen (reports he took his lantus tonight pta)  Hold oral agents until verified  Add Accu-Cheks AC/at bedtime and low dose SSI    GERD: no acute issues add pepcid    Myasthenia gravis: Continue home CellCept and Mestinon    HLD: add home simvastatin    Tobacco abuse: add nicotine patch    BPH: add home cardura     Chronic pain with chronic narcotic induced constipation:   BRIAN reviewed, hydrodocone 10 mg x 4 per day last rx 5/2023    Med list could not be verified tonight and patient unaware of medications other than hydrocodone.     I discussed the patient's findings and my recommendations with patient and staff.     Archana Yang, APRN  05/29/23  22:23 EDT    \"Dictated utilizing Dragon dictation\"    Note disclaimer: At [facility], we believe that sharing information builds trust and better relationships. You are receiving this note because you recently visited [facility]. It is possible you will see health information before a provider has talked with you about it. This kind of information can be easy to misunderstand. To help you fully understand what it means for your health, we urge you to discuss this note with your provider.        "

## 2023-05-30 NOTE — PLAN OF CARE
Goal Outcome Evaluation:  Plan of Care Reviewed With: patient        Progress: no change  Outcome Evaluation: oxygen 3 liters increased to 5 liters, IV solumedrol, norco given for chronic generalized pain, VSS

## 2023-05-30 NOTE — PROGRESS NOTES
"Pharmacy Consult - Lovenox    [facility] Armstrong has been consulted for pharmacy to dose enoxaparin for VTE prophylaxis per Rubi MACKEY's request.     Relevant clinical data and objective history reviewed:  72 y.o. male 188 cm (74\") 123 kg (271 lb)    Past Medical History:   Diagnosis Date    Arthritis of back     Arthritis of neck     Asthma     COPD (chronic obstructive pulmonary disease)     Depression     DM (diabetes mellitus)     GERD (gastroesophageal reflux disease)     Hip arthrosis     Hyperlipidemia     Hypertension     Knee swelling     Myasthenia gravis     Periarthritis of shoulder     Rotator cuff syndrome      Patient has No Known Allergies.    Lab Results   Component Value Date    WBC 8.20 05/29/2023    HGB 13.7 05/29/2023    HCT 44.5 05/29/2023    .7 (H) 05/29/2023     05/29/2023     Lab Results   Component Value Date    GLUCOSE 184 (H) 05/29/2023    CALCIUM 9.7 05/29/2023     05/29/2023    K 4.4 05/29/2023    CO2 31.6 (H) 05/29/2023    CL 98 05/29/2023    BUN 16 05/29/2023    CREATININE 0.81 05/29/2023    EGFRIFNONA 107 07/14/2021    BCR 19.8 05/29/2023    ANIONGAP 9.4 05/29/2023       Estimated Creatinine Clearance: 118.2 mL/min (by C-G formula based on SCr of 0.81 mg/dL).    Assessment/Plan    1) Will start patient on Lovenox 40 mg subcutaneously every 12 hours.     2) Will monitor patient's renal function every 24 hours, platelets at least every 3 days, and adjust as needed.     Thank you-     Adrian Barrera, PharmD   "

## 2023-05-30 NOTE — PROGRESS NOTES
"Hospitalist Team      Patient Care Team:  Ney Kathleen MD as PCP - General (Family Medicine)  Chris Driver MD as Consulting Physician (Pulmonary Disease)      Chief Complaint:  Follow-up Acute-on-Chronic Hypoxic Respiratory Failure    Subjective    Mr. Armstrong reports he feels a little better this morning but becomes very dyspneic w/ any activity.  He denies chest pain.  He is tolerating PO.    Objective    Vital Signs  Temp:  [97.5 °F (36.4 °C)-99.1 °F (37.3 °C)] 97.5 °F (36.4 °C)  Heart Rate:  [104-116] 109  Resp:  [20-34] 20  BP: (123-137)/(52-84) 137/52  Oxygen Therapy  SpO2: 93 %  Pulse Oximetry Type: Continuous  Device (Oxygen Therapy): nasal cannula  Device (Oxygen Therapy) (Infant): room air  Flow (L/min): 5}    Flowsheet Rows    Flowsheet Row First Filed Value   Admission Height 188 cm (74\") Documented at 05/29/2023 1917   Admission Weight 125 kg (276 lb) Documented at 05/29/2023 1917          Physical Exam:    General: Appears in no acute distress.  Lungs: Diminished breath sounds w/ scattered expiratory wheeze.  Respirations are non-labored.  CV: Regular rate and rhythm.  No murmurs appreciated.  Radial pulses are 2+ and symmetric.  Abdomen: Obese, soft, and non-tender w/ active bowel sounds.  MSK: No C/C/E.  Neuro: CN II-XII grossly intact.  Psych: Normal affect.  AAOx3.    Results Review:     I reviewed the patient's new clinical results.    Lab Results (last 24 hours)     Procedure Component Value Units Date/Time    POC Glucose Once [[id number]]  (Abnormal) Collected: 05/30/23 0759    Specimen: Blood Updated: 05/30/23 0805     Glucose 206 mg/dL      Comment: Meter: [id number] : [id number] Crystal JONAS       Respiratory Culture - Sputum, Cough [[id number]] Collected: 05/30/23 0627    Specimen: Sputum from Cough Updated: 05/30/23 0630    S. Pneumo Ag Urine or CSF - Urine, Urine, Clean Catch [[id number]]  (Normal) Collected: 05/29/23 2302    Specimen: Urine, Clean Catch Updated: " "05/29/23 2328     Strep Pneumo Ag Negative    Legionella Antigen, Urine - Urine, Urine, Clean Catch [[id number]]  (Normal) Collected: 05/29/23 2302    Specimen: Urine, Clean Catch Updated: 05/29/23 2328     LEGIONELLA ANTIGEN, URINE Negative    Procalcitonin [[id number]]  (Normal) Collected: 05/29/23 1925    Specimen: Blood Updated: 05/29/23 2258     Procalcitonin 0.07 ng/mL     Narrative:      As a Marker for Sepsis (Non-Neonates):    1. <0.5 ng/mL represents a low risk of severe sepsis and/or septic shock.  2. >2 ng/mL represents a high risk of severe sepsis and/or septic shock.    As a Marker for Lower Respiratory Tract Infections that require antibiotic therapy:    PCT on Admission    Antibiotic Therapy       6-12 Hrs later    >0.5                Strongly Recommended  >0.25 - <0.5        Recommended   0.1 - 0.25          Discouraged              Remeasure/reassess PCT  <0.1                Strongly Discouraged     Remeasure/reassess PCT    As 28 day mortality risk marker: \"Change in Procalcitonin Result\" (>80% or <=80%) if Day 0 (or Day 1) and Day 4 values are available. Refer to http://www.[employer]s-pct-calculator.com    Change in PCT <=80%  A decrease of PCT levels below or equal to 80% defines a positive change in PCT test result representing a higher risk for 28-day all-cause mortality of patients diagnosed with severe sepsis for septic shock.    Change in PCT >80%  A decrease of PCT levels of more than 80% defines a negative change in PCT result representing a lower risk for 28-day all-cause mortality of patients diagnosed with severe sepsis or septic shock.       TSH [[id number]]  (Normal) Collected: 05/29/23 1925    Specimen: Blood Updated: 05/29/23 2258     TSH 1.050 uIU/mL     Hemoglobin A1c [[id number]]  (Abnormal) Collected: 05/29/23 1925    Specimen: Blood Updated: 05/29/23 2252     Hemoglobin A1C 7.80 %     Narrative:      Hemoglobin A1C Ranges:    Increased Risk for Diabetes  5.7% to 6.4%  Diabetes         "             >= 6.5%  Diabetic Goal                < 7.0%    Respiratory Panel PCR w/COVID-19(SARS-CoV-2) ZEUS/SHAW/FRANCIA/PAD/COR/MAD/GIL In-House, NP Swab in UTM/VTM, 3-4 HR TAT - Swab, Nasopharynx [[id number]] Collected: 05/29/23 2247    Specimen: Swab from Nasopharynx Updated: 05/29/23 2249    [place] Draw [[id number]] Collected: 05/29/23 1925    Specimen: Blood Updated: 05/29/23 2030    Narrative:      The following orders were created for panel order [place] Draw.  Procedure                               Abnormality         Status                     ---------                               -----------         ------                     Green Top (Gel)[[id number]]                                  Final result               Lavender Top[[id number]]                                     Final result               Gold Top - SST[[id number]]                                                              Light Blue Top[[id number]]                                   Final result                 Please view results for these tests on the individual orders.    Green Top (Gel) [[id number]] Collected: 05/29/23 1925    Specimen: Blood Updated: 05/29/23 2030     Extra Tube Hold for add-ons.     Comment: Auto resulted.       Lavender Top [[id number]] Collected: 05/29/23 1925    Specimen: Blood Updated: 05/29/23 2030     Extra Tube hold for add-on     Comment: Auto resulted       Light Blue Top [[id number]] Collected: 05/29/23 1925    Specimen: Blood Updated: 05/29/23 2030     Extra Tube Hold for add-ons.     Comment: Auto resulted       BNP [[id number]]  (Normal) Collected: 05/29/23 1925    Specimen: Blood Updated: 05/29/23 2029     proBNP <36.0 pg/mL     Narrative:      Among patients with dyspnea, NT-proBNP is highly sensitive for the detection of acute congestive heart failure. In addition NT-proBNP of <300 pg/ml effectively rules out acute congestive heart failure with 99% negative predictive value.    Results may be falsely decreased if  patient taking Biotin.      Single High Sensitivity Troponin T [[id number]]  (Abnormal) Collected: 05/29/23 1925    Specimen: Blood Updated: 05/29/23 1949     HS Troponin T 26 ng/L     Narrative:      High Sensitive Troponin T Reference Range:  <10.0 ng/L- Negative Female for AMI  <15.0 ng/L- Negative Male for AMI  >=10 - Abnormal Female indicating possible myocardial injury.  >=15 - Abnormal Male indicating possible myocardial injury.   Clinicians would have to utilize clinical acumen, EKG, Troponin, and serial changes to determine if it is an Acute Myocardial Infarction or myocardial injury due to an underlying chronic condition.         Comprehensive Metabolic Panel [[id number]]  (Abnormal) Collected: 05/29/23 1925    Specimen: Blood Updated: 05/29/23 1947     Glucose 184 mg/dL      BUN 16 mg/dL      Creatinine 0.81 mg/dL      Sodium 139 mmol/L      Potassium 4.4 mmol/L      Chloride 98 mmol/L      CO2 31.6 mmol/L      Calcium 9.7 mg/dL      Total Protein 6.7 g/dL      Albumin 4.3 g/dL      ALT (SGPT) 16 U/L      AST (SGOT) 14 U/L      Alkaline Phosphatase 48 U/L      Total Bilirubin <0.2 mg/dL      Globulin 2.4 gm/dL      A/G Ratio 1.8 g/dL      BUN/Creatinine Ratio 19.8     Anion Gap 9.4 mmol/L      eGFR 94.8 mL/min/1.73     Narrative:      GFR Normal >60  Chronic Kidney Disease <60  Kidney Failure <15      CBC & Differential [[id number]]  (Abnormal) Collected: 05/29/23 1925    Specimen: Blood Updated: 05/29/23 1933    Narrative:      The following orders were created for panel order CBC & Differential.  Procedure                               Abnormality         Status                     ---------                               -----------         ------                     CBC Auto Differential[[id number]]        Abnormal            Final result                 Please view results for these tests on the individual orders.    CBC Auto Differential [[id number]]  (Abnormal) Collected: 05/29/23 1925    Specimen: Blood  Updated: 05/29/23 1933     WBC 8.20 10*3/mm3      RBC 4.42 10*6/mm3      Hemoglobin 13.7 g/dL      Hematocrit 44.5 %      .7 fL      MCH 31.0 pg      MCHC 30.8 g/dL      RDW 13.3 %      RDW-SD 49.5 fl      MPV 11.8 fL      Platelets 191 10*3/mm3      Neutrophil % 66.3 %      Lymphocyte % 22.3 %      Monocyte % 7.6 %      Eosinophil % 2.3 %      Basophil % 1.0 %      Immature Grans % 0.5 %      Neutrophils, Absolute 5.44 10*3/mm3      Lymphocytes, Absolute 1.83 10*3/mm3      Monocytes, Absolute 0.62 10*3/mm3      Eosinophils, Absolute 0.19 10*3/mm3      Basophils, Absolute 0.08 10*3/mm3      Immature Grans, Absolute 0.04 10*3/mm3      nRBC 0.0 /100 WBC           Imaging Results (Last 24 Hours)     Procedure Component Value Units Date/Time    XR Chest 2 View [[id number]] Collected: 05/29/23 2033     Updated: 05/29/23 2038    Narrative:      CHEST X-RAY PA LATERAL         HISTORY:  Short of air this evening with history of COPD asthma hypertension and  diabetes.     TECHNIQUE:  PA and lateral views the chest reviewed. 3 films submitted. COMPARISON  chest x-ray 03/31/2023.     FINDINGS:  There is no pleural effusion. No pneumothorax. Heart size is top normal.  There is some linear atelectasis or scarring at lung bases that is  improved from the previous study. No acute parenchymal infiltrate or  acute congestive heart failure. The lungs are hyperinflated consistent  with chronic obstructive lung disease.       Impression:      1. FINDINGS of chronic obstructive lung disease. Linear atelectasis or  scarring again seen at lung bases. Appearance improved from 03/31/2023.  No acute infiltrate appreciated.     This report was finalized on 5/29/2023 8:35 PM by Dr. Deepthi Burks MD.             X-ray reviewed personally by physician.      Medication Review:   I have reviewed the patient's current medication list    Current Facility-Administered Medications:   •  acetaminophen (TYLENOL) tablet 650 mg, 650 mg, Oral, Q4H  PRN **OR** acetaminophen (TYLENOL) 160 MG/5ML solution 650 mg, 650 mg, Oral, Q4H PRN **OR** acetaminophen (TYLENOL) suppository 650 mg, 650 mg, Rectal, Q4H PRN, Archana Yang APRJADEN  •  aspirin chewable tablet 81 mg, 81 mg, Oral, Daily, Archana Yang APRN, 81 mg at 05/30/23 0851  •  atorvastatin (LIPITOR) tablet 10 mg, 10 mg, Oral, Daily, Archana Yang, APRN, 10 mg at 05/30/23 0851  •  sennosides-docusate (PERICOLACE) 8.6-50 MG per tablet 2 tablet, 2 tablet, Oral, BID, 2 tablet at 05/30/23 0851 **AND** polyethylene glycol (MIRALAX) packet 17 g, 17 g, Oral, Daily PRN **AND** bisacodyl (DULCOLAX) EC tablet 5 mg, 5 mg, Oral, Daily PRN **AND** bisacodyl (DULCOLAX) suppository 10 mg, 10 mg, Rectal, Daily PRN, Archana Yang APRN  •  dextrose (D50W) (25 g/50 mL) IV injection 10-50 mL, 10-50 mL, Intravenous, Q15 Min PRN, Sanjiv Rider MD  •  dextrose (GLUTOSE) oral gel 15 g, 15 g, Oral, Q15 Min PRN, Sanjiv Rider MD  •  Enoxaparin Sodium (LOVENOX) syringe 40 mg, 40 mg, Subcutaneous, Q24H, Archana Yang APRJADEN  •  famotidine (PEPCID) tablet 40 mg, 40 mg, Oral, BID AC, Archana Yang APRN, 40 mg at 05/30/23 0653  •  glucagon (GLUCAGEN) injection 1 mg, 1 mg, Intramuscular, Q15 Min PRN, Sanjiv Rider MD  •  guaiFENesin (MUCINEX) 12 hr tablet 1,200 mg, 1,200 mg, Oral, BID, Archana Yang APRN, 1,200 mg at 05/30/23 0851  •  HYDROcodone-acetaminophen (NORCO)  MG per tablet 1 tablet, 1 tablet, Oral, Q6H PRN, Archana Yang, APRN, 1 tablet at 05/30/23 0653  •  insulin aspart (novoLOG) injection 1-200 Units, 1-200 Units, Subcutaneous, 4x Daily With Meals & Nightly, Sanjiv Rider MD  •  insulin aspart (novoLOG) injection 1-200 Units, 1-200 Units, Subcutaneous, PRN, Sanjiv Rider MD  •  insulin detemir (LEVEMIR) injection 1-200 Units, 1-200 Units, Subcutaneous, Nightly - GlucommanderTereso Rusty T, MD  •  ipratropium-albuterol (DUO-NEB) nebulizer solution 3 mL, 3 mL,  Nebulization, 4x Daily - RT, Archana Yang, APRN, 3 mL at 05/30/23 0730  •  melatonin tablet 10 mg, 10 mg, Oral, Nightly PRN, Archana Yang, APRN  •  methylPREDNISolone sodium succinate (SOLU-Medrol) injection 60 mg, 60 mg, Intravenous, Q6H, Sanjiv Rider MD  •  montelukast (SINGULAIR) tablet 10 mg, 10 mg, Oral, Nightly, Archana Yang, APRN, 10 mg at 05/29/23 2259  •  mycophenolate (CELLCEPT) capsule 1,000 mg, 1,000 mg, Oral, Q12H, Archana Yang, APRN, 1,000 mg at 05/30/23 0851  •  nicotine (NICODERM CQ) 21 MG/24HR patch 1 patch, 1 patch, Transdermal, Q24H, Archana Yang, APRN, 1 patch at 05/29/23 2256  •  nitroglycerin (NITROSTAT) SL tablet 0.4 mg, 0.4 mg, Sublingual, Q5 Min PRN, Archana Yang, APRN  •  ondansetron (ZOFRAN) tablet 4 mg, 4 mg, Oral, Q6H PRN **OR** ondansetron (ZOFRAN) injection 4 mg, 4 mg, Intravenous, Q6H PRN, Archana Yang, APRN  •  Pharmacy to Dose enoxaparin (LOVENOX), , Does not apply, Continuous PRN, Archana Yang APRN  •  pyridostigmine (MESTINON) tablet 60 mg, 60 mg, Oral, Q4H, Archana Yang, APRN, 60 mg at 05/30/23 0653  •  sodium chloride 0.9 % flush 10 mL, 10 mL, Intravenous, PRN, Gio Nix MD  •  sodium chloride 0.9 % flush 10 mL, 10 mL, Intravenous, Q12H, Archana Yang, APRN, 10 mL at 05/30/23 0851  •  sodium chloride 0.9 % flush 10 mL, 10 mL, Intravenous, PRN, Acrhana Yang, APRN  •  sodium chloride 0.9 % infusion 40 mL, 40 mL, Intravenous, PRN, Archana Yang APRN  •  terazosin (HYTRIN) capsule 2 mg, 2 mg, Oral, Nightly, Archana Yang APRN      Assessment & Plan     1.  Acute-on-Chronic Hypoxic Respiratory Failure due to AECOPD: RVP is negative.  Increase Solumedrol dose and frequency for today.  Wean O2 as able.  Repeat PCT.    2.  Diabetes Mellitus, Type 2 in Obese: Adjusted insulin regimen as his blood glucose usually increases sharply w/ steroid use.  Monitor trend closely.    3.  Hx/O MG: No acute issues  on home regimen.    4.  Dyslipidemia: Continue home regimen.    5.  Tobacco Abuse: Continue Nicoderm.    6.  Chronic Pain Syndrome: BRIAN reviewed.  Continue home regimen.    Plan for disposition: Home when able.    Sanjiv Rider MD  05/30/23  09:39 EDT

## 2023-05-30 NOTE — CASE MANAGEMENT/SOCIAL WORK
Discharge Planning Assessment  MURIEL Chavez     Patient Name: Kt Armstrong  MRN: [id number]  Today's Date: 5/30/2023    Admit Date: 5/29/2023    Plan: Home with daughter   Discharge Needs Assessment     Row Name 05/30/23 0905       Living Environment    People in Home child(becki), adult    Name(s) of People in Home Kt Lacey, daughter and her son Wilfredo    Current Living Arrangements home    Duration at Residence 2 Y    Potentially Unsafe Housing Conditions none    Primary Care Provided by self    Provides Primary Care For no one    Caregiving Concerns no care giving concerns voiced by patient at this time    Family Caregiver if Needed child(becki), adult    Family Caregiver Names Kt Lacey, daughter and Kojo son    Quality of Family Relationships unable to assess    Able to Return to Prior Arrangements yes    Living Arrangement Comments Patient states he lives with his adult daughter and her son in a two story house with three steps to gain entry       Resource/Environmental Concerns    Resource/Environmental Concerns none    Transportation Concerns none       Food Insecurity    Within the past 12 months, you worried that your food would run out before you got the money to buy more. Never true    Within the past 12 months, the food you bought just didn't last and you didn't have money to get more. Never true       Transition Planning    Patient/Family Anticipates Transition to home with family    Patient/Family Anticipated Services at Transition none    Transportation Anticipated family or friend will provide  pt states either his daughter, son or cousin will be able to provide ride home at discharge       Discharge Needs Assessment    Readmission Within the Last 30 Days no previous admission in last 30 days    Current Outpatient/Agency/Support Group --  none    Equipment Currently Used at Home cane, quad;cane, straight;commode;glucometer;nebulizer;oxygen  oxygen through [facility] 2 to 3L at night and PRN during  the day    Concerns to be Addressed denies needs/concerns at this time;adjustment to diagnosis/illness;discharge planning    Concerns Comments pt voiced no discharge needs at this time.    Anticipated Changes Related to Illness none    Equipment Needed After Discharge none    Outpatient/Agency/Support Group Needs --  pt states he is unsure if he will need these services at discharge    Discharge Facility/Level of Care Needs --  pt states he is unsure if he will need these services at discharge    Provided Post Acute Provider List? Refused    Refused Provider List Comment pt declined    Patient's Choice of Community Agency(s) none    Current Discharge Risk chronically ill    Discharge Coordination/Progress Patient states he plans on returning home at discharge with his family to help as needed and is unsure at this time what DC needs he may have.               Discharge Plan     Row Name 05/30/23 0905       Plan    Plan Home with daughter    Patient/Family in Agreement with Plan yes    Plan Comments Into room and introduced self and role of CM. Discussed discharge disposition with patient at bedside. Patient is currently sitting up on the side of the bed and is currently on 5L of oxygen via [place] and has no complaints. Patient confirms that the info on his face sheet is correct and that he see's Dr. Kathleen as PCP. He states he uses [facility] in [place] and currently has no problem paying for his med's and his daughter picks them up. He also states that he does not have a living will and declines information regarding one. Patient states he lives with his daughter in a two story house with three steps to gain entry and normally has no issues entering the home or maneuvering inside. He states that his bedroom and bath are on the main floor. He states he is independent with his ADL's and drives occasionally and either his daughter, son or cousin will be able to provide ride home at discharge. He also states  that he has a quad and straight canes, BSC, rolling walker, nebulizer, glucometer and oxygen, PRN during the day and 2 to 3L at night through [facility] and does not anticipate needing any other equipment at discharge. Patient states he has used home health in the past but is unsure what agency is was and is unsure if he will need this service at discharge and declined information on other resources such as STR or LTC at this time. Patient states he plans on returning home at discharge with  his family to help as needed and voiced no discharge needs at this time. Name and number placed on white board in room. CM will follow.              Continued Care and Services - Admitted Since 5/29/2023    Coordination has not been started for this encounter.          Demographic Summary     Row Name 05/30/23 0905       General Information    Admission Type inpatient    Arrived From home    Referral Source admission list    Reason for Consult discharge planning    Preferred Language English       Contact Information    Permission Granted to Share Info With                Functional Status    No documentation.                Psychosocial    No documentation.                Abuse/Neglect    No documentation.                Legal    No documentation.                Substance Abuse    No documentation.                Patient Forms    No documentation.                   Glenys Marley RN

## 2023-05-31 LAB
ALBUMIN SERPL-MCNC: 4.4 G/DL (ref 3.5–5.2)
ALBUMIN/GLOB SERPL: 1.6 G/DL
ALP SERPL-CCNC: 43 U/L (ref 39–117)
ALT SERPL W P-5'-P-CCNC: 14 U/L (ref 1–41)
ANION GAP SERPL CALCULATED.3IONS-SCNC: 9.3 MMOL/L (ref 5–15)
AST SERPL-CCNC: 12 U/L (ref 1–40)
BACTERIA SPEC RESP CULT: NORMAL
BILIRUB SERPL-MCNC: 0.3 MG/DL (ref 0–1.2)
BUN SERPL-MCNC: 19 MG/DL (ref 8–23)
BUN/CREAT SERPL: 27.9 (ref 7–25)
CALCIUM SPEC-SCNC: 9.3 MG/DL (ref 8.6–10.5)
CHLORIDE SERPL-SCNC: 97 MMOL/L (ref 98–107)
CO2 SERPL-SCNC: 30.7 MMOL/L (ref 22–29)
CREAT SERPL-MCNC: 0.68 MG/DL (ref 0.76–1.27)
EGFRCR SERPLBLD CKD-EPI 2021: 100 ML/MIN/1.73
GLOBULIN UR ELPH-MCNC: 2.7 GM/DL
GLUCOSE BLDC GLUCOMTR-MCNC: 229 MG/DL (ref 70–130)
GLUCOSE BLDC GLUCOMTR-MCNC: 266 MG/DL (ref 70–130)
GLUCOSE BLDC GLUCOMTR-MCNC: 318 MG/DL (ref 70–130)
GLUCOSE BLDC GLUCOMTR-MCNC: 392 MG/DL (ref 70–130)
GLUCOSE SERPL-MCNC: 251 MG/DL (ref 65–99)
GRAM STN SPEC: NORMAL
POTASSIUM SERPL-SCNC: 4.9 MMOL/L (ref 3.5–5.2)
PROCALCITONIN SERPL-MCNC: 0.05 NG/ML (ref 0–0.25)
PROT SERPL-MCNC: 7.1 G/DL (ref 6–8.5)
SODIUM SERPL-SCNC: 137 MMOL/L (ref 136–145)

## 2023-05-31 PROCEDURE — 82948 REAGENT STRIP/BLOOD GLUCOSE: CPT

## 2023-05-31 PROCEDURE — 94664 DEMO&/EVAL PT USE INHALER: CPT

## 2023-05-31 PROCEDURE — 80053 COMPREHEN METABOLIC PANEL: CPT | Performed by: HOSPITALIST

## 2023-05-31 PROCEDURE — 94761 N-INVAS EAR/PLS OXIMETRY MLT: CPT

## 2023-05-31 PROCEDURE — 25010000002 ENOXAPARIN PER 10 MG: Performed by: NURSE PRACTITIONER

## 2023-05-31 PROCEDURE — 94799 UNLISTED PULMONARY SVC/PX: CPT

## 2023-05-31 PROCEDURE — 25010000002 METHYLPREDNISOLONE PER 125 MG: Performed by: HOSPITALIST

## 2023-05-31 PROCEDURE — 63710000001 MYCOPHENOLATE MOFETIL PER 250 MG: Performed by: NURSE PRACTITIONER

## 2023-05-31 PROCEDURE — 63710000001 INSULIN ASPART PER 5 UNITS: Performed by: HOSPITALIST

## 2023-05-31 PROCEDURE — 63710000001 INSULIN DETEMIR PER 5 UNITS: Performed by: HOSPITALIST

## 2023-05-31 PROCEDURE — 84145 PROCALCITONIN (PCT): CPT | Performed by: HOSPITALIST

## 2023-05-31 RX ADMIN — DOCUSATE SODIUM 50 MG AND SENNOSIDES 8.6 MG 2 TABLET: 8.6; 5 TABLET, FILM COATED ORAL at 08:52

## 2023-05-31 RX ADMIN — PYRIDOSTIGMINE BROMIDE 60 MG: 60 TABLET ORAL at 23:04

## 2023-05-31 RX ADMIN — METHYLPREDNISOLONE SODIUM SUCCINATE 60 MG: 125 INJECTION INTRAMUSCULAR; INTRAVENOUS at 20:58

## 2023-05-31 RX ADMIN — METHYLPREDNISOLONE SODIUM SUCCINATE 60 MG: 125 INJECTION INTRAMUSCULAR; INTRAVENOUS at 08:52

## 2023-05-31 RX ADMIN — FAMOTIDINE 40 MG: 20 TABLET ORAL at 17:43

## 2023-05-31 RX ADMIN — HYDROCODONE BITARTRATE AND ACETAMINOPHEN 1 TABLET: 10; 325 TABLET ORAL at 06:24

## 2023-05-31 RX ADMIN — PYRIDOSTIGMINE BROMIDE 60 MG: 60 TABLET ORAL at 10:53

## 2023-05-31 RX ADMIN — IPRATROPIUM BROMIDE AND ALBUTEROL SULFATE 3 ML: .5; 3 SOLUTION RESPIRATORY (INHALATION) at 07:36

## 2023-05-31 RX ADMIN — MYCOPHENOLATE MOFETIL 1000 MG: 250 CAPSULE ORAL at 20:58

## 2023-05-31 RX ADMIN — NICOTINE 1 PATCH: 21 PATCH, EXTENDED RELEASE TRANSDERMAL at 11:52

## 2023-05-31 RX ADMIN — METHYLPREDNISOLONE SODIUM SUCCINATE 60 MG: 125 INJECTION INTRAMUSCULAR; INTRAVENOUS at 04:36

## 2023-05-31 RX ADMIN — INSULIN ASPART 3 UNITS: 100 INJECTION, SOLUTION INTRAVENOUS; SUBCUTANEOUS at 20:57

## 2023-05-31 RX ADMIN — PYRIDOSTIGMINE BROMIDE 60 MG: 60 TABLET ORAL at 00:34

## 2023-05-31 RX ADMIN — METHYLPREDNISOLONE SODIUM SUCCINATE 60 MG: 125 INJECTION INTRAMUSCULAR; INTRAVENOUS at 15:42

## 2023-05-31 RX ADMIN — ACETAMINOPHEN 650 MG: 325 TABLET ORAL at 10:53

## 2023-05-31 RX ADMIN — HYDROCODONE BITARTRATE AND ACETAMINOPHEN 1 TABLET: 10; 325 TABLET ORAL at 18:16

## 2023-05-31 RX ADMIN — HYDROCODONE BITARTRATE AND ACETAMINOPHEN 1 TABLET: 10; 325 TABLET ORAL at 00:34

## 2023-05-31 RX ADMIN — INSULIN DETEMIR 37 UNITS: 100 INJECTION, SOLUTION SUBCUTANEOUS at 20:58

## 2023-05-31 RX ADMIN — HYDROCODONE BITARTRATE AND ACETAMINOPHEN 1 TABLET: 10; 325 TABLET ORAL at 11:51

## 2023-05-31 RX ADMIN — TERAZOSIN 2 MG: 1 CAPSULE ORAL at 21:05

## 2023-05-31 RX ADMIN — ASPIRIN 81 MG CHEWABLE TABLET 81 MG: 81 TABLET CHEWABLE at 08:52

## 2023-05-31 RX ADMIN — MYCOPHENOLATE MOFETIL 1000 MG: 250 CAPSULE ORAL at 08:52

## 2023-05-31 RX ADMIN — Medication 10 ML: at 20:58

## 2023-05-31 RX ADMIN — PYRIDOSTIGMINE BROMIDE 60 MG: 60 TABLET ORAL at 06:24

## 2023-05-31 RX ADMIN — INSULIN ASPART 18 UNITS: 100 INJECTION, SOLUTION INTRAVENOUS; SUBCUTANEOUS at 12:48

## 2023-05-31 RX ADMIN — MONTELUKAST SODIUM 10 MG: 10 TABLET, COATED ORAL at 20:58

## 2023-05-31 RX ADMIN — ATORVASTATIN CALCIUM 10 MG: 10 TABLET, FILM COATED ORAL at 08:52

## 2023-05-31 RX ADMIN — INSULIN ASPART 8 UNITS: 100 INJECTION, SOLUTION INTRAVENOUS; SUBCUTANEOUS at 08:56

## 2023-05-31 RX ADMIN — FAMOTIDINE 40 MG: 20 TABLET ORAL at 06:48

## 2023-05-31 RX ADMIN — Medication 10 ML: at 08:57

## 2023-05-31 RX ADMIN — IPRATROPIUM BROMIDE AND ALBUTEROL SULFATE 3 ML: .5; 3 SOLUTION RESPIRATORY (INHALATION) at 12:03

## 2023-05-31 RX ADMIN — PYRIDOSTIGMINE BROMIDE 60 MG: 60 TABLET ORAL at 18:15

## 2023-05-31 RX ADMIN — PYRIDOSTIGMINE BROMIDE 60 MG: 60 TABLET ORAL at 15:42

## 2023-05-31 RX ADMIN — GUAIFENESIN 1200 MG: 600 TABLET, EXTENDED RELEASE ORAL at 08:52

## 2023-05-31 RX ADMIN — ENOXAPARIN SODIUM 40 MG: 40 INJECTION SUBCUTANEOUS at 08:52

## 2023-05-31 RX ADMIN — INSULIN ASPART 18 UNITS: 100 INJECTION, SOLUTION INTRAVENOUS; SUBCUTANEOUS at 17:43

## 2023-05-31 RX ADMIN — IPRATROPIUM BROMIDE AND ALBUTEROL SULFATE 3 ML: .5; 3 SOLUTION RESPIRATORY (INHALATION) at 15:43

## 2023-05-31 RX ADMIN — IPRATROPIUM BROMIDE AND ALBUTEROL SULFATE 3 ML: .5; 3 SOLUTION RESPIRATORY (INHALATION) at 20:25

## 2023-05-31 RX ADMIN — GUAIFENESIN 1200 MG: 600 TABLET, EXTENDED RELEASE ORAL at 20:58

## 2023-05-31 RX ADMIN — PYRIDOSTIGMINE BROMIDE 60 MG: 60 TABLET ORAL at 04:36

## 2023-05-31 NOTE — PLAN OF CARE
Goal Outcome Evaluation:  Plan of Care Reviewed With: patient        Progress: improving  Outcome Evaluation: VSS, tele discontinued, VSS, NSR today, IV solumedrol.

## 2023-05-31 NOTE — PROGRESS NOTES
"Hospitalist Team      Patient Care Team:  Ney Kathleen MD as PCP - General (Family Medicine)  Chris Driver MD as Consulting Physician (Pulmonary Disease)      Chief Complaint:  Follow-up Acute-on-Chronic Hypoxic Respiratory Failure due AECOPD    Subjective    Mr. Armstrong continues to feel well at rest, but becomes very dyspneic with any activity.  He denies chest pain.  He is tolerating PO.    Objective    Vital Signs  Temp:  [97 °F (36.1 °C)-98 °F (36.7 °C)] 97 °F (36.1 °C)  Heart Rate:  [] 87  Resp:  [18-24] 24  BP: (120-138)/(61-90) 121/70  Oxygen Therapy  SpO2: (!) 89 %  Pulse Oximetry Type: Continuous  Device (Oxygen Therapy): nasal cannula  Device (Oxygen Therapy) (Infant): room air  Flow (L/min): 4}    Flowsheet Rows    Flowsheet Row First Filed Value   Admission Height 188 cm (74\") Documented at 05/29/2023 1917   Admission Weight 125 kg (276 lb) Documented at 05/29/2023 1917          Physical Exam:    General: Appears in no acute distress.  Lungs: No wheeze appreciated, but lungs are more diminished than previous.  Respirations remain non-labored.  CV: Regular rate and rhythm.  No murmur appreciated.  Radial pulses are 2+ and symmetric.  Abdomen: Obese, soft, and non-tender w/ active bowel sounds.  MSK: No C/C/E.  Neuro: CN II-XII grossly intact.  Psych: Normal affect.  Ox3.    Results Review:     I reviewed the patient's new clinical results.    Lab Results (last 24 hours)     Procedure Component Value Units Date/Time    Comprehensive Metabolic Panel [[id number]]  (Abnormal) Collected: 05/31/23 0842    Specimen: Blood Updated: 05/31/23 0902     Glucose 251 mg/dL      BUN 19 mg/dL      Creatinine 0.68 mg/dL      Sodium 137 mmol/L      Potassium 4.9 mmol/L      Chloride 97 mmol/L      CO2 30.7 mmol/L      Calcium 9.3 mg/dL      Total Protein 7.1 g/dL      Albumin 4.4 g/dL      ALT (SGPT) 14 U/L      AST (SGOT) 12 U/L      Alkaline Phosphatase 43 U/L      Total Bilirubin 0.3 mg/dL      " Globulin 2.7 gm/dL      A/G Ratio 1.6 g/dL      BUN/Creatinine Ratio 27.9     Anion Gap 9.3 mmol/L      eGFR 100.0 mL/min/1.73     Narrative:      GFR Normal >60  Chronic Kidney Disease <60  Kidney Failure <15      POC Glucose Once [[id number]]  (Abnormal) Collected: 05/31/23 0829    Specimen: Blood Updated: 05/31/23 0835     Glucose 229 mg/dL      Comment: Meter: [id number] : [id number] Marcos JONAS       POC Glucose Once [[id number]]  (Abnormal) Collected: 05/30/23 2037    Specimen: Blood Updated: 05/30/23 2045     Glucose 301 mg/dL      Comment: Meter: [id number] : [id number] Amelia Langston NURSING ASSISTANT       POC Glucose Once [[id number]]  (Abnormal) Collected: 05/30/23 1709    Specimen: Blood Updated: 05/30/23 1717     Glucose 223 mg/dL      Comment: Meter: [id number] : [id number] Crystal JONAS       Respiratory Culture - Sputum, Cough [[id number]] Collected: 05/30/23 0627    Specimen: Sputum from Cough Updated: 05/30/23 1451     Respiratory Culture Rejected     Gram Stain Rare (1+) Epithelial cells seen      Few (2+) WBCs seen      Mixed bacterial morphotypes seen on Gram Stain    Narrative:      Specimen rejected due to oropharyngeal contamination. Please reorder and recollect specimen if clinically necessary.    POC Glucose Once [[id number]]  (Abnormal) Collected: 05/30/23 1230    Specimen: Blood Updated: 05/30/23 1238     Glucose 233 mg/dL      Comment: Meter: [id number] : [id number] Kelly JONAS       Respiratory Panel PCR w/COVID-19(SARS-CoV-2) ZEUS/SHAW/FRANCIA/PAD/COR/MAD/GIL In-House, NP Swab in UTM/VTM, 3-4 HR TAT - Swab, Nasopharynx [[id number]]  (Normal) Collected: 05/29/23 2247    Specimen: Swab from Nasopharynx Updated: 05/30/23 1129     ADENOVIRUS, PCR Not Detected     Coronavirus 229E Not Detected     Coronavirus HKU1 Not Detected     Coronavirus NL63 Not Detected     Coronavirus OC43 Not Detected     COVID19 Not Detected     Human Metapneumovirus Not Detected     Human  Rhinovirus/Enterovirus Not Detected     Influenza A PCR Not Detected     Influenza B PCR Not Detected     Parainfluenza Virus 1 Not Detected     Parainfluenza Virus 2 Not Detected     Parainfluenza Virus 3 Not Detected     Parainfluenza Virus 4 Not Detected     RSV, PCR Not Detected     Bordetella pertussis pcr Not Detected     Bordetella parapertussis PCR Not Detected     Chlamydophila pneumoniae PCR Not Detected     Mycoplasma pneumo by PCR Not Detected    Narrative:      In the setting of a positive respiratory panel with a viral infection PLUS a negative procalcitonin without other underlying concern for bacterial infection, consider observing off antibiotics or discontinuation of antibiotics and continue supportive care. If the respiratory panel is positive for atypical bacterial infection (Bordetella pertussis, Chlamydophila pneumoniae, or Mycoplasma pneumoniae), consider antibiotic de-escalation to target atypical bacterial infection.          Imaging Results (Last 24 Hours)     ** No results found for the last 24 hours. **          Medication Review:   I have reviewed the patient's current medication list    Current Facility-Administered Medications:   •  acetaminophen (TYLENOL) tablet 650 mg, 650 mg, Oral, Q4H PRN **OR** acetaminophen (TYLENOL) 160 MG/5ML solution 650 mg, 650 mg, Oral, Q4H PRN **OR** acetaminophen (TYLENOL) suppository 650 mg, 650 mg, Rectal, Q4H PRN, Archana Yang, APRN  •  aspirin chewable tablet 81 mg, 81 mg, Oral, Daily, Archana Yang, APRN, 81 mg at 05/31/23 0852  •  atorvastatin (LIPITOR) tablet 10 mg, 10 mg, Oral, Daily, Archana Yang APRN, 10 mg at 05/31/23 0852  •  sennosides-docusate (PERICOLACE) 8.6-50 MG per tablet 2 tablet, 2 tablet, Oral, BID, 2 tablet at 05/31/23 0852 **AND** polyethylene glycol (MIRALAX) packet 17 g, 17 g, Oral, Daily PRN **AND** bisacodyl (DULCOLAX) EC tablet 5 mg, 5 mg, Oral, Daily PRN **AND** bisacodyl (DULCOLAX) suppository 10 mg, 10 mg,  Rectal, Daily PRN, Archana Yang APRN  •  dextrose (D50W) (25 g/50 mL) IV injection 10-50 mL, 10-50 mL, Intravenous, Q15 Min PRN, Sanjiv Rider MD  •  dextrose (GLUTOSE) oral gel 15 g, 15 g, Oral, Q15 Min PRN, Sanjiv Rider MD  •  Enoxaparin Sodium (LOVENOX) syringe 40 mg, 40 mg, Subcutaneous, Q24H, Archana Yang APRN, 40 mg at 05/31/23 0852  •  famotidine (PEPCID) tablet 40 mg, 40 mg, Oral, BID AC, Archana Yang APRN, 40 mg at 05/31/23 0648  •  glucagon (GLUCAGEN) injection 1 mg, 1 mg, Intramuscular, Q15 Min PRN, Sanjiv Rider MD  •  guaiFENesin (MUCINEX) 12 hr tablet 1,200 mg, 1,200 mg, Oral, BID, Archana Yang APRN, 1,200 mg at 05/31/23 0852  •  HYDROcodone-acetaminophen (NORCO)  MG per tablet 1 tablet, 1 tablet, Oral, Q6H PRN, Archana Yang APRN, 1 tablet at 05/31/23 0624  •  insulin aspart (novoLOG) injection 1-200 Units, 1-200 Units, Subcutaneous, 4x Daily With Meals & Nightly, Sanjiv Rider MD, 8 Units at 05/31/23 0856  •  insulin aspart (novoLOG) injection 1-200 Units, 1-200 Units, Subcutaneous, PRN, Sanjiv Rider MD  •  insulin detemir (LEVEMIR) injection 1-200 Units, 1-200 Units, Subcutaneous, Nightly - GlucommanderTereso Rusty T, MD, 31 Units at 05/30/23 2102  •  ipratropium-albuterol (DUO-NEB) nebulizer solution 3 mL, 3 mL, Nebulization, 4x Daily - RT, Archana Yang APRN, 3 mL at 05/31/23 0736  •  melatonin tablet 10 mg, 10 mg, Oral, Nightly PRN, Archana Yang APRN  •  methylPREDNISolone sodium succinate (SOLU-Medrol) injection 60 mg, 60 mg, Intravenous, Q6H, Sanjiv Rider MD, 60 mg at 05/31/23 0852  •  montelukast (SINGULAIR) tablet 10 mg, 10 mg, Oral, Nightly, Archana Yang APRN, 10 mg at 05/30/23 2107  •  mycophenolate (CELLCEPT) capsule 1,000 mg, 1,000 mg, Oral, Q12H, Archana Yang APRN, 1,000 mg at 05/31/23 0852  •  nicotine (NICODERM CQ) 21 MG/24HR patch 1 patch, 1 patch, Transdermal, Q24H, Archana Yang APRN, 1 patch  at 05/30/23 2340  •  nitroglycerin (NITROSTAT) SL tablet 0.4 mg, 0.4 mg, Sublingual, Q5 Min PRN, Archana Yang APRN  •  ondansetron (ZOFRAN) tablet 4 mg, 4 mg, Oral, Q6H PRN **OR** ondansetron (ZOFRAN) injection 4 mg, 4 mg, Intravenous, Q6H PRN, Archana Yang APRN  •  Pharmacy to Dose enoxaparin (LOVENOX), , Does not apply, Continuous PRN, Archana Yang, APRN  •  pyridostigmine (MESTINON) tablet 60 mg, 60 mg, Oral, Q4H, Archana Yang APRN, 60 mg at 05/31/23 0624  •  sodium chloride 0.9 % flush 10 mL, 10 mL, Intravenous, PRN, Gio Nix MD  •  sodium chloride 0.9 % flush 10 mL, 10 mL, Intravenous, Q12H, Archana Yang APRN, 10 mL at 05/31/23 0857  •  sodium chloride 0.9 % flush 10 mL, 10 mL, Intravenous, PRN, Archana Yang, APRN  •  sodium chloride 0.9 % infusion 40 mL, 40 mL, Intravenous, PRN, Archana Yang, APRN  •  terazosin (HYTRIN) capsule 2 mg, 2 mg, Oral, Nightly, Archana Yang APRN, 2 mg at 05/30/23 2107      Assessment & Plan     1.  Acute-on-Chronic Hypoxic Respiratory Failure due to AECOPD: Infectious work-up thus far negative.  I'm going to continue current dose and frequency of solumedrol.  Continue nebulized bronchodilators.     2.  Diabetes Mellitus, Type 2 in Obese: Bedsides and A.M. not at goal.  Continues on Glucommander.     3.  Dyslipidemia: Continue Lipitor.     4.  Tobacco Abuse: Continue Nicoderm.     5.  Chronic Pain Syndrome: No acute issues on current regimen.  BRIAN reviewed.    6.  Hx/O MG: No acute issues on home regimen.     Plan for disposition: Home when able.    Sanjiv Rider MD  05/31/23  10:49 EDT

## 2023-05-31 NOTE — PLAN OF CARE
Goal Outcome Evaluation:  Plan of Care Reviewed With: patient        Progress: no change  Outcome Evaluation: VSS, Tele: NSR, oxygen 4 liters per NC, IV solumedrol

## 2023-06-01 ENCOUNTER — READMISSION MANAGEMENT (OUTPATIENT)
Dept: CALL CENTER | Facility: HOSPITAL | Age: 73
End: 2023-06-01

## 2023-06-01 VITALS
WEIGHT: 271 LBS | TEMPERATURE: 97 F | DIASTOLIC BLOOD PRESSURE: 82 MMHG | SYSTOLIC BLOOD PRESSURE: 124 MMHG | HEART RATE: 88 BPM | OXYGEN SATURATION: 90 % | BODY MASS INDEX: 34.78 KG/M2 | RESPIRATION RATE: 20 BRPM | HEIGHT: 74 IN

## 2023-06-01 LAB
GLUCOSE BLDC GLUCOMTR-MCNC: 230 MG/DL (ref 70–130)
GLUCOSE BLDC GLUCOMTR-MCNC: 329 MG/DL (ref 70–130)

## 2023-06-01 PROCEDURE — 25010000002 ENOXAPARIN PER 10 MG: Performed by: NURSE PRACTITIONER

## 2023-06-01 PROCEDURE — 82948 REAGENT STRIP/BLOOD GLUCOSE: CPT

## 2023-06-01 PROCEDURE — 25010000002 METHYLPREDNISOLONE PER 125 MG: Performed by: HOSPITALIST

## 2023-06-01 PROCEDURE — 94761 N-INVAS EAR/PLS OXIMETRY MLT: CPT

## 2023-06-01 PROCEDURE — 63710000001 MYCOPHENOLATE MOFETIL PER 250 MG: Performed by: NURSE PRACTITIONER

## 2023-06-01 PROCEDURE — 94618 PULMONARY STRESS TESTING: CPT

## 2023-06-01 PROCEDURE — 63710000001 INSULIN ASPART PER 5 UNITS: Performed by: HOSPITALIST

## 2023-06-01 PROCEDURE — 94799 UNLISTED PULMONARY SVC/PX: CPT

## 2023-06-01 RX ORDER — PREDNISONE 5 MG/1
TABLET ORAL
Qty: 50 TABLET | Refills: 0 | Status: SHIPPED | OUTPATIENT
Start: 2023-06-01 | End: 2023-06-12

## 2023-06-01 RX ORDER — IPRATROPIUM BROMIDE AND ALBUTEROL SULFATE 2.5; .5 MG/3ML; MG/3ML
3 SOLUTION RESPIRATORY (INHALATION) EVERY 4 HOURS PRN
Qty: 120 ML | Refills: 0 | Status: SHIPPED | OUTPATIENT
Start: 2023-06-01

## 2023-06-01 RX ORDER — FAMOTIDINE 40 MG/1
40 TABLET, FILM COATED ORAL
Qty: 60 TABLET | Refills: 0 | Status: SHIPPED | OUTPATIENT
Start: 2023-06-01

## 2023-06-01 RX ORDER — GUAIFENESIN 600 MG/1
1200 TABLET, EXTENDED RELEASE ORAL 2 TIMES DAILY
Qty: 20 TABLET | Refills: 0 | Status: SHIPPED | OUTPATIENT
Start: 2023-06-01

## 2023-06-01 RX ADMIN — ATORVASTATIN CALCIUM 10 MG: 10 TABLET, FILM COATED ORAL at 09:02

## 2023-06-01 RX ADMIN — NICOTINE 1 PATCH: 21 PATCH, EXTENDED RELEASE TRANSDERMAL at 12:32

## 2023-06-01 RX ADMIN — ENOXAPARIN SODIUM 40 MG: 40 INJECTION SUBCUTANEOUS at 09:02

## 2023-06-01 RX ADMIN — METHYLPREDNISOLONE SODIUM SUCCINATE 60 MG: 125 INJECTION INTRAMUSCULAR; INTRAVENOUS at 16:21

## 2023-06-01 RX ADMIN — INSULIN ASPART 49 UNITS: 100 INJECTION, SOLUTION INTRAVENOUS; SUBCUTANEOUS at 09:01

## 2023-06-01 RX ADMIN — METHYLPREDNISOLONE SODIUM SUCCINATE 60 MG: 125 INJECTION INTRAMUSCULAR; INTRAVENOUS at 03:51

## 2023-06-01 RX ADMIN — PYRIDOSTIGMINE BROMIDE 60 MG: 60 TABLET ORAL at 03:51

## 2023-06-01 RX ADMIN — IPRATROPIUM BROMIDE AND ALBUTEROL SULFATE 3 ML: .5; 3 SOLUTION RESPIRATORY (INHALATION) at 12:04

## 2023-06-01 RX ADMIN — MYCOPHENOLATE MOFETIL 1000 MG: 250 CAPSULE ORAL at 09:02

## 2023-06-01 RX ADMIN — IPRATROPIUM BROMIDE AND ALBUTEROL SULFATE 3 ML: .5; 3 SOLUTION RESPIRATORY (INHALATION) at 07:32

## 2023-06-01 RX ADMIN — ASPIRIN 81 MG CHEWABLE TABLET 81 MG: 81 TABLET CHEWABLE at 09:02

## 2023-06-01 RX ADMIN — GUAIFENESIN 1200 MG: 600 TABLET, EXTENDED RELEASE ORAL at 09:01

## 2023-06-01 RX ADMIN — IPRATROPIUM BROMIDE AND ALBUTEROL SULFATE 3 ML: .5; 3 SOLUTION RESPIRATORY (INHALATION) at 16:36

## 2023-06-01 RX ADMIN — PYRIDOSTIGMINE BROMIDE 60 MG: 60 TABLET ORAL at 06:43

## 2023-06-01 RX ADMIN — PYRIDOSTIGMINE BROMIDE 60 MG: 60 TABLET ORAL at 16:20

## 2023-06-01 RX ADMIN — HYDROCODONE BITARTRATE AND ACETAMINOPHEN 1 TABLET: 10; 325 TABLET ORAL at 00:09

## 2023-06-01 RX ADMIN — DOCUSATE SODIUM 50 MG AND SENNOSIDES 8.6 MG 2 TABLET: 8.6; 5 TABLET, FILM COATED ORAL at 09:01

## 2023-06-01 RX ADMIN — METHYLPREDNISOLONE SODIUM SUCCINATE 60 MG: 125 INJECTION INTRAMUSCULAR; INTRAVENOUS at 09:02

## 2023-06-01 RX ADMIN — PYRIDOSTIGMINE BROMIDE 60 MG: 60 TABLET ORAL at 12:32

## 2023-06-01 RX ADMIN — HYDROCODONE BITARTRATE AND ACETAMINOPHEN 1 TABLET: 10; 325 TABLET ORAL at 12:32

## 2023-06-01 RX ADMIN — Medication 10 ML: at 09:02

## 2023-06-01 RX ADMIN — HYDROCODONE BITARTRATE AND ACETAMINOPHEN 1 TABLET: 10; 325 TABLET ORAL at 06:43

## 2023-06-01 RX ADMIN — FAMOTIDINE 40 MG: 20 TABLET ORAL at 06:43

## 2023-06-01 RX ADMIN — INSULIN ASPART 34 UNITS: 100 INJECTION, SOLUTION INTRAVENOUS; SUBCUTANEOUS at 12:32

## 2023-06-01 NOTE — DISCHARGE INSTR - APPOINTMENTS
Patient has follow up with Dr. Kathleen on June 9 @ 9:30 am [phone]     Patient needs to call and make a New Patient appointment with Pulmonary for first available [phone].   Waited on hold 10 minutes before having to hang up.

## 2023-06-01 NOTE — PROCEDURES
Exercise Oximetry    Patient Name:Kt Armstrong   MRN: [id number]   Date: 06/01/23             ROOM AIR BASELINE   SpO2% 90   Heart Rate 110   Blood Pressure 124/82     EXERCISE ON ROOM AIR SpO2% EXERCISE ON O2 @ 2 LPM SpO2%   1 MINUTE 86 1 MINUTE    2 MINUTES  2 MINUTES       88   3 MINUTES  3 MINUTES       88   4 MINUTES  4 MINUTES       89   5 MINUTES  5 MINUTES       89   6 MINUTES  6 MINUTES      90              Distance Walked  100 ft Distance Walked   670 ft   Dyspnea (Jackson Scale)   Dyspnea (Jackson Scale)  5   Fatigue (Jackson Scale)   Fatigue (Jackson Scale)   5   SpO2% Post Exercise   SpO2% Post Exercise  89   HR Post Exercise   HR Post Exercise  125   Time to Recovery   Time to Recovery   1 min     Comments: Test was done with forehead probe. Test began on room air. After 1 min, SpO2 86%, placed on 2 lpm. No rests needed during the walk test. He ended on 2 lpm.

## 2023-06-01 NOTE — PLAN OF CARE
Goal Outcome Evaluation:  Plan of Care Reviewed With: patient        Progress: improving  Outcome Evaluation: Vital signs stable. Oxygen remains on 4L nasal cannula. PRN Norco given once so far this shift. IV solu-medrol given. IV currently saline locked. Tele discontinued. No complaints at this time.

## 2023-06-01 NOTE — CASE MANAGEMENT/SOCIAL WORK
Continued Stay Note  MURIEL Chavez     Patient Name: Kt Armstrong  MRN: [id number]  Today's Date: 6/1/2023    Admit Date: 5/29/2023    Plan: plan home with daughter   Discharge Plan     Row Name 06/01/23 1518       Plan    Plan plan home with daughter    Plan Comments DWO rec'd for 02. Patient is current with [facility] for home oxygen. Spoke with Lidia/Tiki to update. Clinicals faxed and referral placed via epic. No additional needs noted. Family wll need to bring a portable 02 tank from home for dc. Portia PANTOJA updated.               Discharge Codes    No documentation.               Expected Discharge Date and Time     Expected Discharge Date Expected Discharge Time    Jun 1, 2023             Constantino May RN

## 2023-06-01 NOTE — PROGRESS NOTES
"SERVICE: [facility] HOSPITALIST    CONSULTANTS: None    CHIEF COMPLAINT: Shortness of breath    SUBJECTIVE: Patient seen and examined at bedside. Patient reports significant improvement overall in his breathing.  He reports less wheezing.  He continues to have dry cough that is occasionally productive for clear mucus, previously had yellow mucus.  He has no other acute complaint.  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  He has no other complaints or issues.     OBJECTIVE:    Physical exam is largely unchanged from previous exam, except where documented below, examination is accurate as of 6/1/2023    Physical Exam:  General: Patient is awake and alert.  Elderly male. No acute distress noted.   HENT: Head is atraumatic, normocephalic. Hearing is grossly intact. Nose is without obvious congestion and appears patent. Neck is supple and trachea is midline.   Eyes: Vision is grossly intact. Pupils appear equal and round.   Cardiovascular: Heart has regular rate and rhythm with no murmurs, rubs or gallops noted.   Respiratory: Very minimal wheezing noted anteriorly, no rhonchi or rails, good respiratory effort with good air movement throughout.   Abdominal/GI: Soft, nontender, bowel sounds present. No rebound or guarding present.   Extremities: No peripheral edema noted.   Musculoskeletal: Spontaneous movement of bilateral upper and lower extremities against gravity noted. No signs of injury or deformity noted.   Skin: Warm and dry.   Psych: Mood and affect are appropriate. Cooperative with exam.   Neuro: No facial asymmetry noted. No focal deficits noted, hearing and vision are grossly intact.     /63 (BP Location: Left arm, Patient Position: Lying)   Pulse 75   Temp 97.8 °F (36.6 °C) (Oral)   Resp 16   Ht 188 cm (74\")   Wt 123 kg (271 lb)   SpO2 94%   BMI 34.79 kg/m²     MEDS/LABS REVIEWED AND " ORDERED    aspirin, 81 mg, Oral, Daily  atorvastatin, 10 mg, Oral, Daily  enoxaparin, 40 mg, Subcutaneous, Q24H  famotidine, 40 mg, Oral, BID AC  guaiFENesin, 1,200 mg, Oral, BID  insulin aspart, 1-200 Units, Subcutaneous, 4x Daily With Meals & Nightly  insulin detemir, 1-200 Units, Subcutaneous, Nightly - Glucommander  ipratropium-albuterol, 3 mL, Nebulization, 4x Daily - RT  methylPREDNISolone sodium succinate, 60 mg, Intravenous, Q6H  montelukast, 10 mg, Oral, Nightly  mycophenolate, 1,000 mg, Oral, Q12H  nicotine, 1 patch, Transdermal, Q24H  pyridostigmine, 60 mg, Oral, Q4H  senna-docusate sodium, 2 tablet, Oral, BID  sodium chloride, 10 mL, Intravenous, Q12H  terazosin, 2 mg, Oral, Nightly          LAB/DIAGNOSTICS:    Lab Results (last 24 hours)     Procedure Component Value Units Date/Time    POC Glucose Once [[id number]]  (Abnormal) Collected: 05/31/23 2045    Specimen: Blood Updated: 05/31/23 2051     Glucose 392 mg/dL      Comment: Meter: [id number] : [id number] Laly Taylor RN       POC Glucose Once [[id number]]  (Abnormal) Collected: 05/31/23 1708    Specimen: Blood Updated: 05/31/23 1714     Glucose 266 mg/dL      Comment: Meter: [id number] : [id number] Marcos JONAS       Procalcitonin [[id number]]  (Normal) Collected: 05/31/23 0842    Specimen: Blood Updated: 05/31/23 1552     Procalcitonin 0.05 ng/mL     Narrative:      As a Marker for Sepsis (Non-Neonates):    1. <0.5 ng/mL represents a low risk of severe sepsis and/or septic shock.  2. >2 ng/mL represents a high risk of severe sepsis and/or septic shock.    As a Marker for Lower Respiratory Tract Infections that require antibiotic therapy:    PCT on Admission    Antibiotic Therapy       6-12 Hrs later    >0.5                Strongly Recommended  >0.25 - <0.5        Recommended   0.1 - 0.25          Discouraged              Remeasure/reassess PCT  <0.1                Strongly Discouraged     Remeasure/reassess PCT    As 28 day mortality risk  "marker: \"Change in Procalcitonin Result\" (>80% or <=80%) if Day 0 (or Day 1) and Day 4 values are available. Refer to http://www.[facility]-pct-calculator.com    Change in PCT <=80%  A decrease of PCT levels below or equal to 80% defines a positive change in PCT test result representing a higher risk for 28-day all-cause mortality of patients diagnosed with severe sepsis for septic shock.    Change in PCT >80%  A decrease of PCT levels of more than 80% defines a negative change in PCT result representing a lower risk for 28-day all-cause mortality of patients diagnosed with severe sepsis or septic shock.       POC Glucose Once [[id number]]  (Abnormal) Collected: 05/31/23 1235    Specimen: Blood Updated: 05/31/23 1242     Glucose 318 mg/dL      Comment: Meter: [id number] : [id number] Marcos JONAS       Respiratory Culture - Sputum, Cough [[id number]] Collected: 05/30/23 0627    Specimen: Sputum from Cough Updated: 05/31/23 1202     Respiratory Culture Rejected     Gram Stain Rare (1+) Epithelial cells seen      Few (2+) WBCs seen      Mixed bacterial morphotypes seen on Gram Stain    Narrative:      Specimen rejected due to oropharyngeal contamination. Please reorder and recollect specimen if clinically necessary.    Comprehensive Metabolic Panel [[id number]]  (Abnormal) Collected: 05/31/23 0842    Specimen: Blood Updated: 05/31/23 0902     Glucose 251 mg/dL      BUN 19 mg/dL      Creatinine 0.68 mg/dL      Sodium 137 mmol/L      Potassium 4.9 mmol/L      Chloride 97 mmol/L      CO2 30.7 mmol/L      Calcium 9.3 mg/dL      Total Protein 7.1 g/dL      Albumin 4.4 g/dL      ALT (SGPT) 14 U/L      AST (SGOT) 12 U/L      Alkaline Phosphatase 43 U/L      Total Bilirubin 0.3 mg/dL      Globulin 2.7 gm/dL      A/G Ratio 1.6 g/dL      BUN/Creatinine Ratio 27.9     Anion Gap 9.3 mmol/L      eGFR 100.0 mL/min/1.73     Narrative:      GFR Normal >60  Chronic Kidney Disease <60  Kidney Failure <15      POC Glucose Once " [[id number]]  (Abnormal) Collected: 05/31/23 0829    Specimen: Blood Updated: 05/31/23 0835     Glucose 229 mg/dL      Comment: Meter: [id number] : [id number] Marcos JONAS           ECG 12 Lead ED Triage Standing Order; SOA   Final Result   HEART RATE= 105  bpm   RR Interval= 572  ms   MI Interval= 159  ms   P Horizontal Axis= 1  deg   P Front Axis= 51  deg   QRSD Interval= 95  ms   QT Interval= 329  ms   QRS Axis= 51  deg   T Wave Axis= 41  deg   - OTHERWISE NORMAL ECG -   Sinus tachycardia   No change from prior tracing   Electronically Signed By: Flower Prasad ([facility]) 30-May-2023 13:43:02   Date and Time of Study: 2023-05-29 19:46:59        Results for orders placed during the hospital encounter of 11/27/22    Adult Transthoracic Echo Complete W/ Cont if Necessary Per Protocol    Interpretation Summary  •  Calculated left ventricular EF = 53%  •  Left ventricular wall thickness is consistent with mild concentric hypertrophy.  •  Left ventricular diastolic function was normal.  •  Estimated right ventricular systolic pressure from tricuspid regurgitation is normal (<35 mmHg).    No radiology results for the last day      ASSESSMENT/PLAN:  Please note portions of this assessment/plan may have been copied and pasted, but I have personally seen this patient and reviewed each line of this assessment and plan for accuracy and made updates to reflect my necessary changes on 6/1/2023     Acute on chronic hypoxic respiratory failure secondary to acute exacerbation of COPD  -RVP negative  -Continue Solu-Medrol 60 mg every 6 hours, continue DuoNeb treatments every 4 hours  -Continue to target saturation of 88 to 92%, wean oxygen as tolerated.  Has been weaned to 4 L.  Baseline oxygen is 2 L.     Diabetes mellitus type 2 in obese  -Patient remains on Glucomander subcutaneous insulin dosing  -Due to known side effect of steroids patient typically has hyperglycemia with steroid use, this is a known side effect and not  "an adverse reaction.  Continue to monitor.     History of MG-no acute issues on home regimen    Dyslipidemia-stable on home regimen    Tobacco abuse-contributing to above.  Continue NicoDerm, continue to encourage smoking cessation.     Chronic pain syndrome-Efren reviewed, continue home regimen      PLAN FOR DISPOSITION: Home with continued oxygen requirement improvement    Hussain Gutierrez DO  Hospitalist, [facility]  06/01/23  07:32 EDT    At [facility], we believe that sharing information builds trust and better relationships. You are receiving this note because you recently visited [facility]. It is possible you will see health information before a provider has talked with you about it. This kind of information can be easy to misunderstand. To help you fully understand what it means for your health, we urge you to discuss this note with your provider.    \"Dictated utilizing Dragon dictation\"    "

## 2023-06-01 NOTE — DISCHARGE PLACEMENT REQUEST
"Kt Armstrong (72 y.o. Male)     Date of Birth   1950    Social Security Number       Address   [address] [place], [place] [address]    Home Phone   [phone]    MRN   [id number]       Judaism   None    Marital Status                               Admission Date   5/29/23    Admission Type   Emergency    Admitting Provider   Sanjiv Rider MD    Attending Provider   Sanjiv Rider MD    Department, Room/Bed   [facility] MED SURG, 1414/1       Discharge Date       Discharge Disposition   Home or Self Care    Discharge Destination                               Attending Provider: Sanjiv Rider MD    Allergies: No Known Allergies    Isolation: None   Infection: None   Code Status: CPR    Ht: 188 cm (74\")   Wt: 123 kg (271 lb)    Admission Cmt: None   Principal Problem: None                Active Insurance as of 5/29/2023     Primary Coverage     Payor Plan Insurance Group Employer/Plan Group    HUMANA MEDICARE REPLACEMENT HU[employer]NA MEDICARE REPLACEMENT [id number]     Payor Plan Address Payor Plan Phone Number Payor Plan Fax Number Effective Dates    [address] [address] [phone]  1/1/2022 - None Entered    [facility] [id number]       Subscriber Name Subscriber Birth Date Member ID       KT ARMSTRONG 1950 [id number]                 Emergency Contacts      (Rel.) Home Phone Work Phone Mobile Phone    Kojo Armstrong (Son) [phone] -- --    Kt Stevens (Daughter) -- -- [phone]    Nicholas Agudelo (Relative) -- -- [phone]              "

## 2023-06-01 NOTE — DISCHARGE SUMMARY
Kt Armstrong  1950  [id number]    Hospitalists Discharge Summary    Date of Admission: 5/29/2023  Date of Discharge:  6/1/2023    History of Present Illness from [facility] on admit: The patient is a 72-year-old male, known to hospitalist service from previous admissions for similar concerns, who presented to the emergency department secondary to 1 week of increasing shortness of air.  He notes that Dr. Kathleen gave him antibiotic and steroid pack approximately 1 week ago, ran out yesterday and then again became more short of breath.  He notes that he has been wheezing and lots of coughing that is semiproductive.  He typically wears 2 L of oxygen at his baseline and notes that he increased it to 3 L last night without much improvement.  He continues to smoke cigarettes. He is never able to sleep laying flat.  At time of my exam, he is requesting food and coffee.     In ER labs were generally unremarkable, CXR showed improvement from 3/31/2023.  He was given albuterol and DuoNeb, Solu-Medrol and admission was requested.     He has a h/o oxygen dependent COPD, DM2 in obese with hyperglycemia, myasthenia gravis, chronic pain with chronic narcotic induced constipation, HTN, GERD, hyperlipidemia, BPH, tobacco abuse     He otherwise denies f/c/headache/rhinorrhea/nasal congestion/lightheadedness/syncopal sensation/n/v/d/chest pain/abdominal pain/sick exposures/change in bowel or bladder habits/no weight change/bloody emesis or bloody stools/change in medications or any other new concerns.    Primary Discharge diagnoses: Acute on chronic hypoxic respiratory failure secondary to acute exacerbation COPD-has improved oxygen weaned to baseline use of 2 L.     Secondary Discharge Diagnoses: Diabetes mellitus type 2 in obese, hyperglycemia is known side effect due to steroid use not an adverse reaction.  History of MG-no acute issues on home regimen.  Dyslipidemia-stable on home regimen.  Tobacco abuse-patient counseled extensively  about need for cessation, continue NicoDerm.  Chronic pain syndrome-stable on home regimen.     Hospital Course Summary: Patient was admitted for acute on chronic hypoxic respiratory failure secondary to acute exacerbation of COPD, patient required as much as 5 L to keep saturation above 88%.  Patient improved with IV Solu-Medrol administration as well as every 6 hours scheduled DuoNeb treatment.  On day of discharge he was weaned down to his 2 L of oxygen which he tolerated well.  Patient was again counseled to keep saturation between 88 to 92% due to COPD diagnosis with likely CO2 retention.  Patient was also counseled extensively on need for smoking cessation.  Patient will have outpatient follow-up with pulmonology.  I have prescribed DuoNeb nebulizer treatments for use with his home nebulizer equipment.  I have also prescribed prolonged steroid taper with prednisone.  Walking oximetry revealed patient required 2L. On 6/1/2023 patient's condition had improved. They were deemed stable for discharge. They were advised to take all medications as prescribed, follow up with PCP within 1 week. If there are any issues patient should contact PCP and/or return to the ED for follow up. Patient was agreeable to the plan and subsequently discharged at this time.     PCP  Patient Care Team:  Ney Kathleen MD as PCP - General (Family Medicine)  Chris Driver MD as Consulting Physician (Pulmonary Disease)    Consults:   Consults     No orders found from 4/30/2023 to 5/30/2023.          Operations and Procedures Performed:       XR Chest 2 View    Result Date: 5/29/2023  Narrative: CHEST X-RAY PA LATERAL     HISTORY: Short of air this evening with history of COPD asthma hypertension and diabetes.  TECHNIQUE: PA and lateral views the chest reviewed. 3 films submitted. COMPARISON chest x-ray 03/31/2023.  FINDINGS: There is no pleural effusion. No pneumothorax. Heart size is top normal. There is some linear  atelectasis or scarring at lung bases that is improved from the previous study. No acute parenchymal infiltrate or acute congestive heart failure. The lungs are hyperinflated consistent with chronic obstructive lung disease.      Impression: 1. FINDINGS of chronic obstructive lung disease. Linear atelectasis or scarring again seen at lung bases. Appearance improved from 03/31/2023. No acute infiltrate appreciated.  This report was finalized on 5/29/2023 8:35 PM by Dr. Deepthi Burks MD.        Allergies:  has No Known Allergies.    Efren  Reviewed     Discharge Medications:     Discharge Medications      New Medications      Instructions Start Date   famotidine 40 MG tablet  Commonly known as: PEPCID   40 mg, Oral, 2 Times Daily Before Meals      guaiFENesin 600 MG 12 hr tablet  Commonly known as: MUCINEX   1,200 mg, Oral, 2 Times Daily      ipratropium-albuterol 0.5-2.5 mg/3 ml nebulizer  Commonly known as: DUO-NEB   3 mL, Nebulization, Every 4 Hours PRN      predniSONE 5 MG tablet  Commonly known as: DELTASONE   Take 4 tablets by mouth 2 (Two) Times a Day for 3 days, THEN 3 tablets 2 (Two) Times a Day for 2 days, THEN 2 tablets 2 (Two) Times a Day for 2 days, THEN 1 tablet 2 (Two) Times a Day for 2 days, THEN 1 tablet Daily for 2 days.   Start Date: June 1, 2023        Changes to Medications      Instructions Start Date   albuterol sulfate  (90 Base) MCG/ACT inhaler  Commonly known as: PROVENTIL HFA;VENTOLIN HFA;PROAIR HFA  What changed: Another medication with the same name was removed. Continue taking this medication, and follow the directions you see here.   2 puffs, Inhalation, Every 4 Hours PRN         Continue These Medications      Instructions Start Date   aspirin 81 MG chewable tablet   81 mg, Oral, Daily      Kd Contour Test test strip  Generic drug: glucose blood   No dose, route, or frequency recorded.      BD Pen Needle Yaritza U/F 32G X 4 MM misc  Generic drug: Insulin Pen Needle   USE 1  NEEDLE SUBCUTANEOUSLY QD      Bevespi Aerosphere 9-4.8 MCG/ACT aerosol  Generic drug: Glycopyrrolate-Formoterol   2 puffs, Inhalation, 2 Times Daily      cetirizine 10 MG tablet  Commonly known as: zyrTEC   10 mg, Oral, Daily      docusate sodium 100 MG capsule  Commonly known as: COLACE   200 mg, Oral, Daily PRN      EPINEPHrine (Anaphylaxis) 1 MG/ML injection  Commonly known as: ADRENALIN   0.3 mg, Intramuscular, Once As Needed      HYDROcodone-acetaminophen  MG per tablet  Commonly known as: NORCO   1 tablet, Oral, Every 6 Hours PRN      insulin glargine 100 UNIT/ML injection  Commonly known as: LANTUS, SEMGLEE   45 Units, Subcutaneous, Daily, Every morning      insulin glargine 100 UNIT/ML injection  Commonly known as: LANTUS, SEMGLEE   35 Units, Subcutaneous, Nightly      melatonin 5 MG tablet tablet   10 mg, Oral, Nightly PRN      mycophenolate 500 MG tablet  Commonly known as: CELLCEPT   1,000 mg, Oral, 2 Times Daily      nicotine 14 MG/24HR patch  Commonly known as: NICODERM CQ   1 patch, Transdermal, Every 24 Hours Scheduled      pyridostigmine 60 MG tablet  Commonly known as: MESTINON   60 mg, Oral, Every 4 Hours      Trelegy Ellipta 100-62.5-25 MCG/ACT inhaler  Generic drug: Fluticasone-Umeclidin-Vilant   1 puff, Inhalation, Daily - RT         Stop These Medications    methylPREDNISolone 4 MG dose pack  Commonly known as: Medrol     O2  Commonly known as: OXYGEN            Last Lab Results:   Lab Results (most recent)     Procedure Component Value Units Date/Time    POC Glucose Once [[id number]]  (Abnormal) Collected: 06/01/23 1220    Specimen: Blood Updated: 06/01/23 1226     Glucose 230 mg/dL      Comment: Meter: [id number] : [id number] Adriano Tilley CNA       POC Glucose Once [[id number]]  (Abnormal) Collected: 06/01/23 0824    Specimen: Blood Updated: 06/01/23 0830     Glucose 329 mg/dL      Comment: Meter: [id number] : [id number] Adriano Tilley CNA       Procalcitonin [[id number]]   "(Normal) Collected: 05/31/23 0842    Specimen: Blood Updated: 05/31/23 1552     Procalcitonin 0.05 ng/mL     Narrative:      As a Marker for Sepsis (Non-Neonates):    1. <0.5 ng/mL represents a low risk of severe sepsis and/or septic shock.  2. >2 ng/mL represents a high risk of severe sepsis and/or septic shock.    As a Marker for Lower Respiratory Tract Infections that require antibiotic therapy:    PCT on Admission    Antibiotic Therapy       6-12 Hrs later    >0.5                Strongly Recommended  >0.25 - <0.5        Recommended   0.1 - 0.25          Discouraged              Remeasure/reassess PCT  <0.1                Strongly Discouraged     Remeasure/reassess PCT    As 28 day mortality risk marker: \"Change in Procalcitonin Result\" (>80% or <=80%) if Day 0 (or Day 1) and Day 4 values are available. Refer to http://www.[facility] [place]-pct-calculator.com    Change in PCT <=80%  A decrease of PCT levels below or equal to 80% defines a positive change in PCT test result representing a higher risk for 28-day all-cause mortality of patients diagnosed with severe sepsis for septic shock.    Change in PCT >80%  A decrease of PCT levels of more than 80% defines a negative change in PCT result representing a lower risk for 28-day all-cause mortality of patients diagnosed with severe sepsis or septic shock.       Respiratory Culture - Sputum, Cough [[id number]] Collected: 05/30/23 0627    Specimen: Sputum from Cough Updated: 05/31/23 1202     Respiratory Culture Rejected     Gram Stain Rare (1+) Epithelial cells seen      Few (2+) WBCs seen      Mixed bacterial morphotypes seen on Gram Stain    Narrative:      Specimen rejected due to oropharyngeal contamination. Please reorder and recollect specimen if clinically necessary.    Comprehensive Metabolic Panel [[id number]]  (Abnormal) Collected: 05/31/23 0842    Specimen: Blood Updated: 05/31/23 0902     Glucose 251 mg/dL      BUN 19 mg/dL      Creatinine 0.68 mg/dL      Sodium 137 " mmol/L      Potassium 4.9 mmol/L      Chloride 97 mmol/L      CO2 30.7 mmol/L      Calcium 9.3 mg/dL      Total Protein 7.1 g/dL      Albumin 4.4 g/dL      ALT (SGPT) 14 U/L      AST (SGOT) 12 U/L      Alkaline Phosphatase 43 U/L      Total Bilirubin 0.3 mg/dL      Globulin 2.7 gm/dL      A/G Ratio 1.6 g/dL      BUN/Creatinine Ratio 27.9     Anion Gap 9.3 mmol/L      eGFR 100.0 mL/min/1.73     Narrative:      GFR Normal >60  Chronic Kidney Disease <60  Kidney Failure <15      Respiratory Panel PCR w/COVID-19(SARS-CoV-2) ZEUS/SHAW/FRANCIA/PAD/COR/MAD/GIL In-House, NP Swab in UTM/VTM, 3-4 HR TAT - Swab, Nasopharynx [[id number]]  (Normal) Collected: 05/29/23 2247    Specimen: Swab from Nasopharynx Updated: 05/30/23 1129     ADENOVIRUS, PCR Not Detected     Coronavirus 229E Not Detected     Coronavirus HKU1 Not Detected     Coronavirus NL63 Not Detected     Coronavirus OC43 Not Detected     COVID19 Not Detected     Human Metapneumovirus Not Detected     Human Rhinovirus/Enterovirus Not Detected     Influenza A PCR Not Detected     Influenza B PCR Not Detected     Parainfluenza Virus 1 Not Detected     Parainfluenza Virus 2 Not Detected     Parainfluenza Virus 3 Not Detected     Parainfluenza Virus 4 Not Detected     RSV, PCR Not Detected     Bordetella pertussis pcr Not Detected     Bordetella parapertussis PCR Not Detected     Chlamydophila pneumoniae PCR Not Detected     Mycoplasma pneumo by PCR Not Detected    Narrative:      In the setting of a positive respiratory panel with a viral infection PLUS a negative procalcitonin without other underlying concern for bacterial infection, consider observing off antibiotics or discontinuation of antibiotics and continue supportive care. If the respiratory panel is positive for atypical bacterial infection (Bordetella pertussis, Chlamydophila pneumoniae, or Mycoplasma pneumoniae), consider antibiotic de-escalation to target atypical bacterial infection.    S. Pneumo Ag Urine or CSF -  "Urine, Urine, Clean Catch [[id number]]  (Normal) Collected: 05/29/23 2302    Specimen: Urine, Clean Catch Updated: 05/29/23 2328     Strep Pneumo Ag Negative    Legionella Antigen, Urine - Urine, Urine, Clean Catch [[id number]]  (Normal) Collected: 05/29/23 2302    Specimen: Urine, Clean Catch Updated: 05/29/23 2328     LEGIONELLA ANTIGEN, URINE Negative    Procalcitonin [[id number]]  (Normal) Collected: 05/29/23 1925    Specimen: Blood Updated: 05/29/23 2258     Procalcitonin 0.07 ng/mL     Narrative:      As a Marker for Sepsis (Non-Neonates):    1. <0.5 ng/mL represents a low risk of severe sepsis and/or septic shock.  2. >2 ng/mL represents a high risk of severe sepsis and/or septic shock.    As a Marker for Lower Respiratory Tract Infections that require antibiotic therapy:    PCT on Admission    Antibiotic Therapy       6-12 Hrs later    >0.5                Strongly Recommended  >0.25 - <0.5        Recommended   0.1 - 0.25          Discouraged              Remeasure/reassess PCT  <0.1                Strongly Discouraged     Remeasure/reassess PCT    As 28 day mortality risk marker: \"Change in Procalcitonin Result\" (>80% or <=80%) if Day 0 (or Day 1) and Day 4 values are available. Refer to http://www.[facility]-pct-calculator.com    Change in PCT <=80%  A decrease of PCT levels below or equal to 80% defines a positive change in PCT test result representing a higher risk for 28-day all-cause mortality of patients diagnosed with severe sepsis for septic shock.    Change in PCT >80%  A decrease of PCT levels of more than 80% defines a negative change in PCT result representing a lower risk for 28-day all-cause mortality of patients diagnosed with severe sepsis or septic shock.       TSH [[id number]]  (Normal) Collected: 05/29/23 1925    Specimen: Blood Updated: 05/29/23 2258     TSH 1.050 uIU/mL     Hemoglobin A1c [[id number]]  (Abnormal) Collected: 05/29/23 1925    Specimen: Blood Updated: 05/29/23 2252     Hemoglobin " A1C 7.80 %     Narrative:      Hemoglobin A1C Ranges:    Increased Risk for Diabetes  5.7% to 6.4%  Diabetes                     >= 6.5%  Diabetic Goal                < 7.0%    [facility] Draw [[id number]] Collected: 05/29/23 1925    Specimen: Blood Updated: 05/29/23 2030    Narrative:      The following orders were created for panel order [facility] Draw.  Procedure                               Abnormality         Status                     ---------                               -----------         ------                     Green Top (Gel)[[id number]]                                  Final result               Lavender Top[[id number]]                                     Final result               Gold Top - SST[[id number]]                                                              Light Blue Top[[id number]]                                   Final result                 Please view results for these tests on the individual orders.    Green Top (Gel) [[id number]] Collected: 05/29/23 1925    Specimen: Blood Updated: 05/29/23 2030     Extra Tube Hold for add-ons.     Comment: Auto resulted.       Lavender Top [[id number]] Collected: 05/29/23 1925    Specimen: Blood Updated: 05/29/23 2030     Extra Tube hold for add-on     Comment: Auto resulted       Light Blue Top [[id number]] Collected: 05/29/23 1925    Specimen: Blood Updated: 05/29/23 2030     Extra Tube Hold for add-ons.     Comment: Auto resulted       BNP [[id number]]  (Normal) Collected: 05/29/23 1925    Specimen: Blood Updated: 05/29/23 2029     proBNP <36.0 pg/mL     Narrative:      Among patients with dyspnea, NT-proBNP is highly sensitive for the detection of acute congestive heart failure. In addition NT-proBNP of <300 pg/ml effectively rules out acute congestive heart failure with 99% negative predictive value.    Results may be falsely decreased if patient taking Biotin.      Single High Sensitivity Troponin T [[id number]]  (Abnormal) Collected: 05/29/23 1925     Specimen: Blood Updated: 05/29/23 1949     HS Troponin T 26 ng/L     Narrative:      High Sensitive Troponin T Reference Range:  <10.0 ng/L- Negative Female for AMI  <15.0 ng/L- Negative Male for AMI  >=10 - Abnormal Female indicating possible myocardial injury.  >=15 - Abnormal Male indicating possible myocardial injury.   Clinicians would have to utilize clinical acumen, EKG, Troponin, and serial changes to determine if it is an Acute Myocardial Infarction or myocardial injury due to an underlying chronic condition.         Comprehensive Metabolic Panel [[id number]]  (Abnormal) Collected: 05/29/23 1925    Specimen: Blood Updated: 05/29/23 1947     Glucose 184 mg/dL      BUN 16 mg/dL      Creatinine 0.81 mg/dL      Sodium 139 mmol/L      Potassium 4.4 mmol/L      Chloride 98 mmol/L      CO2 31.6 mmol/L      Calcium 9.7 mg/dL      Total Protein 6.7 g/dL      Albumin 4.3 g/dL      ALT (SGPT) 16 U/L      AST (SGOT) 14 U/L      Alkaline Phosphatase 48 U/L      Total Bilirubin <0.2 mg/dL      Globulin 2.4 gm/dL      A/G Ratio 1.8 g/dL      BUN/Creatinine Ratio 19.8     Anion Gap 9.4 mmol/L      eGFR 94.8 mL/min/1.73     Narrative:      GFR Normal >60  Chronic Kidney Disease <60  Kidney Failure <15      CBC & Differential [[id number]]  (Abnormal) Collected: 05/29/23 1925    Specimen: Blood Updated: 05/29/23 1933    Narrative:      The following orders were created for panel order CBC & Differential.  Procedure                               Abnormality         Status                     ---------                               -----------         ------                     CBC Auto Differential[[id number]]        Abnormal            Final result                 Please view results for these tests on the individual orders.    CBC Auto Differential [[id number]]  (Abnormal) Collected: 05/29/23 1925    Specimen: Blood Updated: 05/29/23 1933     WBC 8.20 10*3/mm3      RBC 4.42 10*6/mm3      Hemoglobin 13.7 g/dL      Hematocrit 44.5  %      .7 fL      MCH 31.0 pg      MCHC 30.8 g/dL      RDW 13.3 %      RDW-SD 49.5 fl      MPV 11.8 fL      Platelets 191 10*3/mm3      Neutrophil % 66.3 %      Lymphocyte % 22.3 %      Monocyte % 7.6 %      Eosinophil % 2.3 %      Basophil % 1.0 %      Immature Grans % 0.5 %      Neutrophils, Absolute 5.44 10*3/mm3      Lymphocytes, Absolute 1.83 10*3/mm3      Monocytes, Absolute 0.62 10*3/mm3      Eosinophils, Absolute 0.19 10*3/mm3      Basophils, Absolute 0.08 10*3/mm3      Immature Grans, Absolute 0.04 10*3/mm3      nRBC 0.0 /100 WBC         Imaging Results (Most Recent)     Procedure Component Value Units Date/Time    XR Chest 2 View [[id number]] Collected: 05/29/23 2033     Updated: 05/29/23 2038    Narrative:      CHEST X-RAY PA LATERAL         HISTORY:  Short of air this evening with history of COPD asthma hypertension and  diabetes.     TECHNIQUE:  PA and lateral views the chest reviewed. 3 films submitted. COMPARISON  chest x-ray 03/31/2023.     FINDINGS:  There is no pleural effusion. No pneumothorax. Heart size is top normal.  There is some linear atelectasis or scarring at lung bases that is  improved from the previous study. No acute parenchymal infiltrate or  acute congestive heart failure. The lungs are hyperinflated consistent  with chronic obstructive lung disease.       Impression:      1. FINDINGS of chronic obstructive lung disease. Linear atelectasis or  scarring again seen at lung bases. Appearance improved from 03/31/2023.  No acute infiltrate appreciated.     This report was finalized on 5/29/2023 8:35 PM by Dr. Deepthi Burks MD.             PROCEDURES      Condition on Discharge:  Stable, Improved.     Physical Exam at Discharge  Vital Signs  Temp:  [97 °F (36.1 °C)-98.2 °F (36.8 °C)] 97 °F (36.1 °C)  Heart Rate:  [] 88  Resp:  [16-24] 20  BP: (109-127)/(56-82) 124/82    Physical Exam  Please see today's progress note     Discharge Disposition  To home in stable condition      Visiting Nurse:    No     Home PT/OT:  No    Home Safety Evaluation:  No     DME  Home oxygen therapy at 2L     Discharge Diet:      Dietary Orders (From admission, onward)     Start     Ordered    05/29/23 2212  Diet: Cardiac Diets, Diabetic Diets; Healthy Heart (2-3 Na+); Consistent Carbohydrate; Texture: Regular Texture (IDDSI 7); Fluid Consistency: Thin (IDDSI 0)  Diet Effective Now        References:    Diet Order [place]   Question Answer Comment   Diets: Cardiac Diets    Diets: Diabetic Diets    Cardiac Diet: Healthy Heart (2-3 Na+)    Diabetic Diet: Consistent Carbohydrate    Texture: Regular Texture (IDDSI 7)    Fluid Consistency: Thin (IDDSI 0)        05/29/23 2211                Activity at Discharge:  As tolerated    Pre-discharge education  Smoking cessation, COPD       Follow-up Appointments  No future appointments.  Additional Instructions for the Follow-ups that You Need to Schedule     Discharge Follow-up with PCP   As directed       Currently Documented PCP:    Ney Kathleen MD    PCP Phone Number:    [phone]     Follow Up Details: within 1 week         Discharge Follow-up with Specialty: Pulmonology, 1st available bapstist provider in [place]   As directed      Specialty: Pulmonology, 1st available bapstist provider in [place]               Test Results Pending at Discharge      Discharge over 30 min (if over 30 minutes give explanation as to why it took greater than 30 minutes)  Secondary to:   Coordination of care/follow up  Medication reconciliation  D/W patient      At [facility], we believe that sharing information builds trust and better relationships. You are receiving this note because you recently visited [facility]. It is possible you will see health information before a provider has talked with you about it. This kind of information can be easy to misunderstand. To help you fully understand what it means for your health, we urge you to discuss this note with  your provider.    Hussain Gutierrez DO  06/01/23  14:38 EDT

## 2023-06-02 NOTE — CASE MANAGEMENT/SOCIAL WORK
Case Management Discharge Note      Final Note: dc home    Provided Post Acute Provider List?: Refused  Refused Provider List Comment: pt declined    Selected Continued Care - Discharged on 6/1/2023 Admission date: 5/29/2023 - Discharge disposition: Home or Self Care    Destination    No services have been selected for the patient.              Durable Medical Equipment    No services have been selected for the patient.              Dialysis/Infusion    No services have been selected for the patient.              Home Medical Care    No services have been selected for the patient.              Therapy    No services have been selected for the patient.              Community Resources    No services have been selected for the patient.              Community & DME    No services have been selected for the patient.                       Final Discharge Disposition Code: 01 - home or self-care

## 2023-06-02 NOTE — OUTREACH NOTE
Prep Survey      Flowsheet Row Responses   [facility] facility patient discharged from? [place]   Is LACE score < 7 ? No   Eligibility Readm Mgmt   Discharge diagnosis Acute on chronic hypoxic respiratory failure   Does the patient have one of the following disease processes/diagnoses(primary or secondary)? COPD   Does the patient have Home health ordered? No   Is there a DME ordered? No   Prep survey completed? Yes            Thao BRYAN - Registered Nurse

## 2023-06-07 ENCOUNTER — READMISSION MANAGEMENT (OUTPATIENT)
Dept: CALL CENTER | Facility: HOSPITAL | Age: 73
End: 2023-06-07
Payer: MEDICARE

## 2023-06-07 NOTE — OUTREACH NOTE
COPD/PN Week 1 Survey      Flowsheet Row Responses   [facility] patient discharged from? [place]   Does the patient have one of the following disease processes/diagnoses(primary or secondary)? COPD   Week 1 attempt successful? Yes   Call start time 1551   Call end time 1553   Discharge diagnosis Acute on chronic hypoxic respiratory failure   Meds reviewed with patient/caregiver? Yes   Is the patient having any side effects they believe may be caused by any medication additions or changes? No   Does the patient have all medications ordered at discharge? Yes   Is the patient taking all medications as directed (includes completed medication regime)? Yes   Does the patient have a primary care provider?  Yes   Does the patient have an appointment with their PCP or specialist within 7 days of discharge? Greater than 7 days   Comments regarding PCP PCP appointment is Friday 6/9/23   What is preventing the patient from scheduling follow up appointments within 7 days of discharge? Earlier appointment not available   Nursing Interventions Verified appointment date/time/provider   Has the patient kept scheduled appointments due by today? N/A   Has home health visited the patient within 72 hours of discharge? N/A   Has all DME been delivered? No   Pulse Ox monitoring None   Psychosocial issues? No   Did the patient receive a copy of their discharge instructions? Yes   Nursing interventions Reviewed instructions with patient   What is the patient's perception of their health status since discharge? Improving   Nursing Interventions Nurse provided patient education   If the patient is a current smoker, are they able to teach back resources for cessation? [id number]   Is the patient/caregiver able to teach back the hierarchy of who to call/visit for symptoms/problems? PCP, Specialist, Home health nurse, Urgent Care, ED, 911 Yes   Is the patient able to teach back COPD zones? Yes   Nursing interventions Education provided  on various zones   Patient reports what zone on this call? Green Zone   Green Zone Reports doing well, Breathing without shortness of breath, Usual activity and exercise level, Usual amounts of cough and phlegm/mucous, Slept well last night, Appetite is good   Green Zone interventions: Take daily medications   Week 1 call completed? Yes            Hoa HOPKINS - Licensed Nurse

## 2023-06-15 ENCOUNTER — READMISSION MANAGEMENT (OUTPATIENT)
Dept: CALL CENTER | Facility: HOSPITAL | Age: 73
End: 2023-06-15
Payer: MEDICARE

## 2023-06-15 NOTE — OUTREACH NOTE
COPD/PN Week 2 Survey      Flowsheet Row Responses   [facility] patient discharged from? [place]   Does the patient have one of the following disease processes/diagnoses(primary or secondary)? COPD   Week 2 attempt successful? No   Unsuccessful attempts Attempt 1            Beth HOPKINS - Registered Nurse

## 2023-07-31 ENCOUNTER — HOSPITAL ENCOUNTER (EMERGENCY)
Facility: HOSPITAL | Age: 73
Discharge: HOME | End: 2023-08-01
Attending: EMERGENCY MEDICINE | Admitting: EMERGENCY MEDICINE
Payer: MEDICARE

## 2023-07-31 ENCOUNTER — APPOINTMENT (OUTPATIENT)
Dept: GENERAL RADIOLOGY | Facility: HOSPITAL | Age: 73
End: 2023-07-31
Payer: MEDICARE

## 2023-07-31 DIAGNOSIS — J44.1 COPD EXACERBATION: Primary | ICD-10-CM

## 2023-07-31 LAB
ALBUMIN SERPL-MCNC: 4.3 G/DL (ref 3.5–5.2)
ALBUMIN/GLOB SERPL: 1.7 G/DL
ALP SERPL-CCNC: 44 U/L (ref 39–117)
ALT SERPL W P-5'-P-CCNC: 15 U/L (ref 1–41)
ANION GAP SERPL CALCULATED.3IONS-SCNC: 7.6 MMOL/L (ref 5–15)
AST SERPL-CCNC: 14 U/L (ref 1–40)
BASOPHILS # BLD AUTO: 0.05 10*3/MM3 (ref 0–0.2)
BASOPHILS NFR BLD AUTO: 0.7 % (ref 0–1.5)
BILIRUB SERPL-MCNC: 0.3 MG/DL (ref 0–1.2)
BUN SERPL-MCNC: 14 MG/DL (ref 8–23)
BUN/CREAT SERPL: 17.7 (ref 7–25)
CALCIUM SPEC-SCNC: 9.2 MG/DL (ref 8.6–10.5)
CHLORIDE SERPL-SCNC: 96 MMOL/L (ref 98–107)
CO2 SERPL-SCNC: 32.4 MMOL/L (ref 22–29)
CREAT SERPL-MCNC: 0.79 MG/DL (ref 0.76–1.27)
D DIMER PPP FEU-MCNC: 0.5 MCGFEU/ML (ref 0–0.7)
D-LACTATE SERPL-SCNC: 1.5 MMOL/L (ref 0.5–2)
DEPRECATED RDW RBC AUTO: 54.5 FL (ref 37–54)
EGFRCR SERPLBLD CKD-EPI 2021: 95.6 ML/MIN/1.73
EOSINOPHIL # BLD AUTO: 0.17 10*3/MM3 (ref 0–0.4)
EOSINOPHIL NFR BLD AUTO: 2.2 % (ref 0.3–6.2)
ERYTHROCYTE [DISTWIDTH] IN BLOOD BY AUTOMATED COUNT: 14.5 % (ref 12.3–15.4)
GLOBULIN UR ELPH-MCNC: 2.5 GM/DL
GLUCOSE SERPL-MCNC: 139 MG/DL (ref 65–99)
HCT VFR BLD AUTO: 46 % (ref 37.5–51)
HGB BLD-MCNC: 14 G/DL (ref 13–17.7)
HOLD SPECIMEN: NORMAL
HOLD SPECIMEN: NORMAL
IMM GRANULOCYTES # BLD AUTO: 0.04 10*3/MM3 (ref 0–0.05)
IMM GRANULOCYTES NFR BLD AUTO: 0.5 % (ref 0–0.5)
LYMPHOCYTES # BLD AUTO: 1.61 10*3/MM3 (ref 0.7–3.1)
LYMPHOCYTES NFR BLD AUTO: 21.2 % (ref 19.6–45.3)
MCH RBC QN AUTO: 31 PG (ref 26.6–33)
MCHC RBC AUTO-ENTMCNC: 30.4 G/DL (ref 31.5–35.7)
MCV RBC AUTO: 101.8 FL (ref 79–97)
MONOCYTES # BLD AUTO: 0.61 10*3/MM3 (ref 0.1–0.9)
MONOCYTES NFR BLD AUTO: 8 % (ref 5–12)
NEUTROPHILS NFR BLD AUTO: 5.12 10*3/MM3 (ref 1.7–7)
NEUTROPHILS NFR BLD AUTO: 67.4 % (ref 42.7–76)
NRBC BLD AUTO-RTO: 0 /100 WBC (ref 0–0.2)
NT-PROBNP SERPL-MCNC: <36 PG/ML (ref 0–900)
PLAT MORPH BLD: NORMAL
PLATELET # BLD AUTO: 175 10*3/MM3 (ref 140–450)
PMV BLD AUTO: 12.8 FL (ref 6–12)
POTASSIUM SERPL-SCNC: 4.1 MMOL/L (ref 3.5–5.2)
PROT SERPL-MCNC: 6.8 G/DL (ref 6–8.5)
RBC # BLD AUTO: 4.52 10*6/MM3 (ref 4.14–5.8)
RBC MORPH BLD: NORMAL
SODIUM SERPL-SCNC: 136 MMOL/L (ref 136–145)
TROPONIN T SERPL HS-MCNC: 26 NG/L
WBC MORPH BLD: NORMAL
WBC NRBC COR # BLD: 7.6 10*3/MM3 (ref 3.4–10.8)
WHOLE BLOOD HOLD COAG: NORMAL
WHOLE BLOOD HOLD SPECIMEN: NORMAL

## 2023-07-31 PROCEDURE — 83605 ASSAY OF LACTIC ACID: CPT | Performed by: EMERGENCY MEDICINE

## 2023-07-31 PROCEDURE — 85379 FIBRIN DEGRADATION QUANT: CPT | Performed by: EMERGENCY MEDICINE

## 2023-07-31 PROCEDURE — 85007 BL SMEAR W/DIFF WBC COUNT: CPT | Performed by: EMERGENCY MEDICINE

## 2023-07-31 PROCEDURE — 99284 EMERGENCY DEPT VISIT MOD MDM: CPT

## 2023-07-31 PROCEDURE — 84484 ASSAY OF TROPONIN QUANT: CPT | Performed by: EMERGENCY MEDICINE

## 2023-07-31 PROCEDURE — 93005 ELECTROCARDIOGRAM TRACING: CPT | Performed by: EMERGENCY MEDICINE

## 2023-07-31 PROCEDURE — 36415 COLL VENOUS BLD VENIPUNCTURE: CPT

## 2023-07-31 PROCEDURE — 80053 COMPREHEN METABOLIC PANEL: CPT | Performed by: EMERGENCY MEDICINE

## 2023-07-31 PROCEDURE — 85025 COMPLETE CBC W/AUTO DIFF WBC: CPT | Performed by: EMERGENCY MEDICINE

## 2023-07-31 PROCEDURE — 71045 X-RAY EXAM CHEST 1 VIEW: CPT

## 2023-07-31 PROCEDURE — 87040 BLOOD CULTURE FOR BACTERIA: CPT | Performed by: EMERGENCY MEDICINE

## 2023-07-31 PROCEDURE — 83880 ASSAY OF NATRIURETIC PEPTIDE: CPT | Performed by: EMERGENCY MEDICINE

## 2023-07-31 PROCEDURE — 94640 AIRWAY INHALATION TREATMENT: CPT

## 2023-07-31 RX ORDER — IPRATROPIUM BROMIDE AND ALBUTEROL SULFATE 2.5; .5 MG/3ML; MG/3ML
3 SOLUTION RESPIRATORY (INHALATION) ONCE
Status: COMPLETED | OUTPATIENT
Start: 2023-07-31 | End: 2023-07-31

## 2023-07-31 RX ORDER — SODIUM CHLORIDE 0.9 % (FLUSH) 0.9 %
10 SYRINGE (ML) INJECTION AS NEEDED
Status: DISCONTINUED | OUTPATIENT
Start: 2023-07-31 | End: 2023-08-01 | Stop reason: HOSPADM

## 2023-07-31 RX ADMIN — IPRATROPIUM BROMIDE AND ALBUTEROL SULFATE 3 ML: .5; 3 SOLUTION RESPIRATORY (INHALATION) at 21:32

## 2023-08-01 VITALS
SYSTOLIC BLOOD PRESSURE: 124 MMHG | HEART RATE: 102 BPM | HEIGHT: 74 IN | DIASTOLIC BLOOD PRESSURE: 83 MMHG | RESPIRATION RATE: 22 BRPM | BODY MASS INDEX: 35.23 KG/M2 | OXYGEN SATURATION: 94 % | WEIGHT: 274.5 LBS | TEMPERATURE: 98.5 F

## 2023-08-01 LAB — QT INTERVAL: 330 MS

## 2023-08-01 PROCEDURE — 63710000001 PREDNISONE PER 1 MG: Performed by: EMERGENCY MEDICINE

## 2023-08-01 RX ORDER — PREDNISONE 20 MG/1
60 TABLET ORAL DAILY
Qty: 12 TABLET | Refills: 0 | Status: SHIPPED | OUTPATIENT
Start: 2023-08-01 | End: 2023-08-05

## 2023-08-01 RX ORDER — PREDNISONE 20 MG/1
60 TABLET ORAL ONCE
Status: COMPLETED | OUTPATIENT
Start: 2023-08-01 | End: 2023-08-01

## 2023-08-01 RX ADMIN — PREDNISONE 60 MG: 20 TABLET ORAL at 00:30

## 2023-08-01 NOTE — ED PROVIDER NOTES
Subjective   History of Present Illness  72-year-old male presents with shortness of breath.  Patient has longstanding COPD, is on 2 L home O2, reports that he finished a course of steroids 2 to 3 days ago.  Since completing course of steroids he has had gradually worsening shortness of breath.  Occasional nonproductive cough.  He has been using nebulizers at home frequently.  No fevers or chills.  No vomiting or diarrhea.  Patient has also noted some swelling to his feet and ankles over the past few days.  He has not had any chest pain.  No palpitations or syncope.    Review of Systems   All other systems reviewed and are negative.    Past Medical History:   Diagnosis Date    Arthritis of back     Arthritis of neck     Asthma     COPD (chronic obstructive pulmonary disease)     Depression     DM (diabetes mellitus)     GERD (gastroesophageal reflux disease)     Hip arthrosis     Hyperlipidemia     Hypertension     Knee swelling     Myasthenia gravis     Periarthritis of shoulder     Rotator cuff syndrome        No Known Allergies    Past Surgical History:   Procedure Laterality Date    APPENDECTOMY      CHOLECYSTECTOMY      ROTATOR CUFF REPAIR Right        Family History   Problem Relation Age of Onset    Diabetes Mother     COPD Father        Social History     Socioeconomic History    Marital status:    Tobacco Use    Smoking status: Every Day     Packs/day: 0.50     Years: 58.00     Pack years: 29.00     Types: Cigarettes    Smokeless tobacco: Never    Tobacco comments:     pt asking for nicotine patch   Vaping Use    Vaping Use: Never used   Substance and Sexual Activity    Alcohol use: No    Drug use: No    Sexual activity: Defer           Objective   Physical Exam  Constitutional:       General: He is not in acute distress.     Appearance: He is not toxic-appearing.   HENT:      Head: Normocephalic and atraumatic.      Nose: Nose normal.      Mouth/Throat:      Mouth: Mucous membranes are moist.       Pharynx: Oropharynx is clear.   Eyes:      Extraocular Movements: Extraocular movements intact.      Pupils: Pupils are equal, round, and reactive to light.   Cardiovascular:      Rate and Rhythm: Normal rate and regular rhythm.      Heart sounds: Normal heart sounds.   Pulmonary:      Comments: Diminished throughout.  No increased work of breathing  Abdominal:      General: There is no distension.      Palpations: Abdomen is soft.      Tenderness: There is no abdominal tenderness.   Musculoskeletal:         General: No swelling, tenderness, deformity or signs of injury. Normal range of motion.      Comments: 1+ bilateral lower extremity edema to ankles   Skin:     General: Skin is warm and dry.   Neurological:      General: No focal deficit present.      Mental Status: He is alert and oriented to person, place, and time. Mental status is at baseline.   Psychiatric:         Mood and Affect: Mood normal.         Behavior: Behavior normal.         Thought Content: Thought content normal.         Judgment: Judgment normal.       Procedures           ED Course  ED Course as of 08/01/23 0030   Mon Jul 31, 2023   2309 EKG obtained at 2050 shows sinus tachycardia with a rate of 104, normal AK and QTc, no ST segment or T wave changes. [TD]   e Aug 01, 2023   0027 Patient's work-up here is overall reassuring.  He is on his home level of oxygen with oxygen saturations in the low 90s.  No increased work of breathing after breathing treatment here.  Troponin is technically elevated but appears to be at patient's baseline over several values over the past few months.  D-dimer and BNP are negative.  Patient describes symptoms quite consistent with many previous past COPD exacerbations.  Patient seems to have a pattern of quickly decompensating after finishing courses of steroids that seems to be what happened again today.  I certainly understand the downside of again placing patient on steroids but patient seems to be quite  frustrated with the situation and states he has been miserable at home, wants to try steroids again.  He understands the risks of doing so.  He has a pulmonologist appointment scheduled on Wednesday.  Had long discussion with patient and daughter about goals of care, the possible need for prolonged steroid course versus chronic low-level dosing.  Ultimately I do not think there is any benefit to admitting patient at this point.  He does not describe any kind of infectious symptoms so I am going to hold off on antibiotics at this point.  We discussed expected course, return precautions. [TD]      ED Course User Index  [TD] Gio Judd MD                                           Medical Decision Making  Problems Addressed:  COPD exacerbation: complicated acute illness or injury    Amount and/or Complexity of Data Reviewed  Labs: ordered.  Radiology: ordered.  ECG/medicine tests: ordered.    Risk  Prescription drug management.        Final diagnoses:   COPD exacerbation       ED Disposition  ED Disposition       ED Disposition   Discharge    Condition   Stable    Comment   --               Pulmonologist    On 8/2/2023  Keep scheduled appointment. Ask about possible need for prolonged steroid course         Medication List        New Prescriptions      predniSONE 20 MG tablet  Commonly known as: DELTASONE  Take 3 tablets by mouth Daily for 4 days.               Where to Get Your Medications        These medications were sent to [id number] - [place], [place]  [facility] [facility] AT [address] [place] [address] - [phone]  - [phone] [address], [facility] [address]      Phone: [phone]   predniSONE 20 MG tablet            Gio Judd MD  08/01/23 0030

## 2023-08-01 NOTE — ED NOTES
"Patient called out. This RN entered patient's room and patient stated, \"take this damn IV out of my arm, I'm ready to go since ya'll aint gonna do anything for me\". This RN educated patient that blood work and breathing treatments have been done and completed during his time in ED. Patient stated, \"I've been here 3 damn hours, it don't take this long\". This RN re-educated patient that labs and treatments have been completed and do take time to result. Patient stated he needed to use restroom and wanted \"this damn IV out of me\". This RN educated patient restroom options could be provided if he wanted to stay for final results of labs or if patient wanted to leave, AMA paperwork could be provided. Patient stated that he would take a urinal and \"I guess wait but I'm not going to wait all damn night\".   "

## 2023-08-02 ENCOUNTER — HOSPITAL ENCOUNTER (OUTPATIENT)
Dept: PULMONOLOGY | Facility: HOSPITAL | Age: 73
Discharge: HOME | End: 2023-08-02
Admitting: INTERNAL MEDICINE
Payer: MEDICARE

## 2023-08-02 ENCOUNTER — TRANSCRIBE ORDERS (OUTPATIENT)
Dept: CARDIOLOGY | Facility: HOSPITAL | Age: 73
End: 2023-08-02
Payer: MEDICARE

## 2023-08-02 ENCOUNTER — TRANSCRIBE ORDERS (OUTPATIENT)
Dept: PULMONOLOGY | Facility: HOSPITAL | Age: 73
End: 2023-08-02
Payer: MEDICARE

## 2023-08-02 DIAGNOSIS — J44.9 CHRONIC OBSTRUCTIVE PULMONARY DISEASE, UNSPECIFIED COPD TYPE: Primary | ICD-10-CM

## 2023-08-02 DIAGNOSIS — J44.9 CHRONIC OBSTRUCTIVE PULMONARY DISEASE, UNSPECIFIED COPD TYPE: ICD-10-CM

## 2023-08-02 LAB
ARTERIAL PATENCY WRIST A: POSITIVE
ATMOSPHERIC PRESS: 742 MMHG
BASE EXCESS BLDA CALC-SCNC: 4.5 MMOL/L (ref 0–2)
BDY SITE: ABNORMAL
BODY TEMPERATURE: 37 C
HCO3 BLDA-SCNC: 30.7 MMOL/L (ref 20–26)
HGB BLDA-MCNC: 13.4 G/DL (ref 14–18)
Lab: ABNORMAL
Lab: ABNORMAL
MODALITY: ABNORMAL
NOTIFIED BY: ABNORMAL
NOTIFIED WHO: ABNORMAL
PCO2 BLDA: 51.5 MM HG (ref 35–45)
PCO2 TEMP ADJ BLD: 51.5 MM HG (ref 35–45)
PH BLDA: 7.38 PH UNITS (ref 7.35–7.45)
PH, TEMP CORRECTED: 7.38 PH UNITS (ref 7.35–7.45)
PO2 BLDA: 54.6 MM HG (ref 83–108)
PO2 TEMP ADJ BLD: 54.6 MM HG (ref 83–108)
SAO2 % BLDCOA: 89.2 % (ref 94–99)

## 2023-08-02 PROCEDURE — 36600 WITHDRAWAL OF ARTERIAL BLOOD: CPT

## 2023-08-02 PROCEDURE — 94618 PULMONARY STRESS TESTING: CPT

## 2023-08-02 PROCEDURE — 82803 BLOOD GASES ANY COMBINATION: CPT

## 2023-08-05 LAB
BACTERIA SPEC AEROBE CULT: NORMAL
BACTERIA SPEC AEROBE CULT: NORMAL